# Patient Record
Sex: MALE | Race: BLACK OR AFRICAN AMERICAN | Employment: UNEMPLOYED | URBAN - METROPOLITAN AREA
[De-identification: names, ages, dates, MRNs, and addresses within clinical notes are randomized per-mention and may not be internally consistent; named-entity substitution may affect disease eponyms.]

---

## 2017-02-04 RX ORDER — LEVOTHYROXINE SODIUM 50 UG/1
50 TABLET ORAL
Qty: 90 TAB | Refills: 2 | Status: SHIPPED | OUTPATIENT
Start: 2017-02-04 | End: 2017-05-11 | Stop reason: SDUPTHER

## 2017-03-08 DIAGNOSIS — D50.9 IRON DEFICIENCY ANEMIA, UNSPECIFIED IRON DEFICIENCY ANEMIA TYPE: ICD-10-CM

## 2017-03-10 RX ORDER — MELATONIN
1000 DAILY
Qty: 90 TAB | Refills: 4 | Status: SHIPPED | OUTPATIENT
Start: 2017-03-10 | End: 2019-04-19 | Stop reason: SDUPTHER

## 2017-03-10 RX ORDER — FERROUS SULFATE 220 (44)/5
6.8 SOLUTION, ORAL ORAL DAILY
Qty: 473 ML | Refills: 3 | Status: SHIPPED | OUTPATIENT
Start: 2017-03-10 | End: 2017-05-01 | Stop reason: SDUPTHER

## 2017-03-29 DIAGNOSIS — G40.209 PARTIAL EPILEPSY WITH IMPAIRMENT OF CONSCIOUSNESS (HCC): ICD-10-CM

## 2017-03-29 RX ORDER — VALPROIC ACID 250 MG/5ML
SOLUTION ORAL
Qty: 500 ML | Refills: 1 | Status: SHIPPED | OUTPATIENT
Start: 2017-03-29 | End: 2017-08-18 | Stop reason: SDUPTHER

## 2017-03-29 NOTE — TELEPHONE ENCOUNTER
Requested Prescriptions     Pending Prescriptions Disp Refills    valproate (DEPAKENE) 250 mg/5 mL syrup 500 mL 1     Sig: Take 10 ml (500 mg) in morning and 10 ml(500mg) in the evening.

## 2017-05-01 DIAGNOSIS — D50.9 IRON DEFICIENCY ANEMIA, UNSPECIFIED IRON DEFICIENCY ANEMIA TYPE: ICD-10-CM

## 2017-05-01 RX ORDER — FERROUS SULFATE 220 (44)/5
6.8 SOLUTION, ORAL ORAL DAILY
Qty: 473 ML | Refills: 3 | Status: SHIPPED | OUTPATIENT
Start: 2017-05-01 | End: 2017-05-11 | Stop reason: SDUPTHER

## 2017-05-09 ENCOUNTER — OFFICE VISIT (OUTPATIENT)
Dept: INTERNAL MEDICINE CLINIC | Age: 52
End: 2017-05-09

## 2017-05-09 VITALS
HEART RATE: 69 BPM | DIASTOLIC BLOOD PRESSURE: 68 MMHG | SYSTOLIC BLOOD PRESSURE: 109 MMHG | WEIGHT: 141 LBS | HEIGHT: 63 IN | BODY MASS INDEX: 24.98 KG/M2 | OXYGEN SATURATION: 97 % | RESPIRATION RATE: 12 BRPM | TEMPERATURE: 97.9 F

## 2017-05-09 DIAGNOSIS — Z12.11 SCREEN FOR COLON CANCER: ICD-10-CM

## 2017-05-09 DIAGNOSIS — Z13.39 SCREENING FOR ALCOHOLISM: ICD-10-CM

## 2017-05-09 DIAGNOSIS — Z00.00 ROUTINE GENERAL MEDICAL EXAMINATION AT A HEALTH CARE FACILITY: Primary | ICD-10-CM

## 2017-05-09 NOTE — PROGRESS NOTES
This is an Initial Medicare Annual Wellness Exam (AWV) (Performed 12 months after IPPE or effective date of Medicare Part B enrollment, Once in a lifetime)    I have reviewed the patient's medical history in detail and updated the computerized patient record. History     Past Medical History:   Diagnosis Date    Anemia     Anxiety     Blindness of left eye     Cerebral palsy (HCC)     DEMENTIA     Down syndrome     Endocrine disease     thyroid disorder    GERD (gastroesophageal reflux disease)     Ill-defined condition     blind in left eye    Ill-defined condition     imparied gait - uses walker    Ill-defined condition     dermatitis    Ill-defined condition     cerebral palsy    Ill-defined condition     prone to decubitus ulcers    Ill-defined condition     anemia    Neurological disorder     Other ill-defined conditions(799.89)     Down syndrome    Psychiatric disorder     mental retardation    Psychiatric disorder     anxiety disorder    Se[facility]ures [facility] [address]     Sleep apnea     Thyroid disease     Unspecified epilepsy without mention of intractable epilepsy ([facility] [address]       Past Surgical History:   Procedure Laterality Date    HX OTHER SURGICAL  03/13/2015     [facility]  Dr. Richie Driscoll. David  @ [address]     Current Outpatient Prescriptions   Medication Sig Dispense Refill    multivit w-min-ferrous gluconate (CEROVITE) 9 mg iron/15 mL oral liquid Take  by mouth daily.  ferrous sulfate 220 mg (44 mg iron)/5 mL solution Take 6.8 mL by mouth daily. 473 mL 3    valproate (DEPAKENE) 250 mg/5 mL syrup Take 10 ml (500 mg) in morning and 10 ml(500mg) in the evening. 500 mL 1    cholecalciferol (VITAMIN D3) 1,000 unit tablet Take 1 Tab by mouth daily. 90 Tab 4    levothyroxine (SYNTHROID) 50 mcg tablet Take 1 Tab by mouth Daily (before breakfast). 90 Tab 2    HYDROcodone-acetaminophen (XODOL) 7.5-300 mg tablet Take  by mouth.  phenytoin (DILANTIN-125) suspension Take 4 mL by mouth two (2) times a day.  2 Bottle 5    levETIRAcetam (KEPPRA) 100 mg/mL solution 15ml by mouth twice a day. 600 mL 5    OTHER Benefiber Clear Powder. Mix two teaspoonfuls in 4-8 oz of suitable liquid and give by mouth twice daily as needed.  chlorhexidine (PERIDEX) 0.12 % solution 15 mL by Swish and Spit route two (2) times a day. Allergies   Allergen Reactions    Morphine Not Reported This Time    Tegretol [Carbamazepine] Other (comments)     \"bad reaction\" \"changes attitude\"     Family History   Problem Relation Age of Onset    Hypertension Mother     Cancer Mother     Heart Disease Father     Diabetes Father     Hypertension Father     Diabetes Brother     Cancer Maternal Grandmother      Social History   Substance Use Topics    Smoking status: Never Smoker    Smokeless tobacco: Never Used    Alcohol use No     Patient Active Problem List   Diagnosis Code    Recurrent seizures ([facility] [address] G40.909    Partial epilepsy with impairment of consciousness ([facility] [address] G40.209    Ataxia R27.0    Memory loss R41.3    Seizure ([address] R56.9    S/P placement of VNS (vagus nerve stimulation) device Z96.89    ACP (advance care planning) Z71.89         Depression Risk Factor Screening:     PHQ over the last two weeks 12/10/2015   Little interest or pleasure in doing things Not at all   Feeling down, depressed or hopeless Not at all   Total Score PHQ 2 0     Alcohol Risk Factor Screening: On any occasion during the past 3 months, have you had more than 4 drinks containing alcohol? No    Do you average more than 14 drinks per week? No    Functional Ability and Level of Safety:     Hearing Loss   none    Activities of Daily Living   Total assistance. Requires assistance with: ambulation, bathing and hygiene, feeding, continence, grooming, toileting and dressing    Fall Risk   No flowsheet data found.   Abuse Screen   Patient is not abused    Review of Systems   A comprehensive review of systems was negative except for that written in the HPI. Non responsive  inwheel chair, walks with assistance. Says one word phrases. Physical Examination     No exam data present    Evaluation of Cognitive Function:  Mood/affect:  neutral  Appearance: older than stated age  Family member/caregiver input: none- [address] [place], [place] [address]Truly worker    GEN: No apparent distress. Alert ,non verbal    EYES:  Conjunctiva clear; pupils round and reactive to light; extraocular movements are intact. EAR: External ears are normal.  Tympanic membranes are clear and without effusion. NOSE: Turbinates are within normal limits. No drainage  OROPHYARYNX: No oral lesions or exudates. NECK:  Supple; no masses; thyroid normal           LUNGS: Respirations unlabored; clear to auscultation bilaterally  CARDIOVASCULAR: Regular, rate, and rhythm without murmurs, gallops or rubs   ABDOMEN: Soft; nontender; nondistended; normoactive bowel sounds; no masses or organomegaly  NEUROLOGIC:  No focal neurologic deficits. Strength and sensation grossly intact. Coordination and gait grossly intact. EXT: Well perfused. No edema. SKIN: No obvious rashes. In wheelchair. Patient Care Team:  David Chavira MD as PCP - General (Family Practice)  Hubert Kiran NP as Nurse Practitioner (Neurology)  Jayy Powell MD as Surgeon (General Surgery)  Ben Johnston MD (Neurology)  Sharon Givens MD (Neurology)    Advice/Referrals/Counseling   Education and counseling provided:  Are appropriate based on today's review and evaluation  End-of-Life planning (with patient's consent)  Colorectal cancer screening tests      Assessment/Plan       ICD-10-CM ICD-9-CM    1. Routine general medical examination at a health care facility Z00.00 V70.0 OCCULT BLOOD, IMMUNOASSAY (FIT)   2. Screening for alcoholism Z13.89 V79.1    3. Screen for colon cancer Z12.11 V76.51 OCCULT BLOOD, IMMUNOASSAY (FIT)     Review to see of colonoscopy was done on 2015 referral- GI.    Annual FIT test. Follow-up Disposition:  Return for [facility].   Deloise Gowers, MD

## 2017-05-09 NOTE — MR AVS SNAPSHOT
Visit Information Date & Time Provider Department Dept. Phone Encounter #  
 5/9/2017  2:15 PM Todd Martin MD [facility] and [place] [id number] Follow-up Instructions Return for [facility]. Upcoming Health Maintenance Date Due DTaP/Tdap/Td series (1 - Tdap) 2/16/1986 FOBT Q 1 YEAR AGE 50-75 12/10/2016 INFLUENZA AGE 9 TO ADULT 8/1/2017 Allergies as of 5/9/2017  Review Complete On: 11/9/2016 By: Todd Martin MD  
  
 Severity Noted Reaction Type Reactions Morphine  01/22/2016    Not Reported This Time Tegretol [Carbamazepine]  01/21/2013    Other (comments) \"bad reaction\" \"changes attitude\" Current Immunizations  Reviewed on 12/10/2015 Name Date Influenza Vaccine Intradermal PF 11/13/2014 TB Skin Test (PPD) Intradermal 4/5/2016, 1/29/2013 Not reviewed this visit You Were Diagnosed With   
  
 Codes Comments Routine general medical examination at a health care facility    -  Primary ICD-10-CM: Z00.00 ICD-9-CM: V70.0 Screening for alcoholism     ICD-10-CM: Z13.89 ICD-9-CM: V79.1 Screen for colon cancer     ICD-10-CM: Z12.11 ICD-9-CM: V76.51 Vitals BP Pulse Temp Resp Height(growth percentile) Weight(growth percentile) 109/68 69 97.9 °F (36.6 °C) 12 5' 3\" (1.6 m) 141 lb (64 kg) SpO2 BMI Smoking Status 97% 24.98 kg/m2 Never Smoker BMI and BSA Data Body Mass Index Body Surface Area 24.98 kg/m 2 1.69 m 2 Preferred Pharmacy Pharmacy Name Phone [facility] [phone] Your Updated Medication List  
  
   
This list is accurate as of: 5/9/17  3:03 PM.  Always use your most recent med list.  
  
  
  
  
 CEROVITE 9 mg iron/15 mL oral liquid Generic drug:  multivit w-min-ferrous gluconate Take  by mouth daily. chlorhexidine 0.12 % solution Commonly known as:  PERIDEX 15 mL by Swish and Spit route two (2) times a day. cholecalciferol 1,000 unit tablet Commonly known as:  VITAMIN D3 Take 1 Tab by mouth daily. ferrous sulfate 220 mg (44 mg iron)/5 mL solution Take 6.8 mL by mouth daily. HYDROcodone-acetaminophen 7.5-300 mg tablet Commonly known as:  Bernette Just Take  by mouth.  
  
 levETIRAcetam 100 mg/mL solution Commonly known as:  KEPPRA 15ml by mouth twice a day. levothyroxine 50 mcg tablet Commonly known as:  SYNTHROID Take 1 Tab by mouth Daily (before breakfast). OTHER Benefiber Clear Powder. Mix two teaspoonfuls in 4-8 oz of suitable liquid and give by mouth twice daily as needed. phenytoin suspension Commonly known as:  GTINETPO-257 Take 4 mL by mouth two (2) times a day. valproate 250 mg/5 mL syrup Commonly known as:  Ulysess Cinnamon Take 10 ml (500 mg) in morning and 10 ml(500mg) in the evening. We Performed the Following OCCULT BLOOD, IMMUNOASSAY (FIT) [id number] CPT(R)] Follow-up Instructions Return for [facility]. Introducing [facility]! Larry Pearson introduces [employer] patient portal. Now you can access parts of your medical record, email your doctor's office, and request medication refills online. 1. In your internet browser, go to [email or web address] 2. Click on the First Time User? Click Here link in the Sign In box. You will see the New Member Sign Up page. 3. Enter your [employer] Access Code exactly as it appears below. You will not need to use this code after youve completed the sign-up process. If you do not sign up before the expiration date, you must request a new code. · [employer] Access Code: [email or web address] Expires: 8/7/2017  3:03 PM 
 
4. Enter the last four digits of your Social Security Number (xxxx) and Date of Birth (mm/dd/yyyy) as indicated and click Submit.  You will be taken to the next sign-up page. 5. Create a [employer] ID. This will be your [employer] login ID and cannot be changed, so think of one that is secure and easy to remember. 6. Create a [employer] password. You can change your password at any time. 7. Enter your Password Reset Question and Answer. This can be used at a later time if you forget your password. 8. Enter your e-mail address. You will receive e-mail notification when new information is available in [address] 9. Click Sign Up. You can now view and download portions of your medical record. 10. Click the Download Summary menu link to download a portable copy of your medical information. If you have questions, please visit the Frequently Asked Questions section of the [employer] website. Remember, [employer] is NOT to be used for urgent needs. For medical emergencies, dial 911. Now available from your iPhone and Android! Please provide this summary of care documentation to your next provider. Your primary care clinician is listed as [employer] If you have any questions after today's visit, please call [phone].

## 2017-05-11 DIAGNOSIS — D50.9 IRON DEFICIENCY ANEMIA, UNSPECIFIED IRON DEFICIENCY ANEMIA TYPE: ICD-10-CM

## 2017-05-11 RX ORDER — FERROUS SULFATE 220 (44)/5
6.8 SOLUTION, ORAL ORAL DAILY
Qty: 473 ML | Refills: 3 | Status: SHIPPED | OUTPATIENT
Start: 2017-05-11 | End: 2018-02-03

## 2017-05-11 RX ORDER — LEVOTHYROXINE SODIUM 50 UG/1
50 TABLET ORAL
Qty: 90 TAB | Refills: 2 | Status: SHIPPED | OUTPATIENT
Start: 2017-05-11 | End: 2018-05-23 | Stop reason: SDUPTHER

## 2017-08-18 DIAGNOSIS — G40.209 PARTIAL EPILEPSY WITH IMPAIRMENT OF CONSCIOUSNESS (HCC): ICD-10-CM

## 2017-08-18 NOTE — TELEPHONE ENCOUNTER
Requested Prescriptions     Pending Prescriptions Disp Refills    valproate (DEPAKENE) 250 mg/5 mL syrup 500 mL 0     Sig: Take 10 ml (500 mg) in morning and 10 ml(500mg) in the evening. Patient needs to schedule an appointment to continue refills.

## 2017-08-22 RX ORDER — VALPROIC ACID 250 MG/5ML
SOLUTION ORAL
Qty: 500 ML | Refills: 0 | Status: SHIPPED | OUTPATIENT
Start: 2017-08-22 | End: 2017-10-17 | Stop reason: SDUPTHER

## 2017-10-17 ENCOUNTER — OFFICE VISIT (OUTPATIENT)
Dept: NEUROLOGY | Age: 52
End: 2017-10-17

## 2017-10-17 VITALS
OXYGEN SATURATION: 98 % | SYSTOLIC BLOOD PRESSURE: 100 MMHG | HEART RATE: 82 BPM | DIASTOLIC BLOOD PRESSURE: 64 MMHG | HEIGHT: 63 IN

## 2017-10-17 DIAGNOSIS — G40.209 PARTIAL EPILEPSY WITH IMPAIRMENT OF CONSCIOUSNESS (HCC): ICD-10-CM

## 2017-10-17 DIAGNOSIS — Z51.81 THERAPEUTIC DRUG MONITORING: Primary | ICD-10-CM

## 2017-10-17 DIAGNOSIS — Z96.89 S/P PLACEMENT OF VNS (VAGUS NERVE STIMULATION) DEVICE: ICD-10-CM

## 2017-10-17 RX ORDER — PHENYTOIN 125 MG/5ML
100 SUSPENSION ORAL 2 TIMES DAILY
Qty: 2 BOTTLE | Refills: 5 | Status: ON HOLD | OUTPATIENT
Start: 2017-10-17 | End: 2018-02-07

## 2017-10-17 RX ORDER — LEVETIRACETAM 100 MG/ML
SOLUTION ORAL
Qty: 600 ML | Refills: 5 | Status: SHIPPED | OUTPATIENT
Start: 2017-10-17 | End: 2018-10-19 | Stop reason: SDUPTHER

## 2017-10-17 RX ORDER — VALPROIC ACID 250 MG/5ML
SOLUTION ORAL
Qty: 500 ML | Refills: 5 | Status: ON HOLD | OUTPATIENT
Start: 2017-10-17 | End: 2018-02-07

## 2017-10-17 NOTE — MR AVS SNAPSHOT
Visit Information Date & Time Provider Department Dept. Phone Encounter #  
 10/17/2017  2:30 PM Luciano Husbands, NP Neurology [facility] at [facility] [phone] [id number] Your Appointments 4/20/2018  2:20 PM  
Follow Up with Winter Torrez MD  
Neurology Clinic at [facility]) Appt Note: Follow up $0CP tdb 10/17/17  
 [address], 
[address] [place] [address], [address] [phone]  
[address] [facility], [address] [place] [address], [address] [phone] Upcoming Health Maintenance Date Due DTaP/Tdap/Td series (1 - Tdap) 2/16/1986 FOBT Q 1 YEAR AGE 50-75 12/10/2016 INFLUENZA AGE 9 TO ADULT 8/1/2017 Allergies as of 10/17/2017  Review Complete On: 10/17/2017 By: Ankita Husbands, NP Severity Noted Reaction Type Reactions Morphine  01/22/2016    Not Reported This Time Tegretol [Carbamazepine]  01/21/2013    Other (comments) \"bad reaction\" \"changes attitude\" Current Immunizations  Reviewed on 12/10/2015 Name Date Influenza Vaccine Intradermal PF 11/13/2014 TB Skin Test (PPD) Intradermal 4/5/2016, 1/29/2013 Not reviewed this visit You Were Diagnosed With   
  
 Codes Comments Therapeutic drug monitoring    -  Primary ICD-10-CM: Z51.81 
ICD-9-CM: V58.83 Partial epilepsy with impairment of consciousness ([facility] [address]     ICD-10-CM: C63.806 ICD-9-CM: 345.40 S/P placement of VNS (vagus nerve stimulation) device     ICD-10-CM: Z96.89 
ICD-9-CM: V45.89 Vitals BP Pulse Height(growth percentile) SpO2 Smoking Status 100/64 82 5' 3\" (1.6 m) 98% Never Smoker Preferred Pharmacy Pharmacy Name Phone [facility] [phone] Your Updated Medication List  
  
   
This list is accurate as of: 10/17/17  3:09 PM.  Always use your most recent med list.  
  
  
  
  
 CEROVITE 9 mg iron/15 mL oral liquid Generic drug:  multivit w-min-ferrous gluconate Take  by mouth daily. chlorhexidine 0.12 % solution Commonly known as:  PERIDEX 15 mL by Swish and Spit route two (2) times a day. cholecalciferol 1,000 unit tablet Commonly known as:  VITAMIN D3 Take 1 Tab by mouth daily. ferrous sulfate 220 mg (44 mg iron)/5 mL solution Take 6.8 mL by mouth daily. HYDROcodone-acetaminophen 7.5-300 mg tablet Commonly known as:  Ashley Faith Take  by mouth.  
  
 levETIRAcetam 100 mg/mL solution Commonly known as:  KEPPRA 15ml by mouth twice a day. levothyroxine 50 mcg tablet Commonly known as:  SYNTHROID Take 1 Tab by mouth Daily (before breakfast). OTHER Benefiber Clear Powder. Mix two teaspoonfuls in 4-8 oz of suitable liquid and give by mouth twice daily as needed. phenytoin suspension Commonly known as:  [id number] Take 4 mL by mouth two (2) times a day. valproate 250 mg/5 mL syrup Commonly known as:  Gaby Rivera Take 10 ml (500 mg) in morning and 10 ml(500mg) in the evening. Prescriptions Sent to Pharmacy Refills  
 valproate (DEPAKENE) 250 mg/5 mL syrup 5 Sig: Take 10 ml (500 mg) in morning and 10 ml(500mg) in the evening. Class: Normal  
 Pharmacy: [address] [place] [address] Ph #: [phone]  
 phenytoin ([id number]) suspension 5 Sig: Take 4 mL by mouth two (2) times a day. Class: Normal  
 Pharmacy: [address] [place] [address] Ph #: [phone] Route: Oral  
 levETIRAcetam (KEPPRA) 100 mg/mL solution 5 Sig: 15ml by mouth twice a day. Class: Normal  
 Pharmacy: [address] [place] [address] Ph #: [phone] We Performed the Following LEVETIRACETAM (KEPPRA) [id number] CPT(R)] PHENYTOIN, TOTAL & FREE [id number] CPT(R)] WA ELEC ROCAEL NSTIM PLS GEN SMPL SC/PERPH W/PRGRMG [57021 CPT(R)] VALPROIC ACID [74309 CPT(R)] Patient Instructions PRESCRIPTION REFILL POLICY [facility] Statement to Patients April 1, 2014 In an effort to ensure the large volume of patient prescription refills is processed in the most efficient and expeditious manner, we are asking our patients to assist us by calling your Pharmacy for all prescription refills, this will include also your  Mail Order Pharmacy. The pharmacy will contact our office electronically to continue the refill process. Please do not wait until the last minute to call your pharmacy. We need at least 48 hours (2days) to fill prescriptions. We also encourage you to call your pharmacy before going to  your prescription to make sure it is ready. With regard to controlled substance prescription refill requests (narcotic refills) that need to be picked up at our office, we ask your cooperation by providing us with at least 72 hours (3days) notice that you will need a refill. We will not refill narcotic prescription refill requests after 4:00pm on any weekday, Monday through Thursday, or after 2:00pm on Fridays, or on the weekends. We encourage everyone to explore another way of getting your prescription refill request processed using [employer] ([employer]), our patient web portal through our electronic medical record system. [employer] ([employer]) is an efficient and effective way to communicate your medication request directly to the office and  downloadable as an abi on your smart phone . [employer] ([employer]) also features a review functionality that allows you to view your medication list as well as leave messages for your physician. Are you ready to get connected? If so please review the attatched instructions or speak to any of our staff to get you set up right away! Thank you so much for your cooperation.  Should you have any questions please contact our Practice Administrator. The Physicians and Staff,  [facility] A Healthy Lifestyle: Care Instructions Your Care Instructions A healthy lifestyle can help you feel good, stay at a healthy weight, and have plenty of energy for both work and play. A healthy lifestyle is something you can share with your whole family. A healthy lifestyle also can lower your risk for serious health problems, such as high blood pressure, heart disease, and diabetes. You can follow a few steps listed below to improve your health and the health of your family. Follow-up care is a key part of your treatment and safety. Be sure to make and go to all appointments, and call your doctor if you are having problems. Its also a good idea to know your test results and keep a list of the medicines you take. How can you care for yourself at home? · Do not eat too much sugar, fat, or fast foods. You can still have dessert and treats now and then. The goal is moderation. · Start small to improve your eating habits. Pay attention to portion sizes, drink less juice and soda pop, and eat more fruits and vegetables. ¨ Eat a healthy amount of food. A 3-ounce serving of meat, for example, is about the size of a deck of cards. Fill the rest of your plate with vegetables and whole grains. ¨ Limit the amount of soda and sports drinks you have every day. Drink more water when you are thirsty. ¨ Eat at least 5 servings of fruits and vegetables every day. It may seem like a lot, but it is not hard to reach this goal. A serving or helping is 1 piece of fruit, 1 cup of vegetables, or 2 cups of leafy, raw vegetables. Have an apple or some carrot sticks as an afternoon snack instead of a candy bar. Try to have fruits and/or vegetables at every meal. 
· Make exercise part of your daily routine. You may want to start with simple activities, such as walking, bicycling, or slow swimming.  Try to be active 30 to 60 minutes every day. You do not need to do all 30 to 60 minutes all at once. For example, you can exercise 3 times a day for 10 or 20 minutes. Moderate exercise is safe for most people, but it is always a good idea to talk to your doctor before starting an exercise program. 
· Keep moving. Rosita Meza the lawn, work in the garden, or [employer]. Take the stairs instead of the elevator at work. · If you smoke, quit. People who smoke have an increased risk for heart attack, stroke, cancer, and other lung illnesses. Quitting is hard, but there are ways to boost your chance of quitting tobacco for good. ¨ Use nicotine gum, patches, or lozenges. ¨ Ask your doctor about stop-smoking programs and medicines. ¨ Keep trying. In addition to reducing your risk of diseases in the future, you will notice some benefits soon after you stop using tobacco. If you have shortness of breath or asthma symptoms, they will likely get better within a few weeks after you quit. · Limit how much alcohol you drink. Moderate amounts of alcohol (up to 2 drinks a day for men, 1 drink a day for women) are okay. But drinking too much can lead to liver problems, high blood pressure, and other health problems. Family health If you have a family, there are many things you can do together to improve your health. · Eat meals together as a family as often as possible. · Eat healthy foods. This includes fruits, vegetables, lean meats and dairy, and whole grains. · Include your family in your fitness plan. Most people think of activities such as jogging or tennis as the way to fitness, but there are many ways you and your family can be more active. Anything that makes you breathe hard and gets your heart pumping is exercise. Here are some tips: 
¨ Walk to do errands or to take your child to school or the bus. ¨ Go for a family bike ride after dinner instead of watching TV. Where can you learn more? Go to http://marcello-cynthia.info/. Enter L838 in the search box to learn more about \"A Healthy Lifestyle: Care Instructions. \" Current as of: July 26, 2016 Content Version: 11.3 © 6822-7354 [employer]. Care instructions adapted under license by [employer] (which disclaims liability or warranty for this information). If you have questions about a medical condition or this instruction, always ask your healthcare professional. [address] any warranty or liability for your use of this information. Introducing [facility]! [employer] introduces [employer] patient portal. Now you can access parts of your medical record, email your doctor's office, and request medication refills online. 1. In your internet browser, go to [email or web address] 2. Click on the First Time User? Click Here link in the Sign In box. You will see the New Member Sign Up page. 3. Enter your [employer] Access Code exactly as it appears below. You will not need to use this code after youve completed the sign-up process. If you do not sign up before the expiration date, you must request a new code. · [employer] Access Code: [email or web address] Expires: 1/15/2018  3:09 PM 
 
4. Enter the last four digits of your Social Security Number (xxxx) and Date of Birth (mm/dd/yyyy) as indicated and click Submit. You will be taken to the next sign-up page. 5. Create a [employer] ID. This will be your [employer] login ID and cannot be changed, so think of one that is secure and easy to remember. 6. Create a [employer] password. You can change your password at any time. 7. Enter your Password Reset Question and Answer. This can be used at a later time if you forget your password. 8. Enter your e-mail address. You will receive e-mail notification when new information is available in [address] 9. Click Sign Up.  You can now view and download portions of your medical record. 10. Click the Download Summary menu link to download a portable copy of your medical information. If you have questions, please visit the Frequently Asked Questions section of the [facility] website. Remember, [facility] is NOT to be used for urgent needs. For medical emergencies, dial 911. Now available from your iPhone and Android! Please provide this summary of care documentation to your next provider. Your primary care clinician is listed as Luis Parker. If you have any questions after today's visit, please call [phone].

## 2017-10-17 NOTE — PROGRESS NOTES
Date:            2017    Name:  Shannon Crisostomo  :  1965  MRN:  [id number]     PCP:  Juli Lala MD    Chief Complaint   Patient presents with    Follow-up    Seizure         HISTORY OF PRESENT ILLNESS:  Bruce Zhang is a 46 y.o., male who presents today for follow up for seizures. He was having a lot of breakthrough seizures when he is seen last October, so Dilantin was added back onto his regimen. This has dramatically decreased his seizure frequency. He also has a VNS, and is on Depakote and Keppra. He has not had any big seizures in a while per his caregiver, nothing like his old seizures. He will jerk briefly and get stiff, with his magnet and with caregivers talking to him he will come out of it. This might happen once every few months. He is able to walk with walker at home, uses a wheelchair for long distances. No changes in his health. He is not excessively tired, he might be a little more drowsy some days than others but is able to remain engaged at his day center. No new problems with his health.    10.21.2016 recap  Bruce Zhang is a 46 y.o., male who presents today for follow up for seizures. He has been having seizures more frequently, he had 3 yesterday. The night before he had 5. These usually last about 1 minute. On 10.14.16 and 10.15.16 he had bad seizures. First lasted about 4 minutes, magnet didn't work. Second lasted about 3 minutes. If family is able to see it start and use the magnet right away, seizures will be shorter. If they miss the start, they may last a few minutes. When he has a seizure, he will stiffen and jerk, sometimes scream. Makes a gurgling sound in his throat, and will drool or food will come out of his mouth. VNS magnet does reduce duration and severity of seizures. Frequency has increased since stopping dilantin. This was stopped because of balance concerns. Family member thinks that his balance gets worse when he comes off of dilantin. Usually he can walk, although he might use furniture to steady himself. Since stopping, he is unable to do so. His caregiver has to carry a fair amount of his weight, it's as if he's forgetting how to walk. Current Outpatient Prescriptions   Medication Sig    valproate (DEPAKENE) 250 mg/5 mL syrup Take 10 ml (500 mg) in morning and 10 ml(500mg) in the evening. Patient needs to schedule an appointment to continue refills.  levothyroxine (SYNTHROID) 50 mcg tablet Take 1 Tab by mouth Daily (before breakfast).  ferrous sulfate 220 mg (44 mg iron)/5 mL solution Take 6.8 mL by mouth daily.  multivit w-min-ferrous gluconate (CEROVITE) 9 mg iron/15 mL oral liquid Take  by mouth daily.  cholecalciferol (VITAMIN D3) 1,000 unit tablet Take 1 Tab by mouth daily.  HYDROcodone-acetaminophen (XODOL) 7.5-300 mg tablet Take  by mouth.  phenytoin (DILANTIN-125) suspension Take 4 mL by mouth two (2) times a day.  levETIRAcetam (KEPPRA) 100 mg/mL solution 15ml by mouth twice a day.  OTHER Benefiber Clear Powder. Mix two teaspoonfuls in 4-8 oz of suitable liquid and give by mouth twice daily as needed.  chlorhexidine (PERIDEX) 0.12 % solution 15 mL by Swish and Spit route two (2) times a day. No current facility-administered medications for this visit.       Allergies   Allergen Reactions    Morphine Not Reported This Time    Tegretol [Carbamazepine] Other (comments)     \"bad reaction\" \"changes attitude\"     Past Medical History:   Diagnosis Date    Anemia     Anxiety     Blindness of left eye     [facility] [address] [address]     DEMENTIA     Down syndrome     Endocrine disease     thyroid disorder    GERD (gastroesophageal reflux disease)     Ill-defined condition     blind in left eye    Ill-defined condition     imparied gait - uses walker    Ill-defined condition     dermatitis    Ill-defined condition     cerebral palsy    Ill-defined condition     prone to decubitus ulcers    Ill-defined condition     anemia    Neurological disorder     Other ill-defined conditions(799.89)     Down syndrome    Psychiatric disorder     mental retardation    Psychiatric disorder     anxiety disorder    Seizures ([facility] [address]     Sleep apnea     Thyroid disease     Unspecified epilepsy without mention of intractable epilepsy [facility])      Past Surgical History:   Procedure Laterality Date    HX OTHER SURGICAL  03/13/2015     VNS  Dr. Elizabeth Castaneda. Izabela Downing  @ [facility]     Social History     Social History    Marital status: SINGLE     Spouse name: N/A    Number of children: N/A    Years of education: N/A     Occupational History    Not on file. Social History Main Topics    Smoking status: Never Smoker    Smokeless tobacco: Never Used    Alcohol use No    Drug use: No    Sexual activity: Not on file     Other Topics Concern    Not on file     Social History Narrative     Family History   Problem Relation Age of Onset    Hypertension Mother     Cancer Mother     Heart Disease Father     Diabetes Father     Hypertension Father     Diabetes Brother     Cancer Maternal Grandmother          PHYSICAL EXAMINATION:    Visit Vitals    /64    Pulse 82    Ht 5' 3\" (1.6 m)    SpO2 98%     General:  Well defined, nourished, and groomed individual in no acute distress. Neck: Supple, nontender, no bruits, no pain with resistance to active range of motion. Heart: Regular rate and rhythm, no murmurs, rub, or gallop. Normal S1S2. Lungs:  Clear to auscultation bilaterally with equal chest expansion, no cough, no wheeze  Musculoskeletal:  Extremities revealed no edema and had full range of motion of joints. Psych:  Good mood and bright affect    NEUROLOGICAL EXAMINATION:     Mental Status:   Alert, unable to follow simple commands, nonverbal.      Cranial Nerves:    II, III, IV, VI:  Visual acuity grossly intact. Right pupil round and reactive light, left eye clouded.   Extra-ocular movements are full and fluid. No ptosis or nystagmus. V-XII: Hearing is grossly intact. Facial features are symmetric, with normal sensation and strength. The palate rises symmetrically and the tongue protrudes midline. Sternocleidomastoids 5/5. Motor Examination: NT, does not follow commands. Coordination:  No resting or intention tremor  Gait and Station: [employer]. No muscle wasting or fasciculations noted. VNS interrogated, sufficient battery life, no autostim  Output Current 1.5  Signal Frequency 20  Pulse Width 250  Signal On Time 30  Signal Off Time 5.00    Magnet   Output Current 1.75  Pulse Width 250  Signal On Time 60      ASSESSMENT AND PLAN    ICD-10-CM ICD-9-CM    1. Therapeutic drug monitoring Z51.81 V58.83 LEVETIRACETAM (KEPPRA)      VALPROIC ACID      PHENYTOIN, TOTAL & FREE   2. Partial epilepsy with impairment of consciousness (HCC) G40.209 345.40 valproate (DEPAKENE) 250 mg/5 mL syrup      phenytoin (DILANTIN-125) suspension      levETIRAcetam (KEPPRA) 100 mg/mL solution      LEVETIRACETAM (KEPPRA)      VALPROIC ACID      PHENYTOIN, TOTAL & FREE      MT ELEC ROCAEL NSTIM PLS GEN SMPL SC/PERPH W/PRGRMG   3. S/P placement of VNS (vagus nerve stimulation) device Z96.89 V45.89 MT ELEC ROCAEL NSTIM PLS GEN SMPL SC/PERPH W/PRGRMG     22-year-old male seen in follow-up for seizures. He continues to have occasional breakthrough seizures despite being on Dilantin, Depakote, Keppra and having a VNS. Dilantin was added back last year due to higher frequency of breakthrough seizures, and they have decreased in frequency. He is a little bit drowsy at times, but able to engage in his day program.     1.  Continue Dilantin 100 mg (4 ml) bid  2. Continue Keppra 1500 (15 ml) mg bid  3. Continue depakote 500 mg (10 ml) bid  4. VNS current and magnet current increased    Follow-up in 6 months, call sooner with concerns      Yared Lopez NP    This note was created using voice recognition software.  Despite editing, there may be syntax errors.

## 2017-10-17 NOTE — PATIENT INSTRUCTIONS
[facility]   Statement to Patients  April 1, 2014      In an effort to ensure the large volume of patient prescription refills is processed in the most efficient and expeditious manner, we are asking our patients to assist us by calling your Pharmacy for all prescription refills, this will include also your  Mail Order Pharmacy. The pharmacy will contact our office electronically to continue the refill process. Please do not wait until the last minute to call your pharmacy. We need at least 48 hours (2days) to fill prescriptions. We also encourage you to call your pharmacy before going to  your prescription to make sure it is ready. With regard to controlled substance prescription refill requests (narcotic refills) that need to be picked up at our office, we ask your cooperation by providing us with at least 72 hours (3days) notice that you will need a refill. We will not refill narcotic prescription refill requests after 4:00pm on any weekday, Monday through Thursday, or after 2:00pm on Fridays, or on the weekends. We encourage everyone to explore another way of getting your prescription refill request processed using [employer], our patient web portal through our electronic medical record system. [employer] is an efficient and effective way to communicate your medication request directly to the office and  downloadable as an abi on your smart phone . [employer] also features a review functionality that allows you to view your medication list as well as leave messages for your physician. Are you ready to get connected? If so please review the attatched instructions or speak to any of our staff to get you set up right away! Thank you so much for your cooperation. Should you have any questions please contact our Practice Administrator.     The Physicians and Staff,  [facility]          A Healthy Lifestyle: Care Instructions  Your Care Instructions  A healthy lifestyle can help you feel good, stay at a healthy weight, and have plenty of energy for both work and play. A healthy lifestyle is something you can share with your whole family. A healthy lifestyle also can lower your risk for serious health problems, such as high blood pressure, heart disease, and diabetes. You can follow a few steps listed below to improve your health and the health of your family. Follow-up care is a key part of your treatment and safety. Be sure to make and go to all appointments, and call your doctor if you are having problems. Its also a good idea to know your test results and keep a list of the medicines you take. How can you care for yourself at home? · Do not eat too much sugar, fat, or fast foods. You can still have dessert and treats now and then. The goal is moderation. · Start small to improve your eating habits. Pay attention to portion sizes, drink less juice and soda pop, and eat more fruits and vegetables. ¨ Eat a healthy amount of food. A 3-ounce serving of meat, for example, is about the size of a deck of cards. Fill the rest of your plate with vegetables and whole grains. ¨ Limit the amount of soda and sports drinks you have every day. Drink more water when you are thirsty. ¨ Eat at least 5 servings of fruits and vegetables every day. It may seem like a lot, but it is not hard to reach this goal. A serving or helping is 1 piece of fruit, 1 cup of vegetables, or 2 cups of leafy, raw vegetables. Have an apple or some carrot sticks as an afternoon snack instead of a candy bar. Try to have fruits and/or vegetables at every meal.  · Make exercise part of your daily routine. You may want to start with simple activities, such as walking, bicycling, or slow swimming. Try to be active 30 to 60 minutes every day. You do not need to do all 30 to 60 minutes all at once. For example, you can exercise 3 times a day for 10 or 20 minutes.  Moderate exercise is safe for most people, but it is always a good idea to talk to your doctor before starting an exercise program.  · Keep moving. Ralph Brown the lawn, work in the garden, or [employer]. Take the stairs instead of the elevator at work. · If you smoke, quit. People who smoke have an increased risk for heart attack, stroke, cancer, and other lung illnesses. Quitting is hard, but there are ways to boost your chance of quitting tobacco for good. ¨ Use nicotine gum, patches, or lozenges. ¨ Ask your doctor about stop-smoking programs and medicines. ¨ Keep trying. In addition to reducing your risk of diseases in the future, you will notice some benefits soon after you stop using tobacco. If you have shortness of breath or asthma symptoms, they will likely get better within a few weeks after you quit. · Limit how much alcohol you drink. Moderate amounts of alcohol (up to 2 drinks a day for men, 1 drink a day for women) are okay. But drinking too much can lead to liver problems, high blood pressure, and other health problems. Family health  If you have a family, there are many things you can do together to improve your health. · Eat meals together as a family as often as possible. · Eat healthy foods. This includes fruits, vegetables, lean meats and dairy, and whole grains. · Include your family in your fitness plan. Most people think of activities such as jogging or tennis as the way to fitness, but there are many ways you and your family can be more active. Anything that makes you breathe hard and gets your heart pumping is exercise. Here are some tips:  ¨ Walk to do errands or to take your child to school or the bus. ¨ Go for a family bike ride after dinner instead of watching TV. Where can you learn more? Go to http://marcello-cynthia.info/. Enter C476 in the search box to learn more about \"A Healthy Lifestyle: Care Instructions. \"  Current as of: July 26, 2016  Content Version: 11.3  © 5654-8266 [employer]. Care instructions adapted under license by [employer] (which disclaims liability or warranty for this information). If you have questions about a medical condition or this instruction, always ask your healthcare professional. [address] any warranty or liability for your use of this information.

## 2017-10-31 LAB
LEVETIRACETAM SERPL-MCNC: 11.7 UG/ML (ref 10–40)
PHENYTOIN FREE SERPL-MCNC: 0.9 UG/ML (ref 1–2)
PHENYTOIN SERPL-MCNC: 9.5 UG/ML (ref 10–20)
VALPROATE SERPL-MCNC: 30 UG/ML (ref 50–100)

## 2017-12-27 ENCOUNTER — HOSPITAL ENCOUNTER (EMERGENCY)
Age: 52
Discharge: HOME | End: 2017-12-27
Attending: FAMILY MEDICINE

## 2017-12-27 VITALS
HEIGHT: 63 IN | BODY MASS INDEX: 26.05 KG/M2 | OXYGEN SATURATION: 100 % | DIASTOLIC BLOOD PRESSURE: 87 MMHG | HEART RATE: 92 BPM | RESPIRATION RATE: 22 BRPM | TEMPERATURE: 98.8 F | SYSTOLIC BLOOD PRESSURE: 140 MMHG | WEIGHT: 147 LBS

## 2017-12-27 DIAGNOSIS — J06.9 ACUTE UPPER RESPIRATORY INFECTION: Primary | ICD-10-CM

## 2017-12-27 RX ORDER — LORATADINE 10 MG/1
10 TABLET ORAL DAILY
Qty: 10 TAB | Refills: 0 | Status: SHIPPED | OUTPATIENT
Start: 2017-12-27 | End: 2018-01-06

## 2017-12-27 NOTE — UC PROVIDER NOTE
Patient is a 46 y.o. male presenting with cold symptoms. The history is provided by a caregiver. Cold Symptoms    This is a new problem. The current episode started more than 1 week ago. The problem has not changed since onset. There has been no fever. Associated symptoms include congestion. Pertinent negatives include no chest pain and no cough. He has tried nothing for the symptoms. Past Medical History:   Diagnosis Date    Anemia     Anxiety     Blindness of left eye     Cerebral palsy (HCC)     DEMENTIA     Down syndrome     Endocrine disease     thyroid disorder    GERD (gastroesophageal reflux disease)     Ill-defined condition     blind in left eye    Ill-defined condition     imparied gait - uses walker    Ill-defined condition     dermatitis    Ill-defined condition     cerebral palsy    Ill-defined condition     prone to decubitus ulcers    Ill-defined condition     anemia    Neurological disorder     Other ill-defined conditions(799.89)     Down syndrome    Psychiatric disorder     mental retardation    Psychiatric disorder     anxiety disorder    Seizures ([address]     Sleep apnea     Thyroid disease     Unspecified epilepsy without mention of intractable epilepsy         Past Surgical History:   Procedure Laterality Date    HX OTHER SURGICAL  03/13/2015     VNS  Dr. Alirio Carrera. Roberto Downey  @ [id number] [facility]         Family History   Problem Relation Age of Onset    Hypertension Mother     Cancer Mother     Heart Disease Father     Diabetes Father     Hypertension Father     Diabetes Brother     Cancer Maternal Grandmother         Social History     Social History    Marital status: SINGLE     Spouse name: N/A    Number of children: N/A    Years of education: N/A     Occupational History    Not on file.      Social History Main Topics    Smoking status: Never Smoker    Smokeless tobacco: Never Used    Alcohol use No    Drug use: No    Sexual activity: Not on file     Other Topics Concern  Not on file     Social History Narrative                ALLERGIES: Morphine and Tegretol [carbamazepine]    Review of Systems   Constitutional: Negative for chills. HENT: Positive for congestion. Respiratory: Negative for cough. Cardiovascular: Negative for chest pain. Vitals:    12/27/17 1646   BP: 140/87   Pulse: 92   Resp: 22   Temp: 98.8 °F (37.1 °C)   SpO2: 100%   Weight: 66.7 kg (147 lb)   Height: 5' 3\" (1.6 m)       Physical Exam   Constitutional: He is oriented to person, place, and time. He appears well-developed and well-nourished. HENT:   Right Ear: External ear normal.   Left Ear: External ear normal.   Cardiovascular: Normal rate, regular rhythm and normal heart sounds. Pulmonary/Chest: Effort normal and breath sounds normal. No respiratory distress. He has no wheezes. He has no rales. Neurological: He is alert and oriented to person, place, and time. Skin: Skin is warm and dry. Psychiatric: He has a normal mood and affect. His behavior is normal. Judgment and thought content normal.   Nursing note and vitals reviewed. MDM     Differential Diagnosis; Clinical Impression; Plan:     CLINICAL IMPRESSION:  Acute upper respiratory infection  (primary encounter diagnosis)    Plan:  1. claritin   2.   3.   Risk of Significant Complications, Morbidity, and/or Mortality:   Presenting problems: Moderate  Diagnostic procedures: Moderate  Management options:   Moderate  Progress:   Patient progress:  Stable      Procedures

## 2017-12-27 NOTE — DISCHARGE INSTRUCTIONS
Upper Respiratory Infection (Cold): Care Instructions  Your Care Instructions    An upper respiratory infection, or URI, is an infection of the nose, sinuses, or throat. URIs are spread by coughs, sneezes, and direct contact. The common cold is the most frequent kind of URI. The flu and sinus infections are other kinds of URIs. Almost all URIs are caused by viruses. Antibiotics won't cure them. But you can treat most infections with home care. This may include drinking lots of fluids and taking over-the-counter pain medicine. You will probably feel better in 4 to 10 days. The doctor has checked you carefully, but problems can develop later. If you notice any problems or new symptoms, get medical treatment right away. Follow-up care is a key part of your treatment and safety. Be sure to make and go to all appointments, and call your doctor if you are having problems. It's also a good idea to know your test results and keep a list of the medicines you take. How can you care for yourself at home? · To prevent dehydration, drink plenty of fluids, enough so that your urine is light yellow or clear like water. Choose water and other caffeine-free clear liquids until you feel better. If you have kidney, heart, or liver disease and have to limit fluids, talk with your doctor before you increase the amount of fluids you drink. · Take an over-the-counter pain medicine, such as acetaminophen (Tylenol), ibuprofen (Advil, Motrin), or naproxen (Aleve). Read and follow all instructions on the label. · Before you use cough and cold medicines, check the label. These medicines may not be safe for young children or for people with certain health problems. · Be careful when taking over-the-counter cold or flu medicines and Tylenol at the same time. Many of these medicines have acetaminophen, which is Tylenol. Read the labels to make sure that you are not taking more than the recommended dose.  Too much acetaminophen (Tylenol) can be harmful. · Get plenty of rest.  · Do not smoke or allow others to smoke around you. If you need help quitting, talk to your doctor about stop-smoking programs and medicines. These can increase your chances of quitting for good. When should you call for help? Call 911 anytime you think you may need emergency care. For example, call if:  ? · You have severe trouble breathing. ?Call your doctor now or seek immediate medical care if:  ? · You seem to be getting much sicker. ? · You have new or worse trouble breathing. ? · You have a new or higher fever. ? · You have a new rash. ? Watch closely for changes in your health, and be sure to contact your doctor if:  ? · You have a new symptom, such as a sore throat, an earache, or sinus pain. ? · You cough more deeply or more often, especially if you notice more mucus or a change in the color of your mucus. ? · You do not get better as expected. Where can you learn more? Go to http://marcello-cynthia.info/. Enter C450 in the search box to learn more about \"Upper Respiratory Infection (Cold): Care Instructions. \"  Current as of: May 12, 2017  Content Version: 11.4  © 0793-9435 [employer]. Care instructions adapted under license by [employer] (which disclaims liability or warranty for this information). If you have questions about a medical condition or this instruction, always ask your healthcare professional. [place] [address] any warranty or liability for your use of this information.

## 2018-01-11 ENCOUNTER — OFFICE VISIT (OUTPATIENT)
Dept: INTERNAL MEDICINE CLINIC | Age: 53
End: 2018-01-11

## 2018-01-11 ENCOUNTER — HOSPITAL ENCOUNTER (OUTPATIENT)
Dept: LAB | Age: 53
Discharge: HOME | End: 2018-01-11
Payer: MEDICARE

## 2018-01-11 VITALS
SYSTOLIC BLOOD PRESSURE: 109 MMHG | HEIGHT: 63 IN | RESPIRATION RATE: 18 BRPM | TEMPERATURE: 98.4 F | HEART RATE: 107 BPM | BODY MASS INDEX: 26.97 KG/M2 | DIASTOLIC BLOOD PRESSURE: 75 MMHG | WEIGHT: 152.2 LBS | OXYGEN SATURATION: 99 %

## 2018-01-11 DIAGNOSIS — R09.81 NASAL CONGESTION: ICD-10-CM

## 2018-01-11 DIAGNOSIS — R56.9 SEIZURE (HCC): ICD-10-CM

## 2018-01-11 DIAGNOSIS — E03.9 ACQUIRED HYPOTHYROIDISM: Primary | ICD-10-CM

## 2018-01-11 DIAGNOSIS — Z76.89 ENCOUNTER TO ESTABLISH CARE WITH NEW DOCTOR: ICD-10-CM

## 2018-01-11 DIAGNOSIS — R79.89 LOW VITAMIN D LEVEL: ICD-10-CM

## 2018-01-11 PROBLEM — H54.40 BLIND LEFT EYE: Status: ACTIVE | Noted: 2018-01-11

## 2018-01-11 PROCEDURE — 84443 ASSAY THYROID STIM HORMONE: CPT

## 2018-01-11 PROCEDURE — 85025 COMPLETE CBC W/AUTO DIFF WBC: CPT

## 2018-01-11 PROCEDURE — 80053 COMPREHEN METABOLIC PANEL: CPT

## 2018-01-11 PROCEDURE — 82306 VITAMIN D 25 HYDROXY: CPT

## 2018-01-11 RX ORDER — LANOLIN ALCOHOL/MO/W.PET/CERES
CREAM (GRAM) TOPICAL
COMMUNITY
End: 2018-05-23 | Stop reason: SDUPTHER

## 2018-01-11 RX ORDER — LORATADINE 10 MG/1
10 TABLET ORAL DAILY
Qty: 30 TAB | Refills: 1 | Status: SHIPPED | OUTPATIENT
Start: 2018-01-11 | End: 2018-03-22 | Stop reason: SDUPTHER

## 2018-01-11 NOTE — LETTER
1/12/2018 12:59 PM 
 
Mr. So Sherwood [address] [place] [id number] Dear So Sherwood: 
 
Please find your most recent results below. Resulted Orders TSH AND FREE T4 Result Value Ref Range TSH 1.670 0.450 - 4.500 uIU/mL T4, Free 1.11 0.82 - 1.77 ng/dL Narrative Performed at:  [address]  [id number] : Kyler Byrne MD, Phone:  [phone] VITAMIN D, 25 HYDROXY Result Value Ref Range VITAMIN D, 25-HYDROXY 36.0 30.0 - 100.0 ng/mL Comment:  
   Vitamin D deficiency has been defined by the [address] [place] [address] practice guideline as a 
level of serum 25-OH vitamin D less than 20 ng/mL (1,2). The Endocrine Society went on to further define vitamin D 
insufficiency as a level between 21 and 29 ng/mL (2). 1. IOM ([place] of Medicine). 2010. Dietary reference 
   intakes for calcium and D. [address] [place] [address]: The 
   [employer]. 2. Ralf MF, José NC, Mercedes SANTOS, et al. 
   Evaluation, treatment, and prevention of vitamin D 
   deficiency: an Endocrine Society clinical practice 
   guideline. JCEM. 2011 Jul; 96(7):1911-30. Narrative Performed at:  [address]  [id number] : Kyler Byrne MD, Phone:  [phone] METABOLIC PANEL, COMPREHENSIVE Result Value Ref Range Glucose 97 65 - 99 mg/dL BUN 11 6 - 24 mg/dL Creatinine 0.69 (L) 0.76 - 1.27 mg/dL GFR est non- >59 mL/min/1.73 GFR est  >59 mL/min/1.73  
 BUN/Creatinine ratio 16 9 - 20 Sodium 142 134 - 144 mmol/L Potassium 4.7 3.5 - 5.2 mmol/L Chloride 101 96 - 106 mmol/L  
 CO2 28 18 - 29 mmol/L Calcium 9.1 8.7 - 10.2 mg/dL Protein, total 8.5 6.0 - 8.5 g/dL Albumin 3.8 3.5 - 5.5 g/dL GLOBULIN, TOTAL 4.7 (H) 1.5 - 4.5 g/dL A-G Ratio 0.8 (L) 1.2 - 2.2 Bilirubin, total <0.2 0.0 - 1.2 mg/dL Alk. phosphatase 97 39 - 117 IU/L  
 AST (SGOT) 26 0 - 40 IU/L  
 ALT (SGPT) 18 0 - 44 IU/L Narrative Performed at:  [address]  [id number] : Paty Solorio MD, Phone:  [phone] CBC WITH AUTOMATED DIFF Result Value Ref Range WBC 3.6 3.4 - 10.8 x10E3/uL  
 RBC 3.51 (L) 4.14 - 5.80 x10E6/uL HGB 12.4 (L) 13.0 - 17.7 g/dL HCT 36.1 (L) 37.5 - 51.0 %  (H) 79 - 97 fL  
 MCH 35.3 (H) 26.6 - 33.0 pg  
 MCHC 34.3 31.5 - 35.7 g/dL  
 RDW 14.1 12.3 - 15.4 % PLATELET 789 772 - 340 x10E3/uL NEUTROPHILS 58 Not Estab. % Lymphocytes 24 Not Estab. % MONOCYTES 14 Not Estab. % EOSINOPHILS 2 Not Estab. % BASOPHILS 1 Not Estab. %  
 ABS. NEUTROPHILS 2.2 1.4 - 7.0 x10E3/uL Abs Lymphocytes 0.9 0.7 - 3.1 x10E3/uL  
 ABS. MONOCYTES 0.5 0.1 - 0.9 x10E3/uL  
 ABS. EOSINOPHILS 0.1 0.0 - 0.4 x10E3/uL  
 ABS. BASOPHILS 0.0 0.0 - 0.2 x10E3/uL IMMATURE GRANULOCYTES 1 Not Estab. %  
 ABS. IMM. GRANS. 0.0 0.0 - 0.1 x10E3/uL Narrative Performed at:  [address]  [id number] : Paty Solorio MD, Phone:  [phone] RECOMMENDATIONS: 
 
1. Anemic. Hemoglobin level is 12.4. Take iron supplement twice a day. 2. Vit D, kidney, liver function is normal.  
 
3. Thyroid level is normal. Continue current regiment Please call me if you have any questions: [phone] Sincerely, 
 
 
Kathe Gale MD

## 2018-01-11 NOTE — PROGRESS NOTES
Chief Complaint   Patient presents with    New Patient     establishment    Nasal Congestion     Visit Vitals    /75 (BP 1 Location: Left arm, BP Patient Position: Sitting)    Pulse (!) 107    Temp 98.4 °F (36.9 °C) (Oral)    Resp 18    Ht 5' 3\" (1.6 m)    Wt 152 lb 3.2 oz (69 kg)    SpO2 99%    BMI 26.96 kg/m2     1. Have you been to the ER, urgent care clinic since your last visit? Hospitalized since your last visit? Yes Where: [facility]    2. Have you seen or consulted any other health care providers outside of the [address] [place] [address] since your last visit? Include any pap smears or colon screening.  Yes Where: Dr. Flavio Keller, neurology

## 2018-01-11 NOTE — MR AVS SNAPSHOT
Visit Information Date & Time Provider Department Dept. Phone Encounter #  
 1/11/2018 10:30 AM Mirza Moore MD [facility] Internal Medicine [phone] [id number] Follow-up Instructions Return in about 3 months (around 4/11/2018). Your Appointments 4/20/2018  2:20 PM  
Follow Up with Kwaku Bass MD  
[facility]) Appt Note: Follow up $0CP tdb 10/17/17  
 [address] [place] [address], 
[address] [facility], [address] [address]  
[address] [facility], [address] [facility], [address] [address] Upcoming Health Maintenance Date Due DTaP/Tdap/Td series (1 - Tdap) 2/16/1986 FOBT Q 1 YEAR AGE 50-75 12/10/2016 Influenza Age 5 to Adult 8/1/2017 Allergies as of 1/11/2018  Review Complete On: 1/11/2018 By: Peggy Hassan LPN Severity Noted Reaction Type Reactions Morphine  01/22/2016    Not Reported This Time Tegretol [Carbamazepine]  01/21/2013    Other (comments) \"bad reaction\" \"changes attitude\" Current Immunizations  Reviewed on 12/10/2015 Name Date Influenza Vaccine Intradermal PF 11/13/2014 TB Skin Test (PPD) Intradermal 4/5/2016, 1/29/2013 Not reviewed this visit You Were Diagnosed With   
  
 Codes Comments Acquired hypothyroidism    -  Primary ICD-10-CM: E03.9 ICD-9-CM: 244.9 Low vitamin D level     ICD-10-CM: E55.9 ICD-9-CM: 268.9 Seizure ([facility] [address]     ICD-10-CM: R56.9 ICD-9-CM: 780.39 Nasal congestion     ICD-10-CM: R09.81 ICD-9-CM: 478.19 Vitals BP Pulse Temp Resp Height(growth percentile) Weight(growth percentile) 109/75 (BP 1 Location: Left arm, BP Patient Position: Sitting) (!) 107 98.4 °F (36.9 °C) (Oral) 18 5' 3\" (1.6 m) 152 lb 3.2 oz (69 kg) SpO2 BMI Smoking Status 99% 26.96 kg/m2 Never Smoker Vitals History BMI and BSA Data Body Mass Index Body Surface Area 26.96 kg/m 2 1.75 m 2 Preferred Pharmacy Pharmacy Name Phone [facility] [phone] Your Updated Medication List  
  
   
This list is accurate as of: 1/11/18 12:02 PM.  Always use your most recent med list.  
  
  
  
  
 CEROVITE 9 mg iron/15 mL oral liquid Generic drug:  multivitamin-minerals-ferrous gluconate Take  by mouth daily. chlorhexidine 0.12 % solution Commonly known as:  PERIDEX 15 mL by Swish and Spit route two (2) times a day. cholecalciferol 1,000 unit tablet Commonly known as:  VITAMIN D3 Take 1 Tab by mouth daily. * ferrous sulfate 325 mg (65 mg iron) tablet Take  by mouth Daily (before breakfast). * ferrous sulfate 220 mg (44 mg iron)/5 mL solution Take 6.8 mL by mouth daily. levETIRAcetam 100 mg/mL solution Commonly known as:  KEPPRA 15ml by mouth twice a day. levothyroxine 50 mcg tablet Commonly known as:  SYNTHROID Take 1 Tab by mouth Daily (before breakfast). loratadine 10 mg tablet Commonly known as:  Levonia Beams Take 1 Tab by mouth daily. OTHER Benefiber Clear Powder. Mix two teaspoonfuls in 4-8 oz of suitable liquid and give by mouth twice daily as needed. phenytoin suspension Commonly known as:  PUGIZTAM-917 Take 4 mL by mouth two (2) times a day. valproate 250 mg/5 mL syrup Commonly known as:  Berrios Pages Take 10 ml (500 mg) in morning and 10 ml(500mg) in the evening. * Notice: This list has 2 medication(s) that are the same as other medications prescribed for you. Read the directions carefully, and ask your doctor or other care provider to review them with you. Prescriptions Sent to Pharmacy Refills  
 loratadine (CLARITIN) 10 mg tablet 1 Sig: Take 1 Tab by mouth daily. Class: Normal  
 Pharmacy: [address] [place] [address] Ph #: [phone] Route: Oral  
  
We Performed the Following CBC WITH AUTOMATED DIFF [63158 CPT(R)] METABOLIC PANEL, COMPREHENSIVE [29860 CPT(R)] TSH AND FREE T4 [57659 CPT(R)] VITAMIN D, 25 HYDROXY [id number] CPT(R)] Follow-up Instructions Return in about 3 months (around 4/11/2018). Introducing [facility]! [facility] introduces Mirror42 patient portal. Now you can access parts of your medical record, email your doctor's office, and request medication refills online. 1. In your internet browser, go to [email or web address] 2. Click on the First Time User? Click Here link in the Sign In box. You will see the New Member Sign Up page. 3. Enter your Mirror42 Access Code exactly as it appears below. You will not need to use this code after youve completed the sign-up process. If you do not sign up before the expiration date, you must request a new code. · Mirror42 Access Code: [email or web address] Expires: 1/15/2018  2:09 PM 
 
4. Enter the last four digits of your Social Security Number (xxxx) and Date of Birth (mm/dd/yyyy) as indicated and click Submit. You will be taken to the next sign-up page. 5. Create a Mirror42 ID. This will be your Mirror42 login ID and cannot be changed, so think of one that is secure and easy to remember. 6. Create a Mirror42 password. You can change your password at any time. 7. Enter your Password Reset Question and Answer. This can be used at a later time if you forget your password. 8. Enter your e-mail address. You will receive e-mail notification when new information is available in [address] 9. Click Sign Up. You can now view and download portions of your medical record. 10. Click the Download Summary menu link to download a portable copy of your medical information. If you have questions, please visit the Frequently Asked Questions section of the Mirror42 website.  Remember, Mirror42 is NOT to be used for urgent needs. For medical emergencies, dial 911. Now available from your iPhone and Android! Please provide this summary of care documentation to your next provider. Your primary care clinician is listed as Mirza Sharma. If you have any questions after today's visit, please call [phone].

## 2018-01-12 ENCOUNTER — TELEPHONE (OUTPATIENT)
Dept: INTERNAL MEDICINE CLINIC | Age: 53
End: 2018-01-12

## 2018-01-12 LAB
25(OH)D3+25(OH)D2 SERPL-MCNC: 36 NG/ML (ref 30–100)
ALBUMIN SERPL-MCNC: 3.8 G/DL (ref 3.5–5.5)
ALBUMIN/GLOB SERPL: 0.8 {RATIO} (ref 1.2–2.2)
ALP SERPL-CCNC: 97 IU/L (ref 39–117)
ALT SERPL-CCNC: 18 IU/L (ref 0–44)
AST SERPL-CCNC: 26 IU/L (ref 0–40)
BASOPHILS # BLD AUTO: 0 X10E3/UL (ref 0–0.2)
BASOPHILS NFR BLD AUTO: 1 %
BILIRUB SERPL-MCNC: <0.2 MG/DL (ref 0–1.2)
BUN SERPL-MCNC: 11 MG/DL (ref 6–24)
BUN/CREAT SERPL: 16 (ref 9–20)
CALCIUM SERPL-MCNC: 9.1 MG/DL (ref 8.7–10.2)
CHLORIDE SERPL-SCNC: 101 MMOL/L (ref 96–106)
CO2 SERPL-SCNC: 28 MMOL/L (ref 18–29)
CREAT SERPL-MCNC: 0.69 MG/DL (ref 0.76–1.27)
EOSINOPHIL # BLD AUTO: 0.1 X10E3/UL (ref 0–0.4)
EOSINOPHIL NFR BLD AUTO: 2 %
ERYTHROCYTE [DISTWIDTH] IN BLOOD BY AUTOMATED COUNT: 14.1 % (ref 12.3–15.4)
GLOBULIN SER CALC-MCNC: 4.7 G/DL (ref 1.5–4.5)
GLUCOSE SERPL-MCNC: 97 MG/DL (ref 65–99)
HCT VFR BLD AUTO: 36.1 % (ref 37.5–51)
HGB BLD-MCNC: 12.4 G/DL (ref 13–17.7)
IMM GRANULOCYTES # BLD: 0 X10E3/UL (ref 0–0.1)
IMM GRANULOCYTES NFR BLD: 1 %
LYMPHOCYTES # BLD AUTO: 0.9 X10E3/UL (ref 0.7–3.1)
LYMPHOCYTES NFR BLD AUTO: 24 %
MCH RBC QN AUTO: 35.3 PG (ref 26.6–33)
MCHC RBC AUTO-ENTMCNC: 34.3 G/DL (ref 31.5–35.7)
MCV RBC AUTO: 103 FL (ref 79–97)
MONOCYTES # BLD AUTO: 0.5 X10E3/UL (ref 0.1–0.9)
MONOCYTES NFR BLD AUTO: 14 %
NEUTROPHILS # BLD AUTO: 2.2 X10E3/UL (ref 1.4–7)
NEUTROPHILS NFR BLD AUTO: 58 %
PLATELET # BLD AUTO: 264 X10E3/UL (ref 150–379)
POTASSIUM SERPL-SCNC: 4.7 MMOL/L (ref 3.5–5.2)
PROT SERPL-MCNC: 8.5 G/DL (ref 6–8.5)
RBC # BLD AUTO: 3.51 X10E6/UL (ref 4.14–5.8)
SODIUM SERPL-SCNC: 142 MMOL/L (ref 134–144)
T4 FREE SERPL-MCNC: 1.11 NG/DL (ref 0.82–1.77)
TSH SERPL DL<=0.005 MIU/L-ACNC: 1.67 UIU/ML (ref 0.45–4.5)
WBC # BLD AUTO: 3.6 X10E3/UL (ref 3.4–10.8)

## 2018-01-12 NOTE — PROGRESS NOTES
Please call patient:    1. Anemic. Hemoglobin level is 12.4. Take iron supplement twice a day. 2. Vit D, kidney, liver function is normal.     3. Thyroid level is normal. Continue current regiment.

## 2018-01-12 NOTE — PROGRESS NOTES
Subjective:     Chief Complaint   Patient presents with    New Patient     establishment    Nasal Congestion        He  is a 46y.o. year old male who presents with his brother and caregiver from group home as a new patient to establish. patient is non verbal.  His medical history is significant for Down's syndrome, CP, seizure, hypothyroidism, low vit d. Has been following up with neurologist regularly for seizure. Reports that they have been noticing nasal congestion as well as runny nose in dinner time. Wondering what they should do. Reports that he had taken Claritin D in the past which helped him. He is currently on synthroid 50 mcg daily. Has not had blood work for more than year. He is currently on 1,00 iu vit D. Lab Results   Component Value Date/Time    VITAMIN D, 25-HYDROXY 18.2 11/09/2016 03:33 PM       He is supposed to use CPAP but he does not like to use that. He snores a lot. All info was obtained form info in the chart as well as from family member. Review of systems not obtained due to patient factors. Objective:     Vitals:    01/11/18 1103   BP: 109/75   Pulse: (!) 107   Resp: 18   Temp: 98.4 °F (36.9 °C)   TempSrc: Oral   SpO2: 99%   Weight: 152 lb 3.2 oz (69 kg)   Height: 5' 3\" (1.6 m)       Physical Examination: General appearance - alert, well appearing, and in no distress, oriented to person and normal appearing weight. Non verbal.   Left eye blind. Mental status - alert, oriented to person. Ears - bilateral TM's and external ear canals normal  Nose - nose is very crusted , right nostril is blocked due to dry crust  Mouth - mucous membranes moist, pharynx normal without lesions  Neck - supple, no significant adenopathy  Chest - clear to auscultation, no wheezes, rales or rhonchi, symmetric air entry  Heart - normal rate, regular rhythm, normal S1, S2, no murmurs, rubs, clicks or gallops  Neuro: Unable to walk without support. Abnormal gait.      Allergies   Allergen Reactions    Morphine Not Reported This Time    Tegretol [Carbamazepine] Other (comments)     \"bad reaction\" \"changes attitude\"      Social History     Social History    Marital status: SINGLE     Spouse name: N/A    Number of children: N/A    Years of education: N/A     Social History Main Topics    Smoking status: Never Smoker    Smokeless tobacco: Never Used    Alcohol use No    Drug use: No    Sexual activity: Not Currently     Other Topics Concern    None     Social History Narrative      Family History   Problem Relation Age of Onset    Hypertension Mother     Cancer Mother     Lupus Mother     Arthritis-osteo Mother     Heart Disease Mother     Heart Disease Father     Diabetes Father     Hypertension Father     Elevated Lipids Father     Diabetes Brother     Elevated Lipids Brother     Hypertension Brother     Mental Retardation Brother     Cancer Maternal Grandmother     Arthritis-osteo Maternal Grandmother     Alcohol abuse Maternal Grandfather     Cancer Maternal Grandfather     Arthritis-osteo Paternal Grandmother     Cancer Paternal Grandfather       Past Surgical History:   Procedure Laterality Date    HX OTHER SURGICAL  03/13/2015     STEVIE Alejandro Sender.  Aj Theresa  @ [address] [address] [facility]      Past Medical History:   Diagnosis Date    Anemia     Anxiety     Blindness of left eye     C[facility]bral palsy ([facility] [address]     DEMENTIA     Down syndrome     Endocrine disease     thyroid disorder    GERD (gastroesophageal reflux disease)     Ill-defined condition     blind in left eye    Ill-defined condition     imparied gait - uses walker    Ill-defined condition     dermatitis    Ill-defined condition     cerebral palsy    Ill-defined condition     prone to decubitus ulcers    Ill-defined condition     anemia    Neurological disorder     Other ill-defined conditions(799.89)     Down syndrome    Psychiatric disorder     mental retardation    Psychiatric disorder     anxiety disorder    [facility]     Sleep apnea     Thyroid disease     Unspecified epilepsy without mention of intractable epilepsy       Current Outpatient Prescriptions   Medication Sig Dispense Refill    ferrous sulfate 325 mg (65 mg iron) tablet Take  by mouth Daily (before breakfast).  loratadine (CLARITIN) 10 mg tablet Take 1 Tab by mouth daily. 30 Tab 1    valproate (DEPAKENE) 250 mg/5 mL syrup Take 10 ml (500 mg) in morning and 10 ml(500mg) in the evening. 500 mL 5    phenytoin (DILANTIN-125) suspension Take 4 mL by mouth two (2) times a day. 2 Bottle 5    levETIRAcetam (KEPPRA) 100 mg/mL solution 15ml by mouth twice a day. 600 mL 5    levothyroxine (SYNTHROID) 50 mcg tablet Take 1 Tab by mouth Daily (before breakfast). 90 Tab 2    ferrous sulfate 220 mg (44 mg iron)/5 mL solution Take 6.8 mL by mouth daily. 473 mL 3    multivit w-min-ferrous gluconate (CEROVITE) 9 mg iron/15 mL oral liquid Take  by mouth daily.  cholecalciferol (VITAMIN D3) 1,000 unit tablet Take 1 Tab by mouth daily. 90 Tab 4    OTHER Benefiber Clear Powder. Mix two teaspoonfuls in 4-8 oz of suitable liquid and give by mouth twice daily as needed.  chlorhexidine (PERIDEX) 0.12 % solution 15 mL by Swish and Spit route two (2) times a day. Assessment/ Plan:   Diagnoses and all orders for this visit:    1. Acquired hypothyroidism  -     TSH AND FREE T4        - continue current med . Will call with result and further recommendation. 2. Nasal congestion  -   Advised to use normal saline nasal spary to clean the nostril can use a bulb suction. -     loratadine (CLARITIN) 10 mg tablet; Take 1 Tab by mouth daily. 3. Low vitamin D level  -     VITAMIN D, 25 HYDROXY    - will call with result and further recommendation. 4. Seizure ([facility]  -    Stable. Continue follow up with neurologist.  -     METABOLIC PANEL, COMPREHENSIVE  -     CBC WITH AUTOMATED DIFF    5.  Encounter to establish care with new doctor       Spent > 40 minutes with patient. Medication risks/benefits/costs/interactions/alternatives discussed with patient. Advised patient to call back or return to office if symptoms worsen/change/persist. If patient cannot reach us or should anything more severe/urgent arise he/she should proceed directly to the nearest emergency department. Discussed expected course/resolution/complications of diagnosis in detail with patient. Patient given a written after visit summary which includes her diagnoses, current medications and vitals. Patient expressed understanding with the diagnosis and plan. Follow-up Disposition:  Return in about 3 months (around 4/11/2018).

## 2018-01-12 NOTE — TELEPHONE ENCOUNTER
----- Message from Jose Benz MD sent at 1/12/2018 12:53 PM EST -----  Please call patient:    1. Anemic. Hemoglobin level is 12.4. Take iron supplement twice a day. 2. Vit D, kidney, liver function is normal.     3. Thyroid level is normal. Continue current regiment.

## 2018-01-16 ENCOUNTER — TELEPHONE (OUTPATIENT)
Dept: INTERNAL MEDICINE CLINIC | Age: 53
End: 2018-01-16

## 2018-01-16 NOTE — TELEPHONE ENCOUNTER
Pt needs a prescription for group home for nasal spray, will not let him have it if there is not a prescription.

## 2018-02-03 ENCOUNTER — HOSPITAL ENCOUNTER (INPATIENT)
Age: 53
LOS: 4 days | Discharge: NURSING, REHAB OR LONG-TERM CARE | DRG: 871 | End: 2018-02-07
Attending: EMERGENCY MEDICINE | Admitting: HOSPITALIST
Payer: MEDICARE

## 2018-02-03 ENCOUNTER — APPOINTMENT (OUTPATIENT)
Dept: GENERAL RADIOLOGY | Age: 53
DRG: 871 | End: 2018-02-03
Attending: EMERGENCY MEDICINE
Payer: MEDICARE

## 2018-02-03 ENCOUNTER — APPOINTMENT (OUTPATIENT)
Dept: CT IMAGING | Age: 53
DRG: 871 | End: 2018-02-03
Attending: EMERGENCY MEDICINE
Payer: MEDICARE

## 2018-02-03 DIAGNOSIS — J18.9 PNEUMONIA OF LEFT LOWER LOBE DUE TO INFECTIOUS ORGANISM: ICD-10-CM

## 2018-02-03 DIAGNOSIS — R50.9 FEVER, UNSPECIFIED FEVER CAUSE: ICD-10-CM

## 2018-02-03 DIAGNOSIS — G40.901 STATUS EPILEPTICUS (HCC): Primary | ICD-10-CM

## 2018-02-03 DIAGNOSIS — N39.0 URINARY TRACT INFECTION WITHOUT HEMATURIA, SITE UNSPECIFIED: ICD-10-CM

## 2018-02-03 DIAGNOSIS — G40.209 PARTIAL EPILEPSY WITH IMPAIRMENT OF CONSCIOUSNESS (HCC): ICD-10-CM

## 2018-02-03 LAB
ALBUMIN SERPL-MCNC: 3.2 G/DL (ref 3.5–5)
ALBUMIN/GLOB SERPL: 0.5 {RATIO} (ref 1.1–2.2)
ALP SERPL-CCNC: 98 U/L (ref 45–117)
ALT SERPL-CCNC: 27 U/L (ref 12–78)
ANION GAP SERPL CALC-SCNC: 9 MMOL/L (ref 5–15)
APPEARANCE UR: ABNORMAL
AST SERPL-CCNC: ABNORMAL U/L (ref 15–37)
ATRIAL RATE: 119 BPM
BACTERIA URNS QL MICRO: NEGATIVE /HPF
BASOPHILS # BLD: 0 K/UL (ref 0–0.1)
BASOPHILS NFR BLD: 0 % (ref 0–1)
BILIRUB SERPL-MCNC: 0.2 MG/DL (ref 0.2–1)
BILIRUB UR QL: NEGATIVE
BUN SERPL-MCNC: 8 MG/DL (ref 6–20)
BUN/CREAT SERPL: 9 (ref 12–20)
CALCIUM SERPL-MCNC: 8.3 MG/DL (ref 8.5–10.1)
CALCULATED P AXIS, ECG09: 58 DEGREES
CALCULATED R AXIS, ECG10: 54 DEGREES
CALCULATED T AXIS, ECG11: 41 DEGREES
CHLORIDE SERPL-SCNC: 100 MMOL/L (ref 97–108)
CO2 SERPL-SCNC: 28 MMOL/L (ref 21–32)
COLOR UR: ABNORMAL
CREAT SERPL-MCNC: 0.91 MG/DL (ref 0.7–1.3)
DIAGNOSIS, 93000: NORMAL
DIFFERENTIAL METHOD BLD: ABNORMAL
EOSINOPHIL # BLD: 0 K/UL (ref 0–0.4)
EOSINOPHIL NFR BLD: 0 % (ref 0–7)
EPITH CASTS URNS QL MICRO: ABNORMAL /LPF
ERYTHROCYTE [DISTWIDTH] IN BLOOD BY AUTOMATED COUNT: 13.2 % (ref 11.5–14.5)
FLUAV AG NPH QL IA: NEGATIVE
FLUBV AG NOSE QL IA: NEGATIVE
GLOBULIN SER CALC-MCNC: 6.1 G/DL (ref 2–4)
GLUCOSE SERPL-MCNC: 84 MG/DL (ref 65–100)
GLUCOSE UR STRIP.AUTO-MCNC: NEGATIVE MG/DL
HCT VFR BLD AUTO: 35.1 % (ref 36.6–50.3)
HGB BLD-MCNC: 11.9 G/DL (ref 12.1–17)
HGB UR QL STRIP: NEGATIVE
HYALINE CASTS URNS QL MICRO: ABNORMAL /LPF (ref 0–5)
IMM GRANULOCYTES # BLD: 0.1 K/UL (ref 0–0.04)
IMM GRANULOCYTES NFR BLD AUTO: 1 % (ref 0–0.5)
KETONES UR QL STRIP.AUTO: NEGATIVE MG/DL
LACTATE SERPL-SCNC: 1.4 MMOL/L (ref 0.4–2)
LEUKOCYTE ESTERASE UR QL STRIP.AUTO: ABNORMAL
LYMPHOCYTES # BLD: 0.6 K/UL (ref 0.8–3.5)
LYMPHOCYTES NFR BLD: 9 % (ref 12–49)
MCH RBC QN AUTO: 35.4 PG (ref 26–34)
MCHC RBC AUTO-ENTMCNC: 33.9 G/DL (ref 30–36.5)
MCV RBC AUTO: 104.5 FL (ref 80–99)
MONOCYTES # BLD: 0.9 K/UL (ref 0–1)
MONOCYTES NFR BLD: 15 % (ref 5–13)
NEUTS SEG # BLD: 4.7 K/UL (ref 1.8–8)
NEUTS SEG NFR BLD: 75 % (ref 32–75)
NITRITE UR QL STRIP.AUTO: POSITIVE
NRBC # BLD: 0 K/UL (ref 0–0.01)
NRBC BLD-RTO: 0 PER 100 WBC
P-R INTERVAL, ECG05: 164 MS
PH UR STRIP: 8 [PH] (ref 5–8)
PHENYTOIN SERPL-MCNC: 4.4 UG/ML (ref 10–20)
PLATELET # BLD AUTO: 261 K/UL (ref 150–400)
PMV BLD AUTO: 9.4 FL (ref 8.9–12.9)
POTASSIUM SERPL-SCNC: ABNORMAL MMOL/L (ref 3.5–5.1)
PROT SERPL-MCNC: 9.3 G/DL (ref 6.4–8.2)
PROT UR STRIP-MCNC: NEGATIVE MG/DL
Q-T INTERVAL, ECG07: 326 MS
QRS DURATION, ECG06: 118 MS
QTC CALCULATION (BEZET), ECG08: 458 MS
RBC # BLD AUTO: 3.36 M/UL (ref 4.1–5.7)
RBC #/AREA URNS HPF: ABNORMAL /HPF (ref 0–5)
RBC MORPH BLD: ABNORMAL
RBC MORPH BLD: ABNORMAL
SODIUM SERPL-SCNC: 137 MMOL/L (ref 136–145)
SP GR UR REFRACTOMETRY: 1.02 (ref 1–1.03)
UR CULT HOLD, URHOLD: NORMAL
UROBILINOGEN UR QL STRIP.AUTO: 0.2 EU/DL (ref 0.2–1)
VALPROATE SERPL-MCNC: 42 UG/ML (ref 50–100)
VENTRICULAR RATE, ECG03: 119 BPM
WBC # BLD AUTO: 6.3 K/UL (ref 4.1–11.1)
WBC URNS QL MICRO: ABNORMAL /HPF (ref 0–4)

## 2018-02-03 PROCEDURE — 95816 EEG AWAKE AND DROWSY: CPT | Performed by: PSYCHIATRY & NEUROLOGY

## 2018-02-03 PROCEDURE — 93005 ELECTROCARDIOGRAM TRACING: CPT

## 2018-02-03 PROCEDURE — 65660000000 HC RM CCU STEPDOWN

## 2018-02-03 PROCEDURE — 77030034696 HC CATH URETH FOL 2W BARD -A

## 2018-02-03 PROCEDURE — 99285 EMERGENCY DEPT VISIT HI MDM: CPT

## 2018-02-03 PROCEDURE — 87086 URINE CULTURE/COLONY COUNT: CPT | Performed by: EMERGENCY MEDICINE

## 2018-02-03 PROCEDURE — 81001 URINALYSIS AUTO W/SCOPE: CPT | Performed by: EMERGENCY MEDICINE

## 2018-02-03 PROCEDURE — 36415 COLL VENOUS BLD VENIPUNCTURE: CPT | Performed by: EMERGENCY MEDICINE

## 2018-02-03 PROCEDURE — 74011250636 HC RX REV CODE- 250/636: Performed by: HOSPITALIST

## 2018-02-03 PROCEDURE — 83605 ASSAY OF LACTIC ACID: CPT | Performed by: EMERGENCY MEDICINE

## 2018-02-03 PROCEDURE — 96375 TX/PRO/DX INJ NEW DRUG ADDON: CPT

## 2018-02-03 PROCEDURE — 74011250637 HC RX REV CODE- 250/637: Performed by: HOSPITALIST

## 2018-02-03 PROCEDURE — 85025 COMPLETE CBC W/AUTO DIFF WBC: CPT | Performed by: EMERGENCY MEDICINE

## 2018-02-03 PROCEDURE — 80053 COMPREHEN METABOLIC PANEL: CPT | Performed by: EMERGENCY MEDICINE

## 2018-02-03 PROCEDURE — 71045 X-RAY EXAM CHEST 1 VIEW: CPT

## 2018-02-03 PROCEDURE — 80164 ASSAY DIPROPYLACETIC ACD TOT: CPT | Performed by: EMERGENCY MEDICINE

## 2018-02-03 PROCEDURE — 74011250637 HC RX REV CODE- 250/637: Performed by: EMERGENCY MEDICINE

## 2018-02-03 PROCEDURE — 87040 BLOOD CULTURE FOR BACTERIA: CPT | Performed by: EMERGENCY MEDICINE

## 2018-02-03 PROCEDURE — 74011250637 HC RX REV CODE- 250/637: Performed by: PSYCHIATRY & NEUROLOGY

## 2018-02-03 PROCEDURE — 80185 ASSAY OF PHENYTOIN TOTAL: CPT | Performed by: EMERGENCY MEDICINE

## 2018-02-03 PROCEDURE — 87804 INFLUENZA ASSAY W/OPTIC: CPT | Performed by: EMERGENCY MEDICINE

## 2018-02-03 PROCEDURE — 96365 THER/PROPH/DIAG IV INF INIT: CPT

## 2018-02-03 PROCEDURE — 96361 HYDRATE IV INFUSION ADD-ON: CPT

## 2018-02-03 PROCEDURE — 74011250636 HC RX REV CODE- 250/636: Performed by: EMERGENCY MEDICINE

## 2018-02-03 PROCEDURE — 51701 INSERT BLADDER CATHETER: CPT

## 2018-02-03 PROCEDURE — 70450 CT HEAD/BRAIN W/O DYE: CPT

## 2018-02-03 PROCEDURE — 96374 THER/PROPH/DIAG INJ IV PUSH: CPT

## 2018-02-03 PROCEDURE — 74011000250 HC RX REV CODE- 250: Performed by: EMERGENCY MEDICINE

## 2018-02-03 PROCEDURE — 74011000258 HC RX REV CODE- 258: Performed by: HOSPITALIST

## 2018-02-03 PROCEDURE — 74011000258 HC RX REV CODE- 258: Performed by: EMERGENCY MEDICINE

## 2018-02-03 RX ORDER — LEVETIRACETAM 100 MG/ML
1500 SOLUTION ORAL 2 TIMES DAILY
Status: DISCONTINUED | OUTPATIENT
Start: 2018-02-03 | End: 2018-02-03

## 2018-02-03 RX ORDER — SODIUM CHLORIDE 0.9 % (FLUSH) 0.9 %
5-10 SYRINGE (ML) INJECTION AS NEEDED
Status: DISCONTINUED | OUTPATIENT
Start: 2018-02-03 | End: 2018-02-07 | Stop reason: HOSPADM

## 2018-02-03 RX ORDER — SODIUM CHLORIDE 9 MG/ML
100 INJECTION, SOLUTION INTRAVENOUS CONTINUOUS
Status: DISCONTINUED | OUTPATIENT
Start: 2018-02-03 | End: 2018-02-05

## 2018-02-03 RX ORDER — VALPROIC ACID 250 MG/5ML
750 SOLUTION ORAL EVERY 12 HOURS
Status: DISCONTINUED | OUTPATIENT
Start: 2018-02-03 | End: 2018-02-07 | Stop reason: HOSPADM

## 2018-02-03 RX ORDER — SODIUM CHLORIDE 0.9 % (FLUSH) 0.9 %
5-10 SYRINGE (ML) INJECTION EVERY 8 HOURS
Status: DISCONTINUED | OUTPATIENT
Start: 2018-02-03 | End: 2018-02-07 | Stop reason: HOSPADM

## 2018-02-03 RX ORDER — MELATONIN
1000 DAILY
Status: DISCONTINUED | OUTPATIENT
Start: 2018-02-04 | End: 2018-02-07 | Stop reason: HOSPADM

## 2018-02-03 RX ORDER — ACETAMINOPHEN 500 MG
1000 TABLET ORAL
Status: COMPLETED | OUTPATIENT
Start: 2018-02-03 | End: 2018-02-03

## 2018-02-03 RX ORDER — PHENYTOIN 125 MG/5ML
100 SUSPENSION ORAL 3 TIMES DAILY
Status: DISCONTINUED | OUTPATIENT
Start: 2018-02-03 | End: 2018-02-07 | Stop reason: HOSPADM

## 2018-02-03 RX ORDER — LEVETIRACETAM 100 MG/ML
1500 SOLUTION ORAL EVERY 12 HOURS
Status: DISCONTINUED | OUTPATIENT
Start: 2018-02-03 | End: 2018-02-07 | Stop reason: HOSPADM

## 2018-02-03 RX ORDER — LORATADINE 10 MG/1
10 TABLET ORAL DAILY
Status: DISCONTINUED | OUTPATIENT
Start: 2018-02-04 | End: 2018-02-07 | Stop reason: HOSPADM

## 2018-02-03 RX ORDER — LEVOFLOXACIN 5 MG/ML
750 INJECTION, SOLUTION INTRAVENOUS ONCE
Status: COMPLETED | OUTPATIENT
Start: 2018-02-03 | End: 2018-02-03

## 2018-02-03 RX ORDER — LANOLIN ALCOHOL/MO/W.PET/CERES
1 CREAM (GRAM) TOPICAL
Status: DISCONTINUED | OUTPATIENT
Start: 2018-02-04 | End: 2018-02-07 | Stop reason: HOSPADM

## 2018-02-03 RX ORDER — LEVOTHYROXINE SODIUM 50 UG/1
50 TABLET ORAL
Status: DISCONTINUED | OUTPATIENT
Start: 2018-02-04 | End: 2018-02-07 | Stop reason: HOSPADM

## 2018-02-03 RX ORDER — VALPROIC ACID 250 MG/5ML
750 SOLUTION ORAL 2 TIMES DAILY
Status: DISCONTINUED | OUTPATIENT
Start: 2018-02-03 | End: 2018-02-03

## 2018-02-03 RX ORDER — ACETAMINOPHEN 325 MG/1
650 TABLET ORAL EVERY 6 HOURS
Status: DISCONTINUED | OUTPATIENT
Start: 2018-02-03 | End: 2018-02-03

## 2018-02-03 RX ORDER — ENOXAPARIN SODIUM 100 MG/ML
40 INJECTION SUBCUTANEOUS EVERY 24 HOURS
Status: DISCONTINUED | OUTPATIENT
Start: 2018-02-03 | End: 2018-02-07 | Stop reason: HOSPADM

## 2018-02-03 RX ADMIN — VALPROIC ACID 750 MG: 250 SOLUTION ORAL at 21:14

## 2018-02-03 RX ADMIN — LEVETIRACETAM 1500 MG: 100 SOLUTION ORAL at 23:39

## 2018-02-03 RX ADMIN — SODIUM CHLORIDE 100 ML/HR: 900 INJECTION, SOLUTION INTRAVENOUS at 15:48

## 2018-02-03 RX ADMIN — PHENYTOIN SODIUM 1000 MG: 50 INJECTION INTRAMUSCULAR; INTRAVENOUS at 12:16

## 2018-02-03 RX ADMIN — AZITHROMYCIN MONOHYDRATE 500 MG: 500 INJECTION, POWDER, LYOPHILIZED, FOR SOLUTION INTRAVENOUS at 15:47

## 2018-02-03 RX ADMIN — PIPERACILLIN SODIUM,TAZOBACTAM SODIUM 3.38 G: 3; .375 INJECTION, POWDER, FOR SOLUTION INTRAVENOUS at 13:37

## 2018-02-03 RX ADMIN — SODIUM CHLORIDE 1000 MG: 900 INJECTION, SOLUTION INTRAVENOUS at 12:13

## 2018-02-03 RX ADMIN — LEVOFLOXACIN 750 MG: 5 INJECTION, SOLUTION INTRAVENOUS at 14:14

## 2018-02-03 RX ADMIN — PIPERACILLIN AND TAZOBACTAM 10.12 G: 3; .375 INJECTION, POWDER, FOR SOLUTION INTRAVENOUS at 14:18

## 2018-02-03 RX ADMIN — SODIUM CHLORIDE 2067 ML: 900 INJECTION, SOLUTION INTRAVENOUS at 11:30

## 2018-02-03 RX ADMIN — Medication 10 ML: at 13:42

## 2018-02-03 RX ADMIN — ACETAMINOPHEN 1000 MG: 500 TABLET ORAL at 11:01

## 2018-02-03 RX ADMIN — SODIUM CHLORIDE 1000 MG: 900 INJECTION, SOLUTION INTRAVENOUS at 11:31

## 2018-02-03 RX ADMIN — CEFTRIAXONE 1 G: 1 INJECTION, POWDER, FOR SOLUTION INTRAMUSCULAR; INTRAVENOUS at 15:25

## 2018-02-03 RX ADMIN — Medication 10 ML: at 21:15

## 2018-02-03 RX ADMIN — PHENYTOIN 100 MG: 125 SUSPENSION ORAL at 21:14

## 2018-02-03 RX ADMIN — ACETAMINOPHEN 650 MG: 650 SOLUTION ORAL at 18:42

## 2018-02-03 RX ADMIN — ENOXAPARIN SODIUM 40 MG: 40 INJECTION SUBCUTANEOUS at 13:45

## 2018-02-03 NOTE — ED TRIAGE NOTES
TRIAGE NOTE: Patient arrives for nonproductive cough and low grade fever that started this morning. Patient with multiple seizures yesterday and last night, and one this morning. Patient with seizure hx, VNS device in place, but per care giver \"his seizures aren't back to back like this unless something else is going on\".

## 2018-02-03 NOTE — ED NOTES
Verbal shift change report given to Arturo Samaniego RN (oncoming nurse) by Autumn Mccracken RN (offgoing nurse). Report included the following information SBAR, ED Summary, Intake/Output, MAR, Recent Results and Cardiac Rhythm NSR     Patient in CT.

## 2018-02-03 NOTE — H&P
[facility]  ACUTE CARE HISTORY AND PHYSICAL    Farrukh DANIELSON  MR#: [id number]  : 1965  ACCOUNT #: [de-identified]   DATE OF SERVICE: 2018    PRIMARY CARE PHYSICIAN:  Dr. Jennifer Gillette. CHIEF COMPLAINT:  Fever, cough and seizures. HISTORY OF PRESENT ILLNESS:  The patient is a 72-year-old gentleman who has previous history significant for intractable seizures, Down's syndrome. He had vagal nerve stimulator. The patient currently resides at a group home, was brought in as he had a cough which started this am and was associated with fever. On further questioning, the group home coordinator tells me that he had 2-3 seizures last night and couple of them this morning, which is more than his baseline. In the emergency room, the patient was given 1 gram of Keppra and 1 gram of Dilantin as his Dilantin levels was 4.4 and Depakote level was 42. The patient's group home administrator says that there are some other visitors who are sick. In the emergency room further workup included a chest x-ray, which showed he has left lower lobe infiltrate. He was given IV fluids, Zosyn and Levaquin. The patient at this time is sleepy and not able to give much history. PAST MEDICAL HISTORY:    1. Down's syndrome. 2.  History of recurrent seizures. 3.  History of hypothyroidism. 4.  History of vagal nerve stimulator. 5.  History of GERD. 6.  Mental retardation. SOCIAL HISTORY:  The patient does not smoke or drink. He walks with a walker. PAST SURGICAL HISTORY:  Significant for vagal nerve stimulator insertion in . CODE STATUS:  FULL CODE. ALLERGIES:  INCLUDE MORPHINE AND TEGRETOL.     MEDICATIONS:  Prior to admission are not clear; however, it seems that patient is on vitamin D3 1000 units daily, ferrous sulfate 325 mg daily, Keppra 15 mL twice daily, this is 100 mL,  so he is on 1500 b.i.d., levothyroxine/Synthroid 50 mcg daily, Claritin 10 mg daily, Dilantin 125 mg 4 mL twice daily, Depakote 10 mL in the morning and 10 in the evening. REVIEW OF SYSTEMS:  Cannot be done because of patient's mental retardation. PHYSICAL EXAMINATION:  The patient is a 70-year-old gentleman, not in any acute distress. VITAL SIGNS:  In the ER, his maximum temperature was 102.7, blood pressure was 118/97, pulse is 106, respiratory rate is 20, saturation is 100%. HEENT:  Reveals pupils equally reacting to light and accommodation. NECK:  Supple. There is no adenopathy or JVD. LUNGS:  Clear. No wheezing or crackles. HEART:  S1 and S2 regular, no murmur, no S3.  ABDOMEN:  Reveals no tenderness, no guarding, no rigidity. Bowel sounds are active. EXTREMITIES:  No pedal edema. CENTRAL NERVOUS SYSTEM:  Patient is lethargic, but is moving all his extremities, follows commands. SKIN:  Unremarkable. LABORATORY DATA:  White count of 6.3, hemoglobin of 11.9, hematocrit 35.1, .5, platelet count is 991,477. His chemistries reveal sodium of 137, potassium is pending, chloride is 100, bicarbonate is 28, anion gap of 9, glucose 84, BUN is 8, creatinine 0.91, bilirubin 0.2, protein is 9.3, albumin 3.2, globulin 6.1, ALT is 27. Lactic acid is 1.4. Urinalysis revealed nitrite and small amount of leukocyte esterase. His influenza A and B titers are negative. Dilantin level is 4.4. Depakote level is 42. EKG shows sinus tachycardia with right bundle branch block. His chest x-ray shows left lower lobe infiltrate suggestive of pneumonia. ASSESSMENT AND PLAN:  The patient is a 70-year-old gentleman who has previous history significant for cerebral palsy, Down's syndrome, mental retardation, recurrent seizure, has vagal nerve stimulator placed in 2 years ago, currently resides in a nursing home, is admitted due to:     1. Cough and fever due to pneumonia. Patient will be started on Rocephin and Zithromax for community-acquired pneumonia. 2.  Recurrent seizure.   The patient's Dilantin and Depakote levels are low. He already has received Keppra and Dilantin 1 gram each. We will consult neurology and put him on seizure precaution. He has VGNS. 3.  History of hypothyroidism. Continue thyroid replacement. 4.  The patient has a slightly low hemoglobin. We will monitor. 5.  DVT prophylaxis. 6.  Obtain appropriate cultures. 7. Increase Depakote to 750 mg twice daily due to low levels.         Itzel Gant MD       [facility] /   D: 02/03/2018 12:25     T: 02/03/2018 13:19  JOB #: [id number]

## 2018-02-03 NOTE — PROGRESS NOTES
Admission Medication Reconciliation:    Information obtained from: Group home owner Leodan Greenfield (also NP)    Significant PMH/Disease States:   Past Medical History:   Diagnosis Date    Anemia     Anxiety     Blindness of left eye     Cerebral palsy ([address]     DEMENTIA     Down syndrome     Endocrine disease     thyroid disorder    GERD (gastroesophageal reflux disease)     Ill-defined condition     blind in left eye    Ill-defined condition     imparied gait - uses walker    Ill-defined condition     dermatitis    Ill-defined condition     cerebral palsy    Ill-defined condition     prone to decubitus ulcers    Ill-defined condition     anemia    Neurological disorder     Other ill-defined conditions(799.89)     Down syndrome    Psychiatric disorder     mental retardation    Psychiatric disorder     anxiety disorder    Seizures ([facility]     Sleep apnea     Thyroid disease     Unspecified epilepsy without mention of intractable epilepsy        Chief Complaint for this Admission:  Seizures, fever, cough    Allergies:  Morphine and Tegretol [carbamazepine]    Prior to Admission Medications:   Prior to Admission Medications   Prescriptions Last Dose Informant Patient Reported? Taking? OTHER 2/2/2018 at Unknown time  Yes Yes   Sig: Benefiber Clear Powder. Mix two teaspoonfuls in 4-8 oz of suitable liquid and give by mouth twice daily as needed. chlorhexidine (PERIDEX) 0.12 % solution 2/2/2018 at Unknown time  Yes Yes   Sig: 15 mL by Swish and Spit route two (2) times a day. Dental caries   cholecalciferol (VITAMIN D3) 1,000 unit tablet 2/2/2018 at Unknown time  No Yes   Sig: Take 1 Tab by mouth daily. ferrous sulfate 325 mg (65 mg iron) tablet 2/2/2018 at Unknown time  Yes Yes   Sig: Take  by mouth Daily (before breakfast). levETIRAcetam (KEPPRA) 100 mg/mL solution 2/2/2018 at Unknown time  No Yes   Sig: 15ml by mouth twice a day.    levothyroxine (SYNTHROID) 50 mcg tablet 2/2/2018 at Unknown time  No Yes   Sig: Take 1 Tab by mouth Daily (before breakfast). loratadine (CLARITIN) 10 mg tablet 2018 at Unknown time  No Yes   Sig: Take 1 Tab by mouth daily. multivit w-min-ferrous gluconate (CEROVITE) 9 mg iron/15 mL oral liquid 2018 at Unknown time  Yes Yes   Sig: Take  by mouth daily. phenytoin (DILANTIN-125) suspension 2018 at Unknown time  No Yes   Sig: Take 4 mL by mouth two (2) times a day. sodium chloride (SALINE NASAL) 0.65 % nasal spray 2018 at Unknown time  No Yes   [place] by Both Nostrils route as needed for Congestion. valproate (DEPAKENE) 250 mg/5 mL syrup 2018 at Unknown time  No Yes   Sig: Take 10 ml (500 mg) in morning and 10 ml(500mg) in the evening. Facility-Administered Medications: None         Comments/Recommendations: Patient did not participate in interview, was sleeping soundly (post-ictal?--apparently had four seizures today). Frida Estevez is group home owner and his NP, provided medication list and confirmed his allergies. Note that she is unclear why Morphine is listed as allergy, although I am hesitant to remove as allergy until patient can report for himself the nature of his reaction (if any). NP stated patient has taken LORTAB in the past without difficulty. Please note:  1. DYSPHAGIA per NP: Most meds should be crushed and administered in applesauce or provided in liquid formulation if possible. No changes to med list except to update administration times. Thank you for allowing me to participate in the care of your patient.     Jim PonceD, RN #[id number]

## 2018-02-03 NOTE — ROUTINE PROCESS
Bedside and Verbal shift change report given to Noa Borrego RN (oncoming nurse) by Susan Roldan RN (offgoing nurse). Report included the following information SBAR, Kardex, Intake/Output, MAR and Recent Results.

## 2018-02-03 NOTE — ED NOTES
TRANSFER - OUT REPORT:    Verbal report given to Catina(name) on [address]  being transferred to [facility](unit) for routine progression of care       Report consisted of patients Situation, Background, Assessment and   Recommendations(SBAR). Information from the following report(s) SBAR, Kardex, ED Summary, MAR, Med Rec Status and Cardiac Rhythm nsr/ sinus tach was reviewed with the receiving nurse. Lines:   Peripheral IV 02/03/18 Right Arm (Active)   Site Assessment Clean, dry, & intact 2/3/2018  2:55 PM   Phlebitis Assessment 0 2/3/2018  2:55 PM   Infiltration Assessment 0 2/3/2018  2:55 PM   Dressing Status Clean, dry, & intact 2/3/2018  2:55 PM   Dressing Type Transparent;Tape 2/3/2018  2:55 PM   Hub Color/Line Status Pink; Infusing 2/3/2018  2:55 PM   Action Taken Blood drawn 2/3/2018 10:13 AM   Alcohol Cap Used No 2/3/2018 10:13 AM       Peripheral IV 02/03/18 Left Antecubital (Active)   Site Assessment Clean, dry, & intact 2/3/2018  2:55 PM   Phlebitis Assessment 0 2/3/2018  2:55 PM   Infiltration Assessment 0 2/3/2018  2:55 PM   Dressing Status Clean, dry, & intact 2/3/2018  2:55 PM   Dressing Type Transparent;Tape 2/3/2018  2:55 PM   Hub Color/Line Status Pink; Infusing 2/3/2018  2:55 PM   Action Taken Blood drawn 2/3/2018 10:25 AM        Opportunity for questions and clarification was provided.       Patient transported with:   Monitor

## 2018-02-03 NOTE — CONSULTS
NEUROLOGY CONSULT NOTE    Name Zulma Nj Age 46 y.o. MRN [id number]  1965     Consulting Physician: Hugo Orellana. Milton Pruett MD      Chief Complaint:  Seizures     Assessment:     Principal Problem:    Pneumonia (2/3/2018)    Active Problems:    Recurrent seizures ([address] (2012)      46year old AAM h/o CP, non-verbal at baseline but follows commands, uses walker for ambulation, OS blindness/cataract, refractory seizure d/o s/p VNS presenting with increasing seizure activity provoked by comorbid infectious processes/PNA. AED levels are also subtherapeutic which is also likely contributing. He has been loaded with additional VPA, PHT and LEV in the ED. Head CT completed and without acute process. He is not presently back to his baseline due to post-ictal state. Recommendations:   Stat EEG  If continued somnolence/no improvement in mental status by 1700, please obtain MRI Brain WO to exclude acute intracranial process, although no focal deficits on examination presently. VPA increased to 750mg BID which is reasonable given subtherapeutic levels on admission  Would also increase PHT to 100mg TID  Cont. LEV 1500mg BID  Obtain levels of all AEDs in the AM  Seizure precautions  Management of PNA per primary team- please avoid Levofloxacin due to potential for lowered seizure threshold    Thank you very much for this referral. I appreciate the opportunity to participate in this patient's care. History of Present Illness: This is a 46 y.o.  left handed  male, we were asked to see for seizures. PMH notable for CP, non-verbal at baseline but follows commands, uses walker for ambulation, OS blindness 2/2 cataracts, seizure d/o s/p VNS. He presents from his group home due to increased seizure activity in the setting of new onset cough and fevers (Tm 102.7). At baseline per his brother at bedside, he has breakthrough seizure activity 3x weekly.   His family has noted at least 5 brief episodes of clonic UE activity lasting approximately 15 s today. Patient is presently non-responsive and unable to provide additional details. AED levels are subtherapeutic on admission, PHT 4.7 and VPA 42. He is maintained on VPA 500mg BID, LEV 1500 BID, PHT 100mg BID in addition to his VNS per outside records. CXR c/w LLL infiltrate/PNA now on antibiotics. Allergies   Allergen Reactions    Morphine Not Reported This Time    Tegretol [Carbamazepine] Other (comments)     \"bad reaction\" \"changes attitude\"        Prior to Admission medications    Medication Sig Start Date End Date Taking? Authorizing Provider   sodium chloride (SALINE NASAL) 0.65 % nasal spray 1 [place] by Both Nostrils route as needed for Congestion. 1/16/18  Yes Mirza Sharma MD   ferrous sulfate 325 mg (65 mg iron) tablet Take  by mouth Daily (before breakfast). Yes Historical Provider   loratadine (CLARITIN) 10 mg tablet Take 1 Tab by mouth daily. 1/11/18  Yes Mirza Sharma MD   valproate (DEPAKENE) 250 mg/5 mL syrup Take 10 ml (500 mg) in morning and 10 ml(500mg) in the evening. 10/17/17  Yes Uday Arana NP   phenytoin (DAOJZZJN-280) suspension Take 4 mL by mouth two (2) times a day. 10/17/17  Yes Uday Arana NP   levETIRAcetam (KEPPRA) 100 mg/mL solution 15ml by mouth twice a day. 10/17/17  Yes Uday Arana NP   levothyroxine (SYNTHROID) 50 mcg tablet Take 1 Tab by mouth Daily (before breakfast). 5/11/17  Yes Debra Waddell MD   multivit w-min-ferrous gluconate (CEROVITE) 9 mg iron/15 mL oral liquid Take  by mouth daily. Yes Historical Provider   cholecalciferol (VITAMIN D3) 1,000 unit tablet Take 1 Tab by mouth daily. 3/10/17  Yes Debra Waddell MD   OTHER Benefiber Clear Powder. Mix two teaspoonfuls in 4-8 oz of suitable liquid and give by mouth twice daily as needed. Yes Historical Provider   chlorhexidine (PERIDEX) 0.12 % solution 15 mL by Swish and Spit route two (2) times a day.  Dental caries   Yes Historical Provider       Past Medical History:   Diagnosis Date    Anemia     Anxiety     Blindness of left eye     Cerebral palsy ([address]     DEMENTIA     Down syndrome     Endocrine disease     thyroid disorder    GERD (gastroesophageal reflux disease)     Ill-defined condition     blind in left eye    Ill-defined condition     imparied gait - uses walker    Ill-defined condition     dermatitis    Ill-defined condition     cerebral palsy    Ill-defined condition     prone to decubitus ulcers    Ill-defined condition     anemia    Neurological disorder     Other ill-defined conditions(799.89)     Down syndrome    Psychiatric disorder     mental retardation    Psychiatric disorder     anxiety disorder    Seizures ([address]     Sleep apnea     Thyroid disease     Unspecified epilepsy without mention of intractable epilepsy         Past Surgical History:   Procedure Laterality Date    HX OTHER SURGICAL  03/13/2015     VNS  Dr. Jessica Montoya.  Xochilt Terry  @ [facility]        Social History   Substance Use Topics    Smoking status: Never Smoker    Smokeless tobacco: Never Used    Alcohol use No        Family History   Problem Relation Age of Onset    Hypertension Mother     Cancer Mother     Lupus Mother     Arthritis-osteo Mother     Heart Disease Mother     Heart Disease Father     Diabetes Father     Hypertension Father     Elevated Lipids Father     Diabetes Brother     Elevated Lipids Brother     Hypertension Brother     Mental Retardation Brother     Cancer Maternal Grandmother     Arthritis-osteo Maternal Grandmother     Alcohol abuse Maternal Grandfather     Cancer Maternal Grandfather     Arthritis-osteo Paternal Grandmother     Cancer Paternal Grandfather         Review of Systems:   Comprehensive review of systems: unable to obtain 2/2 post ictal state     Exam:     Visit Vitals    BP 90/46    Pulse 88    Temp 98.8 °F (37.1 °C)    Resp 18    Ht 5' 3\" (1.6 m)    Wt 68.6 kg (151 lb 3.2 oz)    SpO2 99%    BMI 26.78 kg/m2        General: Post-ictal, no commands   Head: Normocephalic, atraumatic, anicteric sclera   Lungs:  Diminished b/l   Cardiac: Regular rate and rhythm with no murmurs. Abd: Bowel sounds were audible. No tenderness on palpation   Ext: No pedal edema   Skin: No overt signs of rash     Neurological Exam:  Mental Status: Post-ictal, somnolent, no commands, arouses minimally to noxious stimulation   Speech: Non-verbal at baseline   Cranial Nerves:   Symmetric facial grimace, eyes ML, no blink threat b/l (OS blindness). Eyes: Pupils OD pinpoint reactive OS opacified, EOM-ML. Motor:  W/D to noxious stim throughout   Reflexes:   Deep tendon reflexes 1+/4 and symmetrical.  Plantar response is extensor b/l   Sensory:   W/D noxious throughout   Gait:  Unable to assess    Tremor:   No tremor noted. Cerebellar:  Unable to assess   Neurovascular: No carotid bruits. Imaging  CT Results (maximum last 3): Results from [place] encounter on 02/03/18   CT HEAD WO CONT   Narrative INDICATION: seizure    EXAM: CT HEAD without contrast.   CT dose reduction was achieved through use of a standardized protocol tailored  for this examination and automatic exposure control for dose modulation. COMPARISON: 2/19/2013. FINDINGS: Unenhanced CT Head is performed. There is no acute finding or  significant change. There is no acute infarct. Unchanged is mild global  cerebellar loss and a punctate hyperdensity in the region of the anterior third  ventricle/foramina of [place]. There is no bleed, shift, hydrocephalus or  extra-axial fluid collection. Bone windows are unremarkable. Impression IMPRESSION: No Acute Intracranial Disease Evident on Head CT. MRI Results (maximum last 3): No results found for this or any previous visit.     Lab Review  Lab Results   Component Value Date/Time    WBC 6.3 02/03/2018 10:15 AM    HCT 35.1 02/03/2018 10:15 AM    HGB 11.9 02/03/2018 10:15 AM    PLATELET 260 59/33/3977 10:15 AM     Lab Results   Component Value Date/Time    Sodium 137 02/03/2018 10:15 AM    Potassium HEMOLYZED,RECOLLECT REQUESTED 02/03/2018 10:15 AM    Chloride 100 02/03/2018 10:15 AM    CO2 28 02/03/2018 10:15 AM    Glucose 84 02/03/2018 10:15 AM    BUN 8 02/03/2018 10:15 AM    Creatinine 0.91 02/03/2018 10:15 AM    Calcium 8.3 02/03/2018 10:15 AM     No components found for: TROPQUANT  No results found for: LEAH    Signed:  Araceli New.  Alyssa Thomas DO  2/3/2018  2:23 PM

## 2018-02-03 NOTE — ED NOTES
Caregiver states patient had seizure #3. Notified MD.    While in room, patient had approx 10 second seizure.  Notified MD.

## 2018-02-03 NOTE — ED PROVIDER NOTES
HPI Comments: 46 y.o. male with past medical history significant for seizures (VNS device in place), down syndrome, cerebral palsy, sleep apnea, mental retardation, left eye blindness, and anxiety disorder who presents from group home for fever of 100.5F accompanied by cough since this morning. Per group home provider, pt had \"a few more seizures than normal\" with associated vomiting last night. Pt takes depakote, dilantin, and keppra. Group home provider denies any missed doses. Group home provider reports pt Hx of pneumonia but denies pt Hx of frequent UTI's. Per group home provider, possible ill contacts have been present at pt's day program. Pt is baseline nonverbal and follows some commands. Per group home provider, pt is \"not too far off\" from baseline; he is currently quieter than usual. Pt does not take prednisone or any immunosuppressants. No rash. There are no other acute medical concerns at this time. Social hx: never smoker, no alcohol or drug use  PCP: Toño Gonzalez MD    Full history, physical exam, and ROS unable to be obtained due to:  Mental status. Note written by Jennifer St, as dictated by [facility]. Guy Munroe MD 10:14 AM      The history is provided by the patient. No  was used.         Past Medical History:   Diagnosis Date    Anemia     Anxiety     Blindness of left eye     Cerebral palsy (HCC)     DEMENTIA     Down syndrome     Endocrine disease     thyroid disorder    GERD (gastroesophageal reflux disease)     Ill-defined condition     blind in left eye    Ill-defined condition     imparied gait - uses walker    Ill-defined condition     dermatitis    Ill-defined condition     cerebral palsy    Ill-defined condition     prone to decubitus ulcers    Ill-defined condition     anemia    Neurological disorder     Other ill-defined conditions(799.89)     Down syndrome    Psychiatric disorder     mental retardation    Psychiatric disorder anxiety disorder    Se[facility] [address]     Sleep apnea     Thyroid disease     Unspecified epilepsy without mention of intractable epilepsy        Past Surgical History:   Procedure Laterality Date    HX OTHER SURGICAL  03/13/2015     VNS  Dr. Hector Keller. Gwen Huizar  @ [address]         Family History:   Problem Relation Age of Onset    Hypertension Mother     Cancer Mother     Lupus Mother     Arthritis-osteo Mother     Heart Disease Mother     Heart Disease Father     Diabetes Father     Hypertension Father     Elevated Lipids Father     Diabetes Brother     Elevated Lipids Brother     Hypertension Brother     Mental Retardation Brother     Cancer Maternal Grandmother     Arthritis-osteo Maternal Grandmother     Alcohol abuse Maternal Grandfather     Cancer Maternal Grandfather     Arthritis-osteo Paternal Grandmother     Cancer Paternal Grandfather        Social History     Social History    Marital status: SINGLE     Spouse name: N/A    Number of children: N/A    Years of education: N/A     Occupational History    Not on file. Social History Main Topics    Smoking status: Never Smoker    Smokeless tobacco: Never Used    Alcohol use No    Drug use: No    Sexual activity: Not Currently     Other Topics Concern    Not on file     Social History Narrative         ALLERGIES: Morphine and Tegretol [carbamazepine]    Review of Systems   Unable to perform ROS: Patient nonverbal       Vitals:    02/03/18 0949 02/03/18 1003   BP: (!) 118/97    Pulse: (!) 113    Resp: 24    Temp: (!) 102.7 °F (39.3 °C) (!) 101.8 °F (38.8 °C)   SpO2: 100%    Height: 5' 3\" (1.6 m)             Physical Exam   Constitutional: He appears well-developed and well-nourished. No distress. HENT:   Head: Normocephalic and atraumatic. Bilateral TM's occluded by cerumen. Rhinorrhea noted   Eyes: No scleral icterus. Neck: Normal range of motion. Neck supple. No tracheal deviation present.    Cardiovascular: Regular rhythm, normal heart sounds and intact distal pulses. Exam reveals no gallop and no friction rub. No murmur heard. Tachycardia   Pulmonary/Chest: Effort normal. No respiratory distress. He has no wheezes. Rales at left base   Abdominal: Soft. He exhibits no distension. There is no tenderness. There is no rebound and no guarding. Musculoskeletal: He exhibits no edema. Neurological: He is alert. Skin: Skin is warm and dry. Skin warm to the touch. No skin rash noted. Psychiatric: He has a normal mood and affect. Nursing note and vitals reviewed. Note written by Manohar Lacey, as dictated by Jillian Griffin. Kamari Flores MD 10:15 AM      MDM  Number of Diagnoses or Management Options  Fever, unspecified fever cause:   Pneumonia of left lower lobe due to infectious organism [facility]):   Status epilepticus [facility]):   Urinary tract infection without hematuria, site unspecified:   Diagnosis management comments: 77-year-old male with history of seizures, nonverbal who presents with seizures and fever. Differential diagnosis includes pneumonia, sepsis, UTI, status. Chest x-ray shows left lower lobe infiltrate consistent with lung exam. Urinalysis shows positive nitrates. Treated with broad-spectrum antibiotics. Noted with Keppra empirically. Dilantin and valproic acid levels returned low. Given 1 g of each. 2 more episodes of seizure noted in ED. Admitted to hospitalist in serious condition. Critical Care  Total time providing critical care: (Total critical care time spent exclusive of procedures: 36 minutes  )    Patient Progress  Patient progress: improved        ED Course       Procedures    CONSULT NOTE:  12:16 PM Reymundo Flores MD spoke with Dr. Chasity Shabazz, Consult for Hospitalist.  Discussed available diagnostic tests and clinical findings. He is in agreement with care plans as outlined. Dr. Chasity Shabazz will evaluate and admit patient. ED EKG interpretation:  Rhythm: sinus tachycardia;   Rate (approx.): 119; Axis: normal; QRS interval: normal ; ST/T wave: non-specific changes;  Other findings:RBBB

## 2018-02-03 NOTE — PROGRESS NOTES
Day #1 of Zosyn  Indication:  CAP  Current regimen:  Zosyn 3.375 g q 8 h  Abx regimen: Zosyn + Levaquin  Recent Labs      18   1015   WBC  6.3     Est CrCl: >100 ml/min; UO: - ml/kg/hr  Temp (24hrs), Av.3 °F (39.1 °C), Min:101.8 °F (38.8 °C), Max:102.7 °F (39.3 °C)    Cultures:   2/3 urine-  2/3 blood-    Plan: Change to 3.375 g IV followed by 10.125 g over 24 hours

## 2018-02-03 NOTE — PROCEDURES
ELECTROENCEPHALOGRAM REPORT    HISTORY: Patient is a 41-year-old male who is being evaluated for altered  mental status and seizure activity. DESCRIPTION: This is an 18-channel EEG performed on a post-ictal  patient. The dominant posterior background rhythm consists of  medium-voltage rhythms in the 7 Hz frequency range out of the posterior  head region. Photic stimulation does not elicit a significant   driving response. Hyperventilation was not performed. ELECTROENCEPHALOGRAM SUMMARY: Mildly abnormal EEG due to mild slowing of  the background rhythms. CLINICAL INTERPRETATION: This EEG is suggestive of some mild generalized  encephalopathic process, nonspecific in type. This may be related to  underlying structural brain injury and/or toxic/metabolic abnormality. No  clearly lateralizing or epileptiform features were seen. Please correlate  clinically.     Karyn Penn DO  02/03/18

## 2018-02-03 NOTE — IP AVS SNAPSHOT
[address] 
[phone] Patient: Maxwell Sandifer MRN: [id number] :1965 About your hospitalization You were admitted on:  February 3, 2018 You last received care in the:  [facility] 6S NEURO-SCI TELE You were discharged on:  2018 Why you were hospitalized Your primary diagnosis was:  Pneumonia Your diagnoses also included:  Recurrent Seizures (Hcc) Follow-up Information Follow up With Details Comments Contact Info Clarisse Ceron MD In 1 week post hospitalization follow up recommended within one week of discharge [address] 
[phone] Discharge Orders None A check juan ramon indicates which time of day the medication should be taken. My Medications START taking these medications Instructions Each Dose to Equal  
 Morning Noon Evening Bedtime  
 amoxicillin-clavulanate 875-125 mg per tablet Commonly known as:  AUGMENTIN Your last dose was: Your next dose is: Take 1 Tab by mouth two (2) times a day for 7 days. 1 Tab Bacillus coagulans 10 billion cell Cpdr  
Commonly known as:  PROBIOTIC (B. COAGULANS) Your last dose was: Your next dose is: Take 1 Cap by mouth daily for 30 days. 1 Cap  
    
   
   
   
  
 oseltamivir 75 mg capsule Commonly known as:  TAMIFLU Your last dose was: Your next dose is: Take 1 Cap by mouth two (2) times a day for 4 doses. 75 mg CHANGE how you take these medications Instructions Each Dose to Equal  
 Morning Noon Evening Bedtime  
 phenytoin suspension Commonly known as:  [id number] What changed:  when to take this Your last dose was: Your next dose is: Take 4 mL by mouth three (3) times daily.   
 100 mg  
    
   
   
   
  
 valproate 250 mg/5 mL syrup Commonly known as:  Stephan Vasques What changed:   
- how much to take 
- how to take this - when to take this Your last dose was: Your next dose is: Take 15 mL by mouth two (2) times a day. Take 10 ml (500 mg) in morning and 10 ml(500mg) in the evening. 750 mg CONTINUE taking these medications Instructions Each Dose to Equal  
 Morning Noon Evening Bedtime CEROVITE 9 mg iron/15 mL oral liquid Generic drug:  multivitamin-minerals-ferrous gluconate Your last dose was: Your next dose is: Take  by mouth daily. chlorhexidine 0.12 % solution Commonly known as:  PERIDEX Your last dose was: Your next dose is:    
   
   
 15 mL by Swish and Spit route two (2) times a day. Dental caries 15 mL  
    
   
   
   
  
 cholecalciferol 1,000 unit tablet Commonly known as:  VITAMIN D3 Your last dose was: Your next dose is: Take 1 Tab by mouth daily. 1000 Units  
    
   
   
   
  
 ferrous sulfate 325 mg (65 mg iron) tablet Your last dose was: Your next dose is: Take  by mouth Daily (before breakfast). levETIRAcetam 100 mg/mL solution Commonly known as:  KEPPRA Your last dose was: Your next dose is:    
   
   
 15ml by mouth twice a day. levothyroxine 50 mcg tablet Commonly known as:  SYNTHROID Your last dose was: Your next dose is: Take 1 Tab by mouth Daily (before breakfast). 50 mcg  
    
   
   
   
  
 loratadine 10 mg tablet Commonly known as:  Martínez Or Your last dose was: Your next dose is: Take 1 Tab by mouth daily. 10 mg  
    
   
   
   
  
 OTHER Your last dose was: Your next dose is: Benefiber Clear Powder. Mix two teaspoonfuls in 4-8 oz of suitable liquid and give by mouth twice daily as needed. sodium chloride 0.65 % nasal squeeze bottle Commonly known as:  SALINE NASAL Your last dose was: Your next dose is:    
   
   
 1 Spray by Both Nostrils route as needed for Congestion. 1 Spray Where to Get Your Medications Information on where to get these meds will be given to you by the nurse or doctor. ! Ask your nurse or doctor about these medications  
  amoxicillin-clavulanate 875-125 mg per tablet Bacillus coagulans 10 billion cell Cpdr  
 oseltamivir 75 mg capsule  
 phenytoin suspension  
 valproate 250 mg/5 mL syrup Discharge Instructions Discharge Instructions PATIENT ID: Charlie Willis MRN: [id number] YOB: 1965 DATE OF ADMISSION: 2/3/2018  9:38 AM   
DATE OF DISCHARGE: 2/7/2018 PRIMARY CARE PROVIDER: Fatimah Garcia MD  
 
ATTENDING PHYSICIAN: Yung Manzano MD 
DISCHARGING PROVIDER: Yung Manzano MD   
To contact this individual call [phone] and ask the  to page. If unavailable ask to be transferred the Adult Hospitalist Department. DISCHARGE DIAGNOSES and ADDITIONAL CARE RECOMMENDATIONS: 
Sepsis Pneumonia: complete your antibiotics as prescribed. You need follow up chest xray in 6 weeks to ensure resolution of the pneumonia. Influenza A infection: Complete the Tamiflu as prescribed. -Contact and droplet isolation at the facility CONSULTATIONS: IP CONSULT TO HOSPITALIST 
IP CONSULT TO NEUROLOGY PROCEDURES/SURGERIES: * No surgery found * PENDING TEST RESULTS:  
At the time of discharge the following test results are still pending: none FOLLOW UP APPOINTMENTS:  
Follow-up Information Follow up With Details Comments Contact Info  Fatimah Garcia MD In 1 week post hospitalization follow up recommended within one week of discharge [address] [place] [address] 
[phone] DIET: Regular Diet ACTIVITY: Activity as tolerated and PT/OT per Home Health 
 
WOUND CARE: NA 
 
EQUIPMENT needed: own DISCHARGE MEDICATIONS: 
 See Medication Reconciliation Form · It is important that you take the medication exactly as they are prescribed. · Keep your medication in the bottles provided by the pharmacist and keep a list of the medication names, dosages, and times to be taken in your wallet. · Do not take other medications without consulting your doctor. NOTIFY YOUR PHYSICIAN FOR ANY OF THE FOLLOWING:  
Fever over 101 degrees for 24 hours. Chest pain, shortness of breath, fever, chills, nausea, vomiting, diarrhea, change in mentation, falling, weakness, bleeding. Severe pain or pain not relieved by medications. Or, any other signs or symptoms that you may have questions about. DISPOSITION:BAck to Long term facility  With HHPT Signed: Leon Sahu MD 
2/7/2018 11:14 AM 
 
ACO Transitions of Care Introducing [facility] [address] Arnav offers a voluntary care coordination program to provide high quality service and care to [facility] fee-for-service beneficiaries. Kelli Stallings was designed to help you enhance your health and well-being through the following services: ? Transitions of Care  support for individuals who are transitioning from one care setting to another (example: Hospital to home). ? Chronic and Complex Care Coordination  support for individuals and caregivers of those with serious or chronic illnesses or with more than one chronic (ongoing) condition and those who take a number of different medications.   
 
 
If you meet specific medical criteria, a [address] [place] [address] Rd may call you directly to coordinate your care with your primary care physician and your other care providers. For questions about the [facility] programs, please, contact your physicians office. For general questions or additional information about Accountable Care Organizations: 
Please visit www.medicare.gov/acos. html or call 1-800-MEDICARE (1-657.348.2692) TTY users should call 6-592.692.5392. eMindful Announcement We are excited to announce that we are making your provider's discharge notes available to you in eMindful. You will see these notes when they are completed and signed by the physician that discharged you from your recent hospital stay. If you have any questions or concerns about any information you see in eMindful, please call the Health Information Department where you were seen or reach out to your Primary Care Provider for more information about your plan of care. Introducing [facility]! Isaiah Reyna introduces eMindful patient portal. Now you can access parts of your medical record, email your doctor's office, and request medication refills online. 1. In your internet browser, go to [email or web address] 2. Click on the First Time User? Click Here link in the Sign In box. You will see the New Member Sign Up page. 3. Enter your eMindful Access Code exactly as it appears below. You will not need to use this code after youve completed the sign-up process. If you do not sign up before the expiration date, you must request a new code. · eMindful Access Code: [email or web address] Expires: 5/8/2018 11:25 AM 
 
4. Enter the last four digits of your Social Security Number (xxxx) and Date of Birth (mm/dd/yyyy) as indicated and click Submit. You will be taken to the next sign-up page. 5. Create a eMindful ID. This will be your eMindful login ID and cannot be changed, so think of one that is secure and easy to remember. 6. Create a English TVt password. You can change your password at any time. 7. Enter your Password Reset Question and Answer. This can be used at a later time if you forget your password. 8. Enter your e-mail address. You will receive e-mail notification when new information is available in [address] 9. Click Sign Up. You can now view and download portions of your medical record. 10. Click the Download Summary menu link to download a portable copy of your medical information. If you have questions, please visit the Frequently Asked Questions section of the Dataiumt website. Remember, Flowline is NOT to be used for urgent needs. For medical emergencies, dial 911. Now available from your iPhone and Android! Unresulted Labs-Please follow up with your PCP about these lab tests Order Current Status VALPROIC ACID, FREE In process CULTURE, BLOOD, PAIRED Preliminary result Providers Seen During Your Hospitalization Provider Specialty Primary office phone Juanita Renee. Gal Ramirez, [address] [place] [address] Emergency Medicine [phone] Vee Gomez MD Internal Medicine [phone] Tracey Taylor MD Internal Medicine [phone] Your Primary Care Physician (PCP) Primary Care Physician Office Phone Office Fax [address] [phone] You are allergic to the following Allergen Reactions Morphine Not Reported This Time Tegretol (Carbamazepine) Other (comments) \"bad reaction\" \"changes attitude\" Recent Documentation Height Weight BMI Smoking Status 1.6 m 65.6 kg 25.63 kg/m2 Never Smoker Emergency Contacts Name Discharge Info Relation Home Work Mobile Duy Dill DISCHARGE CAREGIVER [3] Other Relative [6] [phone] Yaron Sanchez  Brother [24]   [phone] Patient Belongings  The following personal items are in your possession at time of discharge: 
  Dental Appliances: None  Visual Aid: None      Home Medications: None Evelyneelry: None  Clothing: At bedside    Other Valuables: Leticia Diaz Please provide this summary of care documentation to your next provider. Signatures-by signing, you are acknowledging that this After Visit Summary has been reviewed with you and you have received a copy. Patient Signature:  ____________________________________________________________ Date:  ____________________________________________________________  
  
Bhupinder Payor Provider Signature:  ____________________________________________________________ Date:  ____________________________________________________________

## 2018-02-04 LAB
ANION GAP SERPL CALC-SCNC: 7 MMOL/L (ref 5–15)
B PERT DNA SPEC QL NAA+PROBE: NOT DETECTED
BACTERIA SPEC CULT: ABNORMAL
BUN SERPL-MCNC: 8 MG/DL (ref 6–20)
BUN/CREAT SERPL: 9 (ref 12–20)
C PNEUM DNA SPEC QL NAA+PROBE: NOT DETECTED
CALCIUM SERPL-MCNC: 7.2 MG/DL (ref 8.5–10.1)
CC UR VC: ABNORMAL
CHLORIDE SERPL-SCNC: 107 MMOL/L (ref 97–108)
CO2 SERPL-SCNC: 25 MMOL/L (ref 21–32)
CREAT SERPL-MCNC: 0.94 MG/DL (ref 0.7–1.3)
ERYTHROCYTE [DISTWIDTH] IN BLOOD BY AUTOMATED COUNT: 13.2 % (ref 11.5–14.5)
FLUAV H1 2009 PAND RNA SPEC QL NAA+PROBE: NOT DETECTED
FLUAV H1 RNA SPEC QL NAA+PROBE: NOT DETECTED
FLUAV H3 RNA SPEC QL NAA+PROBE: DETECTED
FLUAV SUBTYP SPEC NAA+PROBE: NOT DETECTED
FLUBV RNA SPEC QL NAA+PROBE: NOT DETECTED
GLUCOSE SERPL-MCNC: 112 MG/DL (ref 65–100)
HADV DNA SPEC QL NAA+PROBE: NOT DETECTED
HCOV 229E RNA SPEC QL NAA+PROBE: NOT DETECTED
HCOV HKU1 RNA SPEC QL NAA+PROBE: NOT DETECTED
HCOV NL63 RNA SPEC QL NAA+PROBE: NOT DETECTED
HCOV OC43 RNA SPEC QL NAA+PROBE: NOT DETECTED
HCT VFR BLD AUTO: 24.5 % (ref 36.6–50.3)
HGB BLD-MCNC: 8.4 G/DL (ref 12.1–17)
HMPV RNA SPEC QL NAA+PROBE: NOT DETECTED
HPIV1 RNA SPEC QL NAA+PROBE: NOT DETECTED
HPIV2 RNA SPEC QL NAA+PROBE: NOT DETECTED
HPIV3 RNA SPEC QL NAA+PROBE: NOT DETECTED
HPIV4 RNA SPEC QL NAA+PROBE: NOT DETECTED
M PNEUMO DNA SPEC QL NAA+PROBE: NOT DETECTED
MCH RBC QN AUTO: 35.6 PG (ref 26–34)
MCHC RBC AUTO-ENTMCNC: 34.3 G/DL (ref 30–36.5)
MCV RBC AUTO: 103.8 FL (ref 80–99)
NRBC # BLD: 0 K/UL (ref 0–0.01)
NRBC BLD-RTO: 0 PER 100 WBC
PHENYTOIN FREE SERPL-MCNC: 1.9 UG/ML (ref 1–2)
PHENYTOIN SERPL-MCNC: 9.1 UG/ML (ref 10–20)
PLATELET # BLD AUTO: 214 K/UL (ref 150–400)
PMV BLD AUTO: 9.5 FL (ref 8.9–12.9)
POTASSIUM SERPL-SCNC: 3.5 MMOL/L (ref 3.5–5.1)
RBC # BLD AUTO: 2.36 M/UL (ref 4.1–5.7)
RSV RNA SPEC QL NAA+PROBE: NOT DETECTED
RV+EV RNA SPEC QL NAA+PROBE: NOT DETECTED
SERVICE CMNT-IMP: ABNORMAL
SODIUM SERPL-SCNC: 139 MMOL/L (ref 136–145)
VALPROATE SERPL-MCNC: 80 UG/ML (ref 50–100)
WBC # BLD AUTO: 6.7 K/UL (ref 4.1–11.1)

## 2018-02-04 PROCEDURE — 74011250637 HC RX REV CODE- 250/637: Performed by: HOSPITALIST

## 2018-02-04 PROCEDURE — 80164 ASSAY DIPROPYLACETIC ACD TOT: CPT | Performed by: PSYCHIATRY & NEUROLOGY

## 2018-02-04 PROCEDURE — 85027 COMPLETE CBC AUTOMATED: CPT | Performed by: HOSPITALIST

## 2018-02-04 PROCEDURE — 80186 ASSAY OF PHENYTOIN FREE: CPT | Performed by: PSYCHIATRY & NEUROLOGY

## 2018-02-04 PROCEDURE — 80185 ASSAY OF PHENYTOIN TOTAL: CPT | Performed by: PSYCHIATRY & NEUROLOGY

## 2018-02-04 PROCEDURE — 87205 SMEAR GRAM STAIN: CPT | Performed by: HOSPITALIST

## 2018-02-04 PROCEDURE — 36415 COLL VENOUS BLD VENIPUNCTURE: CPT | Performed by: HOSPITALIST

## 2018-02-04 PROCEDURE — 74011250636 HC RX REV CODE- 250/636: Performed by: HOSPITALIST

## 2018-02-04 PROCEDURE — 87186 SC STD MICRODIL/AGAR DIL: CPT | Performed by: HOSPITALIST

## 2018-02-04 PROCEDURE — 74011000258 HC RX REV CODE- 258: Performed by: HOSPITALIST

## 2018-02-04 PROCEDURE — 80165 DIPROPYLACETIC ACID FREE: CPT | Performed by: PSYCHIATRY & NEUROLOGY

## 2018-02-04 PROCEDURE — 74011250637 HC RX REV CODE- 250/637: Performed by: PSYCHIATRY & NEUROLOGY

## 2018-02-04 PROCEDURE — 87581 M.PNEUMON DNA AMP PROBE: CPT | Performed by: HOSPITALIST

## 2018-02-04 PROCEDURE — 80048 BASIC METABOLIC PNL TOTAL CA: CPT | Performed by: HOSPITALIST

## 2018-02-04 PROCEDURE — 87077 CULTURE AEROBIC IDENTIFY: CPT | Performed by: HOSPITALIST

## 2018-02-04 PROCEDURE — 65660000000 HC RM CCU STEPDOWN

## 2018-02-04 RX ORDER — SODIUM CHLORIDE 9 MG/ML
250 INJECTION, SOLUTION INTRAVENOUS
Status: COMPLETED | OUTPATIENT
Start: 2018-02-04 | End: 2018-02-04

## 2018-02-04 RX ORDER — OSELTAMIVIR PHOSPHATE 6 MG/ML
75 FOR SUSPENSION ORAL EVERY 12 HOURS
Status: DISCONTINUED | OUTPATIENT
Start: 2018-02-04 | End: 2018-02-07 | Stop reason: HOSPADM

## 2018-02-04 RX ORDER — OSELTAMIVIR PHOSPHATE 75 MG/1
75 CAPSULE ORAL 2 TIMES DAILY
Status: DISCONTINUED | OUTPATIENT
Start: 2018-02-04 | End: 2018-02-04

## 2018-02-04 RX ADMIN — ACETAMINOPHEN 650 MG: 650 SOLUTION ORAL at 03:11

## 2018-02-04 RX ADMIN — OSELTAMIVIR PHOSPHATE 75 MG: 6 POWDER, FOR SUSPENSION ORAL at 22:15

## 2018-02-04 RX ADMIN — CEFTRIAXONE 1 G: 1 INJECTION, POWDER, FOR SOLUTION INTRAMUSCULAR; INTRAVENOUS at 12:54

## 2018-02-04 RX ADMIN — FERROUS SULFATE TAB 325 MG (65 MG ELEMENTAL FE) 325 MG: 325 (65 FE) TAB at 09:27

## 2018-02-04 RX ADMIN — SODIUM CHLORIDE 100 ML/HR: 900 INJECTION, SOLUTION INTRAVENOUS at 16:00

## 2018-02-04 RX ADMIN — LEVETIRACETAM 1500 MG: 100 SOLUTION ORAL at 10:03

## 2018-02-04 RX ADMIN — VALPROIC ACID 750 MG: 250 SOLUTION ORAL at 09:27

## 2018-02-04 RX ADMIN — ACETAMINOPHEN 650 MG: 650 SOLUTION ORAL at 17:28

## 2018-02-04 RX ADMIN — Medication 10 ML: at 07:00

## 2018-02-04 RX ADMIN — SODIUM CHLORIDE 100 ML/HR: 900 INJECTION, SOLUTION INTRAVENOUS at 01:00

## 2018-02-04 RX ADMIN — VALPROIC ACID 750 MG: 250 SOLUTION ORAL at 22:15

## 2018-02-04 RX ADMIN — LORATADINE 10 MG: 10 TABLET ORAL at 09:28

## 2018-02-04 RX ADMIN — SODIUM CHLORIDE 250 ML/HR: 900 INJECTION, SOLUTION INTRAVENOUS at 07:30

## 2018-02-04 RX ADMIN — OSELTAMIVIR PHOSPHATE 75 MG: 6 POWDER, FOR SUSPENSION ORAL at 09:26

## 2018-02-04 RX ADMIN — PHENYTOIN 100 MG: 125 SUSPENSION ORAL at 22:15

## 2018-02-04 RX ADMIN — ENOXAPARIN SODIUM 40 MG: 40 INJECTION SUBCUTANEOUS at 12:54

## 2018-02-04 RX ADMIN — LEVOTHYROXINE SODIUM 50 MCG: 50 TABLET ORAL at 09:26

## 2018-02-04 RX ADMIN — LEVETIRACETAM 1500 MG: 100 SOLUTION ORAL at 22:15

## 2018-02-04 RX ADMIN — Medication 10 ML: at 22:28

## 2018-02-04 RX ADMIN — ACETAMINOPHEN 650 MG: 650 SOLUTION ORAL at 09:56

## 2018-02-04 RX ADMIN — VITAMIN D, TAB 1000IU (100/BT) 1000 UNITS: 25 TAB at 09:28

## 2018-02-04 RX ADMIN — PHENYTOIN 100 MG: 125 SUSPENSION ORAL at 09:27

## 2018-02-04 RX ADMIN — Medication 10 ML: at 14:00

## 2018-02-04 RX ADMIN — PHENYTOIN 100 MG: 125 SUSPENSION ORAL at 16:00

## 2018-02-04 RX ADMIN — AZITHROMYCIN MONOHYDRATE 500 MG: 500 INJECTION, POWDER, LYOPHILIZED, FOR SOLUTION INTRAVENOUS at 12:54

## 2018-02-04 NOTE — PROGRESS NOTES
6603 Received call from [facility] in CCU regarding pt's VS. RN awaiting report and to pt's bedside to monitor pt.    0800 Informed charge nurses Kristy Rivera, and Larry Powell of VS and pt condition. Charge nurses, Rocco RN, noc shift RN Arturo Crespo, and this RN at bedside, so no need to call Rapid Response at this time. This RN received bedside report from [facility] from noc shift, who increased NS bolus rate at this time. Paged Dr. Candido Kim and will await return call. 0820 Contact and droplet isolation precautions initiated per dr hyatt. Brother Ganesh Lerma and caregiver Shannan Garcia instructed of same and complied. Marianela Zepeda RN repaged Dr. Candido Kim. 46 RN has remained at bedside. Update provided to charge nurses. Have not received return call from hospitalist. VS as charted.

## 2018-02-04 NOTE — PROGRESS NOTES
Antonina RN and Cici Lezama RN performed a dual skin assessment on this patient. No impairment noted. Scars noted.   Salinas score is 20

## 2018-02-04 NOTE — PROGRESS NOTES
Neurology Progress Note    Patient ID:  Yobani Blackburn  [id number]  89 y.o.  1965    Subjective:      Patient without further seizure activity. EEG completed with diffuse slowing, no subclinical seizure activity.     PHT/VPA levels therapeutic  He is sleeping this AM but arouses to voice    Current Facility-Administered Medications   Medication Dose Route Frequency    0.9% sodium chloride infusion  250 mL/hr IntraVENous ONCE PRN    oseltamivir (TAMIFLU) 6 mg/mL oral suspension 75 mg  75 mg Oral Q12H    sodium chloride (NS) flush 5-10 mL  5-10 mL IntraVENous PRN    sodium chloride (NS) flush 5-10 mL  5-10 mL IntraVENous Q8H    sodium chloride (NS) flush 5-10 mL  5-10 mL IntraVENous PRN    enoxaparin (LOVENOX) injection 40 mg  40 mg SubCUTAneous Q24H    cefTRIAXone (ROCEPHIN) 1 g in 0.9% sodium chloride (MBP/ADV) 50 mL  1 g IntraVENous Q24H    azithromycin (ZITHROMAX) 500 mg in 0.9% sodium chloride (MBP/ADV) 250 mL  500 mg IntraVENous Q24H    ferrous sulfate tablet 325 mg  1 Tab Oral DAILY WITH BREAKFAST    levothyroxine (SYNTHROID) tablet 50 mcg  50 mcg Oral ACB    cholecalciferol (VITAMIN D3) tablet 1,000 Units  1,000 Units Oral DAILY    loratadine (CLARITIN) tablet 10 mg  10 mg Oral DAILY    sodium chloride (OCEAN) 0.65 % nasal spray 1 Spray  1 Spray Both Nostrils PRN    guar gum (BENEFIBER) packet 1 Packet  1 Packet Oral BID PRN    phenytoin (DILANTIN) 100 mg/4 mL oral suspension 100 mg  100 mg Oral TID    0.9% sodium chloride infusion  100 mL/hr IntraVENous CONTINUOUS    levETIRAcetam (KEPPRA) 100 mg/mL solution 1,500 mg  1,500 mg Oral Q12H    acetaminophen (TYLENOL) solution 650 mg  650 mg Oral Q6H    valproic acid (as sodium salt) (DEPAKENE) 250 mg/5 mL (5 mL) oral solution 750 mg  750 mg Oral Q12H          Objective:     Patient Vitals for the past 8 hrs:   BP Temp Pulse Resp SpO2 Weight   02/04/18 1530 102/58 - 96 18 93 % -   02/04/18 1500 (!) 103/32 99.2 °F (37.3 °C) 90 19 94 % - 02/04/18 1353 - - - - - 68.9 kg (152 lb)   02/04/18 1300 110/58 - 94 20 95 % -   02/04/18 1230 107/53 - 99 18 94 % -   02/04/18 1200 106/51 - 90 20 93 % -   02/04/18 1130 106/55 - (!) 101 22 93 % -   02/04/18 1100 113/60 99.7 °F (37.6 °C) 97 19 92 % -   02/04/18 1000 110/64 99.9 °F (37.7 °C) 99 20 94 % -   02/04/18 0930 116/63 - 95 21 97 % -   02/04/18 0915 106/58 - 99 19 95 % -   02/04/18 0900 107/56 - 96 20 93 % -   02/04/18 0846 111/56 - 96 17 93 % -   02/04/18 0830 104/49 - 98 14 95 % -   02/04/18 0823 100/52 - 99 23 93 % -   02/04/18 0817 110/58 - 95 21 93 % -   02/04/18 0810 102/49 - 99 22 92 % -   02/04/18 0804 (!) 77/40 - 96 23 92 % -   02/04/18 0801 (!) 78/46 99.7 °F (37.6 °C) 98 23 91 % -          02/02 1901 - 02/04 0700  In: 2020 [I.V.:2020]  Out: -     Lab Review   Recent Results (from the past 24 hour(s))   METABOLIC PANEL, BASIC    Collection Time: 02/04/18  4:19 AM   Result Value Ref Range    Sodium 139 136 - 145 mmol/L    Potassium 3.5 3.5 - 5.1 mmol/L    Chloride 107 97 - 108 mmol/L    CO2 25 21 - 32 mmol/L    Anion gap 7 5 - 15 mmol/L    Glucose 112 (H) 65 - 100 mg/dL    BUN 8 6 - 20 MG/DL    Creatinine 0.94 0.70 - 1.30 MG/DL    BUN/Creatinine ratio 9 (L) 12 - 20      GFR est AA >60 >60 ml/min/1.73m2    GFR est non-AA >60 >60 ml/min/1.73m2    Calcium 7.2 (L) 8.5 - 10.1 MG/DL   CBC W/O DIFF    Collection Time: 02/04/18  4:19 AM   Result Value Ref Range    WBC 6.7 4.1 - 11.1 K/uL    RBC 2.36 (L) 4.10 - 5.70 M/uL    HGB 8.4 (L) 12.1 - 17.0 g/dL    HCT 24.5 (L) 36.6 - 50.3 %    .8 (H) 80.0 - 99.0 FL    MCH 35.6 (H) 26.0 - 34.0 PG    MCHC 34.3 30.0 - 36.5 g/dL    RDW 13.2 11.5 - 14.5 %    PLATELET 535 635 - 830 K/uL    MPV 9.5 8.9 - 12.9 FL    NRBC 0.0 0  WBC    ABSOLUTE NRBC 0.00 0.00 - 0.01 K/uL   VALPROIC ACID    Collection Time: 02/04/18  4:19 AM   Result Value Ref Range    Valproic acid 80 50 - 100 ug/ml   PHENYTOIN, FREE    Collection Time: 02/04/18  4:19 AM   Result Value Ref Range    Phenytoin, free 1.9 1.0 - 2.0 ug/mL   PHENYTOIN    Collection Time: 02/04/18  4:19 AM   Result Value Ref Range    Phenytoin 9.1 (L) 10.0 - 20.0 ug/mL   CULTURE, RESPIRATORY/SPUTUM/BRONCH W GRAM STAIN    Collection Time: 02/04/18  4:20 AM   Result Value Ref Range    Special Requests: NO SPECIAL REQUESTS      GRAM STAIN 1+ WBCS SEEN      GRAM STAIN FEW EPITHELIAL CELLS SEEN      GRAM STAIN FEW GRAM NEGATIVE RODS      Culture result: PENDING    RESPIRATORY PANEL,PCR,NASOPHARYNGEAL    Collection Time: 02/04/18  9:40 AM   Result Value Ref Range    Adenovirus NOT DETECTED NOTD      Coronavirus 229E NOT DETECTED NOTD      Coronavirus HKU1 NOT DETECTED NOTD      Coronavirus CVNL63 NOT DETECTED NOTD      Coronavirus OC43 NOT DETECTED NOTD      Metapneumovirus NOT DETECTED NOTD      Rhinovirus and Enterovirus NOT DETECTED NOTD      Influenza A NOT DETECTED NOTD      Influenza A, subtype H1 NOT DETECTED NOTD      Influenza A, subtype H3 DETECTED (A) NOTD      INFLUENZA A H1N1 PCR NOT DETECTED NOTD      Influenza B NOT DETECTED NOTD      Parainfluenza 1 NOT DETECTED NOTD      Parainfluenza 2 NOT DETECTED NOTD      Parainfluenza 3 NOT DETECTED NOTD      Parainfluenza virus 4 NOT DETECTED NOTD      RSV by PCR NOT DETECTED NOTD      Bordetella pertussis - PCR NOT DETECTED NOTD      Chlamydophila pneumoniae DNA, QL, PCR NOT DETECTED NOTD      Mycoplasma pneumoniae DNA, QL, PCR NOT DETECTED NOTD       Neurological Exam:  Mental Status: Arouses to voice from sleep, follows commands intermittently   Speech: Non-verbal at baseline   Cranial Nerves:   Symmetric facial grimace, eyes ML     Eyes: Pupils OD reactive OS opacified, EOM-ML. Motor:  UE AG, LE w/d b/l   Reflexes:   Deep tendon reflexes 1+/4 and symmetrical.  Plantar response is extensor b/l   Sensory:   W/D noxious throughout   Gait:  Unable to assess    Tremor:   No tremor noted.    Cerebellar:  Unable to assess         Assessment:     Principal Problem:    Pneumonia (2/3/2018)    Active Problems:    Recurrent seizures ([facility] [address] (8/27/2012)    46year old AAM h/o CP, non-verbal at baseline but follows commands, uses walker for ambulation, OS blindness/cataract, refractory seizure d/o s/p VNS presenting with increasing seizure activity provoked by comorbid infectious processes/PNA/FLU+. AED levels were subtherapeutic on admission which also likely contributed. Head CT completed and without acute process. He appears close to baseline today according to family at bedside. No further seizure activity. AED levels now therapeutic. Plan:   Cont.  Current AEDs  Seizure precautions  FLU tx per primary team  Will sign off, please call if further questions/concerns      Signed:  Yenny Hinojosa DO  2/4/2018  3:53 PM

## 2018-02-04 NOTE — PROGRESS NOTES
2882: NT suction 1x for sputum culture. Large amount tan thick secretions obtained. Pt continues to have coarse lung sounds. 0147-9073: Sustained decreasing BPs 60-80s/30-40s, Dr. Landis Angelucci notified. Orders to give 500cc NS bolus if repeat SBP < 90.  0720: 500 cc NS bolus started  0800: Bedside shift change report given to Kaia Hassan RN (oncoming nurse) by Marvin La RN (offgoing nurse). Report included the following information SBAR, ED Summary, OR Summary, Procedure Summary, Intake/Output, MAR and Recent Results.

## 2018-02-04 NOTE — PROGRESS NOTES
Notified per microbiology lab of positive influenza A dx. Dr. Christiano Alan informed of same. No new orders, as pt already received first dose of Tamiflu and is on isolation precautions. Pt's brother/guardian at bedside and informed of same. Encouraged to contact his PCP today re: exposure as Lidia Valdovinos has been with pt for 24hrs, providing direct pt care. Lidia Valdovinos verbalized understanding of same and made phone call.

## 2018-02-04 NOTE — PROGRESS NOTES
Bedside and Verbal shift change report given to [address] (oncoming nurse) by Praneeth Covarrubias (offgoing nurse). Report included the following information SBAR, Kardex, ED Summary, Intake/Output, MAR, Recent Results and Cardiac Rhythm ST. Informed that Dr Jacob Harris aware of VS and high MEWS; no new orders. Group home APRN, caregiver Parish Penn and brother Jose Cruz Rick at bedside. RN informed of visitation policy of two healthy adults, and all verbalized understanding. Brother is leaving for a short while.

## 2018-02-04 NOTE — PROGRESS NOTES
Dr. Jen Welsh notified of MEWS 5, VS upon arrival to unit from ER. Clarified APAP order (scheduled, not PRN) and per  no SVN indicated.  No new orders

## 2018-02-04 NOTE — PROGRESS NOTES
Hospitalist Progress Note                               Leon Sahu MD                                     Answering service: [phone]                               OR [phone] from in house phone                               Cell: [phone]              Date of Service:  2018  NAME:  Zulma Nj  :  1965  MRN:  [id number]      Admission Summary:   Reviewed ED and admission records     Mr. Zulma Nj is 46 y.o. male with past medical history significant for seizures (VNS device in place), down syndrome, cerebral palsy, sleep apnea, mental retardation, left eye blindness, and anxiety disorder who presents from group home for fever of 100.5F accompanied by cough since this morning. Per group home provider, pt had \"a few more seizures than normal\" with associated vomiting last night. Pt takes depakote, dilantin, and keppra. Group home provider denies any missed doses. Group home provider reports pt Hx of pneumonia but denies pt Hx of frequent UTI's. Per group home provider, possible ill contacts have been present at pt's day program. Pt is baseline nonverbal and follows some commands. Per group home provider, pt is \"not too far off\" from baseline; he is currently quieter than usual.      Interval history / Subjective:   Patient is non verbal at base line uses RW for ambulation. He was able to follow commands during exam,resting quietly. His family in the room. Assessment & Plan:   Sepsis due to pneumonia  -Fluid resuscitated. BP was low this Am,received 500 cc NS bolus and bp improved. Her brother stated he has low BP at baseline. Lactate was normal.  -Levaquin x 1 and Zosyn  x1 on 2/3 in the ED,continue with Ceftriaxone and Zithromax. -Rapid flu negative,this is acute febrile illness in his day care,started Tamiflu. Resp PCR ordered. Droplet isolation.     Breakthrough seizure precipitated by acute infection +/-subtherapeutic AEDs.group home reported patient did not miss doses. -AED levels were low. He received loading doses of depakon,keppra and dilantin at the ED on 2/3. EEG without  clearly lateralizing or epileptiform features were seen.  -No more seizure activity,Contineu AEDs. .    Abnormal UA: abx as above pending culture    Hypothyroidism: on synthroid. TSh a month ago was normal    Down's syndrome,mental retardation,non verbal,ambulates with RW at base line. Group home resident since his parents  in . Drop in HB:no obvious blood loss. This is more than likely due to hemodilution from aggressive fluid resuscitation. Monitor. Diet:regular  Code status: full  DVT prophylaxis: scd  Care Plan discussed with: patient's brother. Discharge planning/disposition:he is septic and needs on going inpatient treatment. Hospital Problems  Date Reviewed: 2/3/2018          Codes Class Noted POA    * (Principal)Pneumonia ICD-10-CM: J18.9  ICD-9-CM: 271  2/3/2018 Unknown        Recurrent seizures ([facility] ICD-10-CM: E64.328  ICD-9-CM: 345.80  2012 Yes                Review of Systems:   Review of systems not obtained due to patient factors. Physical Examination:      Last 24hrs VS reviewed since prior progress note. Most recent are:  Visit Vitals    /64 (BP 1 Location: Right arm, BP Patient Position: At rest;Head of bed elevated (Comment degrees))    Pulse 99    Temp 99.9 °F (37.7 °C)    Resp 20    Ht 5' 3\" (1.6 m)    Wt 68.6 kg (151 lb 3.2 oz)    SpO2 94%    BMI 26.78 kg/m2           Constitutional: Lying quietly, follows commands,not in distress. Eyes: Bruising right periorbital area. ENT:  Oral mucous moist, oropharynx benign. Neck supple,    Resp:  CTA bilaterally. No wheezing/rhonchi/rales. No accessory muscle use   CV:  Regular rhythm, normal rate, no murmurs, gallops, rubs    GI:  Soft, non distended, non tender.  normoactive bowel sounds, no hepatosplenomegaly    :  No CVA or suprapubic tenderness   Skin  : No erythema,rash,bullae,dipigmentation     Musculoskeletal:  No edema, warm, 2+ pulses throughout    Neurologic:  Follows commands,mentally challenged. Intake/Output Summary (Last 24 hours) at 02/04/18 1020  Last data filed at 02/04/18 0400   Gross per 24 hour   Intake             2020 ml   Output                0 ml   Net             2020 ml          Data Review:    Review and/or order of clinical lab test  Review and/or order of tests in the radiology section of CPT  Review and/or order of tests in the medicine section of CPT      Labs:     Recent Labs      02/04/18   0419  02/03/18   1015   WBC  6.7  6.3   HGB  8.4*  11.9*   HCT  24.5*  35.1*   PLT  214  261     Recent Labs      02/04/18   0419  02/03/18   1015   NA  139  137   K  3.5  HEMOLYZED,RECOLLECT REQUESTED   CL  107  100   CO2  25  28   BUN  8  8   CREA  0.94  0.91   GLU  112*  84   CA  7.2*  8.3*     Recent Labs      02/03/18   1015   SGOT  HEMOLYZED,RECOLLECT REQUESTED   ALT  27   AP  98   TBILI  0.2   TP  9.3*   ALB  3.2*   GLOB  6.1*     No results for input(s): INR, PTP, APTT in the last 72 hours. No lab exists for component: INREXT   No results for input(s): FE, TIBC, PSAT, FERR in the last 72 hours. Lab Results   Component Value Date/Time    Folate 8.5 01/19/2015 01:06 PM      No results for input(s): PH, PCO2, PO2 in the last 72 hours. No results for input(s): CPK, CKNDX, TROIQ in the last 72 hours.     No lab exists for component: CPKMB  Lab Results   Component Value Date/Time    Cholesterol, total 271 12/10/2015 12:30 PM    HDL Cholesterol 64 12/10/2015 12:30 PM    LDL, calculated 183 12/10/2015 12:30 PM    Triglyceride 120 12/10/2015 12:30 PM     Lab Results   Component Value Date/Time    Glucose (POC) 98 02/11/2009 12:15 PM     Lab Results   Component Value Date/Time    Color YELLOW/STRAW 02/03/2018 10:16 AM    Appearance CLOUDY 02/03/2018 10:16 AM    Specific gravity 1.018 02/03/2018 10:16 AM    pH (UA) 8.0 02/03/2018 10:16 AM    Protein NEGATIVE  02/03/2018 10:16 AM    Glucose NEGATIVE  02/03/2018 10:16 AM    Ketone NEGATIVE  02/03/2018 10:16 AM    Bilirubin NEGATIVE  02/03/2018 10:16 AM    Urobilinogen 0.2 02/03/2018 10:16 AM    Nitrites POSITIVE 02/03/2018 10:16 AM    Leukocyte Esterase SMALL 02/03/2018 10:16 AM    Epithelial cells FEW 02/03/2018 10:16 AM    Bacteria NEGATIVE  02/03/2018 10:16 AM    WBC 10-20 02/03/2018 10:16 AM    RBC 0-5 02/03/2018 10:16 AM         Medications Reviewed:     Current Facility-Administered Medications   Medication Dose Route Frequency    0.9% sodium chloride infusion  250 mL/hr IntraVENous ONCE PRN    oseltamivir (TAMIFLU) 6 mg/mL oral suspension 75 mg  75 mg Oral Q12H    sodium chloride (NS) flush 5-10 mL  5-10 mL IntraVENous PRN    piperacillin-tazobactam (ZOSYN) 10.125 g in  mL continuous 24 hr infusion  10.125 g IntraVENous Q24H    sodium chloride (NS) flush 5-10 mL  5-10 mL IntraVENous Q8H    sodium chloride (NS) flush 5-10 mL  5-10 mL IntraVENous PRN    enoxaparin (LOVENOX) injection 40 mg  40 mg SubCUTAneous Q24H    cefTRIAXone (ROCEPHIN) 1 g in 0.9% sodium chloride (MBP/ADV) 50 mL  1 g IntraVENous Q24H    azithromycin (ZITHROMAX) 500 mg in 0.9% sodium chloride (MBP/ADV) 250 mL  500 mg IntraVENous Q24H    ferrous sulfate tablet 325 mg  1 Tab Oral DAILY WITH BREAKFAST    levothyroxine (SYNTHROID) tablet 50 mcg  50 mcg Oral ACB    cholecalciferol (VITAMIN D3) tablet 1,000 Units  1,000 Units Oral DAILY    loratadine (CLARITIN) tablet 10 mg  10 mg Oral DAILY    sodium chloride (OCEAN) 0.65 % nasal spray 1 Spray  1 Spray Both Nostrils PRN    guar gum (BENEFIBER) packet 1 Packet  1 Packet Oral BID PRN    phenytoin (DILANTIN) 100 mg/4 mL oral suspension 100 mg  100 mg Oral TID    0.9% sodium chloride infusion  100 mL/hr IntraVENous CONTINUOUS    levETIRAcetam (KEPPRA) 100 mg/mL solution 1,500 mg  1,500 mg Oral Q12H    acetaminophen (TYLENOL) solution 650 mg  650 mg Oral Q6H  valproic acid (as sodium salt) (DEPAKENE) 250 mg/5 mL (5 mL) oral solution 750 mg  750 mg Oral Q12H     ______________________________________________________________________  EXPECTED LENGTH OF STAY: - - -  ACTUAL LENGTH OF STAY:          1                 [address], [place]

## 2018-02-05 LAB
BASOPHILS # BLD: 0 K/UL (ref 0–0.1)
BASOPHILS NFR BLD: 0 % (ref 0–1)
DIFFERENTIAL METHOD BLD: ABNORMAL
EOSINOPHIL # BLD: 0 K/UL (ref 0–0.4)
EOSINOPHIL NFR BLD: 0 % (ref 0–7)
ERYTHROCYTE [DISTWIDTH] IN BLOOD BY AUTOMATED COUNT: 13.4 % (ref 11.5–14.5)
HCT VFR BLD AUTO: 28.6 % (ref 36.6–50.3)
HGB BLD-MCNC: 9.6 G/DL (ref 12.1–17)
IMM GRANULOCYTES # BLD: 0.1 K/UL (ref 0–0.04)
IMM GRANULOCYTES NFR BLD AUTO: 2 % (ref 0–0.5)
LYMPHOCYTES # BLD: 0.6 K/UL (ref 0.8–3.5)
LYMPHOCYTES NFR BLD: 18 % (ref 12–49)
MCH RBC QN AUTO: 35.2 PG (ref 26–34)
MCHC RBC AUTO-ENTMCNC: 33.6 G/DL (ref 30–36.5)
MCV RBC AUTO: 104.8 FL (ref 80–99)
MONOCYTES # BLD: 0.4 K/UL (ref 0–1)
MONOCYTES NFR BLD: 13 % (ref 5–13)
NEUTS SEG # BLD: 2.1 K/UL (ref 1.8–8)
NEUTS SEG NFR BLD: 67 % (ref 32–75)
NRBC # BLD: 0 K/UL (ref 0–0.01)
NRBC BLD-RTO: 0 PER 100 WBC
PLATELET # BLD AUTO: 155 K/UL (ref 150–400)
PMV BLD AUTO: 9.4 FL (ref 8.9–12.9)
RBC # BLD AUTO: 2.73 M/UL (ref 4.1–5.7)
WBC # BLD AUTO: 3.1 K/UL (ref 4.1–11.1)

## 2018-02-05 PROCEDURE — 74011250637 HC RX REV CODE- 250/637: Performed by: HOSPITALIST

## 2018-02-05 PROCEDURE — 36415 COLL VENOUS BLD VENIPUNCTURE: CPT | Performed by: HOSPITALIST

## 2018-02-05 PROCEDURE — 74011000258 HC RX REV CODE- 258: Performed by: HOSPITALIST

## 2018-02-05 PROCEDURE — 65660000000 HC RM CCU STEPDOWN

## 2018-02-05 PROCEDURE — 74011250636 HC RX REV CODE- 250/636: Performed by: HOSPITALIST

## 2018-02-05 PROCEDURE — 85025 COMPLETE CBC W/AUTO DIFF WBC: CPT | Performed by: HOSPITALIST

## 2018-02-05 PROCEDURE — 74011250637 HC RX REV CODE- 250/637: Performed by: PSYCHIATRY & NEUROLOGY

## 2018-02-05 RX ADMIN — VITAMIN D, TAB 1000IU (100/BT) 1000 UNITS: 25 TAB at 10:51

## 2018-02-05 RX ADMIN — VALPROIC ACID 750 MG: 250 SOLUTION ORAL at 22:08

## 2018-02-05 RX ADMIN — LORATADINE 10 MG: 10 TABLET ORAL at 10:51

## 2018-02-05 RX ADMIN — LEVOTHYROXINE SODIUM 50 MCG: 50 TABLET ORAL at 07:06

## 2018-02-05 RX ADMIN — ACETAMINOPHEN 650 MG: 650 SOLUTION ORAL at 07:06

## 2018-02-05 RX ADMIN — AZITHROMYCIN MONOHYDRATE 500 MG: 500 INJECTION, POWDER, LYOPHILIZED, FOR SOLUTION INTRAVENOUS at 12:39

## 2018-02-05 RX ADMIN — OSELTAMIVIR PHOSPHATE 75 MG: 6 POWDER, FOR SUSPENSION ORAL at 10:48

## 2018-02-05 RX ADMIN — LEVETIRACETAM 1500 MG: 100 SOLUTION ORAL at 10:56

## 2018-02-05 RX ADMIN — OSELTAMIVIR PHOSPHATE 75 MG: 6 POWDER, FOR SUSPENSION ORAL at 21:30

## 2018-02-05 RX ADMIN — Medication 10 ML: at 07:07

## 2018-02-05 RX ADMIN — Medication 10 ML: at 22:08

## 2018-02-05 RX ADMIN — ACETAMINOPHEN 650 MG: 650 SOLUTION ORAL at 17:57

## 2018-02-05 RX ADMIN — ACETAMINOPHEN 650 MG: 650 SOLUTION ORAL at 12:39

## 2018-02-05 RX ADMIN — PHENYTOIN 100 MG: 125 SUSPENSION ORAL at 22:00

## 2018-02-05 RX ADMIN — SODIUM CHLORIDE 100 ML/HR: 900 INJECTION, SOLUTION INTRAVENOUS at 01:56

## 2018-02-05 RX ADMIN — ACETAMINOPHEN 650 MG: 650 SOLUTION ORAL at 01:42

## 2018-02-05 RX ADMIN — VALPROIC ACID 750 MG: 250 SOLUTION ORAL at 10:56

## 2018-02-05 RX ADMIN — CEFTRIAXONE 1 G: 1 INJECTION, POWDER, FOR SOLUTION INTRAMUSCULAR; INTRAVENOUS at 12:39

## 2018-02-05 RX ADMIN — ENOXAPARIN SODIUM 40 MG: 40 INJECTION SUBCUTANEOUS at 12:39

## 2018-02-05 RX ADMIN — PHENYTOIN 100 MG: 125 SUSPENSION ORAL at 16:30

## 2018-02-05 RX ADMIN — PHENYTOIN 100 MG: 125 SUSPENSION ORAL at 10:56

## 2018-02-05 RX ADMIN — FERROUS SULFATE TAB 325 MG (65 MG ELEMENTAL FE) 325 MG: 325 (65 FE) TAB at 10:51

## 2018-02-05 RX ADMIN — LEVETIRACETAM 1500 MG: 100 SOLUTION ORAL at 22:06

## 2018-02-05 NOTE — CDMP QUERY
#2    Hi Dr. Tonia Lord,     Please clarify if this patient is (was) being treated/managed for:     => Mental retardation; please specify: mild, moderate, profound or severe in the setting of group home residency, nonverbal and follows some commands requiring monitoring  => Other explanation of clinical findings  => Unable to determine (no explanation for clinical findings)    The medical record reflects the following clinical findings, treatment, and risk factors. Risk Factors:  47 y/o pt resides at group home  Clinical Indicators:  Hx of mental retardation, Down's syndrome. Noted to be nonverbal and follow some commands at baseline  Treatment: monitoring    Please clarify and document your clinical opinion in the progress notes and discharge summary including the definitive and/or presumptive diagnosis, (suspected or probable), related to the above clinical findings. Please include clinical findings supporting your diagnosis.     Thank you,   Donnie Loyola, [address]

## 2018-02-05 NOTE — PROGRESS NOTES
Hospitalist Progress Note                               Ilana Sanchez MD                                     Answering service: [phone]                               OR [phone] from in house phone                               Cell: [phone]              Date of Service:  2018  NAME:  Luis Miguel Workman  :  1965  MRN:  [id number]      Admission Summary:   Reviewed ED and admission records     Mr. Luis Miguel Workman is 46 y.o. male with past medical history significant for seizures (VNS device in place), down syndrome, cerebral palsy, sleep apnea, mental retardation, left eye blindness, and anxiety disorder who presents from group home for fever of 100.5F accompanied by cough since this morning. Per group home provider, pt had \"a few more seizures than normal\" with associated vomiting last night. Pt takes depakote, dilantin, and keppra. Group home provider denies any missed doses. Group home provider reports pt Hx of pneumonia but denies pt Hx of frequent UTI's. Per group home provider, possible ill contacts have been present at pt's day program. Pt is baseline nonverbal and follows some commands. Per group home provider, pt is \"not too far off\" from baseline; he is currently quieter than usual.      Interval history / Subjective:   Patient is non verbal at base line     Assessment & Plan:   Sepsis due to pneumonia  -Fluid resuscitated. BP was low this Am,received 500 cc NS bolus and bp improved. Her brother stated he has low BP at baseline. Lactate was normal.  -Levaquin x 1 and Zosyn  x1 on 23 in the ED,continue with Ceftriaxone and Zithromax.  -Blood culture negative;urine cx grew diphtheroids. Influenza A infection:on Tamiflu. Contact and droplet isolation. Breakthrough seizure precipitated by acute infection +/-subtherapeutic AEDs.group home reported patient did not miss doses. -AED levels were low. He received loading doses of depakon,keppra and dilantin at the ED on 2/3. EEG without  clearly lateralizing or epileptiform features were seen.  -No more seizure activity,Contineu AEDs. .    Abnormal UA:cx grew diphtheroids    Hypothyroidism: on synthroid. TSH a month ago was normal    Down's syndrome,mental retardation,non verbal,ambulates with RW at base line. Group home resident since his parents  in . Drop in HB:no obvious blood loss. This is more than likely due to hemodilution from aggressive fluid resuscitation. Monitor. Diet:regular  Code status: full  DVT prophylaxis: scd  Care Plan discussed with:RN,no family at bedside. Discharge planning/disposition:possible discharge tomorrow. Hospital Problems  Date Reviewed: 2/3/2018          Codes Class Noted POA    * (Principal)Pneumonia ICD-10-CM: J18.9  ICD-9-CM: 728  2/3/2018 Unknown        Recurrent seizures ([facility] [address] ICD-10-CM: N86.020  ICD-9-CM: 345.80  2012 Yes                Review of Systems:   Review of systems not obtained due to patient factors. Physical Examination:      Last 24hrs VS reviewed since prior progress note. Most recent are:  Visit Vitals    /72 (BP 1 Location: Right arm, BP Patient Position: At rest)    Pulse 89    Temp 98.4 °F (36.9 °C)    Resp 15    Ht 5' 3\" (1.6 m)    Wt 68.9 kg (152 lb)    SpO2 94%    BMI 26.93 kg/m2           Constitutional: Follows commands,not in distress. Eyes: Bruising right periorbital area. ENT:  Oral mucous moist, oropharynx benign. Neck supple,    Resp:  CTA bilaterally. No wheezing/rhonchi/rales. No accessory muscle use   CV:  Regular rhythm, normal rate, no murmurs, gallops, rubs    GI:  Soft, non distended, non tender. normoactive bowel sounds, no hepatosplenomegaly    :  No CVA or suprapubic tenderness   Skin  :  No erythema,rash,bullae,dipigmentation     Musculoskeletal:  No edema, warm, 2+ pulses throughout    Neurologic:  Follows commands,mentally challenged.               Intake/Output Summary (Last 24 hours) at 02/05/18 1449  Last data filed at 02/05/18 0735   Gross per 24 hour   Intake              450 ml   Output              800 ml   Net             -350 ml          Data Review:    Review and/or order of clinical lab test  Review and/or order of tests in the radiology section of CPT  Review and/or order of tests in the medicine section of CPT      Labs:     Recent Labs      02/05/18   0200  02/04/18   0419   WBC  3.1*  6.7   HGB  9.6*  8.4*   HCT  28.6*  24.5*   PLT  155  214     Recent Labs      02/04/18   0419  02/03/18   1015   NA  139  137   K  3.5  HEMOLYZED,RECOLLECT REQUESTED   CL  107  100   CO2  25  28   BUN  8  8   CREA  0.94  0.91   GLU  112*  84   CA  7.2*  8.3*     Recent Labs      02/03/18   1015   SGOT  HEMOLYZED,RECOLLECT REQUESTED   ALT  27   AP  98   TBILI  0.2   TP  9.3*   ALB  3.2*   GLOB  6.1*     No results for input(s): INR, PTP, APTT in the last 72 hours. No lab exists for component: INREXT, INREXT   No results for input(s): FE, TIBC, PSAT, FERR in the last 72 hours. Lab Results   Component Value Date/Time    Folate 8.5 01/19/2015 01:06 PM      No results for input(s): PH, PCO2, PO2 in the last 72 hours. No results for input(s): CPK, CKNDX, TROIQ in the last 72 hours.     No lab exists for component: CPKMB  Lab Results   Component Value Date/Time    Cholesterol, total 271 12/10/2015 12:30 PM    HDL Cholesterol 64 12/10/2015 12:30 PM    LDL, calculated 183 12/10/2015 12:30 PM    Triglyceride 120 12/10/2015 12:30 PM     Lab Results   Component Value Date/Time    Glucose (POC) 98 02/11/2009 12:15 PM     Lab Results   Component Value Date/Time    Color YELLOW/STRAW 02/03/2018 10:16 AM    Appearance CLOUDY 02/03/2018 10:16 AM    Specific gravity 1.018 02/03/2018 10:16 AM    pH (UA) 8.0 02/03/2018 10:16 AM    Protein NEGATIVE  02/03/2018 10:16 AM    Glucose NEGATIVE  02/03/2018 10:16 AM    Ketone NEGATIVE  02/03/2018 10:16 AM    Bilirubin NEGATIVE  02/03/2018 10:16 AM Urobilinogen 0.2 02/03/2018 10:16 AM    Nitrites POSITIVE 02/03/2018 10:16 AM    Leukocyte Esterase SMALL 02/03/2018 10:16 AM    Epithelial cells FEW 02/03/2018 10:16 AM    Bacteria NEGATIVE  02/03/2018 10:16 AM    WBC 10-20 02/03/2018 10:16 AM    RBC 0-5 02/03/2018 10:16 AM         Medications Reviewed:     Current Facility-Administered Medications   Medication Dose Route Frequency    oseltamivir (TAMIFLU) 6 mg/mL oral suspension 75 mg  75 mg Oral Q12H    sodium chloride (NS) flush 5-10 mL  5-10 mL IntraVENous PRN    sodium chloride (NS) flush 5-10 mL  5-10 mL IntraVENous Q8H    sodium chloride (NS) flush 5-10 mL  5-10 mL IntraVENous PRN    enoxaparin (LOVENOX) injection 40 mg  40 mg SubCUTAneous Q24H    cefTRIAXone (ROCEPHIN) 1 g in 0.9% sodium chloride (MBP/ADV) 50 mL  1 g IntraVENous Q24H    azithromycin (ZITHROMAX) 500 mg in 0.9% sodium chloride (MBP/ADV) 250 mL  500 mg IntraVENous Q24H    ferrous sulfate tablet 325 mg  1 Tab Oral DAILY WITH BREAKFAST    levothyroxine (SYNTHROID) tablet 50 mcg  50 mcg Oral ACB    cholecalciferol (VITAMIN D3) tablet 1,000 Units  1,000 Units Oral DAILY    loratadine (CLARITIN) tablet 10 mg  10 mg Oral DAILY    sodium chloride (OCEAN) 0.65 % nasal spray 1 Spray  1 Spray Both Nostrils PRN    guar gum (BENEFIBER) packet 1 Packet  1 Packet Oral BID PRN    phenytoin (DILANTIN) 100 mg/4 mL oral suspension 100 mg  100 mg Oral TID    0.9% sodium chloride infusion  100 mL/hr IntraVENous CONTINUOUS    levETIRAcetam (KEPPRA) 100 mg/mL solution 1,500 mg  1,500 mg Oral Q12H    acetaminophen (TYLENOL) solution 650 mg  650 mg Oral Q6H    valproic acid (as sodium salt) (DEPAKENE) 250 mg/5 mL (5 mL) oral solution 750 mg  750 mg Oral Q12H     ______________________________________________________________________  EXPECTED LENGTH OF STAY: 4d 21h  ACTUAL LENGTH OF STAY:          2                 Yeny Wilkinson MD

## 2018-02-05 NOTE — PROGRESS NOTES
Bedside shift change report given to James Pond Arnav (oncoming nurse) by Billy Figueroa (offgoing nurse). Report included the following information SBAR, Kardex, ED Summary, Intake/Output, MAR, Recent Results and Cardiac Rhythm SR/ST. Note pt's brother/Guardian Shilo Carlson and caregiver from group home remain at bedside and had previously complied with isolation precautions to include wearing masks, gloves, and handwashing/using alcohol gel. Both were instructed to not eat or drink in the pt's hospital room also. As previously documented, RN had also encouraged both and those previously exposed to pt to contact their PCPs today to advise them of same for further orders. Upon arrival to pt's room, brother and caregiver from group home were eating at pt's bedside without wearing masks or gloves. RN reminded them of precautions, and both donned masks and gloves. During report, brother removed mask and resumed eating and was reeducated by noc shift RN of concerns to not just visitors but to pts and others he is exposing to. This is the first noted noncompliance this shift.

## 2018-02-05 NOTE — CDMP QUERY
#1    Hi Dr. Landis Angelucci,     Please clarify if this patient is (was) being treated/managed for:     => Metabolic encephalopathy in the setting AMS, somnolence requiring IV abxs and monitoring  => Other explanation of clinical findings  => Unable to determine (no explanation for clinical findings)    The medical record reflects the following clinical findings, treatment, and risk factors. Risk Factors:    Clinical Indicators:  AMS, somnolence, EEG clinical interpretation: This EEG is suggestive of some mild generalized  encephalopathic process, nonspecific in type. This may be related to  underlying structural brain injury and/or toxic/metabolic abnormality. Treatment: monitoring, IV abxs, EEG, neurology consult    Please clarify and document your clinical opinion in the progress notes and discharge summary including the definitive and/or presumptive diagnosis, (suspected or probable), related to the above clinical findings. Please include clinical findings supporting your diagnosis.     Thank you,  Arnold Leonardo, [address]

## 2018-02-05 NOTE — PROGRESS NOTES
Bedside shift change report given to James José (oncoming nurse) by Livier Rodriguez (offgoing nurse). Report included the following information SBAR, Kardex, ED Summary, Intake/Output, MAR, Recent Results and Cardiac Rhythm SR/ST. No significant events overnight, tolerated the condom catheter well. One episode of hypoxemia while sleeping - woke patient up and returned to baseline and remained in the mid 90's spo2      Bedside and Verbal shift change report given to Army Blackburn (oncoming nurse) by Best Vidales (offgoing nurse). Report included the following information SBAR, Kardex, ED Summary, OR Summary, Procedure Summary, Intake/Output, MAR, Accordion, Recent Results, Med Rec Status, Cardiac Rhythm NSr, ST and Alarm Parameters .

## 2018-02-06 PROCEDURE — 74011250637 HC RX REV CODE- 250/637: Performed by: HOSPITALIST

## 2018-02-06 PROCEDURE — 65660000000 HC RM CCU STEPDOWN

## 2018-02-06 PROCEDURE — 74011000258 HC RX REV CODE- 258: Performed by: HOSPITALIST

## 2018-02-06 PROCEDURE — 74011250637 HC RX REV CODE- 250/637: Performed by: PSYCHIATRY & NEUROLOGY

## 2018-02-06 PROCEDURE — 74011250636 HC RX REV CODE- 250/636: Performed by: HOSPITALIST

## 2018-02-06 RX ORDER — AZITHROMYCIN 250 MG/1
500 TABLET, FILM COATED ORAL DAILY
Status: DISCONTINUED | OUTPATIENT
Start: 2018-02-07 | End: 2018-02-07 | Stop reason: HOSPADM

## 2018-02-06 RX ADMIN — ACETAMINOPHEN 650 MG: 650 SOLUTION ORAL at 06:42

## 2018-02-06 RX ADMIN — LEVOTHYROXINE SODIUM 50 MCG: 50 TABLET ORAL at 06:42

## 2018-02-06 RX ADMIN — ENOXAPARIN SODIUM 40 MG: 40 INJECTION SUBCUTANEOUS at 13:06

## 2018-02-06 RX ADMIN — LEVETIRACETAM 1500 MG: 100 SOLUTION ORAL at 22:19

## 2018-02-06 RX ADMIN — LORATADINE 10 MG: 10 TABLET ORAL at 08:44

## 2018-02-06 RX ADMIN — Medication 10 ML: at 06:43

## 2018-02-06 RX ADMIN — VITAMIN D, TAB 1000IU (100/BT) 1000 UNITS: 25 TAB at 08:44

## 2018-02-06 RX ADMIN — Medication 10 ML: at 13:07

## 2018-02-06 RX ADMIN — PHENYTOIN 100 MG: 125 SUSPENSION ORAL at 16:00

## 2018-02-06 RX ADMIN — ACETAMINOPHEN 650 MG: 650 SOLUTION ORAL at 01:30

## 2018-02-06 RX ADMIN — ACETAMINOPHEN 650 MG: 650 SOLUTION ORAL at 18:09

## 2018-02-06 RX ADMIN — PHENYTOIN 100 MG: 125 SUSPENSION ORAL at 21:22

## 2018-02-06 RX ADMIN — PHENYTOIN 100 MG: 125 SUSPENSION ORAL at 08:44

## 2018-02-06 RX ADMIN — VALPROIC ACID 750 MG: 250 SOLUTION ORAL at 08:44

## 2018-02-06 RX ADMIN — ACETAMINOPHEN 650 MG: 650 SOLUTION ORAL at 13:06

## 2018-02-06 RX ADMIN — OSELTAMIVIR PHOSPHATE 75 MG: 6 POWDER, FOR SUSPENSION ORAL at 08:53

## 2018-02-06 RX ADMIN — FERROUS SULFATE TAB 325 MG (65 MG ELEMENTAL FE) 325 MG: 325 (65 FE) TAB at 08:44

## 2018-02-06 RX ADMIN — AZITHROMYCIN MONOHYDRATE 500 MG: 500 INJECTION, POWDER, LYOPHILIZED, FOR SOLUTION INTRAVENOUS at 13:06

## 2018-02-06 RX ADMIN — OSELTAMIVIR PHOSPHATE 75 MG: 6 POWDER, FOR SUSPENSION ORAL at 21:22

## 2018-02-06 RX ADMIN — CEFTRIAXONE 1 G: 1 INJECTION, POWDER, FOR SOLUTION INTRAMUSCULAR; INTRAVENOUS at 13:06

## 2018-02-06 RX ADMIN — Medication 10 ML: at 22:20

## 2018-02-06 RX ADMIN — VALPROIC ACID 750 MG: 250 SOLUTION ORAL at 21:22

## 2018-02-06 RX ADMIN — LEVETIRACETAM 1500 MG: 100 SOLUTION ORAL at 13:07

## 2018-02-06 NOTE — PROGRESS NOTES
Hospitalist Progress Note                               [address] MD [place]                                     Answering service: [phone]                               OR [phone] from in house phone                               Cell: [phone]              Date of Service:  2018  NAME:  Miguel Ángel Leija  :  1965  MRN:  [id number]      Admission Summary:   Reviewed ED and admission records     Mr. Miguel Ángel Leija is 46 y.o. male with past medical history significant for seizures (VNS device in place), down syndrome, cerebral palsy, sleep apnea, mental retardation, left eye blindness, and anxiety disorder who presents from group home for fever of 100.5F accompanied by cough since this morning. Per group home provider, pt had \"a few more seizures than normal\" with associated vomiting last night. Pt takes depakote, dilantin, and keppra. Group home provider denies any missed doses. Group home provider reports pt Hx of pneumonia but denies pt Hx of frequent UTI's. Per group home provider, possible ill contacts have been present at pt's day program. Pt is baseline nonverbal and follows some commands. Per group home provider, pt is \"not too far off\" from baseline; he is currently quieter than usual.      Interval history / Subjective:   Mr Jose J Uribe is non verbal,sleeping soundly. Blood was oozing from the right arm, apparently the iv line haa fallen off. Daisy Nevarez stopped with some pressure and placed tape. Informed JACOB Sanchez. Assessment & Plan:   Sepsis due to pneumonia  -Fluid resuscitated. BP was low this Am,received 500 cc NS bolus and bp improved. Her brother stated he has low BP at baseline. Lactate was normal.  -Levaquin x 1 and Zosyn  x1 on 2/3 in the ED,continue with Ceftriaxone and Zithromax.  -Blood culture negative;urine cx grew diphtheroids. Influenza A infection:on Tamiflu. Contact and droplet isolation.     Breakthrough seizure precipitated by acute infection +/-subtherapeutic AEDs.group home reported patient did not miss doses. -AED levels were low. He received loading doses of depakon,keppra and dilantin at the ED on 2/3. EEG without  clearly lateralizing or epileptiform features were seen.  -No more seizure activity,Contineu AEDs. .    Abnormal UA:cx grew diphtheroids    Hypothyroidism: on synthroid. TSH a month ago was normal    Down's syndrome,mental retardation,non verbal,ambulates with RW at base line. Group home resident since his parents  in . Drop in HB:no obvious blood loss. This is more than likely due to hemodilution from aggressive fluid resuscitation. Monitor. Diet:regular  Code status: full  DVT prophylaxis: scd  Care Plan discussed with:RN,no family at bedside. Discharge planning/disposition:PT eval and possible d/c     Hospital Problems  Date Reviewed: 2/3/2018          Codes Class Noted POA    * (Principal)Pneumonia ICD-10-CM: J18.9  ICD-9-CM: 135  2/3/2018 Unknown        Recurrent seizures ([facility] [address] ICD-10-CM: G40.909  ICD-9-CM: 345.80  2012 Yes                Review of Systems:   Review of systems not obtained due to patient factors. Physical Examination:      Last 24hrs VS reviewed since prior progress note. Most recent are:  Visit Vitals    /66 (BP 1 Location: Right arm, BP Patient Position: At rest)    Pulse 74    Temp 98.4 °F (36.9 °C)    Resp 19    Ht 5' 3\" (1.6 m)    Wt 70.5 kg (155 lb 6.4 oz)    SpO2 96%    BMI 27.53 kg/m2           Constitutional: Sleeping soundly   Eyes: Bruising right periorbital area. ENT:  Oral mucous moist, oropharynx benign. Neck supple,    Resp:  CTA bilaterally. No wheezing/rhonchi/rales. No accessory muscle use   CV:  Regular rhythm, normal rate, no murmurs, gallops, rubs    GI:  Soft, non distended, non tender.  normoactive bowel sounds, no hepatosplenomegaly    :  No CVA or suprapubic tenderness   Skin  :  No erythema,rash,bullae,dipigmentation Musculoskeletal:  oozing from right arm iv line that has fallen off,stopped with pressure and tape applied. Neurologic:  Follows commands,mentally challenged. Intake/Output Summary (Last 24 hours) at 02/06/18 0940  Last data filed at 02/06/18 0702   Gross per 24 hour   Intake                0 ml   Output             1150 ml   Net            -1150 ml          Data Review:    Review and/or order of clinical lab test  Review and/or order of tests in the radiology section of CPT  Review and/or order of tests in the medicine section of CPT      Labs:     Recent Labs      02/05/18   0200  02/04/18   0419   WBC  3.1*  6.7   HGB  9.6*  8.4*   HCT  28.6*  24.5*   PLT  155  214     Recent Labs      02/04/18   0419  02/03/18   1015   NA  139  137   K  3.5  HEMOLYZED,RECOLLECT REQUESTED   CL  107  100   CO2  25  28   BUN  8  8   CREA  0.94  0.91   GLU  112*  84   CA  7.2*  8.3*     Recent Labs      02/03/18   1015   SGOT  HEMOLYZED,RECOLLECT REQUESTED   ALT  27   AP  98   TBILI  0.2   TP  9.3*   ALB  3.2*   GLOB  6.1*     No results for input(s): INR, PTP, APTT in the last 72 hours. No lab exists for component: INREXT, INREXT   No results for input(s): FE, TIBC, PSAT, FERR in the last 72 hours. Lab Results   Component Value Date/Time    Folate 8.5 01/19/2015 01:06 PM      No results for input(s): PH, PCO2, PO2 in the last 72 hours. No results for input(s): CPK, CKNDX, TROIQ in the last 72 hours.     No lab exists for component: CPKMB  Lab Results   Component Value Date/Time    Cholesterol, total 271 12/10/2015 12:30 PM    HDL Cholesterol 64 12/10/2015 12:30 PM    LDL, calculated 183 12/10/2015 12:30 PM    Triglyceride 120 12/10/2015 12:30 PM     Lab Results   Component Value Date/Time    Glucose (POC) 98 02/11/2009 12:15 PM     Lab Results   Component Value Date/Time    Color YELLOW/STRAW 02/03/2018 10:16 AM    Appearance CLOUDY 02/03/2018 10:16 AM    Specific gravity 1.018 02/03/2018 10:16 AM    pH (UA) 8.0 02/03/2018 10:16 AM    Protein NEGATIVE  02/03/2018 10:16 AM    Glucose NEGATIVE  02/03/2018 10:16 AM    Ketone NEGATIVE  02/03/2018 10:16 AM    Bilirubin NEGATIVE  02/03/2018 10:16 AM    Urobilinogen 0.2 02/03/2018 10:16 AM    Nitrites POSITIVE 02/03/2018 10:16 AM    Leukocyte Esterase SMALL 02/03/2018 10:16 AM    Epithelial cells FEW 02/03/2018 10:16 AM    Bacteria NEGATIVE  02/03/2018 10:16 AM    WBC 10-20 02/03/2018 10:16 AM    RBC 0-5 02/03/2018 10:16 AM         Medications Reviewed:     Current Facility-Administered Medications   Medication Dose Route Frequency    oseltamivir (TAMIFLU) 6 mg/mL oral suspension 75 mg  75 mg Oral Q12H    sodium chloride (NS) flush 5-10 mL  5-10 mL IntraVENous PRN    sodium chloride (NS) flush 5-10 mL  5-10 mL IntraVENous Q8H    sodium chloride (NS) flush 5-10 mL  5-10 mL IntraVENous PRN    enoxaparin (LOVENOX) injection 40 mg  40 mg SubCUTAneous Q24H    cefTRIAXone (ROCEPHIN) 1 g in 0.9% sodium chloride (MBP/ADV) 50 mL  1 g IntraVENous Q24H    azithromycin (ZITHROMAX) 500 mg in 0.9% sodium chloride (MBP/ADV) 250 mL  500 mg IntraVENous Q24H    ferrous sulfate tablet 325 mg  1 Tab Oral DAILY WITH BREAKFAST    levothyroxine (SYNTHROID) tablet 50 mcg  50 mcg Oral ACB    cholecalciferol (VITAMIN D3) tablet 1,000 Units  1,000 Units Oral DAILY    loratadine (CLARITIN) tablet 10 mg  10 mg Oral DAILY    sodium chloride (OCEAN) 0.65 % nasal spray 1 Spray  1 Spray Both Nostrils PRN    guar gum (BENEFIBER) packet 1 Packet  1 Packet Oral BID PRN    phenytoin (DILANTIN) 100 mg/4 mL oral suspension 100 mg  100 mg Oral TID    levETIRAcetam (KEPPRA) 100 mg/mL solution 1,500 mg  1,500 mg Oral Q12H    acetaminophen (TYLENOL) solution 650 mg  650 mg Oral Q6H    valproic acid (as sodium salt) (DEPAKENE) 250 mg/5 mL (5 mL) oral solution 750 mg  750 mg Oral Q12H     ______________________________________________________________________  EXPECTED LENGTH OF STAY: 4d 21h  ACTUAL LENGTH OF STAY:          3                 Ilana Sanchez MD

## 2018-02-06 NOTE — PROGRESS NOTES
Bedside and Verbal shift change report given to [address] [facility] [address] (oncoming nurse) by Rosario Cabrera (offgoing nurse). Report included the following information SBAR, Kardex and Recent Results.

## 2018-02-06 NOTE — PROGRESS NOTES
Clinical Pharmacy Note: Re: IV to PO Automatic Conversion - Antibiotic    Please note: Danelle Jackson medication Azithromycin has been changed from IV to PO based on the following criteria:    The patient:  1. Has received IV therapy for at least 48 hours   2. Has a functioning GI tract  - Taking scheduled oral medications  - Tolerating tube feeds at goal rate or a full liquid, soft, or regular diet         3. Is clinically stable        - Temperature < 100.4F for at least 24 hours        - WBC is trending down    This IV to PO conversion is based on the [employer] approved automatic conversion policy for eligible patients. Please call with questions.

## 2018-02-06 NOTE — PROGRESS NOTES
Bedside shift change report given to Grace Sunshine (oncoming nurse) by Deshaun Noguera (offgoing nurse).  Report included the following information SBAR, Kardex, ED Summary, Intake/Output, MAR, Recent Results and Cardiac Rhythm SR.

## 2018-02-06 NOTE — PROGRESS NOTES
Problem: Falls - Risk of  Goal: *Absence of Falls  Document Mae Fall Risk and appropriate interventions in the flowsheet.    Outcome: Progressing Towards Goal  Fall Risk Interventions:  Mobility Interventions: Assess mobility with egress test, Bed/chair exit alarm, Communicate number of staff needed for ambulation/transfer, Patient to call before getting OOB, PT Consult for mobility concerns, Utilize walker, cane, or other assitive device    Mentation Interventions: Adequate sleep, hydration, pain control, Bed/chair exit alarm, Door open when patient unattended, Increase mobility, More frequent rounding, Reorient patient, Toileting rounds    Medication Interventions: Patient to call before getting OOB, Teach patient to arise slowly    Elimination Interventions: Call light in reach, Toileting schedule/hourly rounds, Patient to call for help with toileting needs

## 2018-02-06 NOTE — PROGRESS NOTES
CM met with pt's caregiver, Guy Joseph ([phone]) and spoke with his brother, Bertha Pickett, to introduce them to the role of Cm and transition of care. Both verbalized understanding. This pt resides at the [facility] with plans to return there at d/c. Per Emmie Nieves would need to contact [employer], mgr of the facility ([phone]) at d/c. She will arrange transportation for this pt and ensure that a caregiver will be waiting for him at his home at the time of d/c. CM informed Guy Joseph that per the attending's note, this pt will likely d/c tomorrow. Cm was also told that report should be called to Toño Gloria, pt's NP at [phone]. Per Shadia's request, CM faxed pt's clinicals to [phone]. Pt has PT ordered for today. If our therapist recommends home health PT, they would like to use [facility]. CM will follow. [address] Management Interventions  PCP Verified by CM:  Yes  Palliative Care Criteria Met (RRAT>21 & CHF Dx)?: No  Transition of Care Consult (CM Consult): [address]: Yes  MyChart Signup: No  Discharge Durable Medical Equipment: No  Physical Therapy Consult: Yes  Occupational Therapy Consult: No  Speech Therapy Consult: No  Current Support Network: Assisted Living  Confirm Follow Up Transport: Family  Plan discussed with Pt/Family/Caregiver: Yes  Freedom of Choice Offered: Yes  Drew Resource Information Provided?: No

## 2018-02-06 NOTE — INTERDISCIPLINARY ROUNDS
IDR/SLIDR Summary          Patient: Susie Heaton MRN: [id number]    Age: 46 y.o. YOB: 1965 Room/Bed: [facility]   Admit Diagnosis: Pneumonia , Influenza A Principal Diagnosis: Pneumonia   Goals: Nutrition, Tamiflu, mobility, PT consult, discharge planning  Readmission: NO  Quality Measure: PNA  VTE Prophylaxis: Chemical  Influenza Vaccine screening completed? YES  Pneumococcal Vaccine screening completed? NO  Mobility needs: Yes   Nutrition plan:Yes  Consults: P. T and Case Management    Financial concerns:No  Escalated to CM? YES  RRAT Score:    Interventions:Home Health  Testing due for pt today?  NO  LOS: 3 days Expected length of stay 5 days  Discharge plan: Return to [address] [place] [address]   PCP: Leo Birmingham MD  Transportation needs: Yes    Days before discharge:one day until discharge   Discharge disposition: [facility])    Signed:     Danielle Molina RN  2/6/2018  5:59 AM

## 2018-02-07 ENCOUNTER — HOME HEALTH ADMISSION (OUTPATIENT)
Dept: HOME HEALTH SERVICES | Facility: HOME HEALTH | Age: 53
End: 2018-02-07
Payer: MEDICARE

## 2018-02-07 VITALS
SYSTOLIC BLOOD PRESSURE: 107 MMHG | BODY MASS INDEX: 25.64 KG/M2 | TEMPERATURE: 97.5 F | HEART RATE: 89 BPM | WEIGHT: 144.7 LBS | OXYGEN SATURATION: 96 % | HEIGHT: 63 IN | DIASTOLIC BLOOD PRESSURE: 69 MMHG | RESPIRATION RATE: 12 BRPM

## 2018-02-07 LAB
BACTERIA SPEC CULT: ABNORMAL
GRAM STN SPEC: ABNORMAL
SERVICE CMNT-IMP: ABNORMAL
VALPROATE FREE SERPL-MCNC: 27.8 UG/ML (ref 6–22)

## 2018-02-07 PROCEDURE — 74011250636 HC RX REV CODE- 250/636: Performed by: HOSPITALIST

## 2018-02-07 PROCEDURE — 97116 GAIT TRAINING THERAPY: CPT

## 2018-02-07 PROCEDURE — 74011000258 HC RX REV CODE- 258: Performed by: HOSPITALIST

## 2018-02-07 PROCEDURE — G8979 MOBILITY GOAL STATUS: HCPCS

## 2018-02-07 PROCEDURE — 97161 PT EVAL LOW COMPLEX 20 MIN: CPT

## 2018-02-07 PROCEDURE — 74011250637 HC RX REV CODE- 250/637: Performed by: HOSPITALIST

## 2018-02-07 PROCEDURE — 74011250637 HC RX REV CODE- 250/637: Performed by: PSYCHIATRY & NEUROLOGY

## 2018-02-07 PROCEDURE — G8978 MOBILITY CURRENT STATUS: HCPCS

## 2018-02-07 RX ORDER — AMOXICILLIN AND CLAVULANATE POTASSIUM 875; 125 MG/1; MG/1
1 TABLET, FILM COATED ORAL 2 TIMES DAILY
Qty: 14 TAB | Refills: 0 | Status: SHIPPED | OUTPATIENT
Start: 2018-02-07 | End: 2018-02-14

## 2018-02-07 RX ORDER — PHENYTOIN 125 MG/5ML
100 SUSPENSION ORAL 3 TIMES DAILY
Qty: 2 BOTTLE | Refills: 5 | Status: SHIPPED
Start: 2018-02-07 | End: 2018-04-24 | Stop reason: SDUPTHER

## 2018-02-07 RX ORDER — OSELTAMIVIR PHOSPHATE 75 MG/1
75 CAPSULE ORAL 2 TIMES DAILY
Qty: 4 CAP | Refills: 0 | Status: SHIPPED | OUTPATIENT
Start: 2018-02-07 | End: 2018-02-09

## 2018-02-07 RX ORDER — VALPROIC ACID 250 MG/5ML
750 SOLUTION ORAL 2 TIMES DAILY
Qty: 500 ML | Refills: 5 | Status: SHIPPED
Start: 2018-02-07 | End: 2018-03-28 | Stop reason: ALTCHOICE

## 2018-02-07 RX ADMIN — LORATADINE 10 MG: 10 TABLET ORAL at 09:38

## 2018-02-07 RX ADMIN — Medication 10 ML: at 06:30

## 2018-02-07 RX ADMIN — VITAMIN D, TAB 1000IU (100/BT) 1000 UNITS: 25 TAB at 09:38

## 2018-02-07 RX ADMIN — VALPROIC ACID 750 MG: 250 SOLUTION ORAL at 09:46

## 2018-02-07 RX ADMIN — CEFTRIAXONE 1 G: 1 INJECTION, POWDER, FOR SOLUTION INTRAMUSCULAR; INTRAVENOUS at 12:18

## 2018-02-07 RX ADMIN — PHENYTOIN 100 MG: 125 SUSPENSION ORAL at 09:44

## 2018-02-07 RX ADMIN — ACETAMINOPHEN 650 MG: 650 SOLUTION ORAL at 06:29

## 2018-02-07 RX ADMIN — LEVOTHYROXINE SODIUM 50 MCG: 50 TABLET ORAL at 06:30

## 2018-02-07 RX ADMIN — OSELTAMIVIR PHOSPHATE 75 MG: 6 POWDER, FOR SUSPENSION ORAL at 09:49

## 2018-02-07 RX ADMIN — ACETAMINOPHEN 650 MG: 650 SOLUTION ORAL at 00:42

## 2018-02-07 RX ADMIN — ENOXAPARIN SODIUM 40 MG: 40 INJECTION SUBCUTANEOUS at 12:18

## 2018-02-07 RX ADMIN — LEVETIRACETAM 1500 MG: 100 SOLUTION ORAL at 12:10

## 2018-02-07 RX ADMIN — ACETAMINOPHEN 650 MG: 650 SOLUTION ORAL at 12:18

## 2018-02-07 RX ADMIN — AZITHROMYCIN 500 MG: 250 TABLET, FILM COATED ORAL at 09:37

## 2018-02-07 RX ADMIN — FERROUS SULFATE TAB 325 MG (65 MG ELEMENTAL FE) 325 MG: 325 (65 FE) TAB at 09:38

## 2018-02-07 NOTE — INTERDISCIPLINARY ROUNDS
IDR/SLIDR Summary          Patient: Cesar Gould MRN: [id number]    Age: 46 y.o. YOB: 1965 Room/Bed: [facility]   Admit Diagnosis: Pneumonia , Influenza A Principal Diagnosis: Pneumonia   Goals: Nutrition, Tamiflu, mobility, PT consult, discharge planning  Readmission: NO  Quality Measure: PNA  VTE Prophylaxis: Chemical  Influenza Vaccine screening completed? YES  Pneumococcal Vaccine screening completed? NO  Mobility needs: Yes   Nutrition plan:Yes  Consults: P. T and Case Management    Financial concerns:No  Escalated to CM? YES  RRAT Score:    Interventions:Home Health  Testing due for pt today?  NO  LOS: 4 days Expected length of stay 5 days  [facility]   PCP: Rosalinda Borges MD  Transportation needs: Yes    Days before discharge: ready for discharge  Discharge disposition: [facility])    Signed:     Ye Walker  2/7/2018  12:26 AM

## 2018-02-07 NOTE — PROGRESS NOTES
Problem: Falls - Risk of  Goal: *Absence of Falls  Document Mae Fall Risk and appropriate interventions in the flowsheet.    Outcome: Progressing Towards Goal  Fall Risk Interventions:  Mobility Interventions: Bed/chair exit alarm, Communicate number of staff needed for ambulation/transfer, OT consult for ADLs, PT Consult for mobility concerns, PT Consult for assist device competence, Utilize walker, cane, or other assitive device    Mentation Interventions: Adequate sleep, hydration, pain control, Bed/chair exit alarm, Door open when patient unattended, Increase mobility, More frequent rounding, Reorient patient, Toileting rounds    Medication Interventions: Assess postural VS orthostatic hypotension, Bed/chair exit alarm, Patient to call before getting OOB, Teach patient to arise slowly, Utilize gait belt for transfers/ambulation    Elimination Interventions: Bed/chair exit alarm, Call light in reach, Toileting schedule/hourly rounds

## 2018-02-07 NOTE — PROGRESS NOTES
Patient left the unit with [facility] personnel and personal belongings, discharge instructions, prescriptions, and a copy of the MAR. Nicki Chiu, the patient's brother, is aware the patient was discharged today and transportation arrangements made by [facility].

## 2018-02-07 NOTE — ROUTINE PROCESS
Bedside and Verbal shift change report given to Marcelo Whitney (oncoming nurse) by Sherman Winslow (offgoing nurse). Report included the following information SBAR, Kardex, ED Summary, Procedure Summary, Intake/Output, MAR, Accordion, Recent Results, Med Rec Status and Cardiac Rhythm NSR.

## 2018-02-07 NOTE — DISCHARGE INSTRUCTIONS
Discharge Instructions       PATIENT ID: Anahy Baker  MRN: [id number]   YOB: 1965    DATE OF ADMISSION: 2/3/2018  9:38 AM    DATE OF DISCHARGE: 2/7/2018    PRIMARY CARE PROVIDER: Toño Gonzalez MD     ATTENDING PHYSICIAN: Mynor Melton MD  DISCHARGING PROVIDER: Mynor Melton MD    To contact this individual call [phone] and ask the  to page. If unavailable ask to be transferred the Adult Hospitalist Department. DISCHARGE DIAGNOSES and ADDITIONAL CARE RECOMMENDATIONS:  Sepsis   Pneumonia: complete your antibiotics as prescribed. You need follow up chest xray in 6 weeks to ensure resolution of the pneumonia. Influenza A infection: Complete the Tamiflu as prescribed. -Contact and droplet isolation at the facility         CONSULTATIONS: IP CONSULT TO HOSPITALIST  IP CONSULT TO NEUROLOGY    PROCEDURES/SURGERIES: * No surgery found *    PENDING TEST RESULTS:   At the time of discharge the following test results are still pending: none    FOLLOW UP APPOINTMENTS:   Follow-up Information     Follow up With Details Comments Contact Info    Toño Gonzalez MD In 1 week post hospitalization follow up recommended within one week of discharge [place] [address]  [phone]                 DIET: Regular Diet    ACTIVITY: Activity as tolerated and PT/OT per [address]: NA    EQUIPMENT needed: own      DISCHARGE MEDICATIONS:   See Medication Reconciliation Form    · It is important that you take the medication exactly as they are prescribed. · Keep your medication in the bottles provided by the pharmacist and keep a list of the medication names, dosages, and times to be taken in your wallet. · Do not take other medications without consulting your doctor. NOTIFY YOUR PHYSICIAN FOR ANY OF THE FOLLOWING:   Fever over 101 degrees for 24 hours.    Chest pain, shortness of breath, fever, chills, nausea, vomiting, diarrhea, change in mentation, falling, weakness, bleeding. Severe pain or pain not relieved by medications. Or, any other signs or symptoms that you may have questions about. DISPOSITION:BAck to Long term facility  With HHPT      Signed:    Yeny Wilkinson MD  2/7/2018  11:14 AM

## 2018-02-07 NOTE — PROGRESS NOTES
Pt for discharge today. CM met with pt to inform of his returning to the group home and that transportation will be provided by the home. CM discussed home health for PT at home. He acknowledged information. JULIO talked to [employer]. Manager at [employer] [place], [phone]. She will arrange her transportation to  pt at 2:00 pm. JULIO talked to ISABELLA Topete as well. Discharge summary and instructions were faxed to [phone]. Referral sent to [facility] via [address] [place] [address] Avenue. Pt's nurse to call report to Yoselyn , [phone].     Marcell Mohan, RN ACM

## 2018-02-07 NOTE — PROGRESS NOTES
TRANSFER - OUT REPORT:    Verbal report given to Aniyah at [facility] ([phone]) (name) on Susanne Perez  being discharged to [facility] (unit) for routine progression of care       Report consisted of patients Situation, Background, Assessment and   Recommendations(SBAR). Information from the following report(s) SBAR, Kardex, [facility] and Cardiac Rhythm nsr was reviewed with the receiving nurse. Opportunity for questions and clarification was provided.       Patient transported with:   patient belongings  [facility] staff

## 2018-02-07 NOTE — PROGRESS NOTES
Problem: Mobility Impaired (Adult and Pediatric)  Goal: *Acute Goals and Plan of Care (Insert Text)  Physical Therapy Goals  Initiated 2/7/2018  1. Patient will move from supine to sit and sit to supine  and scoot up and down in bed with supervision/set-up within 7 day(s). 2.  Patient will transfer from bed to chair and chair to bed with supervision/set-up using the least restrictive device within 7 day(s). 3.  Patient will perform sit to stand with supervision/set-up within 7 day(s). 4.  Patient will ambulate with minimal assistance/contact guard assist for 100 feet with the least restrictive device within 7 day(s). physical Therapy EVALUATION  Patient: Christoph Lee (66 y.o. male)  Date: 2/7/2018  Primary Diagnosis: Pneumonia        Precautions:   Contact (and droplet)    ASSESSMENT :  Based on the objective data described below, the patient presents with impaired ?functional mobility as compared to baseline level 2* decreased alertness, decreased command following, impaired cognition and safety awareness, poor standing balance, and impaired gait following admission for PNA and seizure. Pt is non verbal at baseline and unable to provide any PLOF information, however per chart review, he resides at a group home and has (?) 24/7 care and was ambulatory using a rolling walker. Unsure of level of assist needed. Pt was cleared for mobility by RN and appears agreeable to participation in session. He required up to min A for supine>sit (with HOB elevated) with verbal and tactile cues for initiation. Demos good unsupported sitting balance for 3+min, one mild posterior LOB with fatigue. He was able to complete sit<>stands from EOB and chair with CGA and cues for hand placement; gait training progressed in room using personal RW and up to min A - assist for pace setting, path navigation, and AD management - note ?mild ataxia.  He remained up in chair at end of session with chair alarm on, PCT present for bathing. Anticipate that pt is near but possibly slightly below his functional baseline - recommend discharge back to group home with 24/7 SUP/assist and HHPT. Will follow. Patient will benefit from skilled intervention to address the above impairments. Patients rehabilitation potential is considered to be Good  Factors which may influence rehabilitation potential include:   []         None noted  []         Mental ability/status  []         Medical condition  []         Home/family situation and support systems  []         Safety awareness  []         Pain tolerance/management  []         Other:      PLAN :  Recommendations and Planned Interventions:  []           Bed Mobility Training             []    Neuromuscular Re-Education  []           Transfer Training                   []    Orthotic/Prosthetic Training  []           Gait Training                         []    Modalities  []           Therapeutic Exercises           []    Edema Management/Control  []           Therapeutic Activities            []    Patient and Family Training/Education  []           Other (comment):    Frequency/Duration: Patient will be followed by physical therapy  3 times a week to address goals. Discharge Recommendations: Group home with 24/7 care and HHPT  Further Equipment Recommendations for Discharge: none     SUBJECTIVE:   Patient nodded yes/no. No verbalizations.     OBJECTIVE DATA SUMMARY:   HISTORY:    Past Medical History:   Diagnosis Date    Anemia     Anxiety     Blindness of left eye     [facility]bral palsy ([facility] [address]     DEMENTIA     Down syndrome     Endocrine disease     thyroid disorder    GERD (gastroesophageal reflux disease)     Ill-defined condition     blind in left eye    Ill-defined condition     imparied gait - uses walker    Ill-defined condition     dermatitis    Ill-defined condition     cerebral palsy    Ill-defined condition     prone to decubitus ulcers    Ill-defined condition     anemia    Neurological disorder     Other ill-defined conditions(799.89)     Down syndrome    Psychiatric disorder     mental retardation    Psychiatric disorder     anxiety disorder    Seizures ([facility] [address]     Sleep apnea     Thyroid disease     Unspecified epilepsy without mention of intractable epilepsy      Past Surgical History:   Procedure Laterality Date    HX OTHER SURGICAL  03/13/2015     VNS  Dr. Ezra Boyer. Richar Lundbergir  @ [id number]     Prior Level of Function/Home Situation: Unsure of level of assist needed at group home but was ambulatory using a RW  Personal factors and/or comorbidities impacting plan of care:     Home Situation  [facility] [address] [place] [address]  # Steps to Enter: 0  One/Two Story Residence: Other (Comment)  Living Alone: No  Support Systems:  (group home staff)  Patient Expects to be Discharged to[de-identified] Group home  Current DME Used/Available at Home: Walker, rolling    EXAMINATION/PRESENTATION/DECISION MAKING:   Critical Behavior:  Neurologic State: Alert, Eyes open to stimulus  Orientation Level: Unable to verbalize  Cognition: Decreased attention/concentration, Decreased command following  Safety/Judgement: Decreased awareness of environment, Decreased awareness of need for assistance, Decreased awareness of need for safety, Decreased insight into deficits  Hearing:   Auditory  Auditory Impairment: None  Skin:  Intact where expsoed  Edema:   Range Of Motion:  AROM: Generally decreased, functional        Strength:    Strength: Generally decreased, functional        Tone & Sensation:   Tone: Normal  Sensation: Intact      Coordination:  Coordination: Generally decreased, functional  Vision:      Functional Mobility:  Bed Mobility:     Supine to Sit: Minimum assistance     Transfers:  Sit to Stand: Contact guard assistance  Stand to Sit: Contact guard assistance  Balance:   Sitting: Impaired  Sitting - Static: Good (unsupported)  Sitting - Dynamic: Fair (occasional)  Standing: Impaired  Standing - Static: Fair;Constant support  Standing - Dynamic : Poor  Ambulation/Gait Training:  Distance (ft): 30 Feet (ft)  Assistive Device: Gait belt;Walker, rolling  Ambulation - Level of Assistance: Minimal assistance  Gait Description (WDL): Exceptions to WDL  Gait Abnormalities: Ataxic; Altered arm swing;Decreased step clearance;Shuffling gait  Base of Support: Widened  Speed/Elizabet: Shuffled;Pace decreased (<100 feet/min)  Step Length: Left shortened;Right shortened  Functional Measure:  Tinetti test:    Sitting Balance: 1  Arises: 1  Attempts to Rise: 2  Immediate Standing Balance: 1  Standing Balance: 1  Nudged: 0  Eyes Closed: 0  Turn 360 Degrees - Continuous/Discontinuous: 0  Turn 360 Degrees - Steady/Unsteady: 0  Sitting Down: 1  Balance Score: 7  Indication of Gait: 0  R Step Length/Height: 1  L Step Length/Height: 1  R Foot Clearance: 1  L Foot Clearance: 1  Step Symmetry: 1  Step Continuity: 0  Path: 1  Trunk: 0  Walking Time: 0  Gait Score: 6  Total Score: 13       Tinetti Test and G-code impairment scale:  Percentage of Impairment CH    0%   CI    1-19% CJ    20-39% CK    40-59% CL    60-79% CM    80-99% CN     100%   Tinetti  Score 0-28 28 23-27 17-22 12-16 6-11 1-5 0       Tinetti Tool Score Risk of Falls  <19 = High Fall Risk  19-24 = Moderate Fall Risk  25-28 = Low Fall Risk  Tinetti ME. Performance-Oriented Assessment of Mobility Problems in Elderly Patients. [facility]; [id number]. (Scoring Description: PT Bulletin Feb. 10, 1993)    Older adults: Tommy Caldera et al, 2009; n = 1000 [facility] elderly evaluated with ABC, FERMIN, ADL, and IADL)  · Mean FERMIN score for males aged 69-68 years = 26.21(3.40)  · Mean FERMIN score for females age 69-68 years = 25.16(4.30)  · Mean FERMIN score for males over 80 years = 23.29(6.02)  · Mean FERMIN score for females over 80 years = 17.20(8.32)         G codes:   In compliance with CMSs Claims Based Outcome Reporting, the following G-code set was chosen for this patient based on their primary functional limitation being treated: The outcome measure chosen to determine the severity of the functional limitation was the Tinetti with a score of 13/28 which was correlated with the impairment scale. ? Mobility - Walking and Moving Around:     - CURRENT STATUS: CK - 40%-59% impaired, limited or restricted    - GOAL STATUS: CJ - 20%-39% impaired, limited or restricted    - D/C STATUS:  ---------------To be determined---------------      Physical Therapy Evaluation Charge Determination   History Examination Presentation Decision-Making   HIGH Complexity :3+ comorbidities / personal factors will impact the outcome/ POC  MEDIUM Complexity : 3 Standardized tests and measures addressing body structure, function, activity limitation and / or participation in recreation  LOW Complexity : Stable, uncomplicated  MEDIUM Complexity : FOTO score of 26-74      Based on the above components, the patient evaluation is determined to be of the following complexity level: LOW     Pain:  Pain Scale 1: Visual  Pain Intensity 1: 0              Activity Tolerance:   NAD    Please refer to the flowsheet for vital signs taken during this treatment. After treatment:   [x]         Patient left in no apparent distress sitting up in chair  []         Patient left in no apparent distress in bed  [x]         Call bell left within reach  [x]         Nursing notified  [x]         Caregiver present  [x]         Chair alarm activated    COMMUNICATION/EDUCATION:   The patients plan of care was discussed with: Registered Nurse, Physician and . [x]         Fall prevention education was provided and the patient/caregiver indicated understanding. [x]         Patient/family have participated as able in goal setting and plan of care. [x]         Patient/family agree to work toward stated goals and plan of care.   []         Patient understands intent and goals of therapy, but is neutral about his/her participation. []         Patient is unable to participate in goal setting and plan of care.     Thank you for this referral.  Junior Ferrer, PT , DPT   Time Calculation: 28 mins

## 2018-02-07 NOTE — DISCHARGE SUMMARY
Discharge Summary       PATIENT ID: Jensen Sidhu  MRN: [id number]   YOB: 1965    DATE OF ADMISSION: 2/3/2018  9:38 AM    DATE OF DISCHARGE: 2/7/2018  PRIMARY CARE PROVIDER: Toño Gonzalez MD     ATTENDING PHYSICIAN: Sherryle Negri, MD  DISCHARGING PROVIDER: Sherryle Negri, MD    To contact this individual call [phone] and ask the  to page. If unavailable ask to be transferred the Adult Hospitalist Department. CONSULTATIONS: IP CONSULT TO HOSPITALIST  IP CONSULT TO NEUROLOGY    PROCEDURES/SURGERIES: * No surgery found *    ADMITTING DIAGNOSES & HOSPITAL COURSE:        Admission Summary:   Reviewed ED and admission records      Mr. Jensen Sidhu is 46 y.o. male with past medical history significant for seizures (VNS device in place), down syndrome, cerebral palsy, sleep apnea, mental retardation, left eye blindness, and anxiety disorder who presents from group home for fever of 100.5F accompanied by cough since this morning. Per group home provider, pt had \"a few more seizures than normal\" with associated vomiting last night. Pt takes depakote, dilantin, and keppra. Group home provider denies any missed doses. Group home provider reports pt Hx of pneumonia but denies pt Hx of frequent UTI's. Per group home provider, possible ill contacts have been present at pt's day program. Pt is baseline nonverbal and follows some commands. Per group home provider, pt is \"not too far off\" from baseline; he is currently quieter than usual.              Assessment & Plan:   Sepsis due to pneumonia  -Fluid resuscitated.  -Levaquin x 1 and Zosyn  x1 on 2/3 in the ED,continued with Ceftriaxone and Zithromax. Sputum cx grew rina staph sensitive to augmentin and pt is discharged on augmentin for 7 ore days.  -Blood culture negative;urine cx grew diphtheroids. -CXR in 6 weeks. Influenza A infection:on Tamiflu. Contact and droplet isolation.      Breakthrough seizure precipitated by acute infection +/-subtherapeutic AEDs.group home reported patient did not miss doses. -AED levels were low. He received loading doses of depakon,keppra and dilantin at the ED on 2/3. EEG without  clearly lateralizing or epileptiform features were seen. -AEDs adjusted per neurology      Abnormal UA:cx grew diphtheroids     Hypothyroidism: on synthroid. TSH a month ago was normal     Likely moderate MR:Down's syndrome,mental retardation,non verbal,ambulates with RW at base line. Group home resident since his parents  in . Acute metabolic encephalopathy due to acute infection  in the setting of MR: back to baseline. Non verbal.Follows commands. Drop in HB:no obvious blood loss. This is more than likely due to hemodilution from aggressive fluid resuscitation. Monitor. PENDING TEST RESULTS:   At the time of discharge the following test results are still pending: none    FOLLOW UP APPOINTMENTS:    Follow-up Information     Follow up With Details Comments Contact Info    Jose Elias Morin MD In 1 week post hospitalization follow up recommended within one week of discharge [id number] and ADDITIONAL CARE RECOMMENDATIONS:  Sepsis   Pneumonia: complete your antibiotics as prescribed. You need follow up chest xray in 6 weeks to ensure resolution of the pneumonia. Influenza A infection: Complete the Tamiflu as prescribed. -Contact and droplet isolation at the facility     DIET: Regular Diet    ACTIVITY: Activity as tolerated and PT/OT per Home Health    WOUND CARE: NA    EQUIPMENT needed: own        DISCHARGE MEDICATIONS:  Current Discharge Medication List      START taking these medications    Details   oseltamivir (TAMIFLU) 75 mg capsule Take 1 Cap by mouth two (2) times a day for 4 doses. Qty: 4 Cap, Refills: 0      amoxicillin-clavulanate (AUGMENTIN) 875-125 mg per tablet Take 1 Tab by mouth two (2) times a day for 7 days.   Qty: 14 Tab, Refills: 0      Bacillus coagulans (PROBIOTIC, B. COAGULANS,) 10 billion cell cpDR Take 1 Cap by mouth daily for 30 days. Qty: 30 Cap, Refills: 0.         CONTINUE these medications which have CHANGED    Details   valproate (DEPAKENE) 250 mg/5 mL syrup Take 15 mL by mouth two (2) times a day. Take 10 ml (500 mg) in morning and 10 ml(500mg) in the evening. Qty: 500 mL, Refills: 5    Associated Diagnoses: Partial epilepsy with impairment of consciousness (HCC)      phenytoin (DILANTIN-125) suspension Take 4 mL by mouth three (3) times daily. Qty: 2 Bottle, Refills: 5    Associated Diagnoses: Partial epilepsy with impairment of consciousness ([address]         CONTINUE these medications which have NOT CHANGED    Details   sodium chloride (SALINE NASAL) 0.65 % nasal spray 1 [place] by Both Nostrils route as needed for Congestion. Qty: 15 mL, Refills: 6      ferrous sulfate 325 mg (65 mg iron) tablet Take  by mouth Daily (before breakfast). loratadine (CLARITIN) 10 mg tablet Take 1 Tab by mouth daily. Qty: 30 Tab, Refills: 1    Associated Diagnoses: Nasal congestion      levETIRAcetam (KEPPRA) 100 mg/mL solution 15ml by mouth twice a day. 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 Qty: 90 Tab, Refills: 2      multivit w-min-ferrous gluconate (CEROVITE) 9 mg iron/15 mL oral liquid Take  by mouth daily. cholecalciferol (VITAMIN D3) 1,000 unit tablet Take 1 Tab by mouth daily. Qty: 90 Tab, Refills: 4      OTHER Benefiber Clear Powder. Mix two teaspoonfuls in 4-8 oz of suitable liquid and give by mouth twice daily as needed. chlorhexidine (PERIDEX) 0.12 % solution 15 mL by Swish and Spit route two (2) times a day. Dental caries               NOTIFY YOUR PHYSICIAN FOR ANY OF THE FOLLOWING:   Fever over 101 degrees for 24 hours.    Chest pain, shortness of breath, fever, chills, nausea, vomiting, diarrhea, change in mentation, falling, weakness, bleeding. Severe pain or pain not relieved by medications. Or, any other signs or symptoms that you may have questions about. DISPOSITION:GP home with [facility] [address] With:   OT  PT  HH  RN       Long term SNF/Inpatient Rehab    Independent/assisted living    Hospice    Other:       PATIENT CONDITION AT DISCHARGE:     Functional status   x Poor     Deconditioned     Independent      Cognition mental retardation. Lucid     Forgetful     Dementia      Catheters/lines (plus indication)    [place]     PICC     PEG    x None      Code status   x  Full code     DNR      PHYSICAL EXAMINATION AT DISCHARGE:     Visit Vitals    /68 (BP 1 Location: Right arm, BP Patient Position: At rest)    Pulse 78    Temp 98.6 °F (37 °C)    Resp 18    Ht 5' 3\" (1.6 m)    Wt 65.6 kg (144 lb 11.2 oz)    SpO2 96%    BMI 25.63 kg/m2    O2 Flow Rate (L/min): 1.5 l/min O2 Device: Room air    Temp (24hrs), Av.5 °F (36.9 °C), Min:98.1 °F (36.7 °C), Max:98.6 °F (37 °C)         1901 -  0700  In: 890 [P.O.:840; I.V.:50]  Out: 1150 [Urine:1150]  Constitutional: Awake,PCT feeding him breakfast.   Eyes: Bruising right periorbital area. ENT:  Oral mucous moist, oropharynx benign. Neck supple,    Resp:  CTA bilaterally. No wheezing/rhonchi/rales. No accessory muscle use   CV:  Regular rhythm, normal rate, no murmurs, gallops, rubs    GI:  Soft, non distended, non tender. normoactive bowel sounds, no hepatosplenomegaly    :  No CVA or suprapubic tenderness   Skin  :  No erythema,rash,bullae,dipigmentation     Musculoskeletal:  oozing from right arm iv line that has fallen off,stopped with pressure and tape applied. Neurologic:  Follows commands,mentally challenged.            CHRONIC MEDICAL DIAGNOSES:  Problem List as of 2018  Date Reviewed: 2/3/2018          Codes Class Noted - Resolved    * (Principal)Pneumonia ICD-10-CM: J18.9  ICD-9-CM: 503  2/3/2018 - Present        Low vitamin D level ICD-10-CM: E55.9  ICD-9-CM: 268.9  1/11/2018 - Present        Blind left eye ICD-10-CM: H54.40  ICD-9-CM: 369.60  1/11/2018 - Present        ACP (advance care planning) ICD-10-CM: Z71.89  ICD-9-CM: V65.49  11/9/2016 - Present        S/P placement of VNS (vagus nerve stimulation) device ICD-10-CM: Z96.89  ICD-9-CM: V45.89  4/10/2015 - Present        Seizure ([facility] [address] ICD-10-CM: R56.9  ICD-9-CM: 780.39  11/22/2013 - Present        Partial epilepsy with impairment of consciousness ([facility] [address] ICD-10-CM: G40.209  ICD-9-CM: 345.40  1/21/2013 - Present        Ataxia ICD-10-CM: R27.0  ICD-9-CM: 781.3  1/21/2013 - Present        Memory loss ICD-10-CM: R41.3  ICD-9-CM: 780.93  1/21/2013 - Present        Recurrent seizures ([facility] [address] ICD-10-CM: G40.909  ICD-9-CM: 345.80  8/27/2012 - Present              Greater than 40 minutes were spent with the patient on counseling and coordination of care    Signed:    Irvine Hodgkins, MD  2/7/2018  11:17 AM

## 2018-02-07 NOTE — PROGRESS NOTES
Pharmacist Discharge Medication Reconciliation    Discharging Provider: Dr. Deshaun Sanderson [facility]:   Past Medical History:   Diagnosis Date    Anemia     Anxiety     Blindness of left eye     Cerebral palsy ([facility] [address]     DEMENTIA     Down syndrome     Endocrine disease     thyroid disorder    GERD (gastroesophageal reflux disease)     Ill-defined condition     blind in left eye    Ill-defined condition     imparied gait - uses walker    Ill-defined condition     dermatitis    Ill-defined condition     cerebral palsy    Ill-defined condition     prone to decubitus ulcers    Ill-defined condition     anemia    Neurological disorder     Other ill-defined conditions(799.89)     Down syndrome    Psychiatric disorder     mental retardation    Psychiatric disorder     anxiety disorder    Seizures ([facility] [address]     Sleep apnea     Thyroid disease     Unspecified epilepsy without mention of intractable epilepsy      Chief Complaint for this Admission:   Chief Complaint   Patient presents with    Seizure    Fever    Cough     Allergies: Morphine and Tegretol [carbamazepine]    Discharge Medications:   Current Discharge Medication List        START taking these medications    Details   oseltamivir (TAMIFLU) 75 mg capsule Take 1 Cap by mouth two (2) times a day for 4 doses. Qty: 4 Cap, Refills: 0      amoxicillin-clavulanate (AUGMENTIN) 875-125 mg per tablet Take 1 Tab by mouth two (2) times a day for 7 days. Qty: 14 Tab, Refills: 0      Bacillus coagulans (PROBIOTIC, B. COAGULANS,) 10 billion cell cpDR Take 1 Cap by mouth daily for 30 days. Qty: 30 Cap, Refills: 0.           CONTINUE these medications which have CHANGED    Details   valproate (DEPAKENE) 250 mg/5 mL syrup Take 15 mL by mouth two (2) times a day. Take 10 ml (500 mg) in morning and 10 ml(500mg) in the evening.   Qty: 500 mL, Refills: 5    Associated Diagnoses: Partial epilepsy with impairment of consciousness ([facility] [address]      phenytoin (FOYHKXEY-848) suspension Take 4 mL by mouth three (3) times daily. Qty: 2 Bottle, Refills: 5    Associated Diagnoses: Partial epilepsy with impairment of consciousness ([address]           CONTINUE these medications which have NOT CHANGED    Details   sodium chloride (SALINE NASAL) 0.65 % nasal spray 1 [place] by Both Nostrils route as needed for Congestion. Qty: 15 mL, Refills: 6      ferrous sulfate 325 mg (65 mg iron) tablet Take  by mouth Daily (before breakfast). loratadine (CLARITIN) 10 mg tablet Take 1 Tab by mouth daily. Qty: 30 Tab, Refills: 1    Associated Diagnoses: Nasal congestion      levETIRAcetam (KEPPRA) 100 mg/mL solution 15ml by mouth twice a day. 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 Qty: 90 Tab, Refills: 2      multivit w-min-ferrous gluconate (CEROVITE) 9 mg iron/15 mL oral liquid Take  by mouth daily. cholecalciferol (VITAMIN D3) 1,000 unit tablet Take 1 Tab by mouth daily. Qty: 90 Tab, Refills: 4      OTHER Benefiber Clear Powder. Mix two teaspoonfuls in 4-8 oz of suitable liquid and give by mouth twice daily as needed. chlorhexidine (PERIDEX) 0.12 % solution 15 mL by Swish and Spit route two (2) times a day.  Dental caries             The patient's chart, MAR and AVS were reviewed by Laurie Sun PHARMD.

## 2018-02-07 NOTE — PROGRESS NOTES
Bedside shift change report given to [employer] (oncoming nurse) by Edel Damon (offgoing nurse).  Report included the following information SBAR, Kardex, Procedure Summary, Intake/Output, MAR, Recent Results and Cardiac Rhythm SR.

## 2018-02-08 ENCOUNTER — HOME CARE VISIT (OUTPATIENT)
Dept: SCHEDULING | Facility: HOME HEALTH | Age: 53
End: 2018-02-08
Payer: MEDICARE

## 2018-02-08 VITALS
DIASTOLIC BLOOD PRESSURE: 64 MMHG | OXYGEN SATURATION: 96 % | TEMPERATURE: 98 F | RESPIRATION RATE: 17 BRPM | SYSTOLIC BLOOD PRESSURE: 122 MMHG | HEART RATE: 68 BPM

## 2018-02-08 LAB
BACTERIA SPEC CULT: NORMAL
SERVICE CMNT-IMP: NORMAL

## 2018-02-08 PROCEDURE — 3331090002 HH PPS REVENUE DEBIT

## 2018-02-08 PROCEDURE — 400013 HH SOC

## 2018-02-08 PROCEDURE — G0151 HHCP-SERV OF PT,EA 15 MIN: HCPCS

## 2018-02-08 PROCEDURE — 3331090001 HH PPS REVENUE CREDIT

## 2018-02-08 PROCEDURE — 3331090003 HH PPS REVENUE ADJ

## 2018-02-13 ENCOUNTER — OFFICE VISIT (OUTPATIENT)
Dept: INTERNAL MEDICINE CLINIC | Age: 53
End: 2018-02-13

## 2018-02-13 VITALS
BODY MASS INDEX: 27.64 KG/M2 | OXYGEN SATURATION: 95 % | SYSTOLIC BLOOD PRESSURE: 124 MMHG | TEMPERATURE: 97.6 F | DIASTOLIC BLOOD PRESSURE: 62 MMHG | HEART RATE: 104 BPM | WEIGHT: 156 LBS | RESPIRATION RATE: 18 BRPM | HEIGHT: 63 IN

## 2018-02-13 DIAGNOSIS — J11.1 INFLUENZA: ICD-10-CM

## 2018-02-13 DIAGNOSIS — J11.00 PNEUMONIA OF LEFT LOWER LOBE DUE TO INFLUENZA A VIRUS: ICD-10-CM

## 2018-02-13 DIAGNOSIS — R09.81 NASAL CONGESTION: ICD-10-CM

## 2018-02-13 DIAGNOSIS — Z09 HOSPITAL DISCHARGE FOLLOW-UP: Primary | ICD-10-CM

## 2018-02-13 RX ORDER — ASCORBIC ACID 500 MG
500 TABLET ORAL DAILY
Qty: 30 TAB | Refills: 6 | Status: SHIPPED | OUTPATIENT
Start: 2018-02-13 | End: 2018-08-06 | Stop reason: SDUPTHER

## 2018-02-13 RX ORDER — FLUTICASONE PROPIONATE 50 MCG
2 SPRAY, SUSPENSION (ML) NASAL DAILY
Qty: 1 BOTTLE | Refills: 2 | Status: SHIPPED | OUTPATIENT
Start: 2018-02-13 | End: 2021-03-31 | Stop reason: SDUPTHER

## 2018-02-13 NOTE — MR AVS SNAPSHOT
Will Barber 
 
 
 Sherice Samiavonnie Jacobs 26 [address] [place] [address] 
[phone] Patient: Almaz Sierra MRN: [id number] :1965 Visit Information Date & Time Provider Department Dept. Phone Encounter #  
 2018  3:30 PM Marinell Ahumada, MD [facility] Internal Medicine [phone] [id number] Your Appointments 2018  3:00 PM  
ROUTINE CARE with Marinell Ahumada, MD  
[facility] Internal Medicine [address] [place] [address]) Appt Note: follow up  
 Sherice Jacobs [address] [address]  
[phone]  
  
   
 [place] [address]  
  
    
 2018  2:20 PM  
Follow Up with Aramis Che MD  
Neurology Clinic at [address]) Appt Note: Follow up $0CP tdb 10/17/17  
 [address], [address] [address]  
[address] [facility], [address] [place] [address], [address] [place] [address] [address] [address] Upcoming Health Maintenance Date Due DTaP/Tdap/Td series (1 - Tdap) 1986 FOBT Q 1 YEAR AGE 50-75 12/10/2016 Influenza Age 5 to Adult 2017 Allergies as of 2018  Review Complete On: 2018 By: Marinell Ahumada, MD  
  
 Severity Noted Reaction Type Reactions Morphine  2016    Not Reported This Time Tegretol [Carbamazepine]  2013    Other (comments) \"bad reaction\" \"changes attitude\" Current Immunizations  Reviewed on 12/10/2015 Name Date Influenza Vaccine Intradermal PF 2014 TB Skin Test (PPD) Intradermal 2016, 2013 Not reviewed this visit You Were Diagnosed With   
  
 Codes Comments Pneumonia of left lower lobe due to influenza A virus    -  Primary ICD-10-CM: J11.00 ICD-9-CM: 487.0 Nasal congestion     ICD-10-CM: R09.81 ICD-9-CM: 478.19 Vitals BP Pulse Temp Resp Height(growth percentile)  124/62 (BP 1 Location: Left arm, BP Patient Position: Sitting) (!) 104 97.6 °F (36.4 °C) (Temporal) 18 5' 3\" (1.6 m) Weight(growth percentile) SpO2 BMI Smoking Status 156 lb (70.8 kg) 95% 27.63 kg/m2 Never Smoker Vitals History BMI and BSA Data Body Mass Index Body Surface Area  
 27.63 kg/m 2 1.77 m 2 Preferred Pharmacy Pharmacy Name Phone [facility] [phone] Your Updated Medication List  
  
   
This list is accurate as of: 2/13/18  3:57 PM.  Always use your most recent med list.  
  
  
  
  
 amoxicillin-clavulanate 875-125 mg per tablet Commonly known as:  AUGMENTIN Take 1 Tab by mouth two (2) times a day for 7 days. ascorbic acid (vitamin C) 500 mg tablet Commonly known as:  VITAMIN C Take 1 Tab by mouth daily. Bacillus coagulans 10 billion cell Cpdr  
Commonly known as:  PROBIOTIC (B. COAGULANS) Take 1 Cap by mouth daily for 30 days. CEROVITE 9 mg iron/15 mL oral liquid Generic drug:  multivitamin-minerals-ferrous gluconate Take  by mouth daily. chlorhexidine 0.12 % solution Commonly known as:  PERIDEX 15 mL by Swish and Spit route two (2) times a day. Dental caries  
  
 cholecalciferol 1,000 unit tablet Commonly known as:  VITAMIN D3 Take 1 Tab by mouth daily. ferrous sulfate 325 mg (65 mg iron) tablet Take  by mouth Daily (before breakfast). fluticasone 50 mcg/actuation nasal spray Commonly known as:  Joi Joaquin 2 Sprays by Both Nostrils route daily. levETIRAcetam 100 mg/mL solution Commonly known as:  KEPPRA 15ml by mouth twice a day. levothyroxine 50 mcg tablet Commonly known as:  SYNTHROID Take 1 Tab by mouth Daily (before breakfast). loratadine 10 mg tablet Commonly known as:  Marline Cheryl Take 1 Tab by mouth daily. OTHER Benefiber Clear Powder. Mix two teaspoonfuls in 4-8 oz of suitable liquid and give by mouth twice daily as needed. phenytoin suspension Commonly known as:  [id number] Take 4 mL by mouth three (3) times daily. sodium chloride 0.65 % nasal squeeze bottle Commonly known as:  SALINE NASAL  
1 [place] by Both Nostrils route as needed for Congestion. valproate 250 mg/5 mL syrup Commonly known as:  [place] Madelaine Take 15 mL by mouth two (2) times a day. Take 10 ml (500 mg) in morning and 10 ml(500mg) in the evening. Prescriptions Sent to Pharmacy Refills  
 fluticasone (FLONASE) 50 mcg/actuation nasal spray 2 Si Sprays by Both Nostrils route daily. Class: Normal  
 Pharmacy: [address] [place] [address] Ph #: [phone] Route: Both Nostrils  
 ascorbic acid, vitamin C, (VITAMIN C) 500 mg tablet 6 Sig: Take 1 Tab by mouth daily. Class: Normal  
 Pharmacy: [address] [place] [address] Ph #: [phone] Route: Oral  
  
We Performed the Following SLEEP MEDICINE REFERRAL [HPM366 Custom] Comments:  
 Orders: 
Sleep Medicine Consult - Schedule patient for a sleep specialist consult. If appropriate, schedule patient for sleep study(s). Initiate treatment if needed. Forward correspondance to my office. To-Do List   
 2018 Imaging:  XR CHEST PA LAT Referral Information Referral ID Referred By Referred To  
  
 [id number] ASAD, ISMAT A Davie Dakins, MD   
   [address] [place] [address] Phone: [phone] Fax: [phone] Visits Status Start Date End Date 1 New Request 18 If your referral has a status of pending review or denied, additional information will be sent to support the outcome of this decision. Introducing [facility] & HEALTH SERVICES! [facility] introduces [employer] patient portal. Now you can access parts of your medical record, email your doctor's office, and request medication refills online.    
 
1. In your internet browser, go to [email or web address] 2. Click on the First Time User? Click Here link in the Sign In box. You will see the New Member Sign Up page. 3. Enter your [employer] Access Code exactly as it appears below. You will not need to use this code after youve completed the sign-up process. If you do not sign up before the expiration date, you must request a new code. · [employer] Access Code: [email or web address] Expires: 5/8/2018 11:25 AM 
 
4. Enter the last four digits of your Social Security Number (xxxx) and Date of Birth (mm/dd/yyyy) as indicated and click Submit. You will be taken to the next sign-up page. 5. Create a [employer] ID. This will be your [employer] login ID and cannot be changed, so think of one that is secure and easy to remember. 6. Create a [employer] password. You can change your password at any time. 7. Enter your Password Reset Question and Answer. This can be used at a later time if you forget your password. 8. Enter your e-mail address. You will receive e-mail notification when new information is available in [address] 9. Click Sign Up. You can now view and download portions of your medical record. 10. Click the Download Summary menu link to download a portable copy of your medical information. If you have questions, please visit the Frequently Asked Questions section of the [employer] website. Remember, [employer] is NOT to be used for urgent needs. For medical emergencies, dial 911. Now available from your iPhone and Android! Please provide this summary of care documentation to your next provider. Your primary care clinician is listed as Mirza Sharma. If you have any questions after today's visit, please call [phone].

## 2018-02-14 NOTE — PROGRESS NOTES
Subjective:     Chief Complaint   Patient presents with   [address] [facility] Follow Up    Pneumonia        He  is a 46y.o. year old male with h/o Down's syndrome, CP, seizure, hypothyroidism, low vit d.  who presents with his caregiver for follow up from his recent hospital admission. He was admitted in [facility] from 2/3/2018 to 2/7/2018 with   1. Sepsis due to pneumonia  -Levaquin x 1 and Zosyn  x1 on 2/3 in the ED,continued with Ceftriaxone and Zithromax. Sputum cx grew rina staph sensitive to augmentin and pt is discharged on augmentin for 7 ore days.  -Blood culture negative;urine cx grew diphtheroids. 2. Influenza: he was diagnosed with Flu. Been treated with tamiflu. According to the staff member he is back to his normal state. Doing well. He is not coughing. Patient has sleep apnea but he does not want to wear the mask. Group home staff member wants to tevin a referral for a sleep specialist to see if there are any different option. In last visit I did ask guardian to use NS and clean his nose but seems like it was not done. I reviewed the hospital records. Review of systems not obtained due to patient factors. Objective:     Vitals:    02/13/18 1530   BP: 124/62   Pulse: (!) 104   Resp: 18   Temp: 97.6 °F (36.4 °C)   TempSrc: Temporal   SpO2: 95%   Weight: 156 lb (70.8 kg)   Height: 5' 3\" (1.6 m)       Physical Examination: General appearance - alert, well appearing, and in no distress and non verbal, cooperative. Mental status - baseline sate. Ears - bilateral TM's and external ear canals normal  Nose: very crusty, nostrils are almost block.    Neck - supple, no significant adenopathy  Chest - clear to auscultation, no wheezes, rales or rhonchi, symmetric air entry  Heart - normal rate, regular rhythm, normal S1, S2, no murmurs, rubs, clicks or gallops  Neurological - alert, oriented, normal speech, no focal findings or movement disorder noted    Allergies   Allergen Reactions    Morphine Not Reported This Time    Tegretol [Carbamazepine] Other (comments)     \"bad reaction\" \"changes attitude\"      Social History     Social History    Marital status: SINGLE     Spouse name: N/A    Number of children: N/A    Years of education: N/A     Social History Main Topics    Smoking status: Never Smoker    Smokeless tobacco: Never Used    Alcohol use No    Drug use: No    Sexual activity: Not Currently     Other Topics Concern    None     Social History Narrative      Family History   Problem Relation Age of Onset    Hypertension Mother     Cancer Mother     Lupus Mother     Arthritis-osteo Mother     Heart Disease Mother     Heart Disease Father     Diabetes Father     Hypertension Father     Elevated Lipids Father     Diabetes Brother     Elevated Lipids Brother     Hypertension Brother     Mental Retardation Brother     Cancer Maternal Grandmother     Arthritis-osteo Maternal Grandmother     Alcohol abuse Maternal Grandfather     Cancer Maternal Grandfather     Arthritis-osteo Paternal Grandmother     Cancer Paternal Grandfather       Past Surgical History:   Procedure Laterality Date    HX OTHER SURGICAL  03/13/2015     VNS  Dr. Hooper Me.  Katerin Houser  @ [address] [facility]      Past Medical History:   Diagnosis Date    Anemia     Anxiety     Blindness of left eye     Cerebral palsy ([address]     DEMENTIA     Down syndrome     Endocrine disease     thyroid disorder    GERD (gastroesophageal reflux disease)     Ill-defined condition     blind in left eye    Ill-defined condition     imparied gait - uses walker    Ill-defined condition     dermatitis    Ill-defined condition     cerebral palsy    Ill-defined condition     prone to decubitus ulcers    Ill-defined condition     anemia    Neurological disorder     Other ill-defined conditions(799.89)     Down syndrome    Psychiatric disorder     mental retardation    Psychiatric disorder     anxiety disorder    Seizures ([address]     Sleep apnea     Thyroid disease     Unspecified epilepsy without mention of intractable epilepsy       Current Outpatient Prescriptions   Medication Sig Dispense Refill    fluticasone (FLONASE) 50 mcg/actuation nasal spray 2 Sprays by Both Nostrils route daily. 1 Bottle 2    ascorbic acid, vitamin C, (VITAMIN C) 500 mg tablet Take 1 Tab by mouth daily. 30 Tab 6    valproate (DEPAKENE) 250 mg/5 mL syrup Take 15 mL by mouth two (2) times a day. Take 10 ml (500 mg) in morning and 10 ml(500mg) in the evening. 500 mL 5    phenytoin (DILANTIN-125) suspension Take 4 mL by mouth three (3) times daily. 2 Bottle 5    amoxicillin-clavulanate (AUGMENTIN) 875-125 mg per tablet Take 1 Tab by mouth two (2) times a day for 7 days. 14 Tab 0    Bacillus coagulans (PROBIOTIC, B. COAGULANS,) 10 billion cell cpDR Take 1 Cap by mouth daily for 30 days. 30 Cap 0.    sodium chloride (SALINE NASAL) 0.65 % nasal spray 1 [place] by Both Nostrils route as needed for Congestion. 15 mL 6    ferrous sulfate 325 mg (65 mg iron) tablet Take  by mouth Daily (before breakfast).  loratadine (CLARITIN) 10 mg tablet Take 1 Tab by mouth daily. 30 Tab 1    levETIRAcetam (KEPPRA) 100 mg/mL solution 15ml by mouth twice a day. 600 mL 5    levothyroxine (SYNTHROID) 50 mcg tablet Take 1 Tab by mouth Daily (before breakfast). 90 Tab 2    multivit w-min-ferrous gluconate (CEROVITE) 9 mg iron/15 mL oral liquid Take  by mouth daily.  cholecalciferol (VITAMIN D3) 1,000 unit tablet Take 1 Tab by mouth daily. 90 Tab 4    chlorhexidine (PERIDEX) 0.12 % solution 15 mL by Swish and Spit route two (2) times a day. Dental caries      OTHER Benefiber Clear Powder. Mix two teaspoonfuls in 4-8 oz of suitable liquid and give by mouth twice daily as needed. Assessment/ Plan:   Diagnoses and all orders for this visit:    1. Hospital discharge follow-up         - doing well. Treatment completed.    2. Pneumonia of left lower lobe due to influenza A virus  - Patient finished the Augmentin. Doing well. -CXR in 6 weeks. -    XR CHEST PA LAT; Future    3. Influenza       - stable. Finished tamiflu. 4. Nasal congestion  -     fluticasone (FLONASE) 50 mcg/actuation nasal spray; 2 Sprays by Both Nostrils route daily. Advised to use nasal spray, clean with bulb suction.   -     SLEEP MEDICINE REFERRAL    Other orders  -     ascorbic acid, vitamin C, (VITAMIN C) 500 mg tablet; Take 1 Tab by mouth daily. Medication risks/benefits/costs/interactions/alternatives discussed with patient. Advised patient to call back or return to office if symptoms worsen/change/persist. If patient cannot reach us or should anything more severe/urgent arise he/she should proceed directly to the nearest emergency department. Discussed expected course/resolution/complications of diagnosis in detail with patient. Patient given a written after visit summary which includes her diagnoses, current medications and vitals. Patient expressed understanding with the diagnosis and plan.        Follow-up Disposition: Not on File

## 2018-02-21 ENCOUNTER — TELEPHONE (OUTPATIENT)
Dept: INTERNAL MEDICINE CLINIC | Age: 53
End: 2018-02-21

## 2018-02-21 NOTE — TELEPHONE ENCOUNTER
Pharmacist states that pt's liquid centrum multivitamin is unavailable and they would like the ok to switch to a different brand. Advised that it is ok to switch. She verbalized her understanding.

## 2018-03-13 ENCOUNTER — TELEPHONE (OUTPATIENT)
Dept: NEUROLOGY | Age: 53
End: 2018-03-13

## 2018-03-13 NOTE — TELEPHONE ENCOUNTER
----- Message from [facility] sent at 3/13/2018  3:16 PM EDT -----  Regarding: Dr. Abernathy Shown telephone  Leydi Rancho, caregiver, is requesting a callback from the nurse. She needs the nurse to pull the pt's file and provide some information. Best contact number is [phone].

## 2018-03-16 ENCOUNTER — TELEPHONE (OUTPATIENT)
Dept: NEUROLOGY | Age: 53
End: 2018-03-16

## 2018-03-16 NOTE — TELEPHONE ENCOUNTER
Patient caregiver stated patient is not acting right since he had the flu and would like labs R/T seizure and faxed to [phone].

## 2018-03-16 NOTE — TELEPHONE ENCOUNTER
----- Message from [facility] sent at 3/16/2018  2:18 PM EDT -----  Regarding: ISABELLA Schultz/Telephone  Pt caregiver Raffi Acosta [phone] says that the pt is not acting like himself. He was d/c from [facility] with the flu and he has not been acting right since. She's not sure what the issue is.

## 2018-03-18 NOTE — TELEPHONE ENCOUNTER
Rodrigue Pitts, we probably should see the patient in the office, can you work in on Thursday or some other time this week for me to take a look at

## 2018-03-19 NOTE — TELEPHONE ENCOUNTER
I spoke with the patient's caregiver. I put him on the schedule.   Symptoms include: not eating well, tremors have increased, not wanting to get up to do anything

## 2018-03-20 ENCOUNTER — OFFICE VISIT (OUTPATIENT)
Dept: INTERNAL MEDICINE CLINIC | Age: 53
End: 2018-03-20

## 2018-03-20 ENCOUNTER — HOSPITAL ENCOUNTER (OUTPATIENT)
Dept: LAB | Age: 53
Discharge: HOME | End: 2018-03-20
Payer: MEDICARE

## 2018-03-20 VITALS
HEIGHT: 63 IN | SYSTOLIC BLOOD PRESSURE: 130 MMHG | DIASTOLIC BLOOD PRESSURE: 66 MMHG | BODY MASS INDEX: 26.58 KG/M2 | OXYGEN SATURATION: 98 % | WEIGHT: 150 LBS | TEMPERATURE: 96.4 F | HEART RATE: 106 BPM | RESPIRATION RATE: 18 BRPM

## 2018-03-20 DIAGNOSIS — R63.0 LACK OF APPETITE: ICD-10-CM

## 2018-03-20 DIAGNOSIS — R26.9 GAIT ABNORMALITY: ICD-10-CM

## 2018-03-20 DIAGNOSIS — Q90.9 DOWN SYNDROME: ICD-10-CM

## 2018-03-20 DIAGNOSIS — D64.9 LOW HEMOGLOBIN: ICD-10-CM

## 2018-03-20 DIAGNOSIS — R27.0 ATAXIA: ICD-10-CM

## 2018-03-20 DIAGNOSIS — G40.909 RECURRENT SEIZURES (HCC): ICD-10-CM

## 2018-03-20 DIAGNOSIS — R53.83 FATIGUE, UNSPECIFIED TYPE: Primary | ICD-10-CM

## 2018-03-20 PROCEDURE — 80053 COMPREHEN METABOLIC PANEL: CPT

## 2018-03-20 PROCEDURE — 85027 COMPLETE CBC AUTOMATED: CPT

## 2018-03-20 NOTE — MR AVS SNAPSHOT
Alfred Jacobson 
 
 
 Sherice Marianarudi Jacobs [address] [place] [address] 
[phone] Patient: Angelique Vargas MRN: [id number] :1965 Visit Information Date & Time Provider Department Dept. Phone Encounter #  
 3/20/2018 12:00 PM Toño Gonzalez MD [facility] Internal Medicine [phone] [id number] Follow-up Instructions Return if symptoms worsen or fail to improve. Your Appointments 3/22/2018  1:20 PM  
Follow Up with Víctor Jenkins MD  
Neurology Clinic at [facility]) Appt Note: f/u seizure problems, jrb 3/19/18  
 [address] [facility], 
[address] [place] [address], [address] [address]  
[address], [address] [place] [address], [address] [facility] [facility] [address] [address]  
  
    
 2018  3:00 PM  
ROUTINE CARE with Toño Gonzalez MD  
[facility] Internal Medicine [facility]) Appt Note: follow up  
 Sherice Marianarudi Jacobs 26 [facility] [place] [facility] [address]  
[phone]  
  
   
 [facility] [address]  
  
    
 2018  2:20 PM  
Follow Up with Víctor Jenkins MD  
Neurology Clinic at [facility] Appt Note: Follow up $0CP tdb 10/17/17  
 [address] [place] [address], 
[address] [place] [address], [address] [address]  
[phone]  
  
    
 2018  2:00 PM  
New Patient with Fabi Fair MD  
[address] [facility] [place] ([facility]) Appt Note: NP_ ref by Dr Celena Aranda Asad_ h/o seizures_ Medicare+ [employer]BS [place]  
 [address] [facility] [address] [place] [address] [address]  
  
   
 [address] [facility] [address] [facility] [address] Upcoming Health Maintenance Date Due DTaP/Tdap/Td series (1 - Tdap) 1986 FOBT Q 1 YEAR AGE 50-75 12/10/2016 Influenza Age 5 to Adult 2017 MEDICARE YEARLY EXAM 5/10/2018 Allergies as of 3/20/2018  Review Complete On: 3/20/2018 By: Toño Gonzalez MD  
  
 Severity Noted Reaction Type Reactions Morphine  01/22/2016    Not Reported This Time Tegretol [Carbamazepine]  01/21/2013    Other (comments) \"bad reaction\" \"changes attitude\" Current Immunizations  Reviewed on 12/10/2015 Name Date Influenza Vaccine Intradermal PF 11/13/2014 TB Skin Test (PPD) Intradermal 4/5/2016, 1/29/2013 Not reviewed this visit You Were Diagnosed With   
  
 Codes Comments Altered mental status, unspecified altered mental status type    -  Primary ICD-10-CM: R41.82 
ICD-9-CM: 780.97 Fatigue, unspecified type     ICD-10-CM: R53.83 ICD-9-CM: 780.79 Vitals BP Pulse Temp Resp Height(growth percentile) 130/66 (BP 1 Location: Left arm, BP Patient Position: Sitting) (!) 106 96.4 °F (35.8 °C) (Temporal) 18 5' 3\" (1.6 m) Weight(growth percentile) SpO2 BMI Smoking Status 150 lb (68 kg) 98% 26.57 kg/m2 Never Smoker Vitals History BMI and BSA Data Body Mass Index Body Surface Area  
 26.57 kg/m 2 1.74 m 2 Preferred Pharmacy Pharmacy Name Phone [facility] [phone] Your Updated Medication List  
  
   
This list is accurate as of 3/20/18  1:04 PM.  Always use your most recent med list.  
  
  
  
  
 ascorbic acid (vitamin C) 500 mg tablet Commonly known as:  VITAMIN C Take 1 Tab by mouth daily. CEROVITE 9 mg iron/15 mL oral liquid Generic drug:  multivitamin-minerals-ferrous gluconate Take  by mouth daily. chlorhexidine 0.12 % solution Commonly known as:  PERIDEX 15 mL by Swish and Spit route two (2) times a day. Dental caries  
  
 cholecalciferol 1,000 unit tablet Commonly known as:  VITAMIN D3 Take 1 Tab by mouth daily. ferrous sulfate 325 mg (65 mg iron) tablet Take  by mouth Daily (before breakfast). fluticasone 50 mcg/actuation nasal spray Commonly known as:  Jennifer More 2 Sprays by Both Nostrils route daily. levETIRAcetam 100 mg/mL solution Commonly known as:  KEPPRA 15ml by mouth twice a day. levothyroxine 50 mcg tablet Commonly known as:  SYNTHROID Take 1 Tab by mouth Daily (before breakfast). loratadine 10 mg tablet Commonly known as:  Thermon Marker Take 1 Tab by mouth daily. OTHER Benefiber Clear Powder. Mix two teaspoonfuls in 4-8 oz of suitable liquid and give by mouth twice daily as needed. phenytoin suspension Commonly known as:  [id number] Take 4 mL by mouth three (3) times daily. sodium chloride 0.65 % nasal squeeze bottle Commonly known as:  SALINE NASAL  
1 [place] by Both Nostrils route as needed for Congestion. valproate 250 mg/5 mL syrup Commonly known as:  [place] Take 15 mL by mouth two (2) times a day. Take 10 ml (500 mg) in morning and 10 ml(500mg) in the evening. We Performed the Following AMB POC URINALYSIS DIP STICK MANUAL W/O MICRO [86877 CPT(R)] CBC W/O DIFF [45776 CPT(R)] METABOLIC PANEL, COMPREHENSIVE [84409 CPT(R)] Follow-up Instructions Return if symptoms worsen or fail to improve. Introducing \[facility]\"" & HEALTH SERVICES! Citlalli Alexandre introduces [employer] patient portal. Now you can access parts of your medical record, email your doctor's office, and request medication refills online. 1. In your internet browser, go to [email or web address] 2. Click on the First Time User? Click Here link in the Sign In box. You will see the New Member Sign Up page. 3. Enter your [employer] Access Code exactly as it appears below. You will not need to use this code after youve completed the sign-up process. If you do not sign up before the expiration date, you must request a new code. · [employer] Access Code: [email or web address] Expires: 5/8/2018 12:25 PM 
 
4. Enter the last four digits of your Social Security Number (xxxx) and Date of Birth (mm/dd/yyyy) as indicated and click Submit.  You will be taken to the next sign-up page. 5. Create a [employer] ID. This will be your [employer] login ID and cannot be changed, so think of one that is secure and easy to remember. 6. Create a [employer] password. You can change your password at any time. 7. Enter your Password Reset Question and Answer. This can be used at a later time if you forget your password. 8. Enter your e-mail address. You will receive e-mail notification when new information is available in [address] 9. Click Sign Up. You can now view and download portions of your medical record. 10. Click the Download Summary menu link to download a portable copy of your medical information. If you have questions, please visit the Frequently Asked Questions section of the [employer] website. Remember, [employer] is NOT to be used for urgent needs. For medical emergencies, dial 911. Now available from your iPhone and Android! Please provide this summary of care documentation to your next provider. Your primary care clinician is listed as Mirza Sharma. If you have any questions after today's visit, please call [phone].

## 2018-03-20 NOTE — LETTER
3/21/2018 3:08 PM 
 
Mr. Shy Blanchard [address] [place] [id number] Dear Shy Blanchard: 
 
Please find your most recent results below. Resulted Orders CBC W/O DIFF Result Value Ref Range WBC 6.1 3.4 - 10.8 x10E3/uL  
 RBC 3.31 (L) 4.14 - 5.80 x10E6/uL HGB 11.4 (L) 13.0 - 17.7 g/dL HCT 33.8 (L) 37.5 - 51.0 %  (H) 79 - 97 fL  
 MCH 34.4 (H) 26.6 - 33.0 pg  
 MCHC 33.7 31.5 - 35.7 g/dL  
 RDW 13.7 12.3 - 15.4 % PLATELET 305 (H) 123 - 379 x10E3/uL Narrative Performed at:  [address]  [id number] : Sivan Grey MD, Phone:  [phone] METABOLIC PANEL, COMPREHENSIVE Result Value Ref Range Glucose 104 (H) 65 - 99 mg/dL BUN 5 (L) 6 - 24 mg/dL Creatinine 0.64 (L) 0.76 - 1.27 mg/dL GFR est non- >59 mL/min/1.73 GFR est  >59 mL/min/1.73  
 BUN/Creatinine ratio 8 (L) 9 - 20 Sodium 143 134 - 144 mmol/L Potassium 4.2 3.5 - 5.2 mmol/L Chloride 101 96 - 106 mmol/L  
 CO2 27 18 - 29 mmol/L Calcium 8.8 8.7 - 10.2 mg/dL Protein, total 8.7 (H) 6.0 - 8.5 g/dL Albumin 3.6 3.5 - 5.5 g/dL GLOBULIN, TOTAL 5.1 (H) 1.5 - 4.5 g/dL A-G Ratio 0.7 (L) 1.2 - 2.2 Bilirubin, total <0.2 0.0 - 1.2 mg/dL Alk. phosphatase 86 39 - 117 IU/L  
 AST (SGOT) 29 0 - 40 IU/L  
 ALT (SGPT) 20 0 - 44 IU/L Narrative Performed at:  [address]  [id number] : Sivan Grey MD, Phone:  [phone] RECOMMENDATIONS: 
 
1. Hemoglobin level is stable and better than last time but still in low range. Continue iron supplement BID. 2. Kidney and liver function is fairly normal 
 
Please call me if you have any questions: [phone] Sincerely, 
 
 
Sav Estrada MD

## 2018-03-20 NOTE — LETTER
3/27/2018 3:17 PM 
 
Mr. Austin Hyman [address] [place] [address] [phone] Dear Austin Hyman: 
 
Please find your most recent results below. Resulted Orders CBC W/O DIFF Result Value Ref Range WBC 6.1 3.4 - 10.8 x10E3/uL  
 RBC 3.31 (L) 4.14 - 5.80 x10E6/uL HGB 11.4 (L) 13.0 - 17.7 g/dL HCT 33.8 (L) 37.5 - 51.0 %  (H) 79 - 97 fL  
 MCH 34.4 (H) 26.6 - 33.0 pg  
 MCHC 33.7 31.5 - 35.7 g/dL  
 RDW 13.7 12.3 - 15.4 % PLATELET 467 (H) 503 - 379 x10E3/uL Narrative Performed at:  [address]  [id number] : Stu Diallo MD, Phone:  [phone] METABOLIC PANEL, COMPREHENSIVE Result Value Ref Range Glucose 104 (H) 65 - 99 mg/dL BUN 5 (L) 6 - 24 mg/dL Creatinine 0.64 (L) 0.76 - 1.27 mg/dL GFR est non- >59 mL/min/1.73 GFR est  >59 mL/min/1.73  
 BUN/Creatinine ratio 8 (L) 9 - 20 Sodium 143 134 - 144 mmol/L Potassium 4.2 3.5 - 5.2 mmol/L Chloride 101 96 - 106 mmol/L  
 CO2 27 18 - 29 mmol/L Calcium 8.8 8.7 - 10.2 mg/dL Protein, total 8.7 (H) 6.0 - 8.5 g/dL Albumin 3.6 3.5 - 5.5 g/dL GLOBULIN, TOTAL 5.1 (H) 1.5 - 4.5 g/dL A-G Ratio 0.7 (L) 1.2 - 2.2 Bilirubin, total <0.2 0.0 - 1.2 mg/dL Alk. phosphatase 86 39 - 117 IU/L  
 AST (SGOT) 29 0 - 40 IU/L  
 ALT (SGPT) 20 0 - 44 IU/L Narrative Performed at:  [address]  [id number] : Stu Diallo MD, Phone:  [phone] AMB POC URINALYSIS DIP STICK MANUAL W/O MICRO Result Value Ref Range Color (UA POC) Yellow Clarity (UA POC) Clear Glucose (UA POC) Negative Negative Bilirubin (UA POC) Negative Negative Ketones (UA POC) Negative Negative Specific gravity (UA POC) 1.010 1.001 - 1.035 Blood (UA POC) Negative Negative pH (UA POC) 6.5 4.6 - 8.0 Protein (UA POC) Negative Negative Urobilinogen (UA POC) 1 mg/dL 0.2 - 1 Nitrites (UA POC) Negative Negative Leukocyte esterase (UA POC) Negative Negative RECOMMENDATIONS: 
 
None. Urine is normal 
 
Please call me if you have any questions: [phone] Sincerely, 
 
 
Marine Asif MD

## 2018-03-20 NOTE — PROGRESS NOTES
Subjective:     Chief Complaint   Patient presents with    Lethargy    Decreased Appetite    Difficulty Walking        He  is a 48y.o. year old male with h/o Down's syndrome, non verbal. CP, seizure, ataxia, hypothyroidism, low vit d who presents with his caregiver as well as his brother with a complain of Lethargy, lack of appetite, difficulty walking for the past 2-3 days. According to the brother when he went to visit him in the group home he noticed him being lethargic as well as grimace in his face. Noticed lack of appetite as well. Reports that had an episode of small seizure on Saturday and non since then. Denies any cough. Caregiver noted that he is not using the bathroom ( peeing) as much as he suppose to. Reports that he has been having BM 4-6 times/week. Has been taking iron supplement regularly. Lab Results   Component Value Date/Time    HGB 9.6 (L) 02/05/2018 02:00 AM     When I asked his brother about his current status, he mentioned that he looks like he is back to his normal state now. All history obtained from caregivers. H/O     Review of systems not obtained due to patient factors. Objective:     Vitals:    03/20/18 1209   BP: 130/66   Pulse: (!) 106   Resp: 18   Temp: 96.4 °F (35.8 °C)   TempSrc: Temporal   SpO2: 98%   Weight: 150 lb (68 kg)   Height: 5' 3\" (1.6 m)       Physical Examination: General appearance - alert, well appearing, and in no distress and oriented to person  Mental status - alert, oriented to person, normal mood, behavior, dress, motor activity.  Non verbal.   Ears - bilateral TM's and external ear canals normal  Nose - normal and patent, no erythema, discharge or polyps  Mouth - mucous membranes moist, pharynx normal without lesions  Neck - supple, no significant adenopathy  Chest - clear to auscultation, no wheezes, rales or rhonchi, symmetric air entry  Heart - normal rate, regular rhythm, normal S1, S2, no murmurs, rubs, clicks or gallops  Neurological - ataxia. Uses a walker to walk around. Extremities - peripheral pulses normal, no pedal edema, no clubbing or cyanosis, no pedal edema noted    Allergies   Allergen Reactions    Morphine Not Reported This Time    Tegretol [Carbamazepine] Other (comments)     \"bad reaction\" \"changes attitude\"      Social History     Social History    Marital status: SINGLE     Spouse name: N/A    Number of children: N/A    Years of education: N/A     Social History Main Topics    Smoking status: Never Smoker    Smokeless tobacco: Never Used    Alcohol use No    Drug use: No    Sexual activity: Not Currently     Other Topics Concern    None     Social History Narrative      Family History   Problem Relation Age of Onset    Hypertension Mother     Cancer Mother     Lupus Mother     Arthritis-osteo Mother     Heart Disease Mother     Heart Disease Father     Diabetes Father     Hypertension Father     Elevated Lipids Father     Diabetes Brother     Elevated Lipids Brother     Hypertension Brother     Mental Retardation Brother     Cancer Maternal Grandmother     Arthritis-osteo Maternal Grandmother     Alcohol abuse Maternal Grandfather     Cancer Maternal Grandfather     Arthritis-osteo Paternal Grandmother     Cancer Paternal Grandfather       Past Surgical History:   Procedure Laterality Date    HX OTHER SURGICAL  03/13/2015     VNS  Dr. Shannon Mabry.  Jose Ramirez  @ [facility]      Past Medical History:   Diagnosis Date    Anemia     Anxiety     Blindness of left eye     Cerebral palsy ([address]     DEMENTIA     Down syndrome     Endocrine disease     thyroid disorder    GERD (gastroesophageal reflux disease)     Ill-defined condition     blind in left eye    Ill-defined condition     imparied gait - uses walker    Ill-defined condition     dermatitis    Ill-defined condition     cerebral palsy    Ill-defined condition     prone to decubitus ulcers    Ill-defined condition     anemia    Neurological disorder     Other ill-defined conditions(799.89)     Down syndrome    Psychiatric disorder     mental retardation    Psychiatric disorder     anxiety disorder    Seizures (HCC)     Sleep apnea     Thyroid disease     Unspecified epilepsy without mention of intractable epilepsy       Current Outpatient Prescriptions   Medication Sig Dispense Refill    fluticasone (FLONASE) 50 mcg/actuation nasal spray 2 Sprays by Both Nostrils route daily. 1 Bottle 2    ascorbic acid, vitamin C, (VITAMIN C) 500 mg tablet Take 1 Tab by mouth daily. 30 Tab 6    valproate (DEPAKENE) 250 mg/5 mL syrup Take 15 mL by mouth two (2) times a day. Take 10 ml (500 mg) in morning and 10 ml(500mg) in the evening. 500 mL 5    phenytoin (DILANTIN-125) suspension Take 4 mL by mouth three (3) times daily. 2 Bottle 5    sodium chloride (SALINE NASAL) 0.65 % nasal spray 1 [place] by Both Nostrils route as needed for Congestion. 15 mL 6    ferrous sulfate 325 mg (65 mg iron) tablet Take  by mouth Daily (before breakfast).  loratadine (CLARITIN) 10 mg tablet Take 1 Tab by mouth daily. 30 Tab 1    levETIRAcetam (KEPPRA) 100 mg/mL solution 15ml by mouth twice a day. 600 mL 5    levothyroxine (SYNTHROID) 50 mcg tablet Take 1 Tab by mouth Daily (before breakfast). 90 Tab 2    multivit w-min-ferrous gluconate (CEROVITE) 9 mg iron/15 mL oral liquid Take  by mouth daily.  cholecalciferol (VITAMIN D3) 1,000 unit tablet Take 1 Tab by mouth daily. 90 Tab 4    OTHER Benefiber Clear Powder. Mix two teaspoonfuls in 4-8 oz of suitable liquid and give by mouth twice daily as needed.  chlorhexidine (PERIDEX) 0.12 % solution 15 mL by Swish and Spit route two (2) times a day. Dental caries          Assessment/ Plan:   Diagnoses and all orders for this visit:    1. Fatigue, unspecified type  -     CBC W/O DIFF  -     METABOLIC PANEL, COMPREHENSIVE  -     AMB POC URINALYSIS DIP STICK MANUAL W/O MICRO    2.  Lack of appetite  - CBC W/O DIFF  -     METABOLIC PANEL, COMPREHENSIVE  -     AMB POC URINALYSIS DIP STICK MANUAL W/O MICRO    3. Gait abnormality  -     according to the the brother he mentioned that he is back to his normal state now. CBC W/O DIFF  -     METABOLIC PANEL, COMPREHENSIVE  -     AMB POC URINALYSIS DIP STICK MANUAL W/O MICRO-   Patient unable to leave any urine sample. Will bring sample tomorrow. 4. Ataxia        - according to the the brother he mentioned that he is back to his normal state now. CT head on 2/2018 was non significant. 5. Low hemoglobin  -     CBC W/O DIFF    6. Recurrent seizures ([facility]       - continue current med. Follow up with neurologist as scheduled. 7. Down syndrome       I spent over 40 minutes with patient today for coordination care. Patient is non verbal which makes it difficult and takes longer time. Medication risks/benefits/costs/interactions/alternatives discussed with patient. Advised patient to call back or return to office if symptoms worsen/change/persist. If patient cannot reach us or should anything more severe/urgent arise he/she should proceed directly to the nearest emergency department. Discussed expected course/resolution/complications of diagnosis in detail with patient. Patient given a written after visit summary which includes her diagnoses, current medications and vitals. Patient expressed understanding with the diagnosis and plan. Follow-up Disposition:  Return if symptoms worsen or fail to improve.

## 2018-03-21 LAB
ALBUMIN SERPL-MCNC: 3.6 G/DL (ref 3.5–5.5)
ALBUMIN/GLOB SERPL: 0.7 {RATIO} (ref 1.2–2.2)
ALP SERPL-CCNC: 86 IU/L (ref 39–117)
ALT SERPL-CCNC: 20 IU/L (ref 0–44)
AST SERPL-CCNC: 29 IU/L (ref 0–40)
BILIRUB SERPL-MCNC: <0.2 MG/DL (ref 0–1.2)
BUN SERPL-MCNC: 5 MG/DL (ref 6–24)
BUN/CREAT SERPL: 8 (ref 9–20)
CALCIUM SERPL-MCNC: 8.8 MG/DL (ref 8.7–10.2)
CHLORIDE SERPL-SCNC: 101 MMOL/L (ref 96–106)
CO2 SERPL-SCNC: 27 MMOL/L (ref 18–29)
CREAT SERPL-MCNC: 0.64 MG/DL (ref 0.76–1.27)
ERYTHROCYTE [DISTWIDTH] IN BLOOD BY AUTOMATED COUNT: 13.7 % (ref 12.3–15.4)
GFR SERPLBLD CREATININE-BSD FMLA CKD-EPI: 112 ML/MIN/1.73
GFR SERPLBLD CREATININE-BSD FMLA CKD-EPI: 129 ML/MIN/1.73
GLOBULIN SER CALC-MCNC: 5.1 G/DL (ref 1.5–4.5)
GLUCOSE SERPL-MCNC: 104 MG/DL (ref 65–99)
HCT VFR BLD AUTO: 33.8 % (ref 37.5–51)
HGB BLD-MCNC: 11.4 G/DL (ref 13–17.7)
MCH RBC QN AUTO: 34.4 PG (ref 26.6–33)
MCHC RBC AUTO-ENTMCNC: 33.7 G/DL (ref 31.5–35.7)
MCV RBC AUTO: 102 FL (ref 79–97)
PLATELET # BLD AUTO: 391 X10E3/UL (ref 150–379)
POTASSIUM SERPL-SCNC: 4.2 MMOL/L (ref 3.5–5.2)
PROT SERPL-MCNC: 8.7 G/DL (ref 6–8.5)
RBC # BLD AUTO: 3.31 X10E6/UL (ref 4.14–5.8)
SODIUM SERPL-SCNC: 143 MMOL/L (ref 134–144)
WBC # BLD AUTO: 6.1 X10E3/UL (ref 3.4–10.8)

## 2018-03-21 NOTE — PROGRESS NOTES
Please inform patient that:    1. Hemoglobin level is stable and better than last time but still in low range. Continue iron supplement BID.     2. Kidney and liver function is fairly normal.

## 2018-03-22 DIAGNOSIS — R09.81 NASAL CONGESTION: ICD-10-CM

## 2018-03-22 RX ORDER — LORATADINE 10 MG/1
10 TABLET ORAL DAILY
Qty: 30 TAB | Refills: 1 | Status: SHIPPED | OUTPATIENT
Start: 2018-03-22 | End: 2018-05-23 | Stop reason: SDUPTHER

## 2018-03-23 ENCOUNTER — OFFICE VISIT (OUTPATIENT)
Dept: NEUROLOGY | Age: 53
End: 2018-03-23

## 2018-03-23 VITALS
RESPIRATION RATE: 16 BRPM | DIASTOLIC BLOOD PRESSURE: 82 MMHG | HEIGHT: 63 IN | OXYGEN SATURATION: 98 % | WEIGHT: 150 LBS | HEART RATE: 99 BPM | TEMPERATURE: 98 F | BODY MASS INDEX: 26.58 KG/M2 | SYSTOLIC BLOOD PRESSURE: 140 MMHG

## 2018-03-23 DIAGNOSIS — G40.909 RECURRENT SEIZURES (HCC): ICD-10-CM

## 2018-03-23 DIAGNOSIS — G40.209 PARTIAL EPILEPSY WITH IMPAIRMENT OF CONSCIOUSNESS (HCC): Primary | ICD-10-CM

## 2018-03-23 DIAGNOSIS — R27.0 ATAXIA: ICD-10-CM

## 2018-03-23 DIAGNOSIS — Z96.89 S/P PLACEMENT OF VNS (VAGUS NERVE STIMULATION) DEVICE: ICD-10-CM

## 2018-03-23 DIAGNOSIS — G40.209 COMPLEX PARTIAL SEIZURE EVOLVING TO GENERALIZED SEIZURE (HCC): ICD-10-CM

## 2018-03-23 DIAGNOSIS — R41.3 MEMORY LOSS: ICD-10-CM

## 2018-03-23 DIAGNOSIS — G93.40 ACUTE ENCEPHALOPATHY: ICD-10-CM

## 2018-03-23 DIAGNOSIS — H54.40 BLIND LEFT EYE: ICD-10-CM

## 2018-03-23 NOTE — LETTER
3/23/2018 9:12 PM 
 
Patient:  Ronna Ordaz YOB: 1965 Date of Visit: 3/23/2018 Dear No Recipients: Thank you for referring Mr. Frank Corrales to me for evaluation/treatment. Below are the relevant portions of my assessment and plan of care. Consult REFERRED BY: 
Marvin Fletcher MD 
 
CHIEF COMPLAINT: Increasing lethargy and decreasing ability to walk Subjective:  
 
Ronna Ordaz is a 48 y.o. right-handed -American male seen for evaluation urgent work in basis at the request of Dr. Melissa Huffman after I have not seen the patient now for 5 years, for increasing lethargy, drowsiness, and possibly being overmedicated. Patient has known history of Down syndrome and seizures and mental impairment, and basically is nonconversant, quadriplegic, and is able to walk some with a walker at times. Patient was recently hospitalized at [facility] with infection and the flu, and had several seizures there, and had his Depakote increased from 500 mg twice a day to 750 mg twice a day and Dilantin increased from 100 mg twice a day to 300 mg a day in divided doses. His Keppra was continued at 750 mg twice a day. He is becoming increasing lethargic ever since then, but it he for the last 2 weeks, and is working to see us today on urgent work in basis. He still has about 1 seizure a month according to the staff, where he will have a staring off spell and then some tonic-clonic activity for about 30 seconds followed by mild postictal confusion for a minute or 2 and he quickly comes around. He has intractable seizures and has a vagus nerve stimulator and use the [place] swipe stimulator with the magnet and he will stop the seizures. He also seem to be having more tremors, so I suspect is either Dilantin or Depakote as high on the patient.   We told the staff to continue his medication, we check levels today, decide what medication would be the best to decrease to help his symptoms. He is been toxic in the past on medications several times. He had an EEG at [facility] that just showed mild generalized slowing in February and imaging of the brain showed no acute changes. He has had no other focal weakness, sensory loss, or other focal signs. He does not seem to have any major new medical illnesses now. We discussed his condition with the staff in detail, we will check metabolic parameters to rule out any other treatable cause of his deterioration. Past Medical History:  
Diagnosis Date  Anemia  Anxiety  Blindness of left eye  Cerebral palsy ([address]  DEMENTIA  Down syndrome  Endocrine disease   
 thyroid disorder  GERD (gastroesophageal reflux disease)  Ill-defined condition   
 blind in left eye  Ill-defined condition   
 imparied gait - uses walker  Ill-defined condition   
 dermatitis  Ill-defined condition   
 cerebral palsy  Ill-defined condition   
 prone to decubitus ulcers  Ill-defined condition   
 anemia  Neurological disorder  Other ill-defined conditions(799.89) Down syndrome  Psychiatric disorder   
 mental retardation  Psychiatric disorder   
 anxiety disorder  Seizures ([address]  Sleep apnea  Thyroid disease  Unspecified epilepsy without mention of intractable epilepsy Past Surgical History:  
Procedure Laterality Date  HX OTHER SURGICAL  03/13/2015 VNS  Dr. Zohaib Pennington. Candace Early  @ [facility] Family History Problem Relation Age of Onset  Hypertension Mother  Cancer Mother  Lupus Mother [facility] Arthritis-osteo Mother  Heart Disease Mother  Heart Disease Father  Diabetes Father  Hypertension Father  Elevated Lipids Father  Diabetes Brother  Elevated Lipids Brother  Hypertension Brother  Mental Retardation Brother  Cancer Maternal Grandmother  Arthritis-osteo Maternal Grandmother  Alcohol abuse Maternal Grandfather  Cancer Maternal Grandfather  Arthritis-osteo Paternal Grandmother  Cancer Paternal Grandfather Social History Substance Use Topics  Smoking status: Never Smoker  Smokeless tobacco: Never Used  Alcohol use No  
   
 
Current Outpatient Prescriptions:  
  loratadine (CLARITIN) 10 mg tablet, Take 1 Tab by mouth daily. , Disp: 30 Tab, Rfl: 1 
  fluticasone (FLONASE) 50 mcg/actuation nasal spray, 2 Sprays by Both Nostrils route daily. , Disp: 1 Bottle, Rfl: 2 
  ascorbic acid, vitamin C, (VITAMIN C) 500 mg tablet, Take 1 Tab by mouth daily. , Disp: 30 Tab, Rfl: 6 
  valproate (DEPAKENE) 250 mg/5 mL syrup, Take 15 mL by mouth two (2) times a day. Take 10 ml (500 mg) in morning and 10 ml(500mg) in the evening. (Patient taking differently: Take 250 mg by mouth. Take 10 ml (500 mg) in morning and 10 ml(500mg) in the evening.), Disp: 500 mL, Rfl: 5 
  phenytoin (DILANTIN-125) suspension, Take 4 mL by mouth three (3) times daily. (Patient taking differently: Take 100 mg by mouth two (2) times a day.), Disp: 2 Bottle, Rfl: 5 
  sodium chloride (SALINE NASAL) 0.65 % nasal spray, 1 [place] by Both Nostrils route as needed for Congestion. , Disp: 15 mL, Rfl: 6 
  ferrous sulfate 325 mg (65 mg iron) tablet, Take  by mouth Daily (before breakfast). , Disp: , Rfl:  
  levETIRAcetam (KEPPRA) 100 mg/mL solution, 15ml by mouth twice a day., Disp: 600 mL, Rfl: 5 
  levothyroxine (SYNTHROID) 50 mcg tablet, Take 1 Tab by mouth Daily (before breakfast). , Disp: 90 Tab, Rfl: 2 
  multivit w-min-ferrous gluconate (CEROVITE) 9 mg iron/15 mL oral liquid, Take  by mouth daily. , Disp: , Rfl:  
  cholecalciferol (VITAMIN D3) 1,000 unit tablet, Take 1 Tab by mouth daily. , Disp: 90 Tab, Rfl: 4 
  OTHER, Benefiber Clear Powder. Mix two teaspoonfuls in 4-8 oz of suitable liquid and give by mouth twice daily as needed. , Disp: , Rfl:  
   chlorhexidine (PERIDEX) 0.12 % solution, 15 mL by Swish and Spit route two (2) times a day. Dental caries, Disp: , Rfl:  
 
 
 
Allergies Allergen Reactions  Morphine Not Reported This Time  Tegretol [Carbamazepine] Other (comments) \"bad reaction\" \"changes attitude\" Review of Systems: A comprehensive review of systems was negative except for: Constitutional: positive for fatigue and malaise Musculoskeletal: positive for myalgias, arthralgias and stiff joints Neurological: positive for dizziness, seizures, memory problems, speech problems, coordination problems, gait problems, tremor and weakness Vitals:  
 03/23/18 1148 BP: 140/82 Pulse: 99 Resp: 16 Temp: 98 °F (36.7 °C) SpO2: 98% Weight: 150 lb (68 kg) Height: 5' 3\" (1.6 m) Objective: I 
 
NEUROLOGICAL EXAM: 
  
Appearance: The patient is well developed, well nourished, provides no history and is nonverbal and in no acute distress with severe mental impairment. Mental Status: Nonverbal but does follow simple commands. Mood and affect lethargic. Cranial Nerves:   Intact visual fields. Fundi are benign but poorly seen on the right. Right pupil reacts, left pupil is scarred over, EOM's full, no nystagmus, no ptosis. Facial sensation is normal. Corneal reflexes are intact. Facial movement is symmetric. Hearing is normal bilaterally. Palate is midline with normal sternocleidomastoid and trapezius muscles are normal. Tongue is midline Neck without meningismus or bruits Temporal arteries not tender or enlarged. Motor:  3/5 strength in upper and lower proximal and distal muscles. Normal bulk and tone. No fasciculations. Reflexes:   Deep tendon reflexes 1+/4 and symmetrical. Patient has no clonus or Babinski signs present Sensory:   Normal to touch, pinprick and temperature and vibration unreliable as is DSS Gait:  Not testable gait. Tremor: Moderate bilateral intention tremor noted. Cerebellar: Moderate abnormal Romberg and tandem cerebellar signs present. Neurovascular:  Normal heart sounds and regular rhythm, peripheral pulses decreased, and no carotid bruits.  
  
 
 
 
Assessment: ICD-10-CM ICD-9-CM 1. Partial epilepsy with impairment of consciousness ([facility] G40.209 345.40 VALPROIC ACID PHENYTOIN, TOTAL & FREE  
   LEVETIRACETAM (KEPPRA) CBC WITH AUTOMATED DIFF  
   METABOLIC PANEL, COMPREHENSIVE  
   AMMONIA 2. Ataxia R27.0 781. 3 VALPROIC ACID PHENYTOIN, TOTAL & FREE  
   LEVETIRACETAM (KEPPRA) CBC WITH AUTOMATED DIFF  
   METABOLIC PANEL, COMPREHENSIVE  
   AMMONIA 3. Memory loss R41.3 780.93 VALPROIC ACID PHENYTOIN, TOTAL & FREE  
   LEVETIRACETAM (KEPPRA) CBC WITH AUTOMATED DIFF  
   METABOLIC PANEL, COMPREHENSIVE  
   AMMONIA 4. Acute encephalopathy G93.40 348.30 VALPROIC ACID PHENYTOIN, TOTAL & FREE  
   LEVETIRACETAM (KEPPRA) CBC WITH AUTOMATED DIFF  
   METABOLIC PANEL, COMPREHENSIVE  
   AMMONIA 5. Recurrent seizures ([facility] G40.909 345.80 VALPROIC ACID PHENYTOIN, TOTAL & FREE  
   LEVETIRACETAM (KEPPRA) CBC WITH AUTOMATED DIFF  
   METABOLIC PANEL, COMPREHENSIVE  
   AMMONIA 6. S/P placement of VNS (vagus nerve stimulation) device Z96.89 V45.89 VALPROIC ACID PHENYTOIN, TOTAL & FREE  
   LEVETIRACETAM (KEPPRA) CBC WITH AUTOMATED DIFF  
   METABOLIC PANEL, COMPREHENSIVE  
   AMMONIA 7. Blind left eye H54.40 369.60 VALPROIC ACID PHENYTOIN, TOTAL & FREE  
   LEVETIRACETAM (KEPPRA) CBC WITH AUTOMATED DIFF  
   METABOLIC PANEL, COMPREHENSIVE  
   AMMONIA 8. Complex partial seizure evolving to generalized seizure ([facility] G40.209 345.40 VALPROIC ACID PHENYTOIN, TOTAL & FREE  
   LEVETIRACETAM (KEPPRA) CBC WITH AUTOMATED DIFF  
   METABOLIC PANEL, COMPREHENSIVE  
   AMMONIA Active Problems: * No active hospital problems. * 
 
 
Plan: Patient would appear to have some toxicity from his medication most likely, and with her tremors Dilantin and Depakote but seem to be the likely culprits so levels were checked today in addition to his Keppra level. By the chart he has been on the same dose of Keppra for at least 6 months but the Depakote was increased to 750 twice daily from 500 twice daily and the Dilantin increased from 100 twice daily to 100 3 times daily 6 weeks ago. Recent imaging of the brain has shown no new structural lesion, and EEGs showed no seizure just mild generalized slowing. These were all done in February at [facility]. Patient will continue his current medications until we get his levels back to decide him with the treatment should be Discussed with the staff in detail, reviewed all [place]'s records, reviewed the CT scan at [facility] reviewed EEG, all on the PACS system, and I agree with reports and findings as described above. Follow-up in 1 month's time as a scheduled visit or earlier if needed. Signed By: Mariano Avila MD   
 March 23, 2018 CC: Stoney Rojas MD 
FAX: [phone] This note will not be viewable in [address] If you have questions, please do not hesitate to call me. I look forward to following Mr. Merlyn Lyn along with you. Sincerely, Mariano Avila MD

## 2018-03-23 NOTE — PATIENT INSTRUCTIONS

## 2018-03-23 NOTE — MR AVS SNAPSHOT
[address] [place] [address], 
[facility], [address] [facility]. 
[phone] Patient: Anahy Baker MRN: [id number] :1965 Visit Information Date & Time Provider Department Dept. Phone Encounter #  
 3/23/2018 11:40 AM Yves Guevara MD Neurology Clinic at [facility] [phone] [id number] Follow-up Instructions Return in about 6 months (around 2018). Your Appointments 2018  3:00 PM  
ROUTINE CARE with Julissa Martins MD  
[facility] Internal Medicine [facility]) Appt Note: follow up  
 Sherice Jacobs [address] [facility] [address]  
[phone]  
  
   
 [facility] [address]  
  
    
 2018  2:20 PM  
Follow Up with Yves Guevara MD  
Neurology Clinic at [facility]) Appt Note: Follow up $0CP tdb 10/17/17  
 [address] [place] [address], 
[address] [place] [address], [address] [phone]  
[address] [facility], [address] [place] [address], [address] [place] [address] [address] [phone]  
  
    
 2018  2:00 PM  
New Patient with Jesika Archer MD  
[address] [place] [address] ([facility]) Appt Note: NP_ ref by Dr Ada Canchola Asad_ h/o seizures_ Medicare+ BCBS [place]  
 [address] [facility] [address]  
  
   
 [address] [place] [id number] Upcoming Health Maintenance Date Due DTaP/Tdap/Td series (1 - Tdap) 1986 FOBT Q 1 YEAR AGE 50-75 12/10/2016 Influenza Age 5 to Adult 2017 MEDICARE YEARLY EXAM 5/10/2018 Allergies as of 3/23/2018  Review Complete On: 3/23/2018 By: Yves Guevara MD  
  
 Severity Noted Reaction Type Reactions Morphine  2016    Not Reported This Time Tegretol [Carbamazepine]  2013    Other (comments) \"bad reaction\" \"changes attitude\" Current Immunizations  Reviewed on 12/10/2015 Name Date Influenza Vaccine Intradermal PF 11/13/2014 TB Skin Test (PPD) Intradermal 4/5/2016, 1/29/2013 Not reviewed this visit You Were Diagnosed With   
  
 Codes Comments Partial epilepsy with impairment of consciousness ([facility]    -  Primary ICD-10-CM: H57.704 ICD-9-CM: 345.40 Ataxia     ICD-10-CM: R27.0 ICD-9-CM: 174. 3 Memory loss     ICD-10-CM: R41.3 ICD-9-CM: 780.93 Acute encephalopathy     ICD-10-CM: G93.40 ICD-9-CM: 348.30 Recurrent seizures ([facility]     ICD-10-CM: F49.012 ICD-9-CM: 345.80 S/P placement of VNS (vagus nerve stimulation) device     ICD-10-CM: Z96.89 
ICD-9-CM: V45.89 Blind left eye     ICD-10-CM: H54.40 ICD-9-CM: 369.60 Complex partial seizure evolving to generalized seizure ([facility].)     ICD-10-CM: E46.314 ICD-9-CM: 345.40 Vitals BP Pulse Temp Resp Height(growth percentile) Weight(growth percentile) 140/82 99 98 °F (36.7 °C) 16 5' 3\" (1.6 m) 150 lb (68 kg) SpO2 BMI Smoking Status 98% 26.57 kg/m2 Never Smoker Vitals History BMI and BSA Data Body Mass Index Body Surface Area  
 26.57 kg/m 2 1.74 m 2 Preferred Pharmacy Pharmacy Name Phone [address] [place] [address] [phone] Your Updated Medication List  
  
   
This list is accurate as of 3/23/18 12:08 PM.  Always use your most recent med list.  
  
  
  
  
 ascorbic acid (vitamin C) 500 mg tablet Commonly known as:  VITAMIN C Take 1 Tab by mouth daily. CEROVITE 9 mg iron/15 mL oral liquid Generic drug:  multivitamin-minerals-ferrous gluconate Take  by mouth daily. chlorhexidine 0.12 % solution Commonly known as:  PERIDEX 15 mL by Swish and Spit route two (2) times a day. Dental caries  
  
 cholecalciferol 1,000 unit tablet Commonly known as:  VITAMIN D3 Take 1 Tab by mouth daily. ferrous sulfate 325 mg (65 mg iron) tablet Take  by mouth Daily (before breakfast). fluticasone 50 mcg/actuation nasal spray Commonly known as:  Jenifer Medin 2 Sprays by Both Nostrils route daily. levETIRAcetam 100 mg/mL solution Commonly known as:  KEPPRA 15ml by mouth twice a day. levothyroxine 50 mcg tablet Commonly known as:  SYNTHROID Take 1 Tab by mouth Daily (before breakfast). loratadine 10 mg tablet Commonly known as:  Zachary Concepcion Take 1 Tab by mouth daily. OTHER Benefiber Clear Powder. Mix two teaspoonfuls in 4-8 oz of suitable liquid and give by mouth twice daily as needed. phenytoin suspension Commonly known as:  [id number] Take 4 mL by mouth three (3) times daily. sodium chloride 0.65 % nasal squeeze bottle Commonly known as:  SALINE NASAL  
1 [place] by Both Nostrils route as needed for Congestion. valproate 250 mg/5 mL syrup Commonly known as:  Chirag Palms Take 15 mL by mouth two (2) times a day. Take 10 ml (500 mg) in morning and 10 ml(500mg) in the evening. We Performed the Following AMMONIA [id number] CPT(R)] CBC WITH AUTOMATED DIFF [16342 CPT(R)] LEVETIRACETAM (KEPPRA) [id number] CPT(R)] METABOLIC PANEL, COMPREHENSIVE [35294 CPT(R)] PHENYTOIN, TOTAL & FREE [id number] CPT(R)] VALPROIC ACID [63654 CPT(R)] Follow-up Instructions Return in about 6 months (around 9/23/2018). Patient Instructions A Healthy Lifestyle: Care Instructions Your Care Instructions A healthy lifestyle can help you feel good, stay at a healthy weight, and have plenty of energy for both work and play. A healthy lifestyle is something you can share with your whole family. A healthy lifestyle also can lower your risk for serious health problems, such as high blood pressure, heart disease, and diabetes. You can follow a few steps listed below to improve your health and the health of your family. Follow-up care is a key part of your treatment and safety.  Be sure to make and go to all appointments, and call your doctor if you are having problems. It's also a good idea to know your test results and keep a list of the medicines you take. How can you care for yourself at home? · Do not eat too much sugar, fat, or fast foods. You can still have dessert and treats now and then. The goal is moderation. · Start small to improve your eating habits. Pay attention to portion sizes, drink less juice and soda pop, and eat more fruits and vegetables. ¨ Eat a healthy amount of food. A 3-ounce serving of meat, for example, is about the size of a deck of cards. Fill the rest of your plate with vegetables and whole grains. ¨ Limit the amount of soda and sports drinks you have every day. Drink more water when you are thirsty. ¨ Eat at least 5 servings of fruits and vegetables every day. It may seem like a lot, but it is not hard to reach this goal. A serving or helping is 1 piece of fruit, 1 cup of vegetables, or 2 cups of leafy, raw vegetables. Have an apple or some carrot sticks as an afternoon snack instead of a candy bar. Try to have fruits and/or vegetables at every meal. 
· Make exercise part of your daily routine. You may want to start with simple activities, such as walking, bicycling, or slow swimming. Try to be active 30 to 60 minutes every day. You do not need to do all 30 to 60 minutes all at once. For example, you can exercise 3 times a day for 10 or 20 minutes. Moderate exercise is safe for most people, but it is always a good idea to talk to your doctor before starting an exercise program. 
· Keep moving. Solomon Lev the lawn, work in the garden, or [employer]. Take the stairs instead of the elevator at work. · If you smoke, quit. People who smoke have an increased risk for heart attack, stroke, cancer, and other lung illnesses. Quitting is hard, but there are ways to boost your chance of quitting tobacco for good. ¨ Use nicotine gum, patches, or lozenges. ¨ Ask your doctor about stop-smoking programs and medicines. ¨ Keep trying. In addition to reducing your risk of diseases in the future, you will notice some benefits soon after you stop using tobacco. If you have shortness of breath or asthma symptoms, they will likely get better within a few weeks after you quit. · Limit how much alcohol you drink. Moderate amounts of alcohol (up to 2 drinks a day for men, 1 drink a day for women) are okay. But drinking too much can lead to liver problems, high blood pressure, and other health problems. Family health If you have a family, there are many things you can do together to improve your health. · Eat meals together as a family as often as possible. · Eat healthy foods. This includes fruits, vegetables, lean meats and dairy, and whole grains. · Include your family in your fitness plan. Most people think of activities such as jogging or tennis as the way to fitness, but there are many ways you and your family can be more active. Anything that makes you breathe hard and gets your heart pumping is exercise. Here are some tips: 
¨ Walk to do errands or to take your child to school or the bus. ¨ Go for a family bike ride after dinner instead of watching TV. Where can you learn more? Go to http://marcello-cynthia.info/. Enter X838 in the search box to learn more about \"A Healthy Lifestyle: Care Instructions. \" Current as of: May 12, 2017 Content Version: 11.4 © 9605-8321 [employer]. Care instructions adapted under license by [employer] (which disclaims liability or warranty for this information). If you have questions about a medical condition or this instruction, always ask your healthcare professional. [address] any warranty or liability for your use of this information. Introducing [facility]!    
 [facility] introduces [employer] patient portal. Now you can access parts of your medical record, email your doctor's office, and request medication refills online. 1. In your internet browser, go to [email or web address] 2. Click on the First Time User? Click Here link in the Sign In box. You will see the New Member Sign Up page. 3. Enter your Handipoints Access Code exactly as it appears below. You will not need to use this code after youve completed the sign-up process. If you do not sign up before the expiration date, you must request a new code. · Handipoints Access Code: [email or web address] Expires: 5/8/2018 12:25 PM 
 
4. Enter the last four digits of your Social Security Number (xxxx) and Date of Birth (mm/dd/yyyy) as indicated and click Submit. You will be taken to the next sign-up page. 5. Create a Handipoints ID. This will be your Handipoints login ID and cannot be changed, so think of one that is secure and easy to remember. 6. Create a Handipoints password. You can change your password at any time. 7. Enter your Password Reset Question and Answer. This can be used at a later time if you forget your password. 8. Enter your e-mail address. You will receive e-mail notification when new information is available in [address] 9. Click Sign Up. You can now view and download portions of your medical record. 10. Click the Download Summary menu link to download a portable copy of your medical information. If you have questions, please visit the Frequently Asked Questions section of the Handipoints website. Remember, Handipoints is NOT to be used for urgent needs. For medical emergencies, dial 911. Now available from your iPhone and Android! Please provide this summary of care documentation to your next provider. Your primary care clinician is listed as Mirza Sharma. If you have any questions after today's visit, please call [phone].

## 2018-03-24 NOTE — PROGRESS NOTES
Consult  REFERRED BY:  Sav Estrada MD    CHIEF COMPLAINT: Increasing lethargy and decreasing ability to walk      Subjective:     Shy Blanchard is a 48 y.o. right-handed -American male seen for evaluation urgent work in basis at the request of Dr. Clarita Espino after I have not seen the patient now for 5 years, for increasing lethargy, drowsiness, and possibly being overmedicated. Patient has known history of Down syndrome and seizures and mental impairment, and basically is nonconversant, quadriplegic, and is able to walk some with a walker at times. Patient was recently hospitalized at [facility] with infection and the flu, and had several seizures there, and had his Depakote increased from 500 mg twice a day to 750 mg twice a day and Dilantin increased from 100 mg twice a day to 300 mg a day in divided doses. His Keppra was continued at 750 mg twice a day. He is becoming increasing lethargic ever since then, but it he for the last 2 weeks, and is working to see us today on urgent work in basis. He still has about 1 seizure a month according to the staff, where he will have a staring off spell and then some tonic-clonic activity for about 30 seconds followed by mild postictal confusion for a minute or 2 and he quickly comes around. He has intractable seizures and has a vagus nerve stimulator and use the Dakin swipe stimulator with the magnet and he will stop the seizures. He also seem to be having more tremors, so I suspect is either Dilantin or Depakote as high on the patient. We told the staff to continue his medication, we check levels today, decide what medication would be the best to decrease to help his symptoms. He is been toxic in the past on medications several times. He had an EEG at [facility] that just showed mild generalized slowing in February and imaging of the brain showed no acute changes. He has had no other focal weakness, sensory loss, or other focal signs.   He does not seem to have any major new medical illnesses now. We discussed his condition with the staff in detail, we will check metabolic parameters to rule out any other treatable cause of his deterioration. Past Medical History:   Diagnosis Date    Anemia     Anxiety     Blindness of left eye     Cerebral palsy (HCC)     DEMENTIA     Down syndrome     Endocrine disease     thyroid disorder    GERD (gastroesophageal reflux disease)     Ill-defined condition     blind in left eye    Ill-defined condition     imparied gait - uses walker    Ill-defined condition     dermatitis    Ill-defined condition     cerebral palsy    Ill-defined condition     prone to decubitus ulcers    Ill-defined condition     anemia    Neurological disorder     Other ill-defined conditions(799.89)     Down syndrome    Psychiatric disorder     mental retardation    Psychiatric disorder     anxiety disorder    Seizures ([address]     Sleep apnea     Thyroid disease     Unspecified epilepsy without mention of intractable epilepsy       Past Surgical History:   Procedure Laterality Date    HX OTHER SURGICAL  03/13/2015     VNS  Dr. Manuel Keller.  Rashard Villaseñor  @ [facility]     Family History   Problem Relation Age of Onset    Hypertension Mother     Cancer Mother     Lupus Mother    Bishop Stai Arthritis-osteo Mother     Heart Disease Mother     Heart Disease Father     Diabetes Father     Hypertension Father     Elevated Lipids Father     Diabetes Brother     Elevated Lipids Brother     Hypertension Brother     Mental Retardation Brother     Cancer Maternal Grandmother     Arthritis-osteo Maternal Grandmother     Alcohol abuse Maternal Grandfather     Cancer Maternal Grandfather     Arthritis-osteo Paternal Grandmother     Cancer Paternal Grandfather       Social History   Substance Use Topics    Smoking status: Never Smoker    Smokeless tobacco: Never Used    Alcohol use No         Current Outpatient Prescriptions:     loratadine (CLARITIN) 10 mg tablet, Take 1 Tab by mouth daily. , Disp: 30 Tab, Rfl: 1    fluticasone (FLONASE) 50 mcg/actuation nasal spray, 2 Sprays by Both Nostrils route daily. , Disp: 1 Bottle, Rfl: 2    ascorbic acid, vitamin C, (VITAMIN C) 500 mg tablet, Take 1 Tab by mouth daily. , Disp: 30 Tab, Rfl: 6    valproate (DEPAKENE) 250 mg/5 mL syrup, Take 15 mL by mouth two (2) times a day. Take 10 ml (500 mg) in morning and 10 ml(500mg) in the evening. (Patient taking differently: Take 250 mg by mouth. Take 10 ml (500 mg) in morning and 10 ml(500mg) in the evening.), Disp: 500 mL, Rfl: 5    phenytoin (DILANTIN-125) suspension, Take 4 mL by mouth three (3) times daily. (Patient taking differently: Take 100 mg by mouth two (2) times a day.), Disp: 2 Bottle, Rfl: 5    sodium chloride (SALINE NASAL) 0.65 % nasal spray, 1 [place] by Both Nostrils route as needed for Congestion. , Disp: 15 mL, Rfl: 6    ferrous sulfate 325 mg (65 mg iron) tablet, Take  by mouth Daily (before breakfast). , Disp: , Rfl:     levETIRAcetam (KEPPRA) 100 mg/mL solution, 15ml by mouth twice a day., Disp: 600 mL, Rfl: 5    levothyroxine (SYNTHROID) 50 mcg tablet, Take 1 Tab by mouth Daily (before breakfast). , Disp: 90 Tab, Rfl: 2    multivit w-min-ferrous gluconate (CEROVITE) 9 mg iron/15 mL oral liquid, Take  by mouth daily. , Disp: , Rfl:     cholecalciferol (VITAMIN D3) 1,000 unit tablet, Take 1 Tab by mouth daily. , Disp: 90 Tab, Rfl: 4    OTHER, Benefiber Clear Powder. Mix two teaspoonfuls in 4-8 oz of suitable liquid and give by mouth twice daily as needed. , Disp: , Rfl:     chlorhexidine (PERIDEX) 0.12 % solution, 15 mL by Swish and Spit route two (2) times a day.  Dental caries, Disp: , Rfl:         Allergies   Allergen Reactions    Morphine Not Reported This Time    Tegretol [Carbamazepine] Other (comments)     \"bad reaction\" \"changes attitude\"        Review of Systems:  A comprehensive review of systems was negative except for: Constitutional: positive for fatigue and malaise  Musculoskeletal: positive for myalgias, arthralgias and stiff joints  Neurological: positive for dizziness, seizures, memory problems, speech problems, coordination problems, gait problems, tremor and weakness   Vitals:    03/23/18 1148   BP: 140/82   Pulse: 99   Resp: 16   Temp: 98 °F (36.7 °C)   SpO2: 98%   Weight: 150 lb (68 kg)   Height: 5' 3\" (1.6 m)     Objective:     I    NEUROLOGICAL EXAM:     Appearance: The patient is well developed, well nourished, provides no history and is nonverbal and in no acute distress with severe mental impairment. Mental Status: Nonverbal but does follow simple commands. Mood and affect lethargic. Cranial Nerves:   Intact visual fields. Fundi are benign but poorly seen on the right. Right pupil reacts, left pupil is scarred over, EOM's full, no nystagmus, no ptosis. Facial sensation is normal. Corneal reflexes are intact. Facial movement is symmetric. Hearing is normal bilaterally. Palate is midline with normal sternocleidomastoid and trapezius muscles are normal. Tongue is midline  Neck without meningismus or bruits  Temporal arteries not tender or enlarged. Motor:  3/5 strength in upper and lower proximal and distal muscles. Normal bulk and tone. No fasciculations. Reflexes:   Deep tendon reflexes 1+/4 and symmetrical. Patient has no clonus or Babinski signs present   Sensory:   Normal to touch, pinprick and temperature and vibration unreliable as is DSS   Gait:  Not testable gait. Tremor: Moderate bilateral intention tremor noted. Cerebellar: Moderate abnormal Romberg and tandem cerebellar signs present. Neurovascular:  Normal heart sounds and regular rhythm, peripheral pulses decreased, and no carotid bruits.            Assessment:       ICD-10-CM ICD-9-CM    1.  Partial epilepsy with impairment of consciousness (HCC) G40.209 345.40 VALPROIC ACID      PHENYTOIN, TOTAL & FREE      LEVETIRACETAM (KEPPRA)      CBC WITH AUTOMATED DIFF METABOLIC PANEL, COMPREHENSIVE      AMMONIA   2. Ataxia R27.0 781. 3 VALPROIC ACID      PHENYTOIN, TOTAL & FREE      LEVETIRACETAM (KEPPRA)      CBC WITH AUTOMATED DIFF      METABOLIC PANEL, COMPREHENSIVE      AMMONIA   3. Memory loss R41.3 780.93 VALPROIC ACID      PHENYTOIN, TOTAL & FREE      LEVETIRACETAM (KEPPRA)      CBC WITH AUTOMATED DIFF      METABOLIC PANEL, COMPREHENSIVE      AMMONIA   4. Acute encephalopathy G93.40 348.30 VALPROIC ACID      PHENYTOIN, TOTAL & FREE      LEVETIRACETAM (KEPPRA)      CBC WITH AUTOMATED DIFF      METABOLIC PANEL, COMPREHENSIVE      AMMONIA   5. Recurrent seizures (HCC) G40.909 345.80 VALPROIC ACID      PHENYTOIN, TOTAL & FREE      LEVETIRACETAM (KEPPRA)      CBC WITH AUTOMATED DIFF      METABOLIC PANEL, COMPREHENSIVE      AMMONIA   6. S/P placement of VNS (vagus nerve stimulation) device Z96.89 V45.89 VALPROIC ACID      PHENYTOIN, TOTAL & FREE      LEVETIRACETAM (KEPPRA)      CBC WITH AUTOMATED DIFF      METABOLIC PANEL, COMPREHENSIVE      AMMONIA   7. Blind left eye H54.40 369.60 VALPROIC ACID      PHENYTOIN, TOTAL & FREE      LEVETIRACETAM (KEPPRA)      CBC WITH AUTOMATED DIFF      METABOLIC PANEL, COMPREHENSIVE      AMMONIA   8. Complex partial seizure evolving to generalized seizure ([facility] G40.209 345.40 VALPROIC ACID      PHENYTOIN, TOTAL & FREE      LEVETIRACETAM (KEPPRA)      CBC WITH AUTOMATED DIFF      METABOLIC PANEL, COMPREHENSIVE      AMMONIA     Active Problems:    * No active hospital problems. *      Plan:     Patient would appear to have some toxicity from his medication most likely, and with her tremors Dilantin and Depakote but seem to be the likely culprits so levels were checked today in addition to his Keppra level. By the chart he has been on the same dose of Keppra for at least 6 months but the Depakote was increased to 750 twice daily from 500 twice daily and the Dilantin increased from 100 twice daily to 100 3 times daily 6 weeks ago.   Recent imaging of the brain has shown no new structural lesion, and EEGs showed no seizure just mild generalized slowing. These were all done in February at [facility]. Patient will continue his current medications until we get his levels back to decide him with the treatment should be  Discussed with the staff in detail, reviewed all [facility] records, reviewed the CT scan at [facility] reviewed EEG, all on the PACS system, and I agree with reports and findings as described above. Follow-up in 1 month's time as a scheduled visit or earlier if needed. Signed By: Alexy Santillan MD     March 23, 2018       CC: Radha Bravo MD  FAX: [phone]    This note will not be viewable in [address]

## 2018-03-26 ENCOUNTER — HOSPITAL ENCOUNTER (OUTPATIENT)
Dept: GENERAL RADIOLOGY | Age: 53
Discharge: HOME | End: 2018-03-26
Payer: MEDICARE

## 2018-03-26 DIAGNOSIS — J11.00 PNEUMONIA OF LEFT LOWER LOBE DUE TO INFLUENZA A VIRUS: ICD-10-CM

## 2018-03-26 PROCEDURE — 71046 X-RAY EXAM CHEST 2 VIEWS: CPT

## 2018-03-26 NOTE — LETTER
3/27/2018 3:18 PM 
 
Mr. Ronna Ordaz [address] [facility] 7 [address] Dear Ronna Ordaz: 
 
Please find your most recent results below. Resulted Orders XR CHEST PA LAT Narrative Exam:  2 view chest 
 
Indication: Follow-up abnormal chest x-ray Comparison to 2/3/2018. PA and lateral views demonstrate normal heart size. Parenchymal abnormality in 
the left lower lobe is unchanged compared to the prior examination. Degenerative 
changes are noted in the thoracic spine. Impression Impression: Stable parenchymal abnormality left lower lobe. Lungs are otherwise 
clear. RECOMMENDATIONS: 
 
C-xray is stable. No acute changes noted Please call me if you have any questions: [phone] Sincerely, [employer] [facility]M

## 2018-03-27 LAB
BILIRUB UR QL STRIP: NEGATIVE
GLUCOSE UR-MCNC: NEGATIVE MG/DL
KETONES P FAST UR STRIP-MCNC: NEGATIVE MG/DL
PH UR STRIP: 6.5 [PH] (ref 4.6–8)
PROT UR QL STRIP: NEGATIVE
SP GR UR STRIP: 1.01 (ref 1–1.03)
UA UROBILINOGEN AMB POC: NORMAL (ref 0.2–1)
URINALYSIS CLARITY POC: CLEAR
URINALYSIS COLOR POC: YELLOW
URINE BLOOD POC: NEGATIVE
URINE LEUKOCYTES POC: NEGATIVE
URINE NITRITES POC: NEGATIVE

## 2018-03-28 DIAGNOSIS — G40.209 PARTIAL EPILEPSY WITH IMPAIRMENT OF CONSCIOUSNESS (HCC): Primary | ICD-10-CM

## 2018-03-28 DIAGNOSIS — G40.209 COMPLEX PARTIAL SEIZURE EVOLVING TO GENERALIZED SEIZURE (HCC): ICD-10-CM

## 2018-03-28 RX ORDER — LACOSAMIDE 100 MG/1
100 TABLET ORAL 2 TIMES DAILY
Qty: 60 TAB | Refills: 11 | Status: SHIPPED | OUTPATIENT
Start: 2018-03-28 | End: 2018-04-12 | Stop reason: ALTCHOICE

## 2018-03-28 NOTE — PROGRESS NOTES
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

## 2018-03-30 ENCOUNTER — TELEPHONE (OUTPATIENT)
Dept: NEUROLOGY | Age: 53
End: 2018-03-30

## 2018-03-30 LAB
ALBUMIN SERPL-MCNC: 3.4 G/DL (ref 3.5–5.5)
ALBUMIN/GLOB SERPL: 0.7 {RATIO} (ref 1.2–2.2)
ALP SERPL-CCNC: 93 IU/L (ref 39–117)
ALT SERPL-CCNC: 19 IU/L (ref 0–44)
AMMONIA PLAS-MCNC: 136 UG/DL (ref 27–102)
AST SERPL-CCNC: 31 IU/L (ref 0–40)
BASOPHILS # BLD AUTO: 0 X10E3/UL (ref 0–0.2)
BASOPHILS NFR BLD AUTO: 0 %
BILIRUB SERPL-MCNC: 0.2 MG/DL (ref 0–1.2)
BUN SERPL-MCNC: 5 MG/DL (ref 6–24)
BUN/CREAT SERPL: 7 (ref 9–20)
CALCIUM SERPL-MCNC: 8.9 MG/DL (ref 8.7–10.2)
CHLORIDE SERPL-SCNC: 99 MMOL/L (ref 96–106)
CO2 SERPL-SCNC: 29 MMOL/L (ref 18–29)
CREAT SERPL-MCNC: 0.7 MG/DL (ref 0.76–1.27)
EOSINOPHIL # BLD AUTO: 0.1 X10E3/UL (ref 0–0.4)
EOSINOPHIL NFR BLD AUTO: 1 %
ERYTHROCYTE [DISTWIDTH] IN BLOOD BY AUTOMATED COUNT: 14.2 % (ref 12.3–15.4)
GFR SERPLBLD CREATININE-BSD FMLA CKD-EPI: 108 ML/MIN/1.73
GFR SERPLBLD CREATININE-BSD FMLA CKD-EPI: 125 ML/MIN/1.73
GLOBULIN SER CALC-MCNC: 5.1 G/DL (ref 1.5–4.5)
GLUCOSE SERPL-MCNC: 78 MG/DL (ref 65–99)
HCT VFR BLD AUTO: 34.8 % (ref 37.5–51)
HGB BLD-MCNC: 11.6 G/DL (ref 13–17.7)
IMM GRANULOCYTES # BLD: 0 X10E3/UL (ref 0–0.1)
IMM GRANULOCYTES NFR BLD: 1 %
LEVETIRACETAM SERPL-MCNC: 51.7 UG/ML (ref 10–40)
LYMPHOCYTES # BLD AUTO: 1 X10E3/UL (ref 0.7–3.1)
LYMPHOCYTES NFR BLD AUTO: 23 %
MCH RBC QN AUTO: 34.2 PG (ref 26.6–33)
MCHC RBC AUTO-ENTMCNC: 33.3 G/DL (ref 31.5–35.7)
MCV RBC AUTO: 103 FL (ref 79–97)
MONOCYTES # BLD AUTO: 0.8 X10E3/UL (ref 0.1–0.9)
MONOCYTES NFR BLD AUTO: 18 %
NEUTROPHILS # BLD AUTO: 2.4 X10E3/UL (ref 1.4–7)
NEUTROPHILS NFR BLD AUTO: 57 %
PHENYTOIN FREE SERPL-MCNC: 0.8 UG/ML (ref 1–2)
PHENYTOIN SERPL-MCNC: 6.4 UG/ML (ref 10–20)
PLATELET # BLD AUTO: 419 X10E3/UL (ref 150–379)
POTASSIUM SERPL-SCNC: 3.7 MMOL/L (ref 3.5–5.2)
PROT SERPL-MCNC: 8.5 G/DL (ref 6–8.5)
RBC # BLD AUTO: 3.39 X10E6/UL (ref 4.14–5.8)
SODIUM SERPL-SCNC: 142 MMOL/L (ref 134–144)
VALPROATE SERPL-MCNC: 69 UG/ML (ref 50–100)
WBC # BLD AUTO: 4.2 X10E3/UL (ref 3.4–10.8)

## 2018-03-30 NOTE — TELEPHONE ENCOUNTER
----- Message from Wendy Taylor sent at 3/30/2018  1:14 PM EDT -----  Regarding: Dr. Alberta Marks, from [facility] wanted to know if the medication change had been approved. He stated that the [facility] pharmacy had not received anything. Medication change was discussed at the 03/23/18 appt. Best contact number is  [phone].

## 2018-03-30 NOTE — TELEPHONE ENCOUNTER
Notified I called the Vimpat in to the pharmacy and notified the caregiver. He did want to know if the medication could be crushed as the patient has to have crushed or liquid medications.   I called the pharmacy and found the medication can be crushed and notified the caregiver of this as well

## 2018-04-12 ENCOUNTER — OFFICE VISIT (OUTPATIENT)
Dept: INTERNAL MEDICINE CLINIC | Age: 53
End: 2018-04-12

## 2018-04-12 VITALS
BODY MASS INDEX: 25.34 KG/M2 | HEIGHT: 63 IN | HEART RATE: 106 BPM | WEIGHT: 143 LBS | RESPIRATION RATE: 18 BRPM | OXYGEN SATURATION: 100 % | TEMPERATURE: 97.9 F | SYSTOLIC BLOOD PRESSURE: 134 MMHG | DIASTOLIC BLOOD PRESSURE: 70 MMHG

## 2018-04-12 DIAGNOSIS — R56.9 SEIZURE (HCC): ICD-10-CM

## 2018-04-12 DIAGNOSIS — E03.9 ACQUIRED HYPOTHYROIDISM: ICD-10-CM

## 2018-04-12 DIAGNOSIS — D50.9 IRON DEFICIENCY ANEMIA, UNSPECIFIED IRON DEFICIENCY ANEMIA TYPE: Primary | ICD-10-CM

## 2018-04-12 RX ORDER — HYDROCODONE BITARTRATE AND ACETAMINOPHEN 7.5; 325 MG/15ML; MG/15ML
10 SOLUTION ORAL
COMMUNITY
End: 2019-01-07

## 2018-04-12 RX ORDER — LACOSAMIDE 100 MG/1
100 TABLET ORAL 2 TIMES DAILY
Status: ON HOLD | COMMUNITY
End: 2018-10-04

## 2018-04-12 NOTE — MR AVS SNAPSHOT
[facility]. Diana Jacobs [address] [address] 
[phone] Patient: Aneta Knox MRN: [id number] :1965 Visit Information Date & Time Provider Department Dept. Phone Encounter #  
 2018  3:30 PM Sofie Nageotte, MD [facility] Internal Medicine [phone] [id number] Follow-up Instructions Return in about 3 months (around 2018). Your Appointments 2018  2:20 PM  
Follow Up with Madhavi Bazan MD  
Neurology Clinic at [address]) Appt Note: Follow up $0CP tdb 10/17/17  
 [address] [place] [address], 
[address] [place] [address], [address] [phone]  
[address] [facility], [address] [place] [address], [address] [place] [address] [address] [address]  
  
    
 2018  2:00 PM  
New Patient with Nelson Foreman MD  
[address] [place] [address] ([address] [place] [address]) Appt Note: NP_ ref by Dr Olivia Wick Asad_ h/o seizures_ Medicare+ BCBS [address] [place] [id number]  
[address] [address]  
  
    
 2018  1:00 PM  
Follow Up with Madhavi Bazan MD  
Neurology Clinic at [address]) Appt Note: f/u seizure, jrb 3/23/18  
 [address] [place] [address], 
[address] [place] [address], [address] [place] [phone] Upcoming Health Maintenance Date Due DTaP/Tdap/Td series (1 - Tdap) 1986 FOBT Q 1 YEAR AGE 50-75 12/10/2016 Influenza Age 5 to Adult 2017 MEDICARE YEARLY EXAM 5/10/2018 Allergies as of 2018  Review Complete On: 3/23/2018 By: Madhavi Bazan MD  
  
 Severity Noted Reaction Type Reactions Morphine  2016    Not Reported This Time Tegretol [Carbamazepine]  2013    Other (comments) \"bad reaction\" \"changes attitude\" Current Immunizations  Reviewed on 12/10/2015 Name Date Influenza Vaccine Intradermal PF 2014 TB Skin Test (PPD) Intradermal 4/5/2016, 1/29/2013 Not reviewed this visit You Were Diagnosed With   
  
 Codes Comments Iron deficiency anemia, unspecified iron deficiency anemia type    -  Primary ICD-10-CM: D50.9 ICD-9-CM: 280.9 Seizure ([address]     ICD-10-CM: R56.9 ICD-9-CM: 780.39 Vitals BP Pulse Temp Resp Height(growth percentile) 134/70 (BP 1 Location: Left arm, BP Patient Position: Sitting) (!) 106 97.9 °F (36.6 °C) (Temporal) 18 5' 3\" (1.6 m) Weight(growth percentile) SpO2 BMI Smoking Status 143 lb (64.9 kg) 100% 25.33 kg/m2 Never Smoker Vitals History BMI and BSA Data Body Mass Index Body Surface Area  
 25.33 kg/m 2 1.7 m 2 Preferred Pharmacy Pharmacy Name Phone [address] [place] [address] [phone] Your Updated Medication List  
  
   
This list is accurate as of 4/12/18  3:57 PM.  Always use your most recent med list.  
  
  
  
  
 ascorbic acid (vitamin C) 500 mg tablet Commonly known as:  VITAMIN C Take 1 Tab by mouth daily. cholecalciferol 1,000 unit tablet Commonly known as:  VITAMIN D3 Take 1 Tab by mouth daily. DEEP SEA NASAL 0.65 % nasal squeeze bottle Generic drug:  sodium chloride 1 Spray as needed for Congestion. ferrous sulfate 325 mg (65 mg iron) tablet Take  by mouth Daily (before breakfast). fluticasone 50 mcg/actuation nasal spray Commonly known as:  Leafy Few 2 Sprays by Both Nostrils route daily. GERITOL TONIC WITH FERREX 18 2.5 mg-50 mg-18 iron/15 mL Liqd Generic drug:  O8-D2-X2-B5-B6-iron-met-choln Take 15 mL by mouth daily. HYDROcodone-acetaminophen 0.5-21.7 mg/mL oral solution Commonly known as:  HYCET Take 10 mL by mouth four (4) times daily as needed for Pain.  
  
 levETIRAcetam 100 mg/mL solution Commonly known as:  KEPPRA 15ml by mouth twice a day. levothyroxine 50 mcg tablet Commonly known as:  SYNTHROID Take 1 Tab by mouth Daily (before breakfast). loratadine 10 mg tablet Commonly known as:  Curlee Arms Take 1 Tab by mouth daily. OTHER Benefiber Clear Powder. Mix two teaspoonfuls in 4-8 oz of suitable liquid and give by mouth twice daily as needed. phenytoin suspension Commonly known as:  [id number] Take 4 mL by mouth three (3) times daily. Follow-up Instructions Return in about 3 months (around 7/12/2018). Introducing [address]! [facility] [place] [facility] introduces [employer] patient portal. Now you can access parts of your medical record, email your doctor's office, and request medication refills online. 1. In your internet browser, go to [email or web address] 2. Click on the First Time User? Click Here link in the Sign In box. You will see the New Member Sign Up page. 3. Enter your [employer] Access Code exactly as it appears below. You will not need to use this code after youve completed the sign-up process. If you do not sign up before the expiration date, you must request a new code. · [employer] Access Code: [email or web address] Expires: 5/8/2018 12:25 PM 
 
4. Enter the last four digits of your Social Security Number (xxxx) and Date of Birth (mm/dd/yyyy) as indicated and click Submit. You will be taken to the next sign-up page. 5. Create a [employer] ID. This will be your [employer] login ID and cannot be changed, so think of one that is secure and easy to remember. 6. Create a [employer] password. You can change your password at any time. 7. Enter your Password Reset Question and Answer. This can be used at a later time if you forget your password. 8. Enter your e-mail address. You will receive e-mail notification when new information is available in [address] 9. Click Sign Up. You can now view and download portions of your medical record.  
10. Click the Download Summary menu link to download a portable copy of your medical information. If you have questions, please visit the Frequently Asked Questions section of the [employer] website. Remember, [employer] is NOT to be used for urgent needs. For medical emergencies, dial 911. Now available from your iPhone and Android! Please provide this summary of care documentation to your next provider. Your primary care clinician is listed as Mirza GONZALES Zachary. If you have any questions after today's visit, please call [phone].

## 2018-04-13 NOTE — PROGRESS NOTES
Subjective:     Chief Complaint   Patient presents with    Other     3 mo f/u        He  is a 48y.o. year old male with h/o Down's syndrome, non verbal. CP, seizure, ataxia, hypothyroidism, low vit d who presents with his caregiver for three months follow up. According to the caregiver he has been doing very well. His balance has been more stable, he did not have any seizure since last visit. Deandre Sparrow has been following up with neurologist regularly. Recently Depakote was stopped and started Vimpat. Has been taking iron supplement regularly. Lab Results   Component Value Date/Time    HGB 11.6 (L) 03/26/2018 12:12 PM     Hypothyroid: has been taking synthroid 50 mcg daily. No concerns today.         Review of systems not obtained due to patient factors. Objective:     Vitals:    04/12/18 1538   BP: 134/70   Pulse: (!) 106   Resp: 18   Temp: 97.9 °F (36.6 °C)   TempSrc: Temporal   SpO2: 100%   Weight: 143 lb (64.9 kg)   Height: 5' 3\" (1.6 m)       Physical Examination: General appearance - alert, well appearing, and in no distress and sitting in chair. Mental status - normal mood, behavior.  Non verbal.  Chest - clear to auscultation, no wheezes, rales or rhonchi, symmetric air entry  Heart - normal rate, regular rhythm, normal S1, S2, no murmurs, rubs, clicks or gallops    Allergies   Allergen Reactions    Morphine Not Reported This Time    Tegretol [Carbamazepine] Other (comments)     \"bad reaction\" \"changes attitude\"      Social History     Social History    Marital status: SINGLE     Spouse name: N/A    Number of children: N/A    Years of education: N/A     Social History Main Topics    Smoking status: Never Smoker    Smokeless tobacco: Never Used    Alcohol use No    Drug use: No    Sexual activity: Not Currently     Other Topics Concern    None     Social History Narrative      Family History   Problem Relation Age of Onset    Hypertension Mother     Cancer Mother     Lupus Mother     Arthritis-osteo Mother     Heart Disease Mother     Heart Disease Father     Diabetes Father     Hypertension Father     Elevated Lipids Father     Diabetes Brother     Elevated Lipids Brother     Hypertension Brother     Mental Retardation Brother     Cancer Maternal Grandmother     Arthritis-osteo Maternal Grandmother     Alcohol abuse Maternal Grandfather     Cancer Maternal Grandfather     Arthritis-osteo Paternal Grandmother     Cancer Paternal Grandfather       Past Surgical History:   Procedure Laterality Date    HX OTHER SURGICAL  03/13/2015     VNS  Dr. Didi Lantigua. Nelson Gates  @ [facility]      Past Medical History:   Diagnosis Date    Anemia     Anxiety     Blindness of left eye     Cerebral palsy ([address]     DEMENTIA     Down syndrome     Endocrine disease     thyroid disorder    GERD (gastroesophageal reflux disease)     Ill-defined condition     blind in left eye    Ill-defined condition     imparied gait - uses walker    Ill-defined condition     dermatitis    Ill-defined condition     cerebral palsy    Ill-defined condition     prone to decubitus ulcers    Ill-defined condition     anemia    Neurological disorder     Other ill-defined conditions(799.89)     Down syndrome    Psychiatric disorder     mental retardation    Psychiatric disorder     anxiety disorder    Seizures ([facility]     Sleep apnea     Thyroid disease     Unspecified epilepsy without mention of intractable epilepsy       Current Outpatient Prescriptions   Medication Sig Dispense Refill    J7-N1-K0-B5-B6-iron-met-choln (GERITOL TONIC WITH FERREX 18) 2.5 mg-50 mg-18 iron/15 mL liqd Take 15 mL by mouth daily.  HYDROcodone-acetaminophen (HYCET) 0.5-21.7 mg/mL oral solution Take 10 mL by mouth four (4) times daily as needed for Pain.  loratadine (CLARITIN) 10 mg tablet Take 1 Tab by mouth daily. 30 Tab 1    fluticasone (FLONASE) 50 mcg/actuation nasal spray 2 Sprays by Both Nostrils route daily.  1 Bottle 2    ascorbic acid, vitamin C, (VITAMIN C) 500 mg tablet Take 1 Tab by mouth daily. 30 Tab 6    phenytoin (DILANTIN-125) suspension Take 4 mL by mouth three (3) times daily. (Patient taking differently: Take 125 mg by mouth two (2) times a day.) 2 Bottle 5    ferrous sulfate 325 mg (65 mg iron) tablet Take  by mouth Daily (before breakfast).  levETIRAcetam (KEPPRA) 100 mg/mL solution 15ml by mouth twice a day. 600 mL 5    levothyroxine (SYNTHROID) 50 mcg tablet Take 1 Tab by mouth Daily (before breakfast). 90 Tab 2    cholecalciferol (VITAMIN D3) 1,000 unit tablet Take 1 Tab by mouth daily. 90 Tab 4    OTHER Benefiber Clear Powder. Mix two teaspoonfuls in 4-8 oz of suitable liquid and give by mouth twice daily as needed.  sodium chloride (DEEP SEA NASAL) 0.65 % nasal squeeze bottle 1 Spray as needed for Congestion. Assessment/ Plan:   Diagnoses and all orders for this visit:    1. Iron deficiency anemia, unspecified iron deficiency anemia type      - continue iron supplement. 2. Seizure ([address]      - stable. Continue follow up with neurologist.  3. Acquired hypothyroidism        - stable. Continue synthroid 50 mcg. Follow up in 3 months. Lab Results   Component Value Date/Time    TSH 1.670 01/11/2018 12:07 PM    T4, Free 1.11 01/11/2018 12:07 PM              Medication risks/benefits/costs/interactions/alternatives discussed with patient. Advised patient to call back or return to office if symptoms worsen/change/persist. If patient cannot reach us or should anything more severe/urgent arise he/she should proceed directly to the nearest emergency department. Discussed expected course/resolution/complications of diagnosis in detail with patient. Patient given a written after visit summary which includes her diagnoses, current medications and vitals. Patient expressed understanding with the diagnosis and plan. Follow-up Disposition:  Return in about 3 months (around 7/12/2018).

## 2018-04-20 ENCOUNTER — OFFICE VISIT (OUTPATIENT)
Dept: NEUROLOGY | Age: 53
End: 2018-04-20

## 2018-04-20 VITALS
SYSTOLIC BLOOD PRESSURE: 126 MMHG | HEART RATE: 83 BPM | HEIGHT: 63 IN | WEIGHT: 145 LBS | BODY MASS INDEX: 25.69 KG/M2 | OXYGEN SATURATION: 97 % | DIASTOLIC BLOOD PRESSURE: 82 MMHG

## 2018-04-20 DIAGNOSIS — Z96.89 S/P PLACEMENT OF VNS (VAGUS NERVE STIMULATION) DEVICE: ICD-10-CM

## 2018-04-20 DIAGNOSIS — G40.209 PARTIAL EPILEPSY WITH IMPAIRMENT OF CONSCIOUSNESS (HCC): Primary | ICD-10-CM

## 2018-04-20 DIAGNOSIS — Q90.9 DOWN SYNDROME: ICD-10-CM

## 2018-04-20 DIAGNOSIS — G40.209 COMPLEX PARTIAL SEIZURE EVOLVING TO GENERALIZED SEIZURE (HCC): ICD-10-CM

## 2018-04-20 DIAGNOSIS — R41.3 MEMORY LOSS: ICD-10-CM

## 2018-04-20 DIAGNOSIS — G93.40 ACUTE ENCEPHALOPATHY: ICD-10-CM

## 2018-04-20 DIAGNOSIS — H54.40 BLIND LEFT EYE: ICD-10-CM

## 2018-04-20 NOTE — LETTER
4/20/2018 9:03 PM 
 
Patient:  Karyn Vo YOB: 1965 Date of Visit: 4/20/2018 Dear No Recipients: Thank you for referring Mr. Edilberto Graff to me for evaluation/treatment. Below are the relevant portions of my assessment and plan of care. Consult REFERRED BY: 
Eugene Roberts MD 
 
CHIEF COMPLAINT: Increasing lethargy and decreasing ability to walk Subjective:  
 
Karyn Vo is a 48 y.o. right-handed -American male seen for evaluation urgent on work in basis at the request of Dr. Jenifer Forrester for new problem of not eating, and progressive weight loss for the last several months, that did not get better even though he stopped the Depakote because of increased ammonia from that medication, for us to reevaluate. Patient is now on Vimpat 100 mg twice a day instead of the Depakote, and has not had any clear seizures since his last visit 3 weeks ago. He seem to be tolerating the medication otherwise fairly well. His last Keppra and Dilantin levels were actually therapeutic with a Dilantin level being a little bit on the low side. His tremors are gotten much better in the interim. His drowsiness, sedation, and leaning over in his chair have all gotten better. We will check his levels again of his medications, and include an ammonia level, and metabolic parameters and liver function tests also check in addition to make sure there is no other cause for his symptoms. He discussed his condition with the patient and the caregiver in detail and they agreed to therapy and plans as above. He has not had any recurrent seizures in the interim and seem to be doing well as far as the problem goes on the new medications. Patient has known history of Down syndrome and seizures and mental impairment, and basically is nonconversant, quadriplegic, and is able to walk some with a walker at times.   He still has about 1 seizure a month according to the staff, where he will have a staring off spell and then some tonic-clonic activity for about 30 seconds followed by mild postictal confusion for a minute or 2 and he quickly comes around. He has intractable seizures and has a vagus nerve stimulator and use the Dakin swipe stimulator with the magnet and he will stop the seizures. He is been toxic in the past on medications several times. He had an EEG at [facility] that just showed mild generalized slowing in February and imaging of the brain showed no acute changes. He has had no other focal weakness, sensory loss, or other focal signs. He does not seem to have any major new medical illnesses now. We discussed his condition with the staff in detail, we will check metabolic parameters to rule out any other treatable cause of his deterioration. Past Medical History:  
Diagnosis Date  Anemia  Anxiety  Blindness of left eye  Cerebral palsy ([address]  DEMENTIA  Down syndrome  Endocrine disease   
 thyroid disorder  GERD (gastroesophageal reflux disease)  Ill-defined condition   
 blind in left eye  Ill-defined condition   
 imparied gait - uses walker  Ill-defined condition   
 dermatitis  Ill-defined condition   
 cerebral palsy  Ill-defined condition   
 prone to decubitus ulcers  Ill-defined condition   
 anemia  Neurological disorder  Other ill-defined conditions(799.89) Down syndrome  Psychiatric disorder   
 mental retardation  Psychiatric disorder   
 anxiety disorder  Seizures ([address]  Sleep apnea  Thyroid disease  Unspecified epilepsy without mention of intractable epilepsy Past Surgical History:  
Procedure Laterality Date  HX OTHER SURGICAL  03/13/2015 VNS  Dr. Joana Godoy. Mee Marina  @ [address] Family History Problem Relation Age of Onset  Hypertension Mother  Cancer Mother  Lupus Mother Catha Sprout Arthritis-osteo Mother  Heart Disease Mother  Heart Disease Father  Diabetes Father  Hypertension Father  Elevated Lipids Father  Diabetes Brother  Elevated Lipids Brother  Hypertension Brother  Mental Retardation Brother  Cancer Maternal Grandmother  Arthritis-osteo Maternal Grandmother  Alcohol abuse Maternal Grandfather  Cancer Maternal Grandfather  Arthritis-osteo Paternal Grandmother  Cancer Paternal Grandfather Social History Substance Use Topics  Smoking status: Never Smoker  Smokeless tobacco: Never Used  Alcohol use No  
   
 
Current Outpatient Prescriptions:  
  [id number]-T9-R7-B5-B6-iron-met-choln (GERITOL TONIC WITH FERREX 18) 2.5 mg-50 mg-18 iron/15 mL liqd, Take 15 mL by mouth daily. , Disp: , Rfl:  
  sodium chloride (DEEP SEA NASAL) 0.65 % nasal squeeze bottle, 1 Spray as needed for Congestion. , Disp: , Rfl:  
  HYDROcodone-acetaminophen (HYCET) 0.5-21.7 mg/mL oral solution, Take 10 mL by mouth four (4) times daily as needed for Pain., Disp: , Rfl:  
  lacosamide (VIMPAT) 100 mg tab tablet, Take 100 mg by mouth two (2) times a day., Disp: , Rfl:  
  loratadine (CLARITIN) 10 mg tablet, Take 1 Tab by mouth daily. , Disp: 30 Tab, Rfl: 1 
  fluticasone (FLONASE) 50 mcg/actuation nasal spray, 2 Sprays by Both Nostrils route daily. , Disp: 1 Bottle, Rfl: 2 
  ascorbic acid, vitamin C, (VITAMIN C) 500 mg tablet, Take 1 Tab by mouth daily. , Disp: 30 Tab, Rfl: 6 
  phenytoin (DILANTIN-125) suspension, Take 4 mL by mouth three (3) times daily. (Patient taking differently: Take 125 mg by mouth two (2) times a day.), Disp: 2 Bottle, Rfl: 5 
  ferrous sulfate 325 mg (65 mg iron) tablet, Take  by mouth Daily (before breakfast). , Disp: , Rfl:  
  levETIRAcetam (KEPPRA) 100 mg/mL solution, 15ml by mouth twice a day., Disp: 600 mL, Rfl: 5 
  levothyroxine (SYNTHROID) 50 mcg tablet, Take 1 Tab by mouth Daily (before breakfast). , Disp: 90 Tab, Rfl: 2   cholecalciferol (VITAMIN D3) 1,000 unit tablet, Take 1 Tab by mouth daily. , Disp: 90 Tab, Rfl: 4 
  OTHER, Benefiber Clear Powder. Mix two teaspoonfuls in 4-8 oz of suitable liquid and give by mouth twice daily as needed. , Disp: , Rfl:  
 
 
 
Allergies Allergen Reactions  Morphine Not Reported This Time  Tegretol [Carbamazepine] Other (comments) \"bad reaction\" \"changes attitude\" Review of Systems: A comprehensive review of systems was negative except for: Constitutional: positive for fatigue and malaise Musculoskeletal: positive for myalgias, arthralgias and stiff joints Neurological: positive for dizziness, seizures, memory problems, speech problems, coordination problems, gait problems, tremor and weakness Vitals:  
 04/20/18 1438 BP: 126/82 Pulse: 83 SpO2: 97% Weight: 145 lb (65.8 kg) Height: 5' 3\" (1.6 m) Objective: I 
 
NEUROLOGICAL EXAM: 
  
Appearance: The patient is well developed, well nourished, provides no history and is nonverbal and in no acute distress with severe mental impairment. Mental Status: Nonverbal but does follow simple commands. Mood and affect lethargic. Cranial Nerves:   Intact visual fields. Fundi are benign but poorly seen on the right. Right pupil reacts, left pupil is scarred over, EOM's full, no nystagmus, no ptosis. Facial sensation is normal. Corneal reflexes are intact. Facial movement is symmetric. Hearing is normal bilaterally. Palate is midline with normal sternocleidomastoid and trapezius muscles are normal. Tongue is midline Neck without meningismus or bruits Temporal arteries not tender or enlarged. Motor:  3/5 strength in upper and lower proximal and distal muscles. Normal bulk and tone. No fasciculations. Reflexes:   Deep tendon reflexes 1+/4 and symmetrical. Patient has no clonus or Babinski signs present Sensory:   Normal to touch, pinprick and temperature and vibration unreliable as is DSS Gait:  Not testable gait. Tremor: Moderate bilateral intention tremor noted. Cerebellar: Moderate abnormal Romberg and tandem cerebellar signs present. Neurovascular:  Normal heart sounds and regular rhythm, peripheral pulses decreased, and no carotid bruits.  
  
 
 
 
Assessment: ICD-10-CM ICD-9-CM 1. Partial epilepsy with impairment of consciousness ([facility]) G40.209 345.40 LEVETIRACETAM (KEPPRA) LACOSAMIDE PHENYTOIN, TOTAL & FREE  
   CBC WITH AUTOMATED DIFF  
   METABOLIC PANEL, COMPREHENSIVE  
   AMMONIA 2. Memory loss R41.3 780.93 LEVETIRACETAM (KEPPRA) LACOSAMIDE PHENYTOIN, TOTAL & FREE  
   CBC WITH AUTOMATED DIFF  
   METABOLIC PANEL, COMPREHENSIVE  
   AMMONIA 3. Acute encephalopathy G93.40 348.30 LEVETIRACETAM (KEPPRA) LACOSAMIDE PHENYTOIN, TOTAL & FREE  
   CBC WITH AUTOMATED DIFF  
   METABOLIC PANEL, COMPREHENSIVE  
   AMMONIA 4. Complex partial seizure evolving to generalized seizure ([facility] G40.209 345.40 LEVETIRACETAM (KEPPRA) LACOSAMIDE PHENYTOIN, TOTAL & FREE  
   CBC WITH AUTOMATED DIFF  
   METABOLIC PANEL, COMPREHENSIVE  
   AMMONIA 5. S/P placement of VNS (vagus nerve stimulation) device Z96.89 V45.89 LEVETIRACETAM (KEPPRA) LACOSAMIDE PHENYTOIN, TOTAL & FREE  
   CBC WITH AUTOMATED DIFF  
   METABOLIC PANEL, COMPREHENSIVE  
   AMMONIA 6. Blind left eye H54.40 369.60 LEVETIRACETAM (KEPPRA) LACOSAMIDE PHENYTOIN, TOTAL & FREE  
   CBC WITH AUTOMATED DIFF  
   METABOLIC PANEL, COMPREHENSIVE  
   AMMONIA 7. Down syndrome Q90.9 758.0 LEVETIRACETAM (KEPPRA) LACOSAMIDE PHENYTOIN, TOTAL & FREE  
   CBC WITH AUTOMATED DIFF  
   METABOLIC PANEL, COMPREHENSIVE  
   AMMONIA Active Problems: * No active hospital problems.  * 
 
 
Plan:  
 
Patient better now that his Depakote has been discontinued, probably reflecting the fact that his ammonia levels probably down to normal.  We will recheck that his metabolic parameters to make sure there is no other cause for his not eating and weight loss. Patient doing well without seizures we will continue his current dose of medication. Overall he is better, more awake, but still not eating. Recent imaging of the brain has shown no new structural lesion, and EEGs showed no seizure just mild generalized slowing. These were all done in February at [facility]. Patient will continue his current medications until we get his levels back to decide him with the treatment should be Discussed with the staff in detail, reviewed all [facility] records, reviewed the CT scan at [facility] reviewed EEG, all on the PACS system, and I agree with reports and findings as described above. Follow-up in 6 month's time as a scheduled visit or earlier if needed. If we can find a cause of his anorexia he is to see his PCP for medical evaluation. We will check my chart for results of this test, or give us a call for further interpretation of his lab results. Signed By: Adam Bella MD   
 April 20, 2018 CC: Balbir Willis MD 
FAX: [phone] This note will not be viewable in [address] If you have questions, please do not hesitate to call me. I look forward to following Mr. Jax Arzate along with you. Sincerely, Adam Bella MD

## 2018-04-20 NOTE — MR AVS SNAPSHOT
[address], 
[address], [facility] [address] [facility] 
[phone] Patient: Teressa Gonzales MRN: [id number] :1965 Visit Information Date & Time Provider Department Dept. Phone Encounter #  
 2018  2:20 PM Enid Rawls MD Neurology [facility] at [facility] [phone] [id number] Follow-up Instructions Return in about 6 months (around 10/20/2018). Your Appointments 2018  2:00 PM  
New Patient with Rafia Vanegas MD  
[address] [facility] ([address] [place] [address]) Appt Note: NP_ ref by Dr Evan Buchanan Asad_ h/o seizures_ Medicare+ BCBS   
 [address] [facility] [address] [place] [id number]  
[address]  
  
    
 2018  1:00 PM  
Follow Up with Endi Rawls MD  
Neurology Clinic at [address]) Appt Note: f/u seizure, jrb 3/23/18  
 [facility], 
[address] [place] [address], [address] [phone]  
[address] [facility], [address] [place] [address], [address] [place] [address] [address] [phone] Upcoming Health Maintenance Date Due DTaP/Tdap/Td series (1 - Tdap) 1986 FOBT Q 1 YEAR AGE 50-75 12/10/2016 Influenza Age 5 to Adult 2017 MEDICARE YEARLY EXAM 5/10/2018 Allergies as of 2018  Review Complete On: 2018 By: Enid Rawls MD  
  
 Severity Noted Reaction Type Reactions Morphine  2016    Not Reported This Time Tegretol [Carbamazepine]  2013    Other (comments) \"bad reaction\" \"changes attitude\" Current Immunizations  Reviewed on 12/10/2015 Name Date Influenza Vaccine Intradermal PF 2014 TB Skin Test (PPD) Intradermal 2016, 2013 Not reviewed this visit You Were Diagnosed With   
  
 Codes Comments Partial epilepsy with impairment of consciousness ([facility]    -  Primary ICD-10-CM: M99.912 ICD-9-CM: 345.40 Memory loss     ICD-10-CM: R41.3 ICD-9-CM: 780.93 Acute encephalopathy     ICD-10-CM: G93.40 ICD-9-CM: 348.30 Complex partial seizure evolving to generalized seizure ([facility]     ICD-10-CM: X99.805 ICD-9-CM: 345.40 S/P placement of VNS (vagus nerve stimulation) device     ICD-10-CM: Z96.89 
ICD-9-CM: V45.89 Blind left eye     ICD-10-CM: H54.40 ICD-9-CM: 369.60 Down syndrome     ICD-10-CM: Q90.9 ICD-9-CM: 494. 0 Vitals BP Pulse Height(growth percentile) Weight(growth percentile) SpO2 BMI  
 126/82 83 5' 3\" (1.6 m) 145 lb (65.8 kg) 97% 25.69 kg/m2 Smoking Status Never Smoker BMI and BSA Data Body Mass Index Body Surface Area  
 25.69 kg/m 2 1.71 m 2 Preferred Pharmacy Pharmacy Name Phone [address] [place] [address] [phone] Your Updated Medication List  
  
   
This list is accurate as of 4/20/18  2:53 PM.  Always use your most recent med list.  
  
  
  
  
 ascorbic acid (vitamin C) 500 mg tablet Commonly known as:  VITAMIN C Take 1 Tab by mouth daily. cholecalciferol 1,000 unit tablet Commonly known as:  VITAMIN D3 Take 1 Tab by mouth daily. DEEP SEA NASAL 0.65 % nasal squeeze bottle Generic drug:  sodium chloride 1 Spray as needed for Congestion. ferrous sulfate 325 mg (65 mg iron) tablet Take  by mouth Daily (before breakfast). fluticasone 50 mcg/actuation nasal spray Commonly known as:  Turner Davina 2 Sprays by Both Nostrils route daily. GERITOL TONIC WITH FERREX 18 2.5 mg-50 mg-18 iron/15 mL Liqd Generic drug:  S6-M4-H1-B5-B6-iron-met-choln Take 15 mL by mouth daily. HYDROcodone-acetaminophen 0.5-21.7 mg/mL oral solution Commonly known as:  HYCET Take 10 mL by mouth four (4) times daily as needed for Pain.  
  
 levETIRAcetam 100 mg/mL solution Commonly known as:  KEPPRA 15ml by mouth twice a day. levothyroxine 50 mcg tablet Commonly known as:  SYNTHROID Take 1 Tab by mouth Daily (before breakfast). loratadine 10 mg tablet Commonly known as:  Claritza Case Take 1 Tab by mouth daily. OTHER Benefiber Clear Powder. Mix two teaspoonfuls in 4-8 oz of suitable liquid and give by mouth twice daily as needed. phenytoin suspension Commonly known as:  [id number] Take 4 mL by mouth three (3) times daily. VIMPAT 100 mg Tab tablet Generic drug:  lacosamide Take 100 mg by mouth two (2) times a day. We Performed the Following AMMONIA [id number] CPT(R)] CBC WITH AUTOMATED DIFF [38049 CPT(R)] LACOSAMIDE [79707 CPT(R)] LEVETIRACETAM (KEPPRA) [id number] CPT(R)] METABOLIC PANEL, COMPREHENSIVE [26588 CPT(R)] PHENYTOIN, TOTAL & FREE [id number] CPT(R)] Follow-up Instructions Return in about 6 months (around 10/20/2018). Introducing [facility]! [employer] introduces [employer] patient portal. Now you can access parts of your medical record, email your doctor's office, and request medication refills online. 1. In your internet browser, go to https://[employer]. [employer] 2. Click on the First Time User? Click Here link in the Sign In box. You will see the New Member Sign Up page. 3. Enter your [employer] Access Code exactly as it appears below. You will not need to use this code after youve completed the sign-up process. If you do not sign up before the expiration date, you must request a new code. · [employer] Access Code: [email or web address] Expires: 5/8/2018 12:25 PM 
 
4. Enter the last four digits of your Social Security Number (xxxx) and Date of Birth (mm/dd/yyyy) as indicated and click Submit. You will be taken to the next sign-up page. 5. Create a [employer] ID. This will be your [employer] login ID and cannot be changed, so think of one that is secure and easy to remember. 6. Create a [address] password. You can change your password at any time. 7. Enter your Password Reset Question and Answer. This can be used at a later time if you forget your password. 8. Enter your e-mail address. You will receive e-mail notification when new information is available in [address] 9. Click Sign Up. You can now view and download portions of your medical record. 10. Click the Download Summary menu link to download a portable copy of your medical information. If you have questions, please visit the Frequently Asked Questions section of the [employer] website. Remember, [employer] is NOT to be used for urgent needs. For medical emergencies, dial 911. Now available from your iPhone and Android! Please provide this summary of care documentation to your next provider. Your primary care clinician is listed as Mirza Sharma. If you have any questions after today's visit, please call [phone].

## 2018-04-21 NOTE — PROGRESS NOTES
Consult  REFERRED BY:  Liam Curry MD    CHIEF COMPLAINT: Increasing lethargy and decreasing ability to walk      Subjective:     Sergio Jack is a 48 y.o. right-handed -American male seen for evaluation urgent on work in basis at the request of Dr. Lazara Arana for new problem of not eating, and progressive weight loss for the last several months, that did not get better even though he stopped the Depakote because of increased ammonia from that medication, for us to reevaluate. Patient is now on Vimpat 100 mg twice a day instead of the Depakote, and has not had any clear seizures since his last visit 3 weeks ago. He seem to be tolerating the medication otherwise fairly well. His last Keppra and Dilantin levels were actually therapeutic with a Dilantin level being a little bit on the low side. His tremors are gotten much better in the interim. His drowsiness, sedation, and leaning over in his chair have all gotten better. We will check his levels again of his medications, and include an ammonia level, and metabolic parameters and liver function tests also check in addition to make sure there is no other cause for his symptoms. He discussed his condition with the patient and the caregiver in detail and they agreed to therapy and plans as above. He has not had any recurrent seizures in the interim and seem to be doing well as far as the problem goes on the new medications. Patient has known history of Down syndrome and seizures and mental impairment, and basically is nonconversant, quadriplegic, and is able to walk some with a walker at times. He still has about 1 seizure a month according to the staff, where he will have a staring off spell and then some tonic-clonic activity for about 30 seconds followed by mild postictal confusion for a minute or 2 and he quickly comes around.   He has intractable seizures and has a vagus nerve stimulator and use the [employer] stimulator with the magnet and he will stop the seizures. He is been toxic in the past on medications several times. He had an EEG at [facility] that just showed mild generalized slowing in February and imaging of the brain showed no acute changes. He has had no other focal weakness, sensory loss, or other focal signs. He does not seem to have any major new medical illnesses now. We discussed his condition with the staff in detail, we will check metabolic parameters to rule out any other treatable cause of his deterioration. Past Medical History:   Diagnosis Date    Anemia     Anxiety     Blindness of left eye     Cerebral palsy (HCC)     DEMENTIA     Down syndrome     Endocrine disease     thyroid disorder    GERD (gastroesophageal reflux disease)     Ill-defined condition     blind in left eye    Ill-defined condition     imparied gait - uses walker    Ill-defined condition     dermatitis    Ill-defined condition     cerebral palsy    Ill-defined condition     prone to decubitus ulcers    Ill-defined condition     anemia    Neurological disorder     Other ill-defined conditions(799.89)     Down syndrome    Psychiatric disorder     mental retardation    Psychiatric disorder     anxiety disorder    Seizures ([address]     Sleep apnea     Thyroid disease     Unspecified epilepsy without mention of intractable epilepsy       Past Surgical History:   Procedure Laterality Date    HX OTHER SURGICAL  03/13/2015     VNS  Dr. Darby Yuan.  Chantelle Vega  @ [facility]     Family History   Problem Relation Age of Onset    Hypertension Mother     Cancer Mother     Lupus Mother    Sim Powell Arthritis-osteo Mother     Heart Disease Mother     Heart Disease Father     Diabetes Father     Hypertension Father     Elevated Lipids Father     Diabetes Brother     Elevated Lipids Brother     Hypertension Brother     Mental Retardation Brother     Cancer Maternal Grandmother     Arthritis-osteo Maternal Grandmother     Alcohol abuse Maternal Grandfather     Cancer Maternal Grandfather     Arthritis-osteo Paternal Grandmother     Cancer Paternal Grandfather       Social History   Substance Use Topics    Smoking status: Never Smoker    Smokeless tobacco: Never Used    Alcohol use No         Current Outpatient Prescriptions:     Z4-I9-G0-B5-B6-iron-met-choln (GERITOL TONIC WITH FERREX 18) 2.5 mg-50 mg-18 iron/15 mL liqd, Take 15 mL by mouth daily. , Disp: , Rfl:     sodium chloride (DEEP SEA NASAL) 0.65 % nasal squeeze bottle, 1 Spray as needed for Congestion. , Disp: , Rfl:     HYDROcodone-acetaminophen (HYCET) 0.5-21.7 mg/mL oral solution, Take 10 mL by mouth four (4) times daily as needed for Pain., Disp: , Rfl:     lacosamide (VIMPAT) 100 mg tab tablet, Take 100 mg by mouth two (2) times a day., Disp: , Rfl:     loratadine (CLARITIN) 10 mg tablet, Take 1 Tab by mouth daily. , Disp: 30 Tab, Rfl: 1    fluticasone (FLONASE) 50 mcg/actuation nasal spray, 2 Sprays by Both Nostrils route daily. , Disp: 1 Bottle, Rfl: 2    ascorbic acid, vitamin C, (VITAMIN C) 500 mg tablet, Take 1 Tab by mouth daily. , Disp: 30 Tab, Rfl: 6    phenytoin (DILANTIN-125) suspension, Take 4 mL by mouth three (3) times daily. (Patient taking differently: Take 125 mg by mouth two (2) times a day.), Disp: 2 Bottle, Rfl: 5    ferrous sulfate 325 mg (65 mg iron) tablet, Take  by mouth Daily (before breakfast). , Disp: , Rfl:     levETIRAcetam (KEPPRA) 100 mg/mL solution, 15ml by mouth twice a day., Disp: 600 mL, Rfl: 5    levothyroxine (SYNTHROID) 50 mcg tablet, Take 1 Tab by mouth Daily (before breakfast). , Disp: 90 Tab, Rfl: 2    cholecalciferol (VITAMIN D3) 1,000 unit tablet, Take 1 Tab by mouth daily. , Disp: 90 Tab, Rfl: 4    OTHER, Benefiber Clear Powder. Mix two teaspoonfuls in 4-8 oz of suitable liquid and give by mouth twice daily as needed. , Disp: , Rfl:         Allergies   Allergen Reactions    Morphine Not Reported This Time    Tegretol [Carbamazepine] Other (comments)     \"bad reaction\" \"changes attitude\"        Review of Systems:  A comprehensive review of systems was negative except for: Constitutional: positive for fatigue and malaise  Musculoskeletal: positive for myalgias, arthralgias and stiff joints  Neurological: positive for dizziness, seizures, memory problems, speech problems, coordination problems, gait problems, tremor and weakness   Vitals:    04/20/18 1438   BP: 126/82   Pulse: 83   SpO2: 97%   Weight: 145 lb (65.8 kg)   Height: 5' 3\" (1.6 m)     Objective:     I    NEUROLOGICAL EXAM:     Appearance: The patient is well developed, well nourished, provides no history and is nonverbal and in no acute distress with severe mental impairment. Mental Status: Nonverbal but does follow simple commands. Mood and affect lethargic. Cranial Nerves:   Intact visual fields. Fundi are benign but poorly seen on the right. Right pupil reacts, left pupil is scarred over, EOM's full, no nystagmus, no ptosis. Facial sensation is normal. Corneal reflexes are intact. Facial movement is symmetric. Hearing is normal bilaterally. Palate is midline with normal sternocleidomastoid and trapezius muscles are normal. Tongue is midline  Neck without meningismus or bruits  Temporal arteries not tender or enlarged. Motor:  3/5 strength in upper and lower proximal and distal muscles. Normal bulk and tone. No fasciculations. Reflexes:   Deep tendon reflexes 1+/4 and symmetrical. Patient has no clonus or Babinski signs present   Sensory:   Normal to touch, pinprick and temperature and vibration unreliable as is DSS   Gait:  Not testable gait. Tremor: Moderate bilateral intention tremor noted. Cerebellar: Moderate abnormal Romberg and tandem cerebellar signs present. Neurovascular:  Normal heart sounds and regular rhythm, peripheral pulses decreased, and no carotid bruits.            Assessment:       ICD-10-CM ICD-9-CM    1.  Partial epilepsy with impairment of consciousness ([facility] G40.209 345.40 LEVETIRACETAM (KEPPRA)      LACOSAMIDE      PHENYTOIN, TOTAL & FREE      CBC WITH AUTOMATED DIFF      METABOLIC PANEL, COMPREHENSIVE      AMMONIA   2. Memory loss R41.3 780.93 LEVETIRACETAM (KEPPRA)      LACOSAMIDE      PHENYTOIN, TOTAL & FREE      CBC WITH AUTOMATED DIFF      METABOLIC PANEL, COMPREHENSIVE      AMMONIA   3. Acute encephalopathy G93.40 348.30 LEVETIRACETAM (KEPPRA)      LACOSAMIDE      PHENYTOIN, TOTAL & FREE      CBC WITH AUTOMATED DIFF      METABOLIC PANEL, COMPREHENSIVE      AMMONIA   4. Complex partial seizure evolving to generalized seizure ([address] G40.209 345.40 LEVETIRACETAM (KEPPRA)      LACOSAMIDE      PHENYTOIN, TOTAL & FREE      CBC WITH AUTOMATED DIFF      METABOLIC PANEL, COMPREHENSIVE      AMMONIA   5. S/P placement of VNS (vagus nerve stimulation) device Z96.89 V45.89 LEVETIRACETAM (KEPPRA)      LACOSAMIDE      PHENYTOIN, TOTAL & FREE      CBC WITH AUTOMATED DIFF      METABOLIC PANEL, COMPREHENSIVE      AMMONIA   6. Blind left eye H54.40 369.60 LEVETIRACETAM (KEPPRA)      LACOSAMIDE      PHENYTOIN, TOTAL & FREE      CBC WITH AUTOMATED DIFF      METABOLIC PANEL, COMPREHENSIVE      AMMONIA   7. Down syndrome Q90.9 758.0 LEVETIRACETAM (KEPPRA)      LACOSAMIDE      PHENYTOIN, TOTAL & FREE      CBC WITH AUTOMATED DIFF      METABOLIC PANEL, COMPREHENSIVE      AMMONIA     Active Problems:    * No active hospital problems. *      Plan:     Patient better now that his Depakote has been discontinued, probably reflecting the fact that his ammonia levels probably down to normal.  We will recheck that his metabolic parameters to make sure there is no other cause for his not eating and weight loss. Patient doing well without seizures we will continue his current dose of medication. Overall he is better, more awake, but still not eating. Recent imaging of the brain has shown no new structural lesion, and EEGs showed no seizure just mild generalized slowing.   These were all done in February at [facility]. Patient will continue his current medications until we get his levels back to decide him with the treatment should be  Discussed with the staff in detail, reviewed all [facility] records, reviewed the CT scan at [facility] reviewed EEG, all on the PACS system, and I agree with reports and findings as described above. Follow-up in 6 month's time as a scheduled visit or earlier if needed. If we can find a cause of his anorexia he is to see his PCP for medical evaluation. We will check my chart for results of this test, or give us a call for further interpretation of his lab results. Signed By: Madhavi Bazan MD     April 20, 2018       CC: Sofie Nageotte, MD  FAX: [phone]    This note will not be viewable in [address]

## 2018-04-24 DIAGNOSIS — G40.209 PARTIAL EPILEPSY WITH IMPAIRMENT OF CONSCIOUSNESS (HCC): ICD-10-CM

## 2018-04-24 RX ORDER — PHENYTOIN 125 MG/5ML
125 SUSPENSION ORAL 2 TIMES DAILY
Qty: 1 BOTTLE | Refills: 5 | Status: SHIPPED | OUTPATIENT
Start: 2018-04-24 | End: 2018-04-26 | Stop reason: SDUPTHER

## 2018-04-24 NOTE — TELEPHONE ENCOUNTER
I spoke with Neelima Lira at his facility and per her records, the patient is taking 4mL twice daily (100mg twice daily). She is faxing over the [facility] for the patient to our office now.     Please advise about sending this script with changes to pharmacy    Will be getting medication labs very soon (today)

## 2018-04-26 ENCOUNTER — TELEPHONE (OUTPATIENT)
Dept: NEUROLOGY | Age: 53
End: 2018-04-26

## 2018-04-26 DIAGNOSIS — G40.209 PARTIAL EPILEPSY WITH IMPAIRMENT OF CONSCIOUSNESS (HCC): ICD-10-CM

## 2018-04-26 RX ORDER — PHENYTOIN 125 MG/5ML
100 SUSPENSION ORAL 2 TIMES DAILY
Qty: 1 BOTTLE | Refills: 5 | Status: SHIPPED | OUTPATIENT
Start: 2018-04-26 | End: 2018-10-19 | Stop reason: SDUPTHER

## 2018-04-26 NOTE — TELEPHONE ENCOUNTER
I received the MARS on this patient and it states that the patient takes Phenytoin 4mL twice daily. Some of his labs have come back. So far the total phenytoin number is 14.9 with free still pending  The ammonia level was 115 which has come down from 134 a month ago. Please advise if you want the patient on 4mL BID or 5mL BID from now on and I will call the facility.   Sushila at [phone]

## 2018-05-02 LAB
ALBUMIN SERPL-MCNC: 3.6 G/DL (ref 3.5–5.5)
ALBUMIN/GLOB SERPL: 0.7 {RATIO} (ref 1.2–2.2)
ALP SERPL-CCNC: 114 IU/L (ref 39–117)
ALT SERPL-CCNC: 21 IU/L (ref 0–44)
AMMONIA PLAS-MCNC: 115 UG/DL (ref 27–102)
AST SERPL-CCNC: 25 IU/L (ref 0–40)
BASOPHILS # BLD AUTO: 0 X10E3/UL (ref 0–0.2)
BASOPHILS NFR BLD AUTO: 1 %
BILIRUB SERPL-MCNC: <0.2 MG/DL (ref 0–1.2)
BUN SERPL-MCNC: 4 MG/DL (ref 6–24)
BUN/CREAT SERPL: 5 (ref 9–20)
CALCIUM SERPL-MCNC: 9 MG/DL (ref 8.7–10.2)
CHLORIDE SERPL-SCNC: 100 MMOL/L (ref 96–106)
CO2 SERPL-SCNC: 27 MMOL/L (ref 18–29)
CREAT SERPL-MCNC: 0.8 MG/DL (ref 0.76–1.27)
EOSINOPHIL # BLD AUTO: 0.1 X10E3/UL (ref 0–0.4)
EOSINOPHIL NFR BLD AUTO: 2 %
ERYTHROCYTE [DISTWIDTH] IN BLOOD BY AUTOMATED COUNT: 14.4 % (ref 12.3–15.4)
GFR SERPLBLD CREATININE-BSD FMLA CKD-EPI: 102 ML/MIN/1.73
GFR SERPLBLD CREATININE-BSD FMLA CKD-EPI: 118 ML/MIN/1.73
GLOBULIN SER CALC-MCNC: 5.1 G/DL (ref 1.5–4.5)
GLUCOSE SERPL-MCNC: 102 MG/DL (ref 65–99)
HCT VFR BLD AUTO: 35.3 % (ref 37.5–51)
HGB BLD-MCNC: 11.8 G/DL (ref 13–17.7)
IMM GRANULOCYTES # BLD: 0 X10E3/UL (ref 0–0.1)
IMM GRANULOCYTES NFR BLD: 0 %
LACOSAMIDE SERPL-MCNC: 8.3 UG/ML (ref 5–10)
LEVETIRACETAM SERPL-MCNC: 50 UG/ML (ref 10–40)
LYMPHOCYTES # BLD AUTO: 1.1 X10E3/UL (ref 0.7–3.1)
LYMPHOCYTES NFR BLD AUTO: 30 %
MCH RBC QN AUTO: 33.9 PG (ref 26.6–33)
MCHC RBC AUTO-ENTMCNC: 33.4 G/DL (ref 31.5–35.7)
MCV RBC AUTO: 101 FL (ref 79–97)
MONOCYTES # BLD AUTO: 0.6 X10E3/UL (ref 0.1–0.9)
MONOCYTES NFR BLD AUTO: 18 %
NEUTROPHILS # BLD AUTO: 1.7 X10E3/UL (ref 1.4–7)
NEUTROPHILS NFR BLD AUTO: 49 %
PHENYTOIN FREE SERPL-MCNC: 1.3 UG/ML (ref 1–2)
PHENYTOIN SERPL-MCNC: 14.9 UG/ML (ref 10–20)
PLATELET # BLD AUTO: 401 X10E3/UL (ref 150–379)
POTASSIUM SERPL-SCNC: 3.8 MMOL/L (ref 3.5–5.2)
PROT SERPL-MCNC: 8.7 G/DL (ref 6–8.5)
RBC # BLD AUTO: 3.48 X10E6/UL (ref 4.14–5.8)
SODIUM SERPL-SCNC: 142 MMOL/L (ref 134–144)
WBC # BLD AUTO: 3.5 X10E3/UL (ref 3.4–10.8)

## 2018-05-03 NOTE — PROGRESS NOTES
Mildly elevated ammonia level, dropping and almost normal range now, we will repeat at next follow-up

## 2018-05-23 DIAGNOSIS — R09.81 NASAL CONGESTION: ICD-10-CM

## 2018-05-23 RX ORDER — LORATADINE 10 MG/1
10 TABLET ORAL DAILY
Qty: 30 TAB | Refills: 1 | Status: SHIPPED | OUTPATIENT
Start: 2018-05-23 | End: 2018-08-06 | Stop reason: SDUPTHER

## 2018-05-23 RX ORDER — LANOLIN ALCOHOL/MO/W.PET/CERES
325 CREAM (GRAM) TOPICAL
Qty: 90 TAB | Refills: 2 | Status: SHIPPED | OUTPATIENT
Start: 2018-05-23 | End: 2018-09-27 | Stop reason: SDUPTHER

## 2018-05-23 RX ORDER — LEVOTHYROXINE SODIUM 50 UG/1
50 TABLET ORAL
Qty: 90 TAB | Refills: 1 | Status: SHIPPED | OUTPATIENT
Start: 2018-05-23 | End: 2019-01-08 | Stop reason: SDUPTHER

## 2018-08-06 DIAGNOSIS — R09.81 NASAL CONGESTION: ICD-10-CM

## 2018-08-06 RX ORDER — LORATADINE 10 MG/1
10 TABLET ORAL DAILY
Qty: 30 TAB | Refills: 1 | Status: SHIPPED | OUTPATIENT
Start: 2018-08-06 | End: 2018-09-27 | Stop reason: SDUPTHER

## 2018-08-06 RX ORDER — ASCORBIC ACID 500 MG
500 TABLET ORAL DAILY
Qty: 30 TAB | Refills: 6 | Status: SHIPPED | OUTPATIENT
Start: 2018-08-06 | End: 2019-04-16 | Stop reason: SDUPTHER

## 2018-09-27 DIAGNOSIS — R09.81 NASAL CONGESTION: ICD-10-CM

## 2018-09-27 RX ORDER — LORATADINE 10 MG/1
10 TABLET ORAL DAILY
Qty: 90 TAB | Refills: 1 | Status: SHIPPED | OUTPATIENT
Start: 2018-09-27 | End: 2019-02-08 | Stop reason: SDUPTHER

## 2018-09-27 RX ORDER — LANOLIN ALCOHOL/MO/W.PET/CERES
325 CREAM (GRAM) TOPICAL
Qty: 90 TAB | Refills: 2 | Status: SHIPPED | OUTPATIENT
Start: 2018-09-27 | End: 2019-01-21 | Stop reason: SDUPTHER

## 2018-09-30 ENCOUNTER — HOSPITAL ENCOUNTER (INPATIENT)
Age: 53
LOS: 4 days | Discharge: HOME | DRG: 208 | End: 2018-10-04
Attending: EMERGENCY MEDICINE | Admitting: HOSPITALIST
Payer: MEDICARE

## 2018-09-30 ENCOUNTER — APPOINTMENT (OUTPATIENT)
Dept: GENERAL RADIOLOGY | Age: 53
DRG: 208 | End: 2018-09-30
Attending: HOSPITALIST
Payer: MEDICARE

## 2018-09-30 ENCOUNTER — APPOINTMENT (OUTPATIENT)
Dept: CT IMAGING | Age: 53
DRG: 208 | End: 2018-09-30
Attending: EMERGENCY MEDICINE
Payer: MEDICARE

## 2018-09-30 ENCOUNTER — APPOINTMENT (OUTPATIENT)
Dept: GENERAL RADIOLOGY | Age: 53
DRG: 208 | End: 2018-09-30
Attending: EMERGENCY MEDICINE
Payer: MEDICARE

## 2018-09-30 DIAGNOSIS — G40.901 STATUS EPILEPTICUS (HCC): Primary | ICD-10-CM

## 2018-09-30 DIAGNOSIS — Q90.9 DOWN SYNDROME: ICD-10-CM

## 2018-09-30 DIAGNOSIS — G40.909 RECURRENT SEIZURES (HCC): ICD-10-CM

## 2018-09-30 LAB
ALBUMIN SERPL-MCNC: 3.5 G/DL (ref 3.5–5)
ALBUMIN/GLOB SERPL: 0.5 {RATIO} (ref 1.1–2.2)
ALP SERPL-CCNC: 186 U/L (ref 45–117)
ALT SERPL-CCNC: 46 U/L (ref 12–78)
AMPHET UR QL SCN: NEGATIVE
ANION GAP BLD CALC-SCNC: 24 MMOL/L (ref 10–20)
ANION GAP SERPL CALC-SCNC: 27 MMOL/L (ref 5–15)
ARTERIAL PATENCY WRIST A: YES
AST SERPL-CCNC: 34 U/L (ref 15–37)
ATRIAL RATE: 468 BPM
BARBITURATES UR QL SCN: NEGATIVE
BASE DEFICIT BLD-SCNC: 8 MMOL/L
BASOPHILS # BLD: 0 K/UL (ref 0–0.1)
BASOPHILS NFR BLD: 0 % (ref 0–1)
BDY SITE: ABNORMAL
BENZODIAZ UR QL: POSITIVE
BILIRUB SERPL-MCNC: 0.2 MG/DL (ref 0.2–1)
BUN BLD-MCNC: 7 MG/DL (ref 9–20)
BUN SERPL-MCNC: 7 MG/DL (ref 6–20)
BUN/CREAT SERPL: 6 (ref 12–20)
CA-I BLD-MCNC: 1.08 MMOL/L (ref 1.12–1.32)
CALCIUM SERPL-MCNC: 8.9 MG/DL (ref 8.5–10.1)
CALCULATED R AXIS, ECG10: 69 DEGREES
CALCULATED T AXIS, ECG11: 68 DEGREES
CANNABINOIDS UR QL SCN: NEGATIVE
CHLORIDE BLD-SCNC: 102 MMOL/L (ref 98–107)
CHLORIDE SERPL-SCNC: 99 MMOL/L (ref 97–108)
CK MB CFR SERPL CALC: 0.7 % (ref 0–2.5)
CK MB SERPL-MCNC: 3.2 NG/ML (ref 5–25)
CK SERPL-CCNC: 445 U/L (ref 39–308)
CO2 BLD-SCNC: 17 MMOL/L (ref 21–32)
CO2 SERPL-SCNC: 14 MMOL/L (ref 21–32)
COCAINE UR QL SCN: NEGATIVE
COMMENT, HOLDF: NORMAL
CREAT BLD-MCNC: 0.8 MG/DL (ref 0.6–1.3)
CREAT SERPL-MCNC: 1.25 MG/DL (ref 0.7–1.3)
DIAGNOSIS, 93000: NORMAL
DIFFERENTIAL METHOD BLD: ABNORMAL
DRUG SCRN COMMENT,DRGCM: ABNORMAL
EOSINOPHIL # BLD: 0 K/UL (ref 0–0.4)
EOSINOPHIL NFR BLD: 0 % (ref 0–7)
ERYTHROCYTE [DISTWIDTH] IN BLOOD BY AUTOMATED COUNT: 13 % (ref 11.5–14.5)
ETHANOL SERPL-MCNC: <10 MG/DL
GAS FLOW.O2 O2 DELIVERY SYS: ABNORMAL L/MIN
GAS FLOW.O2 SETTING OXYMISER: 14 BPM
GLOBULIN SER CALC-MCNC: 6.6 G/DL (ref 2–4)
GLUCOSE BLD STRIP.AUTO-MCNC: 239 MG/DL (ref 65–100)
GLUCOSE BLD-MCNC: 237 MG/DL (ref 65–100)
GLUCOSE SERPL-MCNC: 238 MG/DL (ref 65–100)
HCO3 BLD-SCNC: 19.1 MMOL/L (ref 22–26)
HCT VFR BLD AUTO: 41.1 % (ref 36.6–50.3)
HCT VFR BLD CALC: 41 % (ref 36.6–50.3)
HGB BLD-MCNC: 13 G/DL (ref 12.1–17)
IMM GRANULOCYTES # BLD: 0 K/UL
IMM GRANULOCYTES NFR BLD AUTO: 0 %
LACTATE BLD-SCNC: >20 MMOL/L (ref 0.4–2)
LACTATE SERPL-SCNC: 22.1 MMOL/L (ref 0.4–2)
LYMPHOCYTES # BLD: 2.4 K/UL (ref 0.8–3.5)
LYMPHOCYTES NFR BLD: 26 % (ref 12–49)
MAGNESIUM SERPL-MCNC: 2.9 MG/DL (ref 1.6–2.4)
MCH RBC QN AUTO: 34.8 PG (ref 26–34)
MCHC RBC AUTO-ENTMCNC: 31.6 G/DL (ref 30–36.5)
MCV RBC AUTO: 109.9 FL (ref 80–99)
METHADONE UR QL: NEGATIVE
MONOCYTES # BLD: 1.2 K/UL (ref 0–1)
MONOCYTES NFR BLD: 13 % (ref 5–13)
NEUTS BAND NFR BLD MANUAL: 13 % (ref 0–6)
NEUTS SEG # BLD: 5.8 K/UL (ref 1.8–8)
NEUTS SEG NFR BLD: 48 % (ref 32–75)
NRBC # BLD: 0 K/UL (ref 0–0.01)
NRBC BLD-RTO: 0 PER 100 WBC
O2/TOTAL GAS SETTING VFR VENT: 100 %
OPIATES UR QL: NEGATIVE
PCO2 BLD: 43.1 MMHG (ref 35–45)
PCP UR QL: NEGATIVE
PEEP RESPIRATORY: 5 CMH2O
PH BLD: 7.25 [PH] (ref 7.35–7.45)
PHENYTOIN SERPL-MCNC: 4.9 UG/ML (ref 10–20)
PLATELET # BLD AUTO: 266 K/UL (ref 150–400)
PMV BLD AUTO: 9.3 FL (ref 8.9–12.9)
PO2 BLD: 552 MMHG (ref 80–100)
POTASSIUM BLD-SCNC: 3.9 MMOL/L (ref 3.5–5.1)
POTASSIUM SERPL-SCNC: 3.5 MMOL/L (ref 3.5–5.1)
PROT SERPL-MCNC: 10.1 G/DL (ref 6.4–8.2)
Q-T INTERVAL, ECG07: 318 MS
QRS DURATION, ECG06: 118 MS
QTC CALCULATION (BEZET), ECG08: 505 MS
RBC # BLD AUTO: 3.74 M/UL (ref 4.1–5.7)
RBC MORPH BLD: ABNORMAL
SAMPLES BEING HELD,HOLD: NORMAL
SAO2 % BLD: 100 % (ref 92–97)
SERVICE CMNT-IMP: ABNORMAL
SERVICE CMNT-IMP: ABNORMAL
SODIUM BLD-SCNC: 139 MMOL/L (ref 136–145)
SODIUM SERPL-SCNC: 140 MMOL/L (ref 136–145)
SPECIMEN TYPE: ABNORMAL
TROPONIN I SERPL-MCNC: <0.05 NG/ML
VENTILATION MODE VENT: ABNORMAL
VENTRICULAR RATE, ECG03: 152 BPM
VOLUME CONTROL IVLC: YES
VT SETTING VENT: 450 ML
WBC # BLD AUTO: 9.4 K/UL (ref 4.1–11.1)
WBC MORPH BLD: ABNORMAL

## 2018-09-30 PROCEDURE — 36415 COLL VENOUS BLD VENIPUNCTURE: CPT | Performed by: EMERGENCY MEDICINE

## 2018-09-30 PROCEDURE — 80053 COMPREHEN METABOLIC PANEL: CPT | Performed by: EMERGENCY MEDICINE

## 2018-09-30 PROCEDURE — 82962 GLUCOSE BLOOD TEST: CPT

## 2018-09-30 PROCEDURE — 65610000006 HC RM INTENSIVE CARE

## 2018-09-30 PROCEDURE — 74011250636 HC RX REV CODE- 250/636

## 2018-09-30 PROCEDURE — 0BH17EZ INSERTION OF ENDOTRACHEAL AIRWAY INTO TRACHEA, VIA NATURAL OR ARTIFICIAL OPENING: ICD-10-PCS | Performed by: EMERGENCY MEDICINE

## 2018-09-30 PROCEDURE — 93005 ELECTROCARDIOGRAM TRACING: CPT

## 2018-09-30 PROCEDURE — 31500 INSERT EMERGENCY AIRWAY: CPT

## 2018-09-30 PROCEDURE — 51702 INSERT TEMP BLADDER CATH: CPT

## 2018-09-30 PROCEDURE — 80047 BASIC METABLC PNL IONIZED CA: CPT

## 2018-09-30 PROCEDURE — 83735 ASSAY OF MAGNESIUM: CPT | Performed by: EMERGENCY MEDICINE

## 2018-09-30 PROCEDURE — 80307 DRUG TEST PRSMV CHEM ANLYZR: CPT | Performed by: EMERGENCY MEDICINE

## 2018-09-30 PROCEDURE — 74011000250 HC RX REV CODE- 250: Performed by: HOSPITALIST

## 2018-09-30 PROCEDURE — 70450 CT HEAD/BRAIN W/O DYE: CPT

## 2018-09-30 PROCEDURE — 96365 THER/PROPH/DIAG IV INF INIT: CPT

## 2018-09-30 PROCEDURE — 74018 RADEX ABDOMEN 1 VIEW: CPT

## 2018-09-30 PROCEDURE — 74011250637 HC RX REV CODE- 250/637: Performed by: INTERNAL MEDICINE

## 2018-09-30 PROCEDURE — 80185 ASSAY OF PHENYTOIN TOTAL: CPT | Performed by: EMERGENCY MEDICINE

## 2018-09-30 PROCEDURE — 36600 WITHDRAWAL OF ARTERIAL BLOOD: CPT

## 2018-09-30 PROCEDURE — 83605 ASSAY OF LACTIC ACID: CPT

## 2018-09-30 PROCEDURE — 82550 ASSAY OF CK (CPK): CPT | Performed by: EMERGENCY MEDICINE

## 2018-09-30 PROCEDURE — 74011250636 HC RX REV CODE- 250/636: Performed by: EMERGENCY MEDICINE

## 2018-09-30 PROCEDURE — 74011250637 HC RX REV CODE- 250/637: Performed by: HOSPITALIST

## 2018-09-30 PROCEDURE — 84484 ASSAY OF TROPONIN QUANT: CPT | Performed by: EMERGENCY MEDICINE

## 2018-09-30 PROCEDURE — 95816 EEG AWAKE AND DROWSY: CPT | Performed by: EMERGENCY MEDICINE

## 2018-09-30 PROCEDURE — 83605 ASSAY OF LACTIC ACID: CPT | Performed by: EMERGENCY MEDICINE

## 2018-09-30 PROCEDURE — 74011250636 HC RX REV CODE- 250/636: Performed by: INTERNAL MEDICINE

## 2018-09-30 PROCEDURE — 74011250636 HC RX REV CODE- 250/636: Performed by: HOSPITALIST

## 2018-09-30 PROCEDURE — 99285 EMERGENCY DEPT VISIT HI MDM: CPT

## 2018-09-30 PROCEDURE — 5A1945Z RESPIRATORY VENTILATION, 24-96 CONSECUTIVE HOURS: ICD-10-PCS | Performed by: EMERGENCY MEDICINE

## 2018-09-30 PROCEDURE — 74011000258 HC RX REV CODE- 258: Performed by: EMERGENCY MEDICINE

## 2018-09-30 PROCEDURE — 80177 DRUG SCRN QUAN LEVETIRACETAM: CPT | Performed by: EMERGENCY MEDICINE

## 2018-09-30 PROCEDURE — 82803 BLOOD GASES ANY COMBINATION: CPT

## 2018-09-30 PROCEDURE — 77030005514 HC CATH URETH FOL14 BARD -A

## 2018-09-30 PROCEDURE — 85025 COMPLETE CBC W/AUTO DIFF WBC: CPT | Performed by: EMERGENCY MEDICINE

## 2018-09-30 PROCEDURE — 77030008683 HC TU ET CUF COVD -A

## 2018-09-30 PROCEDURE — 71045 X-RAY EXAM CHEST 1 VIEW: CPT

## 2018-09-30 PROCEDURE — 74011000258 HC RX REV CODE- 258: Performed by: HOSPITALIST

## 2018-09-30 RX ORDER — SODIUM CHLORIDE 0.9 % (FLUSH) 0.9 %
5-10 SYRINGE (ML) INJECTION EVERY 8 HOURS
Status: DISCONTINUED | OUTPATIENT
Start: 2018-09-30 | End: 2018-10-04 | Stop reason: HOSPADM

## 2018-09-30 RX ORDER — ENOXAPARIN SODIUM 100 MG/ML
40 INJECTION SUBCUTANEOUS EVERY 24 HOURS
Status: DISCONTINUED | OUTPATIENT
Start: 2018-09-30 | End: 2018-10-04 | Stop reason: HOSPADM

## 2018-09-30 RX ORDER — PROPOFOL 10 MG/ML
0-50 VIAL (ML) INTRAVENOUS
Status: DISCONTINUED | OUTPATIENT
Start: 2018-09-30 | End: 2018-10-02

## 2018-09-30 RX ORDER — LACOSAMIDE 50 MG/1
100 TABLET ORAL 2 TIMES DAILY
Status: DISCONTINUED | OUTPATIENT
Start: 2018-09-30 | End: 2018-09-30

## 2018-09-30 RX ORDER — PROPOFOL 10 MG/ML
INJECTION, EMULSION INTRAVENOUS
Status: COMPLETED
Start: 2018-09-30 | End: 2018-09-30

## 2018-09-30 RX ORDER — LORAZEPAM 2 MG/ML
2 INJECTION INTRAMUSCULAR
Status: DISCONTINUED | OUTPATIENT
Start: 2018-09-30 | End: 2018-09-30

## 2018-09-30 RX ORDER — SODIUM CHLORIDE 0.9 % (FLUSH) 0.9 %
5-10 SYRINGE (ML) INJECTION AS NEEDED
Status: DISCONTINUED | OUTPATIENT
Start: 2018-09-30 | End: 2018-10-04 | Stop reason: HOSPADM

## 2018-09-30 RX ORDER — PHENYLEPHRINE HCL IN 0.9% NACL 30MG/250ML
10-300 PLASTIC BAG, INJECTION (ML) INTRAVENOUS
Status: DISCONTINUED | OUTPATIENT
Start: 2018-10-01 | End: 2018-10-02

## 2018-09-30 RX ORDER — LANOLIN ALCOHOL/MO/W.PET/CERES
325 CREAM (GRAM) TOPICAL
Status: DISCONTINUED | OUTPATIENT
Start: 2018-10-01 | End: 2018-10-04 | Stop reason: HOSPADM

## 2018-09-30 RX ORDER — LORAZEPAM 2 MG/ML
1 INJECTION INTRAMUSCULAR
Status: DISCONTINUED | OUTPATIENT
Start: 2018-09-30 | End: 2018-10-04 | Stop reason: HOSPADM

## 2018-09-30 RX ORDER — LEVOFLOXACIN 5 MG/ML
750 INJECTION, SOLUTION INTRAVENOUS ONCE
Status: COMPLETED | OUTPATIENT
Start: 2018-09-30 | End: 2018-10-01

## 2018-09-30 RX ORDER — LORATADINE 10 MG/1
10 TABLET ORAL DAILY
Status: DISCONTINUED | OUTPATIENT
Start: 2018-10-01 | End: 2018-10-04 | Stop reason: HOSPADM

## 2018-09-30 RX ORDER — PHENYTOIN 125 MG/5ML
100 SUSPENSION ORAL 2 TIMES DAILY
Status: DISCONTINUED | OUTPATIENT
Start: 2018-09-30 | End: 2018-10-01 | Stop reason: SDUPTHER

## 2018-09-30 RX ORDER — LORAZEPAM 2 MG/ML
INJECTION INTRAMUSCULAR
Status: COMPLETED
Start: 2018-09-30 | End: 2018-09-30

## 2018-09-30 RX ORDER — LORAZEPAM 2 MG/ML
2 INJECTION INTRAMUSCULAR ONCE
Status: COMPLETED | OUTPATIENT
Start: 2018-09-30 | End: 2018-09-30

## 2018-09-30 RX ORDER — ASCORBIC ACID 500 MG
500 TABLET ORAL DAILY
Status: DISCONTINUED | OUTPATIENT
Start: 2018-10-01 | End: 2018-10-04 | Stop reason: HOSPADM

## 2018-09-30 RX ORDER — LEVOTHYROXINE SODIUM 50 UG/1
50 TABLET ORAL
Status: DISCONTINUED | OUTPATIENT
Start: 2018-10-01 | End: 2018-09-30

## 2018-09-30 RX ADMIN — LORAZEPAM 2 MG: 2 INJECTION INTRAMUSCULAR at 12:25

## 2018-09-30 RX ADMIN — SODIUM CHLORIDE 500 ML: 900 INJECTION, SOLUTION INTRAVENOUS at 23:00

## 2018-09-30 RX ADMIN — PROPOFOL 40 MCG/KG/MIN: 10 INJECTION, EMULSION INTRAVENOUS at 20:41

## 2018-09-30 RX ADMIN — SODIUM BICARBONATE: 84 INJECTION, SOLUTION INTRAVENOUS at 16:35

## 2018-09-30 RX ADMIN — ACETAMINOPHEN 650 MG: 650 SOLUTION ORAL at 22:42

## 2018-09-30 RX ADMIN — PROPOFOL 10 MCG/KG/MIN: 10 INJECTION, EMULSION INTRAVENOUS at 12:40

## 2018-09-30 RX ADMIN — PHENYTOIN 100 MG: 125 SUSPENSION ORAL at 20:19

## 2018-09-30 RX ADMIN — SODIUM CHLORIDE 750 MG PE: 900 INJECTION, SOLUTION INTRAVENOUS at 14:14

## 2018-09-30 RX ADMIN — ENOXAPARIN SODIUM 40 MG: 40 INJECTION, SOLUTION INTRAVENOUS; SUBCUTANEOUS at 15:17

## 2018-09-30 RX ADMIN — PIPERACILLIN SODIUM,TAZOBACTAM SODIUM 3.38 G: 3; .375 INJECTION, POWDER, FOR SOLUTION INTRAVENOUS at 22:16

## 2018-09-30 RX ADMIN — PIPERACILLIN SODIUM,TAZOBACTAM SODIUM 3.38 G: 3; .375 INJECTION, POWDER, FOR SOLUTION INTRAVENOUS at 15:17

## 2018-09-30 RX ADMIN — LORAZEPAM 2 MG: 2 INJECTION INTRAMUSCULAR; INTRAVENOUS at 12:25

## 2018-09-30 RX ADMIN — Medication 10 ML: at 14:00

## 2018-09-30 RX ADMIN — PROPOFOL 40 MCG/KG/MIN: 10 INJECTION, EMULSION INTRAVENOUS at 20:37

## 2018-09-30 RX ADMIN — Medication 10 ML: at 22:00

## 2018-09-30 RX ADMIN — SODIUM CHLORIDE 1000 ML: 900 INJECTION, SOLUTION INTRAVENOUS at 13:43

## 2018-09-30 NOTE — ED PROVIDER NOTES
HPI Comments: 48 y.o. male with extensive past medical history, please see list, significant for epilepsy, seizures, dementia, Down Syndrome, cerebral palsy, hypothyroid, anemia, anxiety, who presents via EMS from group home to the ED with cc of seizures. Pt presented in status epilepticus upon arrival to ED. EMS reports pt started seizing at approximately 11:20AM, and they gave him 5mg Versed IM en route without improvement. In ED, after IV access obtained and 2mg Ativan received, pt continued to actively seize, at which point pt was given RSI with Etomidate and Rocuronium and was subsequently intubated orally; seizures ceased after these treatments, totaling ~1 hour of continuous seizing except for a brief \"slow-down\" of seizure activity en route per EMS. Per family, pt appeared normal at baseline today prior to seizures, stating he was engaging in his usual activities such as stacking blocks. Family states pt has been admitted in the past for seizures, and his last seizure was ~6 months ago in March. Family states pt has a VNS in place in his left chest, which was placed to reduce the high amount of anti-convulsant medications he was on; last anti-convulsant medication reduction was in February per family. Family notes pt is nonverbal at baseline. Full history, physical exam, and ROS unable to be obtained due to:  Condition (actively seizing), dementia and nonverbal at baseline. Social Hx: Never Tobacco use, denies EtOH use, denies Illicit Drug use PCP: Favio Vanegas MD 
Neurologist: Charlies Carrel Deanne Burrows, MD 
 
Note written by Jose F Crouch, as dictated by Iris Richter MD 12:12 PM 
 
 
The history is provided by the EMS personnel and a relative. No  was used. Past Medical History:  
Diagnosis Date  Anemia  Anxiety  Blindness of left eye  Cerebral palsy ([facility] [address]  DEMENTIA  Down syndrome  Endocrine disease   
 thyroid disorder  GERD (gastroesophageal reflux disease)  Ill-defined condition   
 blind in left eye  Ill-defined condition   
 imparied gait - uses walker  Ill-defined condition   
 dermatitis  Ill-defined condition   
 cerebral palsy  Ill-defined condition   
 prone to decubitus ulcers  Ill-defined condition   
 anemia  Neurological disorder  Other ill-defined conditions(799.89) Down syndrome  Psychiatric disorder   
 mental retardation  Psychiatric disorder   
 anxiety disorder  Seizures ([facility] [address]  Sleep apnea  Thyroid disease  Unspecified epilepsy without mention of intractable epilepsy Past Surgical History:  
Procedure Laterality Date  HX OTHER SURGICAL  03/13/2015 VNS  Dr. Jordan Almendarez. [facility]  @ [id number] Family History:  
Problem Relation Age of Onset  Hypertension Mother  Cancer Mother  Lupus Mother Narvis Smith Arthritis-osteo Mother  Heart Disease Mother  Heart Disease Father  Diabetes Father  Hypertension Father  Elevated Lipids Father  Diabetes Brother  Elevated Lipids Brother  Hypertension Brother  Mental Retardation Brother  Cancer Maternal Grandmother  Arthritis-osteo Maternal Grandmother  Alcohol abuse Maternal Grandfather  Cancer Maternal Grandfather  Arthritis-osteo Paternal Grandmother  Cancer Paternal Grandfather Social History Social History  Marital status: SINGLE Spouse name: N/A  
 Number of children: N/A  
 Years of education: N/A Occupational History  Not on file. Social History Main Topics  Smoking status: Never Smoker  Smokeless tobacco: Never Used  Alcohol use No  
 Drug use: No  
 Sexual activity: Not Currently Other Topics Concern  Not on file Social History Narrative ALLERGIES: Morphine and Tegretol [carbamazepine] Review of Systems Unable to perform ROS: Acuity of condition (also dementia and nonverbal at baseline) Neurological: Positive for seizures. Vitals:  
 09/30/18 1215 09/30/18 1235 09/30/18 1245 BP: (!) 229/121 122/60 116/69 Pulse: (!) 150 (!) 134 (!) 122 Resp: (!) 59 26 15 SpO2: 98% 98% Weight: 64.8 kg (142 lb 14.4 oz) Physical Exam  
Constitutional: He appears well-developed and well-nourished. No distress. Appears to be actively seizing HENT:  
Intraoral exam: dried blood from laceration to R side of tongue Eyes: Both eyes deviated to the left. Pacified blind left eye; right pupil 3 mm, minimally reactive Neck: Normal range of motion. Cardiovascular: Regular rhythm. Tachycardia present. No murmur heard. Pulmonary/Chest: Effort normal and breath sounds normal. No respiratory distress. Abdominal: Soft. He exhibits distension (slightly). There is no tenderness. Musculoskeletal: Normal range of motion. He exhibits no edema. Neurological: He has normal strength. Seizure activity in all 4 extremities Skin: Skin is warm and dry. Psychiatric: He has a normal mood and affect. His behavior is normal.  
Nursing note and vitals reviewed. Note written by Jose F Haddad, as dictated by Beryl Altamirano MD 12:12 PM 
 
MDM Number of Diagnoses or Management Options Status epilepticus ([facility] [address]): Amount and/or Complexity of Data Reviewed Clinical lab tests: ordered and reviewed Obtain history from someone other than the patient: yes Review and summarize past medical records: yes Discuss the patient with other providers: yes Independent visualization of images, tracings, or specimens: yes Risk of Complications, Morbidity, and/or Mortality Presenting problems: high Diagnostic procedures: high Management options: high Critical Care Total time providing critical care:  minutes ED Course Intubation Date/Time: 9/30/2018 12:32 PM 
Performed by: Leana Mortimer Authorized by: Leana Mortimer Consent:  
  Consent obtained:  Emergent situation Pre-procedure details:  
  Patient status:  Unresponsive Mallampati score:  III Pretreatment meds: etomidate. Paralytics:  Rocuronium Procedure details:  
  Preoxygenation:  Nonrebreather mask Intubation method:  Oral 
  Laryngoscope blade:  Andrade 3 Tube size (mm):  7.5 Tube type:  Cuffed Number of attempts:  1 Ventilation between attempts: no   
  Cricoid pressure: no   
  Tube visualized through cords: yes Placement assessment:  
  Tube secured with:  ETT flynn Breath sounds:  Equal 
  Placement verification: chest rise and ETCO2 detector CXR findings:  ETT in proper place Post-procedure details:  
  Patient tolerance of procedure: Tolerated well, no immediate complications ED EKG interpretation: 
SVT, rate 152, tremendous amount of artifact but no ST changes. Note written by Jose F Mccauley, as dictated by Ada Siddiqui MD 12:45 PM 
 
CONSULT NOTE: 
1:20 PM Ada Siddiqui MD spoke with Dr. Navarro Almonte, Consult for Hospitalist.  Discussed available diagnostic tests and clinical findings. Dr. Navarro Almonte will admit pt. CONSULT NOTE: 
1:40 PM Ada Siddiqui MD spoke with Dr. Dorita Montoya, Consult for Neurology. Discussed available diagnostic tests and clinical findings. Dr. Dorita Montoya recommends stat EEG to make sure he is not still seizing.

## 2018-09-30 NOTE — ROUTINE PROCESS
TRANSFER - OUT REPORT: 
 
Verbal report given to JACOB Horne (name) on Miroslava Weber  being transferred to ICU (unit) for routine progression of care Report consisted of patients Situation, Background, Assessment and  
Recommendations(SBAR). Information from the following report(s) SBAR, ED Summary, [facility] and Recent Results was reviewed with the receiving nurse. Lines:  
Peripheral IV 09/30/18 Left Antecubital (Active) Site Assessment Clean, dry, & intact 9/30/2018 12:37 PM  
Phlebitis Assessment 0 9/30/2018 12:37 PM  
Infiltration Assessment 0 9/30/2018 12:37 PM  
Dressing Status Clean, dry, & intact 9/30/2018 12:37 PM  
Dressing Type Transparent 9/30/2018 12:37 PM  
Hub Color/Line Status Pink;Flushed;Patent 9/30/2018 12:37 PM  
Action Taken Blood drawn 9/30/2018 12:37 PM  
   
Peripheral IV 09/30/18 Right Arm (Active) Site Assessment Clean, dry, & intact 9/30/2018 12:38 PM  
Phlebitis Assessment 0 9/30/2018 12:38 PM  
Infiltration Assessment 0 9/30/2018 12:38 PM  
Dressing Status Clean, dry, & intact 9/30/2018 12:38 PM  
  
 
Opportunity for questions and clarification was provided. Patient transported with: 
 Monitor Registered Nurse Tech

## 2018-09-30 NOTE — H&P
Dictation ID :[id number] 1. Status epilepticus 2. JOHN PAUL 3. Acidosis Mary Patel MD FACP Adult Hospitalist

## 2018-09-30 NOTE — ED NOTES
1242: Pt not adequately sedated,  verbal order 30mg Jaiden.  
 
1242: 30mg jaiden pushed and flushed 1242: Propofol drip started at 10mcg/kg/min (3.9ml/hour)

## 2018-09-30 NOTE — ED TRIAGE NOTES
Pt to ED for c/o uncontrolled seizures for approx 30 minutes Pt given 5mg Versed by EMS. Has vagus nerve stimulator to rib on chest. . /50 during seizure. Pt presents with gasping respirations, non rebreather in place, blood in mask from possible tongue lac.

## 2018-09-30 NOTE — IP AVS SNAPSHOT
[address] [facility] [address] [place] [address] 
[phone] Patient: Adrianne Erazo MRN: [id number] :[id number] About your hospitalization You were admitted on:  2018 You last received care in the:  [facility] 6S NEURO-SCI TELE You were discharged on:  2018 Why you were hospitalized Your primary diagnosis was:  Seizure (Hcc) Follow-up Information Follow up With Details Comments Contact Info Holli Wallace MD On 10/9/2018 Hospital PCP f/u appointment on Tuesday 10/9 @ 11:30 a.m. [address] [place] [address] 
[phone] Ragini Lopez NP On 10/15/2018 check for SAVITA, alternatives to CPAP mask if not tolerated. Follow-up appointment on Monday 10/15 @ 11:00 a.m. Arrive 15 minutes prior to appointment. Please bring Photo ID,insurance card, medications. [id number] [id number] 
[phone] Tamara Keita MD In 1 month  [address] [place] [address] [address] [place] [address] 
[phone] Your Scheduled Appointments 2018 11:30 AM EDT TRANSITIONAL CARE MANAGEMENT with Holli Wallace MD  
[facility] [address]. ([facility]) 800 [facility] 57  
[phone] 2018  2:00 PM EST New Patient with Shala Mo MD  
[address] [place] [address] ([facility]) [address] [phone]  
[phone] Discharge Orders None A check juan ramon indicates which time of day the medication should be taken. My Medications START taking these medications Instructions Each Dose to Equal  
 Morning Noon Evening Bedtime  
 amoxicillin-clavulanate 875-125 mg per tablet Commonly known as:  AUGMENTIN Your last dose was: Your next dose is: Take 1 Tab by mouth two (2) times a day. 1 Tab  
    
   
   
   
  
 multivit-folic acid-herbal 495 107 mcg-200 mg/30 mL Liqd oral liquid Commonly known as:  Serenity Mireles Replaces:  GERITOL TONIC WITH FERREX 18 2.5 mg-50 mg-18 iron/15 mL Liqd Your last dose was: Your next dose is: Take 30 mL by mouth daily. 30 mL CHANGE how you take these medications Instructions Each Dose to Equal  
 Morning Noon Evening Bedtime  
 lacosamide 100 mg Tab tablet Commonly known as:  VIMPAT What changed:  how much to take Your last dose was: Your next dose is: Take 1.5 Tabs by mouth two (2) times a day. Max Daily Amount: 300 mg.  
 150 mg CONTINUE taking these medications Instructions Each Dose to Equal  
 Morning Noon Evening Bedtime  
 ascorbic acid (vitamin C) 500 mg tablet Commonly known as:  VITAMIN C Your last dose was: Your next dose is: Take 1 Tab by mouth daily. 500 mg  
    
   
   
   
  
 cholecalciferol 1,000 unit tablet Commonly known as:  VITAMIN D3 Your last dose was: Your next dose is: Take 1 Tab by mouth daily. 1000 Units DEEP SEA NASAL 0.65 % nasal squeeze bottle Generic drug:  sodium chloride Your last dose was: Your next dose is:    
   
   
 1 Spray as needed for Congestion. 1 Spray  
    
   
   
   
  
 ferrous sulfate 325 mg (65 mg iron) tablet Your last dose was: Your next dose is: Take 1 Tab by mouth Daily (before breakfast). 325 mg  
    
   
   
   
  
 fluticasone 50 mcg/actuation nasal spray Commonly known as:  Shivani Plater Your last dose was: Your next dose is: 2 Sprays by Both Nostrils route daily. 2 [place] HYDROcodone-acetaminophen 0.5-21.7 mg/mL oral solution Commonly known as:  HYCET Your last dose was: Your next dose is: Take 10 mL by mouth four (4) times daily as needed for Pain. 10 mL  
    
   
   
   
  
 levETIRAcetam 100 mg/mL solution Commonly known as:  KEPPRA Your last dose was: Your next dose is:    
   
   
 15ml by mouth twice a day. levothyroxine 50 mcg tablet Commonly known as:  SYNTHROID Your last dose was: Your next dose is: Take 1 Tab by mouth Daily (before breakfast). 50 mcg  
    
   
   
   
  
 loratadine 10 mg tablet Commonly known as:  Laura Stajameser Your last dose was: Your next dose is: Take 1 Tab by mouth daily. 10 mg  
    
   
   
   
  
 phenytoin suspension Commonly known as:  [id number]-220 Your last dose was: Your next dose is: Take 4 mL by mouth two (2) times a day. 100 mg  
    
   
   
   
  
  
STOP taking these medications GERITOL TONIC WITH FERREX 18 2.5 mg-50 mg-18 iron/15 mL Liqd Generic drug:  I3-N4-U9-B5-B6-iron-met-choln Replaced by:  multivit-folic acid-herbal 889 739 mcg-200 mg/30 mL Liqd oral liquid Where to Get Your Medications These medications were sent to [address] [place] [address] Phone:  [phone]  
  multivit-folic acid-herbal 103 834 mcg-200 mg/30 mL Liqd oral liquid Information on where to get these meds will be given to you by the nurse or doctor. ! Ask your nurse or doctor about these medications  
  amoxicillin-clavulanate 875-125 mg per tablet  
 lacosamide 100 mg Tab tablet Opioid Education Prescription Opioids: What You Need to Know: 
 
 
ATTENDING PHYSICIAN: Karolina Gonzalez MD 
DISCHARGING PROVIDER: Karolina Gonzalez MD   
To contact this individual call [phone] and ask the  to page. If unavailable ask to be transferred the Adult Hospitalist Department. DISCHARGE DIAGNOSES  Status epilepticus and aspiration pneumonia CONSULTATIONS: IP CONSULT TO NEUROLOGY 
IP CONSULT TO INTENSIVIST 
IP CONSULT TO HOSPITALIST 
 
PROCEDURES/SURGERIES: * No surgery found * FOLLOW UP APPOINTMENTS:  
Follow-up Information Follow up With Details Comments Contact Info Ravindra Gayle MD   [address] [place] [address] 
[phone] [facility] [address] [place] [address] In 1 month check for SVAITA, alternatives to CPAP mask if not tolerated. [address] [place] [address] [address] [place] [address] 
[phone] ADDITIONAL CARE RECOMMENDATIONS: follow up with Pulmonary team for reassessment for SAVITA, and treatment. DIET: Resume previous diet DISCHARGE MEDICATIONS:  2 more days of Augmentin to finish course, adjustable bed · It is important that you take the medication exactly as they are prescribed. · Keep your medication in the bottles provided by the pharmacist and keep a list of the medication names, dosages, and times to be taken in your wallet. · Do not take other medications without consulting your doctor. NOTIFY YOUR PHYSICIAN FOR ANY OF THE FOLLOWING:  
Fever over 101 degrees for 24 hours. Chest pain, shortness of breath, fever, chills, nausea, vomiting, diarrhea, change in mentation, falling, weakness, bleeding.  Severe pain or pain not relieved by medications. Or, any other signs or symptoms that you may have questions about. It was our pleasure to help take care of you and we hope you get well very soon. DISPOSITION: 
  Home With: 
 OT  PT  [facility]  RN  
  
 SNF/Inpatient Rehab/LTAC Independent/assisted living Hospice  
x Other: Group Home CDMP Checked:  
Yes x PROBLEM LIST Updated: 
Yes x Signed:  
Sue Aburto MD 
10/4/2018 
9:04 AM 
 
ACO Transitions of Care [facility] offers a voluntary care coordination program to provide high quality service and care to [facility] fee-for-service beneficiaries. Em Ramey was designed to help you enhance your health and well-being through the following services: ? Transitions of Care  support for individuals who are transitioning from one care setting to another (example: Hospital to home). ? Chronic and Complex Care Coordination  support for individuals and caregivers of those with serious or chronic illnesses or with more than one chronic (ongoing) condition and those who take a number of different medications. If you meet specific medical criteria, a [address] [place] [address] Rd may call you directly to coordinate your care with your primary care physician and your other care providers. For questions about the [facility] programs, please, contact your physicians office. For general questions or additional information about Accountable Care Organizations: 
Please visit www.medicare.gov/acos. html or call 1-800-MEDICARE (8-136.177.8220) TTY users should call 1-353.409.3390. Aiming Announcement We are excited to announce that we are making your provider's discharge notes available to you in Aiming.   You will see these notes when they are completed and signed by the physician that discharged you from your recent hospital stay. If you have any questions or concerns about any information you see in LivePerson, please call the Health Information Department where you were seen or reach out to your Primary Care Provider for more information about your plan of care. Introducing [facility] HEALTH SERVICES! [facility] introduces LivePerson patient portal. Now you can access parts of your medical record, email your doctor's office, and request medication refills online. 1. In your internet browser, go to [email or web address] 2. Click on the First Time User? Click Here link in the Sign In box. You will see the New Member Sign Up page. 3. Enter your LivePerson Access Code exactly as it appears below. You will not need to use this code after youve completed the sign-up process. If you do not sign up before the expiration date, you must request a new code. · LivePerson Access Code: [email or web address] Expires: 12/29/2018 12:14 PM 
 
4. Enter the last four digits of your Social Security Number (xxxx) and Date of Birth (mm/dd/yyyy) as indicated and click Submit. You will be taken to the next sign-up page. 5. Create a LivePerson ID. This will be your LivePerson login ID and cannot be changed, so think of one that is secure and easy to remember. 6. Create a LivePerson password. You can change your password at any time. 7. Enter your Password Reset Question and Answer. This can be used at a later time if you forget your password. 8. Enter your e-mail address. You will receive e-mail notification when new information is available in [address] 9. Click Sign Up. You can now view and download portions of your medical record. 10. Click the Download Summary menu link to download a portable copy of your medical information. If you have questions, please visit the Frequently Asked Questions section of the LivePerson website. Remember, LivePerson is NOT to be used for urgent needs. For medical emergencies, dial 911. Now available from your iPhone and Android! Introducing Mina Cano As a [employer] patient, I wanted to make you aware of our electronic visit tool called Mina Cano. [employer] allows you to connect within minutes with a medical provider 24 hours a day, seven days a week via a mobile device or tablet or logging into a secure website from your computer. You can access Mina Cano from anywhere in the [place]. A virtual visit might be right for you when you have a simple condition and feel like you just dont want to get out of bed, or cant get away from work for an appointment, when your regular [employer] provider is not available (evenings, weekends or holidays), or when youre out of town and need minor care. Electronic visits cost only $49 and if the [employer] provider determines a prescription is needed to treat your condition, one can be electronically transmitted to a nearby pharmacy*. Please take a moment to enroll today if you have not already done so. The enrollment process is free and takes just a few minutes. To enroll, please download the [employer] abi to your tablet or phone, or visit [email or web address] to enroll on your computer. And, as an [address] [place] [address] patient with a [employer] account, the results of your visits will be scanned into your electronic medical record and your primary care provider will be able to view the scanned results. We urge you to continue to see your regular [employer] provider for your ongoing medical care. And while your primary care provider may not be the one available when you seek a Mina Cano virtual visit, the peace of mind you get from getting a real diagnosis real time can be priceless. For more information on Mina Cano, view our Frequently Asked Questions (FAQs) at [email or web address]. Sincerely, 
 
So Jiménez MD 
Chief Medical Officer Gabriela8 Liza José *:  certain medications cannot be prescribed via Mina Cano Unresulted Labs-Please follow up with your PCP about these lab tests Order Current Status CULTURE, BLOOD, PAIRED Preliminary result Providers Seen During Your Hospitalization Provider Specialty Primary office phone Funmilayo Redmond MD Emergency Medicine [phone] Jose Martin Leija MD Internal Medicine [phone] Akshat Zayas MD Internal Medicine [phone] Bonney Bernheim, MD Internal Medicine [phone] Immunizations Administered for This Admission Name Date Influenza Vaccine (Quad) PF 10/4/2018 Your Primary Care Physician (PCP) Primary Care Physician Office Phone Office Fax [address] [phone] You are allergic to the following Allergen Reactions Morphine Not Reported This Time Tegretol (Carbamazepine) Other (comments) \"bad reaction\" \"changes attitude\" Recent Documentation Height Weight BMI Smoking Status 1.6 m 70.5 kg 27.53 kg/m2 Never Smoker Emergency Contacts Name Discharge Info Relation Home Work Mobile Aniyah Gordillo DISCHARGE CAREGIVER [3] Other Relative [6] [phone] [facility] DISCHARGE CAREGIVER [3] Brother [24] [phone] Staci Sanchez DISCHARGE CAREGIVER [3] Other Relative [6] [phone] Patient Belongings The following personal items are in your possession at time of discharge: 
  Dental Appliances: None  Visual Aid: None      Home Medications: None   Jewelry: None  Clothing: Footwear    Other Valuables: None Please provide this summary of care documentation to your next provider. Signatures-by signing, you are acknowledging that this After Visit Summary has been reviewed with you and you have received a copy.   
  
 
  
    
    
 Patient Signature: ____________________________________________________________ Date:  ____________________________________________________________  
  
Jonny Frohlich Provider Signature:  ____________________________________________________________ Date:  ____________________________________________________________

## 2018-09-30 NOTE — PROGRESS NOTES
Pt received from ED on vent. Diprivan, bicarb infusing. No seizure activity. Pt baseline nonverbal, smiles, makes sounds. Ambulates w/ walker. Lives in group home, [facility], will close supervision. Pt vagus nerve stimulator in chest that may not be functioning. Family/caregivers updated and given patient code. 7307: Dr. Rosette Hood called, EEG negative. She will round in AM.

## 2018-09-30 NOTE — IP AVS SNAPSHOT
[address] [facility] [address] [place] [address] 
[phone] Patient: Kecia Rivera MRN: [id number] :1965 A check juan ramon indicates which time of day the medication should be taken. My Medications START taking these medications Instructions Each Dose to Equal  
 Morning Noon Evening Bedtime  
 amoxicillin-clavulanate 875-125 mg per tablet Commonly known as:  AUGMENTIN Your last dose was: Your next dose is: Take 1 Tab by mouth two (2) times a day. 1 Tab  
    
   
   
   
  
 multivit-folic acid-herbal 293 040 mcg-200 mg/30 mL Liqd oral liquid Commonly known as:  [facility] Replaces:  GERITOL TONIC WITH FERREX 18 2.5 mg-50 mg-18 iron/15 mL Liqd Your last dose was: Your next dose is: Take 30 mL by mouth daily. 30 mL CHANGE how you take these medications Instructions Each Dose to Equal  
 Morning Noon Evening Bedtime  
 lacosamide 100 mg Tab tablet Commonly known as:  VIMPAT What changed:  how much to take Your last dose was: Your next dose is: Take 1.5 Tabs by mouth two (2) times a day. Max Daily Amount: 300 mg.  
 150 mg CONTINUE taking these medications Instructions Each Dose to Equal  
 Morning Noon Evening Bedtime  
 ascorbic acid (vitamin C) 500 mg tablet Commonly known as:  VITAMIN C Your last dose was: Your next dose is: Take 1 Tab by mouth daily. 500 mg  
    
   
   
   
  
 cholecalciferol 1,000 unit tablet Commonly known as:  VITAMIN D3 Your last dose was: Your next dose is: Take 1 Tab by mouth daily. 1000 Units DEEP SEA NASAL 0.65 % nasal squeeze bottle Generic drug:  sodium chloride Your last dose was: Your next dose is:    
   
   
 1 Spray as needed for Congestion. 1 Spray  
    
   
   
   
  
 ferrous sulfate 325 mg (65 mg iron) tablet Your last dose was: Your next dose is: Take 1 Tab by mouth Daily (before breakfast). 325 mg  
    
   
   
   
  
 fluticasone 50 mcg/actuation nasal spray Commonly known as:  Nnea Perez Your last dose was: Your next dose is: 2 Sprays by Both Nostrils route daily. 2 [place] HYDROcodone-acetaminophen 0.5-21.7 mg/mL oral solution Commonly known as:  HYCET Your last dose was: Your next dose is: Take 10 mL by mouth four (4) times daily as needed for Pain. 10 mL  
    
   
   
   
  
 levETIRAcetam 100 mg/mL solution Commonly known as:  KEPPRA Your last dose was: Your next dose is:    
   
   
 15ml by mouth twice a day. levothyroxine 50 mcg tablet Commonly known as:  SYNTHROID Your last dose was: Your next dose is: Take 1 Tab by mouth Daily (before breakfast). 50 mcg  
    
   
   
   
  
 loratadine 10 mg tablet Commonly known as:  Karoline Contreras Your last dose was: Your next dose is: Take 1 Tab by mouth daily. 10 mg  
    
   
   
   
  
 phenytoin suspension Commonly known as:  [id number] Your last dose was: Your next dose is: Take 4 mL by mouth two (2) times a day. 100 mg  
    
   
   
   
  
  
STOP taking these medications GERITOL TONIC WITH FERREX 18 2.5 mg-50 mg-18 iron/15 mL Liqd Generic drug:  [id number]-iron-met-choln Replaced by:  multivit-folic acid-herbal 816 246 mcg-200 mg/30 mL Liqd oral liquid Where to Get Your Medications These medications were sent to [address] [place] [address] Phone:  [phone]  
  multivit-folic acid-herbal 131 175 mcg-200 mg/30 mL Liqd oral liquid Information on where to get these meds will be given to you by the nurse or doctor. ! Ask your nurse or doctor about these medications  
  amoxicillin-clavulanate 875-125 mg per tablet  
 lacosamide 100 mg Tab tablet

## 2018-09-30 NOTE — ROUTINE PROCESS
TRANSFER - IN REPORT: 
 
Verbal report received from Brittney RN(name) on Marquez Doradoome  being received from ED(unit) for routine progression of care Report consisted of patients Situation, Background, Assessment and  
Recommendations(SBAR). Information from the following report(s) SBAR, Kardex, Procedure Summary, Intake/Output, MAR and Recent Results was reviewed with the receiving nurse. Opportunity for questions and clarification was provided. Assessment completed upon patients arrival to unit and care assumed.

## 2018-09-30 NOTE — PROCEDURES
PROCEDURE: ROUTINE INPATIENT EEG  NAME:   Risa Rao  ACCOUNT NUMBER : [de-identified]  MRN:   [id number]  DATE OF SERVICE: 9/30/2018     HISTORY/INDICATION: Pt is a 52yo male with Down Syndrome and seizure disorder presenting with status epilepticus, subsequently intubated and sedated on propofol gtt. EEG is performed to exclude subclinical status epilepticus. MEDICATIONS:   Current Facility-Administered Medications   Medication Dose Route Frequency Provider Last Rate Last Dose    propofol (DIPRIVAN) infusion  0-50 mcg/kg/min IntraVENous TITRATE Kaya Schaffer MD 7.8 mL/hr at 09/30/18 1345 20 mcg/kg/min at 09/30/18 1345    sodium chloride (NS) flush 5-10 mL  5-10 mL IntraVENous Q8H Audelia Torrez MD        sodium chloride (NS) flush 5-10 mL  5-10 mL IntraVENous PRN Audelia Torrez MD        enoxaparin (LOVENOX) injection 40 mg  40 mg SubCUTAneous Q24H Audelia Torrez MD   40 mg at 09/30/18 1517    0.45% sodium chloride 1,000 mL with sodium bicarbonate (8.4%) 75 mEq infusion   IntraVENous CONTINUOUS Audelia Torrez MD        LORazepam (ATIVAN) injection 1 mg  1 mg IntraVENous Q2H PRN Audelia Torrez MD       [address] [place] [address] ON 10/1/2018] ferrous sulfate tablet 325 mg  325 mg Oral ACB Audelia Torrez MD        [START ON 10/1/2018] loratadine (CLARITIN) tablet 10 mg  10 mg Oral DAILY Audelia Torrez MD       [address] [place] [address] ON 10/1/2018] ascorbic acid (vitamin C) (VITAMIN C) tablet 500 mg  500 mg Oral DAILY Audelia Torrez MD        . PHARMACY TO SUBSTITUTE PER PROTOCOL (Reordered from: phenytoin (DILANTIN-125) suspension)    Per Protocol Audelia Torrez MD        . PHARMACY TO SUBSTITUTE PER PROTOCOL (Reordered from: [id number]-Z6-B5-B6-iron-met-choln (GERITOL TONIC WITH FERREX 18) 2.5 mg-50 mg-18 iron/15 mL liqd)    Per Protocol Audelia Torrez MD        sodium chloride (OCEAN) 0.65 % nasal squeeze bottle 1 Spray  1 Spray Both Nostrils PRN Audelia Torrez MD        [START ON 10/1/2018] levETIRAcetam (KEPPRA) 1,500 mg in 0.9% sodium chloride 100 mL IVPB  1,500 mg IntraVENous Q12H Berry Crenshaw MD        piperacillin-tazobactam (ZOSYN) 3.375 g in 0.9% sodium chloride (MBP/ADV) 100 mL  3.375 g IntraVENous Q8H Berry Crenshaw MD 25 mL/hr at 09/30/18 1517 3.375 g at 09/30/18 1517     Current Outpatient Prescriptions   Medication Sig Dispense Refill    ferrous sulfate 325 mg (65 mg iron) tablet Take 1 Tab by mouth Daily (before breakfast). 90 Tab 2    loratadine (CLARITIN) 10 mg tablet Take 1 Tab by mouth daily. 90 Tab 1    ascorbic acid, vitamin C, (VITAMIN C) 500 mg tablet Take 1 Tab by mouth daily. 30 Tab 6    levothyroxine (SYNTHROID) 50 mcg tablet Take 1 Tab by mouth Daily (before breakfast). 90 Tab 1    phenytoin (DILANTIN-125) suspension Take 4 mL by mouth two (2) times a day. 1 Bottle 5    sodium chloride (DEEP SEA NASAL) 0.65 % nasal squeeze bottle 1 Spray as needed for Congestion.  HYDROcodone-acetaminophen (HYCET) 0.5-21.7 mg/mL oral solution Take 10 mL by mouth four (4) times daily as needed for Pain.  lacosamide (VIMPAT) 100 mg tab tablet Take 100 mg by mouth two (2) times a day.  fluticasone (FLONASE) 50 mcg/actuation nasal spray 2 Sprays by Both Nostrils route daily. 1 Bottle 2    levETIRAcetam (KEPPRA) 100 mg/mL solution 15ml by mouth twice a day. 600 mL 5    cholecalciferol (VITAMIN D3) 1,000 unit tablet Take 1 Tab by mouth daily. 90 Tab 4       CONDITIONS OF RECORDING: This is a routine 21-channel EEG recording performed in accordance with the international 10-20 system with one channel devoted to limited EKG. This study was done during an unresponsive state. Photic stimulation was performed as an activating procedure. DESCRIPTION:   As the record opens, there is diffuse generalized polymorphic delta and theta activity seen across all head regions. At times a posterior dominant rhythm is seen with a frequency of 4Hz and amplitude of 20uV.  This activity is symmetric over the bilateral posterior derivations, but not well sustained. Photic stimulation did not significantly alter the tracing. No normal sleep architecture is seen. There are no focal abnormalities, epileptiform discharges, or electrographic seizures seen. INTERPRETATION: Abnormal EEG due to diffuse generalized delta and theta slowing. No electrographic seizure activity seen. CLINICAL CORRELATION: Finding is c/w a moderate encephalopathy which can have many etiologies including toxic, metabolic, or medication effect, or may represent a post-ictal state. Clinical correlation is advised.      Juan Peralta MD

## 2018-09-30 NOTE — PROGRESS NOTES
Admission Medication Reconciliation: 
 
Information obtained from:  Family, RN from [facility] facility Comments: Updated PTA meds 1)  Spoke with Dillon (RN) from facility, she reported that mediations are monitored during administration and patient is adherent to medications. Of note patient's weight last March of 2017 was 68kg during admission. Per RN, there is no recent changes to patient's medications. 2)  Medication changes (since last review): Added: 
-Eldertonic elixir (vit), replaced geritol Removed -Benefiber 
-Geritol (replace by eldertonic elixir) Allergies:  Morphine and Tegretol [carbamazepine] Significant PMH/Disease States:  
Past Medical History:  
Diagnosis Date  Anemia  Anxiety  Blindness of left eye  Cerebral palsy ([address]  DEMENTIA  Down syndrome  Endocrine disease   
 thyroid disorder  GERD (gastroesophageal reflux disease)  Ill-defined condition   
 blind in left eye  Ill-defined condition   
 imparied gait - uses walker  Ill-defined condition   
 dermatitis  Ill-defined condition   
 cerebral palsy  Ill-defined condition   
 prone to decubitus ulcers  Ill-defined condition   
 anemia  Neurological disorder  Other ill-defined conditions(799.89) Down syndrome  Psychiatric disorder   
 mental retardation  Psychiatric disorder   
 anxiety disorder  Seizures ([address]  Sleep apnea  Thyroid disease  Unspecified epilepsy without mention of intractable epilepsy Chief Complaint for this Admission: Chief Complaint Patient presents with  Seizure Prior to Admission Medications:  
Prior to Admission Medications Prescriptions Last Dose Informant Patient Reported? Taking? HYDROcodone-acetaminophen (HYCET) 0.5-21.7 mg/mL oral solution   Yes Yes Sig: Take 10 mL by mouth four (4) times daily as needed for Pain. ascorbic acid, vitamin C, (VITAMIN C) 500 mg tablet   No Yes Sig: Take 1 Tab by mouth daily. cholecalciferol (VITAMIN D3) 1,000 unit tablet   No Yes Sig: Take 1 Tab by mouth daily. ferrous sulfate 325 mg (65 mg iron) tablet   No Yes Sig: Take 1 Tab by mouth Daily (before breakfast). fluticasone (FLONASE) 50 mcg/actuation nasal spray   No Yes Si Sprays by Both Nostrils route daily. geriatric multivitamins & minerals (ELDERTONIC) elix   Yes Yes Sig: Take 15 mL by mouth daily. lacosamide (VIMPAT) 100 mg tab tablet   Yes Yes Sig: Take 100 mg by mouth two (2) times a day. levETIRAcetam (KEPPRA) 100 mg/mL solution   No Yes Sig: 15ml by mouth twice a day. levothyroxine (SYNTHROID) 50 mcg tablet   No Yes Sig: Take 1 Tab by mouth Daily (before breakfast). loratadine (CLARITIN) 10 mg tablet   No Yes Sig: Take 1 Tab by mouth daily. phenytoin (DILANTIN-125) suspension   No Yes Sig: Take 4 mL by mouth two (2) times a day. sodium chloride (DEEP SEA NASAL) 0.65 % nasal squeeze bottle   Yes Yes Si Spray as needed for Congestion. Facility-Administered Medications: None

## 2018-09-30 NOTE — ED NOTES
1226: Dr. Nereida Alonso at bedside. 2 IVs established. RSI kit at bedside. 1228: Verbal order for: 30mg  etomidate, 60mg maxwell to intubate. 1230: 30 etomidate in and flushed by [facility] 1231: 60mg maxwell in and flushed by [facility] 1232: et tube in, +color change, pt suctioned,  
 
Pulse 128, O2 100%, RR 15, /109 Tube 23 at the lip

## 2018-09-30 NOTE — H&P
[address] [facility] HISTORY AND PHYSICAL Mariana Salomon 
MR#: [id number] : 1965 ACCOUNT #: [de-identified] ADMIT DATE: 2018 History obtained from ER physician; primary care physician, Dr. Berna Mcleod; neurologist, Dr. Digna Johnson. CHIEF COMPLAINT:  Status epilepticus. HISTORY OF PRESENT ILLNESS:  Patient is a 14-year-old male with past medical history of seizure, dementia, Down syndrome, cerebral palsy, hypothyroid, anemia, who presented from a group home via EMS with a complaint of seizure. En route as per EMS, the patient was in status epilepticus and given 5 mg of Versed in the ED. After IV access obtained, 2 mg of Ativan was given, and the patient continued seizing actively, so to protect airway the patient was intubated with etomidate and rocuronium. The patient was intubated, placed on vent. Consulted Neurology and found to have a low level of phenytoin. The patient was loaded with phenytoin and a 500 mg Keppra was also given in the ER. The patient was sent for ruling out other issues related to seizure. He was getting a CT scan at the time of exam.  The patient was admitted to ICU for subsequent management. REVIEW OF SYSTEMS:  Unable to obtain due to above. SOCIAL HISTORY:  Nonsmoker, nonalcoholic. PAST MEDICAL HISTORY:  As stated in the HPI. PAST SURGICAL HISTORY:  None. FAMILY HISTORY:  Reviewed from the chart. Positive for hypertension, cancer, lupus in mother; diabetes, hypertension in father; and cancer and arthritis in maternal grandmother. SOCIAL HISTORY:  Patient is single, lives in group home. Nonsmoker, nonalcoholic. ALLERGIES: 
1. MORPHINE. 2.  TEGRETOL. 3.  CARBAMAZEPINE. MEDICATIONS:  Reviewed from the chart. Med reconciliation was requested, will review when available. The patient was on: 1. Ascorbic acid. 2.  Vitamin B complex. 3.  Vitamin D3. 
4.  Ferrous sulfate. 5.  Flonase. 6.  Hydrocodone. 7.  Lacosamide (Vimpat) 100 mg by mouth twice daily. 8.  Keppra 100 mg per mL solution 1500 twice a day. 9.  Levothyroxine 50 mcg daily. 10.  Claritin. 11.  Phenytoin 125 suspension  4 mL by mouth 2 times a day. PHYSICAL EXAMINATION: 
VITAL SIGNS:  Blood pressure on arrival to the /121, pulse 150, respiratory rate 59, saturation 98% on room air. Later when examined the patient's blood pressure was normal 160/69, slightly tachycardic, respiratory rate 15. HEENT:  Pupils equally reactive to light and accommodation. NECK:  Supple. JVD is not raised. LUNGS:  Clear to auscultation, on vent. HEART:  S1, S2 normal, regular. ABDOMEN:  Soft, bowel sounds positive. EXTREMITIES:  No edema. CENTRAL NERVOUS SYSTEM:  Sedated. LABORATORY DATA:  WBC count 9.4, hemoglobin 13, hematocrit 41, platelet count of 027. Chemistry:  Electrolytes:  Sodium 140, potassium 3.5, chloride 99, carbon dioxide 14, anion gap of 27, glucose 238, BUN 7, creatinine 1.25, which is raised from his baseline, bilirubin 0.2, total protein 10.1, lactate 22.1, alkaline phosphatase 186. , troponin less than 0.05. CT head was pending at the time of dictation. Chest x-ray:  ET tube at the lillian. EKG showed supraventricular tachycardia with frequent premature ventricular complexes and right bundle branch block. Therapeutic drug levels were drawn and phenytoin level was found to be 4.9. 
 
ASSESSMENT AND PLAN: 
3  A 48year-old came with status epilepticus. He was found to have a low level of phenytoin. Plan:  Status epilepticus. Patient's seizure was controlled with Versed and Ativan en route and Keppra 500 mg was given in the ER. We will continue his home dose 1500 mg twice a day, and we will load with phenytoin and will consult Neurology. We will also get CT scan which is pending at this point and get EEG stat. We will also continue seizure precaution and admit to intensive care unit. 2.  Acute hypoxic respiratory failure due to status epilepticus, on vent management per Pulmonary Critical Care. Wean off vent as possible. 3.  Acute kidney injury with acidosis, possibly seizure related. We will start IV fluid, and we will push normal saline 100 mL per hour with bicarbonate. We will also repeat lab tomorrow morning. Further management as per clinical course. 4.  History of hypothyroidism. Continue thyroid replacement. 5.  High lactate from seizure. We will follow until normalized. 6.  High glucose, possibly also in the setting of seizure. We will check POC glucose q.6h. Discussed with ER physician. Further management as per clinical course. MD PARKER Rowe/CAPRICE 
D: 09/30/2018 14:10    
T: 09/30/2018 16:06 JOB #: [id number]

## 2018-10-01 ENCOUNTER — APPOINTMENT (OUTPATIENT)
Dept: GENERAL RADIOLOGY | Age: 53
DRG: 208 | End: 2018-10-01
Attending: INTERNAL MEDICINE
Payer: MEDICARE

## 2018-10-01 LAB
ALBUMIN SERPL-MCNC: 2.6 G/DL (ref 3.5–5)
ALBUMIN/GLOB SERPL: 0.5 {RATIO} (ref 1.1–2.2)
ALP SERPL-CCNC: 132 U/L (ref 45–117)
ALT SERPL-CCNC: 116 U/L (ref 12–78)
ANION GAP SERPL CALC-SCNC: 6 MMOL/L (ref 5–15)
ANION GAP SERPL CALC-SCNC: 9 MMOL/L (ref 5–15)
ARTERIAL PATENCY WRIST A: YES
ARTERIAL PATENCY WRIST A: YES
AST SERPL-CCNC: 90 U/L (ref 15–37)
BASE EXCESS BLD CALC-SCNC: 3 MMOL/L
BASE EXCESS BLD CALC-SCNC: 4 MMOL/L
BASOPHILS # BLD: 0 K/UL (ref 0–0.1)
BASOPHILS # BLD: 0 K/UL (ref 0–0.1)
BASOPHILS NFR BLD: 0 % (ref 0–1)
BASOPHILS NFR BLD: 0 % (ref 0–1)
BDY SITE: ABNORMAL
BDY SITE: ABNORMAL
BILIRUB SERPL-MCNC: 0.4 MG/DL (ref 0.2–1)
BUN SERPL-MCNC: 5 MG/DL (ref 6–20)
BUN SERPL-MCNC: 5 MG/DL (ref 6–20)
BUN/CREAT SERPL: 6 (ref 12–20)
BUN/CREAT SERPL: 7 (ref 12–20)
CALCIUM SERPL-MCNC: 6 MG/DL (ref 8.5–10.1)
CALCIUM SERPL-MCNC: 7 MG/DL (ref 8.5–10.1)
CHLORIDE SERPL-SCNC: 108 MMOL/L (ref 97–108)
CHLORIDE SERPL-SCNC: 110 MMOL/L (ref 97–108)
CO2 SERPL-SCNC: 23 MMOL/L (ref 21–32)
CO2 SERPL-SCNC: 26 MMOL/L (ref 21–32)
CORTIS SERPL-MCNC: 21.3 UG/DL
CREAT SERPL-MCNC: 0.71 MG/DL (ref 0.7–1.3)
CREAT SERPL-MCNC: 0.82 MG/DL (ref 0.7–1.3)
DIFFERENTIAL METHOD BLD: ABNORMAL
DIFFERENTIAL METHOD BLD: ABNORMAL
EOSINOPHIL # BLD: 0 K/UL (ref 0–0.4)
EOSINOPHIL # BLD: 0 K/UL (ref 0–0.4)
EOSINOPHIL NFR BLD: 0 % (ref 0–7)
EOSINOPHIL NFR BLD: 0 % (ref 0–7)
ERYTHROCYTE [DISTWIDTH] IN BLOOD BY AUTOMATED COUNT: 12.4 % (ref 11.5–14.5)
ERYTHROCYTE [DISTWIDTH] IN BLOOD BY AUTOMATED COUNT: 12.8 % (ref 11.5–14.5)
GAS FLOW.O2 O2 DELIVERY SYS: ABNORMAL L/MIN
GAS FLOW.O2 O2 DELIVERY SYS: ABNORMAL L/MIN
GAS FLOW.O2 SETTING OXYMISER: 14 BPM
GLOBULIN SER CALC-MCNC: 4.9 G/DL (ref 2–4)
GLUCOSE SERPL-MCNC: 111 MG/DL (ref 65–100)
GLUCOSE SERPL-MCNC: 124 MG/DL (ref 65–100)
HCO3 BLD-SCNC: 25.5 MMOL/L (ref 22–26)
HCO3 BLD-SCNC: 25.6 MMOL/L (ref 22–26)
HCT VFR BLD AUTO: 30 % (ref 36.6–50.3)
HCT VFR BLD AUTO: 31.8 % (ref 36.6–50.3)
HGB BLD-MCNC: 10.2 G/DL (ref 12.1–17)
HGB BLD-MCNC: 10.9 G/DL (ref 12.1–17)
IMM GRANULOCYTES # BLD: 0 K/UL
IMM GRANULOCYTES # BLD: 0.1 K/UL (ref 0–0.04)
IMM GRANULOCYTES NFR BLD AUTO: 0 %
IMM GRANULOCYTES NFR BLD AUTO: 1 % (ref 0–0.5)
LACTATE SERPL-SCNC: 1.1 MMOL/L (ref 0.4–2)
LYMPHOCYTES # BLD: 0.5 K/UL (ref 0.8–3.5)
LYMPHOCYTES # BLD: 0.9 K/UL (ref 0.8–3.5)
LYMPHOCYTES NFR BLD: 13 % (ref 12–49)
LYMPHOCYTES NFR BLD: 8 % (ref 12–49)
MCH RBC QN AUTO: 34.5 PG (ref 26–34)
MCH RBC QN AUTO: 34.5 PG (ref 26–34)
MCHC RBC AUTO-ENTMCNC: 34 G/DL (ref 30–36.5)
MCHC RBC AUTO-ENTMCNC: 34.3 G/DL (ref 30–36.5)
MCV RBC AUTO: 100.6 FL (ref 80–99)
MCV RBC AUTO: 101.4 FL (ref 80–99)
METAMYELOCYTES NFR BLD MANUAL: 1 %
MONOCYTES # BLD: 0.3 K/UL (ref 0–1)
MONOCYTES # BLD: 1.1 K/UL (ref 0–1)
MONOCYTES NFR BLD: 17 % (ref 5–13)
MONOCYTES NFR BLD: 6 % (ref 5–13)
NEUTS BAND NFR BLD MANUAL: 7 % (ref 0–6)
NEUTS SEG # BLD: 4.6 K/UL (ref 1.8–8)
NEUTS SEG # BLD: 4.9 K/UL (ref 1.8–8)
NEUTS SEG NFR BLD: 69 % (ref 32–75)
NEUTS SEG NFR BLD: 78 % (ref 32–75)
NRBC # BLD: 0 K/UL (ref 0–0.01)
NRBC # BLD: 0 K/UL (ref 0–0.01)
NRBC BLD-RTO: 0 PER 100 WBC
NRBC BLD-RTO: 0 PER 100 WBC
O2/TOTAL GAS SETTING VFR VENT: 40 %
O2/TOTAL GAS SETTING VFR VENT: 40 %
PCO2 BLD: 25.4 MMHG (ref 35–45)
PCO2 BLD: 28.5 MMHG (ref 35–45)
PEEP RESPIRATORY: 5 CMH2O
PEEP RESPIRATORY: 5 CMH2O
PH BLD: 7.56 [PH] (ref 7.35–7.45)
PH BLD: 7.61 [PH] (ref 7.35–7.45)
PIP ISTAT,IPIP: 21
PLATELET # BLD AUTO: 190 K/UL (ref 150–400)
PLATELET # BLD AUTO: 212 K/UL (ref 150–400)
PLATELET COMMENTS,PCOM: ABNORMAL
PMV BLD AUTO: 9.2 FL (ref 8.9–12.9)
PMV BLD AUTO: 9.4 FL (ref 8.9–12.9)
PO2 BLD: 230 MMHG (ref 80–100)
PO2 BLD: 262 MMHG (ref 80–100)
POTASSIUM SERPL-SCNC: 2.6 MMOL/L (ref 3.5–5.1)
POTASSIUM SERPL-SCNC: 3.2 MMOL/L (ref 3.5–5.1)
PRESSURE SUPPORT SETTING VENT: 5 CMH2O
PROT SERPL-MCNC: 7.5 G/DL (ref 6.4–8.2)
RBC # BLD AUTO: 2.96 M/UL (ref 4.1–5.7)
RBC # BLD AUTO: 3.16 M/UL (ref 4.1–5.7)
RBC MORPH BLD: ABNORMAL
SAO2 % BLD: 100 % (ref 92–97)
SAO2 % BLD: 100 % (ref 92–97)
SODIUM SERPL-SCNC: 140 MMOL/L (ref 136–145)
SODIUM SERPL-SCNC: 142 MMOL/L (ref 136–145)
SPECIMEN TYPE: ABNORMAL
SPECIMEN TYPE: ABNORMAL
TOTAL RESP. RATE, ITRR: 14
TOTAL RESP. RATE, ITRR: 19
VENTILATION MODE VENT: ABNORMAL
VENTILATION MODE VENT: ABNORMAL
VT SETTING VENT: 450 ML
WBC # BLD AUTO: 5.8 K/UL (ref 4.1–11.1)
WBC # BLD AUTO: 6.6 K/UL (ref 4.1–11.1)

## 2018-10-01 PROCEDURE — 94760 N-INVAS EAR/PLS OXIMETRY 1: CPT

## 2018-10-01 PROCEDURE — 36600 WITHDRAWAL OF ARTERIAL BLOOD: CPT

## 2018-10-01 PROCEDURE — 74011250637 HC RX REV CODE- 250/637: Performed by: HOSPITALIST

## 2018-10-01 PROCEDURE — 77010033678 HC OXYGEN DAILY

## 2018-10-01 PROCEDURE — 94640 AIRWAY INHALATION TREATMENT: CPT

## 2018-10-01 PROCEDURE — 82533 TOTAL CORTISOL: CPT | Performed by: INTERNAL MEDICINE

## 2018-10-01 PROCEDURE — 74011250636 HC RX REV CODE- 250/636: Performed by: EMERGENCY MEDICINE

## 2018-10-01 PROCEDURE — 74011250636 HC RX REV CODE- 250/636: Performed by: INTERNAL MEDICINE

## 2018-10-01 PROCEDURE — 80053 COMPREHEN METABOLIC PANEL: CPT | Performed by: HOSPITALIST

## 2018-10-01 PROCEDURE — 36415 COLL VENOUS BLD VENIPUNCTURE: CPT | Performed by: HOSPITALIST

## 2018-10-01 PROCEDURE — 87086 URINE CULTURE/COLONY COUNT: CPT | Performed by: INTERNAL MEDICINE

## 2018-10-01 PROCEDURE — 74011000250 HC RX REV CODE- 250: Performed by: HOSPITALIST

## 2018-10-01 PROCEDURE — 71045 X-RAY EXAM CHEST 1 VIEW: CPT

## 2018-10-01 PROCEDURE — 74011250636 HC RX REV CODE- 250/636: Performed by: HOSPITALIST

## 2018-10-01 PROCEDURE — 65610000006 HC RM INTENSIVE CARE

## 2018-10-01 PROCEDURE — 82803 BLOOD GASES ANY COMBINATION: CPT

## 2018-10-01 PROCEDURE — 74011250636 HC RX REV CODE- 250/636: Performed by: PSYCHIATRY & NEUROLOGY

## 2018-10-01 PROCEDURE — 87070 CULTURE OTHR SPECIMN AEROBIC: CPT | Performed by: INTERNAL MEDICINE

## 2018-10-01 PROCEDURE — 74011000258 HC RX REV CODE- 258: Performed by: PSYCHIATRY & NEUROLOGY

## 2018-10-01 PROCEDURE — C8929 TTE W OR WO FOL WCON,DOPPLER: HCPCS

## 2018-10-01 PROCEDURE — C9113 INJ PANTOPRAZOLE SODIUM, VIA: HCPCS | Performed by: INTERNAL MEDICINE

## 2018-10-01 PROCEDURE — C9254 INJECTION, LACOSAMIDE: HCPCS | Performed by: PSYCHIATRY & NEUROLOGY

## 2018-10-01 PROCEDURE — 74011000258 HC RX REV CODE- 258: Performed by: INTERNAL MEDICINE

## 2018-10-01 PROCEDURE — 85025 COMPLETE CBC W/AUTO DIFF WBC: CPT | Performed by: HOSPITALIST

## 2018-10-01 PROCEDURE — 83605 ASSAY OF LACTIC ACID: CPT | Performed by: INTERNAL MEDICINE

## 2018-10-01 PROCEDURE — 74011000258 HC RX REV CODE- 258: Performed by: HOSPITALIST

## 2018-10-01 PROCEDURE — 94003 VENT MGMT INPAT SUBQ DAY: CPT

## 2018-10-01 PROCEDURE — 87040 BLOOD CULTURE FOR BACTERIA: CPT | Performed by: INTERNAL MEDICINE

## 2018-10-01 PROCEDURE — 74011250637 HC RX REV CODE- 250/637: Performed by: INTERNAL MEDICINE

## 2018-10-01 PROCEDURE — 74011000250 HC RX REV CODE- 250: Performed by: INTERNAL MEDICINE

## 2018-10-01 RX ORDER — LACOSAMIDE 10 MG/ML
150 INJECTION, SOLUTION INTRAVENOUS 2 TIMES DAILY
Status: DISCONTINUED | OUTPATIENT
Start: 2018-10-01 | End: 2018-10-01

## 2018-10-01 RX ORDER — SCOLOPAMINE TRANSDERMAL SYSTEM 1 MG/1
1 PATCH, EXTENDED RELEASE TRANSDERMAL
Status: DISCONTINUED | OUTPATIENT
Start: 2018-10-01 | End: 2018-10-04 | Stop reason: HOSPADM

## 2018-10-01 RX ORDER — CHLORHEXIDINE GLUCONATE 1.2 MG/ML
15 RINSE ORAL 2 TIMES DAILY
Status: DISCONTINUED | OUTPATIENT
Start: 2018-10-01 | End: 2018-10-02

## 2018-10-01 RX ORDER — POTASSIUM CHLORIDE 14.9 MG/ML
10 INJECTION INTRAVENOUS
Status: COMPLETED | OUTPATIENT
Start: 2018-10-01 | End: 2018-10-01

## 2018-10-01 RX ORDER — ALBUTEROL SULFATE 0.83 MG/ML
2.5 SOLUTION RESPIRATORY (INHALATION)
Status: DISCONTINUED | OUTPATIENT
Start: 2018-10-01 | End: 2018-10-03

## 2018-10-01 RX ORDER — PHENYTOIN SODIUM 50 MG/ML
100 INJECTION, SOLUTION INTRAMUSCULAR; INTRAVENOUS EVERY 12 HOURS
Status: DISCONTINUED | OUTPATIENT
Start: 2018-10-01 | End: 2018-10-02

## 2018-10-01 RX ORDER — SODIUM CHLORIDE 450 MG/100ML
125 INJECTION, SOLUTION INTRAVENOUS CONTINUOUS
Status: DISCONTINUED | OUTPATIENT
Start: 2018-10-01 | End: 2018-10-03

## 2018-10-01 RX ADMIN — POTASSIUM CHLORIDE 10 MEQ: 200 INJECTION, SOLUTION INTRAVENOUS at 06:34

## 2018-10-01 RX ADMIN — OXYCODONE HYDROCHLORIDE AND ACETAMINOPHEN 500 MG: 500 TABLET ORAL at 08:19

## 2018-10-01 RX ADMIN — Medication 250 MCG/MIN: at 03:03

## 2018-10-01 RX ADMIN — SODIUM CHLORIDE 40 MG: 9 INJECTION INTRAMUSCULAR; INTRAVENOUS; SUBCUTANEOUS at 08:19

## 2018-10-01 RX ADMIN — SODIUM CHLORIDE 1500 MG: 900 INJECTION, SOLUTION INTRAVENOUS at 03:04

## 2018-10-01 RX ADMIN — ALBUTEROL SULFATE 2.5 MG: 2.5 SOLUTION RESPIRATORY (INHALATION) at 09:13

## 2018-10-01 RX ADMIN — PHENYTOIN 100 MG: 125 SUSPENSION ORAL at 08:19

## 2018-10-01 RX ADMIN — SODIUM CHLORIDE 125 ML/HR: 450 INJECTION, SOLUTION INTRAVENOUS at 18:41

## 2018-10-01 RX ADMIN — Medication 190 MCG/MIN: at 08:33

## 2018-10-01 RX ADMIN — PIPERACILLIN SODIUM,TAZOBACTAM SODIUM 3.38 G: 3; .375 INJECTION, POWDER, FOR SOLUTION INTRAVENOUS at 13:49

## 2018-10-01 RX ADMIN — POTASSIUM CHLORIDE 10 MEQ: 200 INJECTION, SOLUTION INTRAVENOUS at 11:53

## 2018-10-01 RX ADMIN — Medication 30 MCG/MIN: at 00:09

## 2018-10-01 RX ADMIN — SODIUM CHLORIDE 150 MG: 900 INJECTION, SOLUTION INTRAVENOUS at 12:27

## 2018-10-01 RX ADMIN — POTASSIUM CHLORIDE 10 MEQ: 200 INJECTION, SOLUTION INTRAVENOUS at 10:27

## 2018-10-01 RX ADMIN — Medication 10 ML: at 14:00

## 2018-10-01 RX ADMIN — SODIUM CHLORIDE 1.5 ML: 9 INJECTION INTRAMUSCULAR; INTRAVENOUS; SUBCUTANEOUS at 10:08

## 2018-10-01 RX ADMIN — LACTULOSE 20 G: 20 SOLUTION ORAL at 08:19

## 2018-10-01 RX ADMIN — LORATADINE 10 MG: 10 TABLET ORAL at 08:19

## 2018-10-01 RX ADMIN — SODIUM CHLORIDE 125 ML/HR: 450 INJECTION, SOLUTION INTRAVENOUS at 08:34

## 2018-10-01 RX ADMIN — Medication 10 ML: at 06:00

## 2018-10-01 RX ADMIN — PROPOFOL 10 MCG/KG/MIN: 10 INJECTION, EMULSION INTRAVENOUS at 08:02

## 2018-10-01 RX ADMIN — ALBUTEROL SULFATE 2.5 MG: 2.5 SOLUTION RESPIRATORY (INHALATION) at 20:10

## 2018-10-01 RX ADMIN — POTASSIUM CHLORIDE 10 MEQ: 200 INJECTION, SOLUTION INTRAVENOUS at 08:19

## 2018-10-01 RX ADMIN — Medication 30 ML: at 08:26

## 2018-10-01 RX ADMIN — CALCIUM GLUCONATE 2 G: 98 INJECTION, SOLUTION INTRAVENOUS at 09:01

## 2018-10-01 RX ADMIN — PIPERACILLIN SODIUM,TAZOBACTAM SODIUM 3.38 G: 3; .375 INJECTION, POWDER, FOR SOLUTION INTRAVENOUS at 06:29

## 2018-10-01 RX ADMIN — SODIUM CHLORIDE 500 ML: 900 INJECTION, SOLUTION INTRAVENOUS at 02:00

## 2018-10-01 RX ADMIN — Medication 100 MCG/MIN: at 00:38

## 2018-10-01 RX ADMIN — PIPERACILLIN SODIUM,TAZOBACTAM SODIUM 3.38 G: 3; .375 INJECTION, POWDER, FOR SOLUTION INTRAVENOUS at 23:08

## 2018-10-01 RX ADMIN — LEVOFLOXACIN 750 MG: 5 INJECTION, SOLUTION INTRAVENOUS at 00:43

## 2018-10-01 RX ADMIN — ALBUTEROL SULFATE 2.5 MG: 2.5 SOLUTION RESPIRATORY (INHALATION) at 14:52

## 2018-10-01 RX ADMIN — SODIUM CHLORIDE 1500 MG: 900 INJECTION, SOLUTION INTRAVENOUS at 13:38

## 2018-10-01 RX ADMIN — ENOXAPARIN SODIUM 40 MG: 40 INJECTION, SOLUTION INTRAVENOUS; SUBCUTANEOUS at 13:39

## 2018-10-01 RX ADMIN — Medication 10 ML: at 23:09

## 2018-10-01 RX ADMIN — FERROUS SULFATE TAB 325 MG (65 MG ELEMENTAL FE) 325 MG: 325 (65 FE) TAB at 08:19

## 2018-10-01 RX ADMIN — Medication 220 MCG/MIN: at 05:51

## 2018-10-01 RX ADMIN — PHENYTOIN SODIUM 100 MG: 50 INJECTION INTRAMUSCULAR; INTRAVENOUS at 20:35

## 2018-10-01 NOTE — CONSULTS
Neuro consult completed. Dictated note to follow. Pt with Down syndrome, dementia, and epilepsy presenting in SE without any known cause - no recent illness, med changes, sleep deprivation, though he is chronically sleep deprived due to untreated SAVITA intolerant of CPAP. Continue Dilantin 100mg bid for now, though level very low. Continue Keppra 1500mg bid. Increased Vimpat to 150mg bid. Will assess VNS later today or tomorrow.

## 2018-10-01 NOTE — CDMP QUERY
The diagnosis of Acute Kidney Injury has been documented for this patient. Based on the noted clinical findings, the diagnosis of JOHN PAUL may be challenged by an external reviewer. Please document if JOHN PAUL has been ruled out. Current (2012) RIFLE criteria notes the following: Section 2: JOHN PAUL Definition: \"JOHN PAUL is defined as any of the following\":  
 
RIFLE  (BSV Approved) RISK: Increased Scr x 1.5 or GFR decrease > 25% (within 7 days) INJURY:  Increased SCr x 2.0 or GFR decreased > 50% FAILURE:  Increased SCr x 3.0 or GFR decrease > 75% or SCr >4.0 mg/dL or acute increase >0.5 mg/dL LOSS:  Persistent acute renal failure = complete loss of kidney function > 4 weeks END STAGE:  End stage of kidney disease > 3 months The record reflects:  
range of lab values during this admit: BUN: 5 - 7; Creatinine: 0.71 - 1.25; GFR est AA: >60 Please clarify and document your clinical opinion in the progress notes and discharge summary including the definitive and/or presumptive diagnosis, (suspected or probable), related to the above clinical findings. Please include clinical findings supporting your diagnosis. Thank you, Ed Shepherd, RN 
[phone]

## 2018-10-01 NOTE — PROGRESS NOTES
2100 Patient febrile. Paged hospitalist. Spoke w/Dr. Racquel Saul. MD orders given. 2249 Paged hospitalist. Spoke w/ Dr. Racquel Saul about hypotension. MD orders given. NS bolus given. Ricardo 70 Paged hospitalist. Crys Gonzalez w/ Allyson Canales. MD orders given for second bolus. To start Attila if no response. 2015 Patient not responding to bolus. Attila started. Levoquin and paired cultures ordered. 8603 Paged hospitalist. ABD ordered. MD approval for central line. Car Abelino [phone]. Voicemail left. 
0102 Called Sharita Price. Voicemail left. 0200 Paired cultures and ABD obtained per Iam, RT 
0200 113/88 (96)

## 2018-10-01 NOTE — PROGRESS NOTES
Clinical Pharmacy Note: Stress Ulcer Prophylaxis Protocol (ICU patients) Pantoprazole has been ordered for Kecia Rivera for stress ulcer prophylaxis. Kecia Rivera does not have an indication for a PPI and therefore it has been changed to famotidine per the [facility] Stress Ulcer Prophylaxis Protocol. See the below indications for stress ulcer prophylaxis as well as the exclusion criteria. Please call with any questions. Stress ulcer prophylaxis is indicated in the following ICU patients: ? Mechanical ventilation expected to be >48 hours ? Coagulopathy (platelet count <21,791 mm3, INR >1.5 or aPTT >2 times the control value [not attributable to anticoagulant therapy]) ? Shock requiring vasopressor therapy ? Conway coma score <10 or intracranial hemorrhage ? Thermal injury to >35% of body surface area, multiple trauma or spinal cord injury ? Hepatic failure or partial hepatectomy ? Organ transplant (perioperatively) ? Two or more of the following risk factors: sepsis, ICU stay >1 week, occult bleeding for ?6 days, high dose corticosteroid use (>250 mg hydrocortisone equivalents/day) Indications for PPI Therapy (vs. H2RAs) for SUP: 
? Documented GI bleed or ulceration within the previous one year ? Platelet count <21,089 mm3 Patients are excluded from this protocol if they are: 
? Continuing a proton pump inhibitor (PPI) from prior to admission ? Receiving a PPI for an indication other than SUP 
o GI bleeding, GERD, gastritis, PUD, Albrechts esophagitis, Zollinger-White Syndrome, H.pylori eradication ? Cardiac surgery patients ? Receiving a PPI to prevent GI bleeding while on dual antiplatelet therapy ? Receiving a PPI for prevention of marginal ulcers after gastric bypass surgery (Didier-en-Y, sleeve gastrectomy, etc)

## 2018-10-01 NOTE — CONSULTS
[facility]  Pulmonary, Critical Care, and Sleep Medicine  Name: Dawna Bloch MRN: [id number]   : 1965 [facility]   Date: 10/1/2018          IMPRESSION:   1. Status ellipticus-known sz d/o  2. Respiratory failure/ vent  3. Hypotensive shock- pressors  4. Baseline CP/ non-verbal MS  5.  Elevated NH3      RECOMMENDATIONS:   · Vent support  · Pressors as needed- f/u repeat lactate/cortisol/echo/ cvp  · empiric abx for now (LQ/Zosyn)  · IVF d/c bicarb  · lactulose  · AED per neurology  · SUP- protonix  · DVT prophylaxis-lovenox    Unstable at hi risk further decompensation       Radiology  ( personally reviewed) Head CT/ cxr reviewed   ABG Recent Labs      10/01/18   0225  18   1308   PHI  7.562*  7.254*   PO2I  230*  552*   PCO2I  28.5*  43.1          Subjective/Interval History:     Asked to see pt for ICU management  Complicated pt with sz/ CP/hypothyroid  Presented in status from group home    Intubated in ED  no further sz on vent/propofol--> EEG neg  Initial DPH level low  Loaded DPH/ given Keppra  To ICU  Last night hypotension--> pressors and empiric abx started    ROS unobtainable  No family present  D/w RN at bedside    Patient Active Problem List   Diagnosis Code    Recurrent seizures ([address] G40.909    Partial epilepsy with impairment of consciousness ([address] G40.209    Ataxia R27.0    Memory loss R41.3    Seizure ([address] R56.9    S/P placement of VNS (vagus nerve stimulation) device Z96.89    ACP (advance care planning) Z71.89    Low vitamin D level R79.89    Blind left eye H54.40    Pneumonia J18.9    Down syndrome Q90.9    Acute encephalopathy G93.40    Complex partial seizure evolving to generalized seizure ([address] G40.209    Acquired hypothyroidism E03.9     Past Medical History:   Diagnosis Date    Anemia     Anxiety     Blindness of left eye     Cerebral palsy (HCC)     DEMENTIA     Down syndrome     Endocrine disease     thyroid disorder    GERD (gastroesophageal reflux disease)     Ill-defined condition     blind in left eye    Ill-defined condition     imparied gait - uses walker    Ill-defined condition     dermatitis    Ill-defined condition     cerebral palsy    Ill-defined condition     prone to decubitus ulcers    Ill-defined condition     anemia    Neurological disorder     Other ill-defined conditions(799.89)     Down syndrome    Psychiatric disorder     mental retardation    Psychiatric disorder     anxiety disorder    Seizures ([address]     Sleep apnea     Thyroid disease     Unspecified epilepsy without mention of intractable epilepsy      Past Surgical History:   Procedure Laterality Date    HX OTHER SURGICAL  03/13/2015     VNS  Dr. Riley Gardner. Nadeem Tavares  @ [facility]     Social History     Social History    Marital status: SINGLE     Spouse name: N/A    Number of children: N/A    Years of education: N/A     Occupational History    Not on file. Social History Main Topics    Smoking status: Never Smoker    Smokeless tobacco: Never Used    Alcohol use No    Drug use: No    Sexual activity: Not Currently     Other Topics Concern    Not on file     Social History Narrative     Family History   Problem Relation Age of Onset    Hypertension Mother     Cancer Mother     Lupus Mother     Arthritis-osteo Mother     Heart Disease Mother     Heart Disease Father     Diabetes Father     Hypertension Father     Elevated Lipids Father     Diabetes Brother     Elevated Lipids Brother     Hypertension Brother     Mental Retardation Brother     Cancer Maternal Grandmother     Arthritis-osteo Maternal Grandmother     Alcohol abuse Maternal Grandfather     Cancer Maternal Grandfather     Arthritis-osteo Paternal Grandmother     Cancer Paternal Grandfather      Prior to Admission Medications   Prescriptions Last Dose Informant Patient Reported? Taking?    HYDROcodone-acetaminophen (HYCET) 0.5-21.7 mg/mL oral solution Yes Yes   Sig: Take 10 mL by mouth four (4) times daily as needed for Pain. ascorbic acid, vitamin C, (VITAMIN C) 500 mg tablet   No Yes   Sig: Take 1 Tab by mouth daily. cholecalciferol (VITAMIN D3) 1,000 unit tablet   No Yes   Sig: Take 1 Tab by mouth daily. ferrous sulfate 325 mg (65 mg iron) tablet   No Yes   Sig: Take 1 Tab by mouth Daily (before breakfast). fluticasone (FLONASE) 50 mcg/actuation nasal spray   No Yes   Si Sprays by Both Nostrils route daily. geriatric multivitamins & minerals (ELDERTONIC) elix   Yes Yes   Sig: Take 15 mL by mouth daily. lacosamide (VIMPAT) 100 mg tab tablet   Yes Yes   Sig: Take 100 mg by mouth two (2) times a day. levETIRAcetam (KEPPRA) 100 mg/mL solution   No Yes   Sig: 15ml by mouth twice a day. levothyroxine (SYNTHROID) 50 mcg tablet   No Yes   Sig: Take 1 Tab by mouth Daily (before breakfast). loratadine (CLARITIN) 10 mg tablet   No Yes   Sig: Take 1 Tab by mouth daily. phenytoin (DILANTIN-125) suspension   No Yes   Sig: Take 4 mL by mouth two (2) times a day. sodium chloride (DEEP SEA NASAL) 0.65 % nasal squeeze bottle   Yes Yes   Si Spray as needed for Congestion.       Facility-Administered Medications: None     Allergies   Allergen Reactions    Morphine Not Reported This Time    Tegretol [Carbamazepine] Other (comments)     \"bad reaction\" \"changes attitude\"               Objective:     Mode Rate Tidal Volume Pressure FiO2 PEEP   Assist control 14 450 ml    40 % 5 cm H20     Vital Signs:      Patient Vitals for the past 24 hrs:   Temp Pulse Resp BP SpO2   10/01/18 0700 - (!) 56 14 151/77 100 %   10/01/18 0600 - 60 14 129/70 100 %   10/01/18 0500 - 67 14 122/69 100 %   10/01/18 0449 - 73 14 - 100 %   10/01/18 0400 98.5 °F (36.9 °C) 74 14 103/62 100 %   10/01/18 0300 - 64 14 128/64 100 %   10/01/18 0230 - 65 14 129/62 100 %   10/01/18 0200 - 90 23 - 100 %   10/01/18 0130 - 85 14 (!) 83/48 100 %   10/01/18 0110 - - - (!) 84/49 -   10/01/18 0100 - 93 17 (!) 84/49 100 %   10/01/18 0046 - 69 14 - 100 %   10/01/18 0045 - 90 14 - 100 %   10/01/18 0030 - 87 15 (!) 79/49 100 %   10/01/18 0015 - 84 14 - -   10/01/18 0000 99.5 °F (37.5 °C) 86 16 (!) 75/49 98 %   09/30/18 2345 - 88 14 - -   09/30/18 2330 - 91 14 (!) 74/35 -   09/30/18 2315 - 93 14 - -   09/30/18 2300 - 96 14 (!) 84/45 -   09/30/18 2230 - 96 14 (!) 78/43 100 %   09/30/18 2200 - 96 14 (!) 84/43 100 %   09/30/18 2130 - 98 14 92/48 99 %   09/30/18 2100 - (!) 101 14 98/53 100 %   09/30/18 2030 - 98 13 103/66 100 %   09/30/18 2018 - 99 17 - 100 %   09/30/18 2000 (!) 100.6 °F (38.1 °C) (!) 104 14 96/59 100 %   09/30/18 1900 - 98 15 105/66 100 %   09/30/18 1801 - (!) 102 14 110/64 100 %   09/30/18 1745 - (!) 104 16 104/66 100 %   09/30/18 1715 - (!) 101 15 105/63 -   09/30/18 1700 99.6 °F (37.6 °C) 95 15 (!) 121/91 100 %   09/30/18 1600 - 99 16 112/65 100 %   09/30/18 1530 - 95 14 105/77 -   09/30/18 1345 - (!) 112 17 138/71 100 %   09/30/18 1245 - (!) 122 15 116/69 -   09/30/18 1235 - (!) 134 26 122/60 98 %   09/30/18 1215 98.9 °F (37.2 °C) (!) 150 (!) 59 (!) 229/121 98 %           Ventilator Pressures  PIP Observed (cm H2O): 22 cm H2O  MAP (cm H2O): 10  PEEP/VENT (cm H2O): 5 cm [id number](24)Ventilator Pressures  PIP Observed (cm H2O): 22 cm H2O  MAP (cm H2O): 10  PEEP/VENT (cm H2O): 5 cm H20  Safety & Alarms  Backup Mode Checked/Apnea: Yes  Pressure Max: 40 cm H2O  Ve Min: 2  Ve Max: 20  Vt Min: 200 ml  Vt Max: 1000 ml  RR Max: 40  Intake/Output:   Last shift:      Ventilator Volumes  Vt Set (ml): 450 ml  Vt Exhaled (Machine Breath) (ml): 488 ml  Vt Spont (ml): 450 ml  Ve Observed (l/min): 6.6 l/min  Last 3 shifts:  [id number]  Intake/Output Summary (Last 24 hours) at 10/01/18 0733  Last data filed at 10/01/18 0300   Gross per 24 hour   Intake          1689.84 ml   Output             1080 ml   Net           609.84 ml     EXAM:     Sedated BM in NAD  Responsive  NC/AT  Grimaces/opens eyes  Oral ETT- swollen tongue  Neck no JVD  Chest symmetrical CV S1S2   Abd soft  Conklin--> clear yellow urine  LE no edema  Moves all   skin no rash      Data    I have personally reviewed data, flowsheets for the last 24 hours.         Labs:  Recent Labs      10/01/18   0442  09/30/18   1222   WBC  5.8  9.4   HGB  10.2*  13.0   HCT  30.0*  41.1   PLT  190  266     Recent Labs      10/01/18   0442  09/30/18   1222   NA  142  140   K  2.6*  3.5   CL  110*  99   CO2  23  14*   GLU  111*  238*   BUN  5*  7   CREA  0.71  1.25   CA  6.0*  8.9   MG   --   2.9*   ALB   --   3.5   SGOT   --   34   ALT   --   [address] [facility], MD  [facility]

## 2018-10-01 NOTE — PROGRESS NOTES
Problem: Discharge Planning Goal: *Discharge to safe environment Outcome: Not Progressing Towards Goal 
See Care management note.

## 2018-10-01 NOTE — PROGRESS NOTES
Reason for Admission:  Status epilepticus RRAT Score:     17 Do you (patient/family) have any concerns for transition/discharge? Facility will transport patient Plan for utilizing home health:  TBD Likelihood of readmission? Low Transition of Care Plan:   Return to [facility] Patient admitted to ICU with status epilepticus, currently intubated. Patient has a history of Down syndrome and dementia and is non verbal. Care manager spoke with Ms Hung Ahuja ([phone]) to obtain functional status. Patient is ambulatory with a walker and a gait belt, moves very slow, patient is able to feed himself but again is very slow and picky with his food choices. CM faxed clinical information to [phone]. At discharge the facility will send a  to transport patient back to the home. CM also met with patient's brother Sukhi Chang [phone] to introduce self and explain role of care management. Cassandra Green RN,CRM Care Management Interventions PCP Verified by CM: Yes (Dr Irasema Ortiz) Last Visit to PCP: 04/20/18 Palliative Care Criteria Met (RRAT>21 & CHF Dx)?: No 
MyChart Signup: No 
Discharge Durable Medical Equipment: No 
Physical Therapy Consult: No 
Occupational Therapy Consult: No 
Speech Therapy Consult: No 
Current Support Network: Adult Group Home (Brother Sukhi Chang [phone]) Confirm Follow Up Transport:  (The facility will send a ) Plan discussed with Pt/Family/Caregiver:  Yes

## 2018-10-01 NOTE — PROGRESS NOTES
0800 Bedside and Verbal shift change report given to Hannah Wheeler, JACOB (oncoming nurse) by Flavio Smalls RN (offgoing nurse). Report included the following information SBAR, Kardex, ED Summary, Procedure Summary, Intake/Output, MAR and Recent Results. 0800 Assumed care of pt, resting comfortably, VSS. 0912 RT at bedside for ART stick AM labs redrawn, lactic, cortisol sent. Sputum culture sent. 0945 Echo tech at bedside. 1000 Attila gtt OFF. Sedation OFF. 
1130 Interdisciplinary rounds held. OK for SBT per Dr [facility] 1200 Dr Marine Landry in to see, plan of care discussed with pt's brother at bedside. 1400 RT at bedside for ABG. Pt extubated to 4L NC, tolerating well. 2000 Bedside and Verbal shift change report given to Flavio Smalls RN (oncoming nurse) by Hannah Wheeler RN (offgoing nurse). Report included the following information SBAR, Kardex, ED Summary, Procedure Summary, Intake/Output, MAR and Recent Results.

## 2018-10-01 NOTE — PROGRESS NOTES
Hospitalist Progress Note Kayla Hale MD 
Answering service: [phone] from in house phone Cell: [phone] Date of Service:  10/1/2018 NAME:  Debby Cuadra :  1965 MRN:  [id number] Admission Summary:  
Patient is a 51-year-old male with past medical history of seizure, dementia, Down syndrome, cerebral palsy, hypothyroid, anemia, who presented from a group home via EMS with a complaint of seizure. En route as per EMS, the patient was in status epilepticus and given 5 mg of Versed in the ED. After IV access obtained, 2 mg of Ativan was given, and the patient continued seizing actively, so to protect airway the patient was intubated with etomidate and rocuronium. The patient was intubated, placed on vent. Consulted Neurology and found to have a low level of phenytoin. The patient was loaded with phenytoin and a 500 mg Keppra was also given in the ER. The patient was sent for ruling out other issues related to seizure. He was getting a CT scan at the time of exam.  The patient was admitted to ICU for subsequent management. Interval history / Subjective:  
  F/u seizure Intubated Assessment & Plan:  
 
Status epilepticus 
-CT head  No acute changes 
-On Keppra/Dilantin 
-Seizure/Aspiration precautions -EEG reviewed, consistent with generalized encephalopathy 
-Appreciate Neurology 
-reportedly no more seizure Hypotension 
-on pressors, wean as tolerated Acute respiratory failure 
-intubated 
-SBT today 
-Appreciate Pulmonary Hypokalemia 
-replace as tolerated Mild renal dysfunction 
-The patient did not have JOHN PAUL 
-likely pre-renal 
-Continue fluids Transaminitis -Monitor, would hold work up for now Down's syndrome Hypothyroidism 
-follow TFTs 
 
NPO Code status: FULL CODE 
DVT prophylaxis: scd PTA: Group home Plan: Follow Neurology, Pulmonary Care Plan discussed with: Patient/Family Disposition: [facility] Hospital Problems  Date Reviewed: 9/30/2018 Codes Class Noted POA * (Principal)Seizure ([address] ICD-10-CM: R56.9 ICD-9-CM: 780.39  11/22/2013 Yes Review of Systems: A comprehensive review of systems was negative except for that written in the HPI. Vital Signs:  
 Last 24hrs VS reviewed since prior progress note. Most recent are: 
Visit Vitals  /68  Pulse (!) 55  Temp 99 °F (37.2 °C)  Resp 12  Wt 68.9 kg (151 lb 12.8 oz)  SpO2 100%  BMI 26.89 kg/m2 Intake/Output Summary (Last 24 hours) at 10/01/18 1120 Last data filed at 10/01/18 1100 Gross per 24 hour Intake          3286.39 ml Output             1440 ml Net          1846.39 ml Physical Examination:  
 
 
     
Constitutional:  No acute distress, intubated ENT:  Oral mucous moist, oropharynx benign. Neck supple, Resp:  CTA bilaterally. No wheezing/rhonchi/rales. No accessory muscle use CV:  Regular rhythm, normal rate, no murmurs, gallops, rubs GI:  Soft, non distended, non tender. normoactive bowel sounds, no hepatosplenomegaly Musculoskeletal:  No edema, warm, 2+ pulses throughout Neurologic:  intubated, sedated Skin:  Good turgor, no rashes or ulcers Data Review:  
 Review and/or order of clinical lab test 
 
 
Labs:  
 
Recent Labs 10/01/18 
 0912  10/01/18 
 0721 WBC  6.6  5.8 HGB  10.9*  10.2* HCT  31.8*  30.0*  
PLT  212  190 Recent Labs 10/01/18 
 0912  10/01/18 
 7897  09/30/18 
 1222 NA  140  142  140  
K  3.2*  2.6*  3.5 CL  108  110*  99  
CO2  26  23  14* BUN  5*  5*  7 CREA  0.82  0.71  1.25 GLU  124*  111*  238* CA  7.0*  6.0*  8.9 MG   --    --   2.9* Recent Labs 10/01/18 
 0912  09/30/18 
 1222 SGOT  90*  34 ALT  116*  46 AP  132*  186* TBILI  0.4  0.2 TP  7.5  10.1* ALB  2.6*  3.5 GLOB  4.9*  6.6*  
 
 No results for input(s): INR, PTP, APTT in the last 72 hours. No lab exists for component: INREXT No results for input(s): FE, TIBC, PSAT, FERR in the last 72 hours. Lab Results Component Value Date/Time Folate 8.5 01/19/2015 01:06 PM  
  
No results for input(s): PH, PCO2, PO2 in the last 72 hours. Recent Labs  
   09/30/18 
 1222 CPK  445* CKNDX  0.7  
TROIQ  <0.05 Lab Results Component Value Date/Time Cholesterol, total 271 (H) 12/10/2015 12:30 PM  
 HDL Cholesterol 64 12/10/2015 12:30 PM  
 LDL, calculated 183 (H) 12/10/2015 12:30 PM  
 Triglyceride 120 12/10/2015 12:30 PM  
 
Lab Results Component Value Date/Time Glucose (POC) 237 (H) 09/30/2018 12:51 PM  
 Glucose (POC) 239 (H) 09/30/2018 12:25 PM  
 Glucose (POC) 98 02/11/2009 12:15 PM  
 
Lab Results Component Value Date/Time Color YELLOW/STRAW 02/03/2018 10:16 AM  
 Appearance CLOUDY (A) 02/03/2018 10:16 AM  
 Specific gravity 1.018 02/03/2018 10:16 AM  
 pH (UA) 8.0 02/03/2018 10:16 AM  
 Protein NEGATIVE  02/03/2018 10:16 AM  
 Glucose NEGATIVE  02/03/2018 10:16 AM  
 Ketone NEGATIVE  02/03/2018 10:16 AM  
 Bilirubin NEGATIVE  02/03/2018 10:16 AM  
 Urobilinogen 0.2 02/03/2018 10:16 AM  
 Nitrites POSITIVE (A) 02/03/2018 10:16 AM  
 Leukocyte Esterase SMALL (A) 02/03/2018 10:16 AM  
 Epithelial cells FEW 02/03/2018 10:16 AM  
 Bacteria NEGATIVE  02/03/2018 10:16 AM  
 WBC 10-20 02/03/2018 10:16 AM  
 RBC 0-5 02/03/2018 10:16 AM  
 
 
 
Medications Reviewed:  
 
Current Facility-Administered Medications Medication Dose Route Frequency  pantoprazole (PROTONIX) 40 mg in sodium chloride 0.9% 10 mL injection  40 mg IntraVENous Q12H  
 albuterol (PROVENTIL VENTOLIN) nebulizer solution 2.5 mg  2.5 mg Nebulization Q6H RT  
 lactulose (CHRONULAC) solution 20 g  20 g Oral BID  
 0.45% sodium chloride infusion  125 mL/hr IntraVENous CONTINUOUS  chlorhexidine (PERIDEX) 0.12 % mouthwash 15 mL  15 mL Oral BID  
  propofol (DIPRIVAN) infusion  0-50 mcg/kg/min IntraVENous TITRATE  sodium chloride (NS) flush 5-10 mL  5-10 mL IntraVENous Q8H  
 sodium chloride (NS) flush 5-10 mL  5-10 mL IntraVENous PRN  
 enoxaparin (LOVENOX) injection 40 mg  40 mg SubCUTAneous Q24H  
 LORazepam (ATIVAN) injection 1 mg  1 mg IntraVENous Q2H PRN  
 ferrous sulfate tablet 325 mg  325 mg Oral ACB  loratadine (CLARITIN) tablet 10 mg  10 mg Oral DAILY  ascorbic acid (vitamin C) (VITAMIN C) tablet 500 mg  500 mg Oral DAILY  phenytoin (DILANTIN) 100 mg/4 mL oral suspension 100 mg  100 mg Oral BID  multivit-folic acid-herbal 008 (WELLESSE PLUS) oral liquid 30 mL  30 mL Oral DAILY  sodium chloride (OCEAN) 0.65 % nasal squeeze bottle 1 Spray  1 Spray Both Nostrils PRN  
 levETIRAcetam (KEPPRA) 1,500 mg in 0.9% sodium chloride 100 mL IVPB  1,500 mg IntraVENous Q12H  piperacillin-tazobactam (ZOSYN) 3.375 g in 0.9% sodium chloride (MBP/ADV) 100 mL  3.375 g IntraVENous Q8H  
 influenza vaccine 2018-19 (6 mos+)(PF) (FLUARIX QUAD/FLULAVAL QUAD) injection 0.5 mL  0.5 mL IntraMUSCular PRIOR TO DISCHARGE  acetaminophen (TYLENOL) solution 650 mg  650 mg Per NG tube Q4H PRN  
 PHENYLephrine (PF)(VAL-SYNEPHRINE) 30 mg in 0.9% sodium chloride 250 mL infusion   mcg/min IntraVENous TITRATE  
 
______________________________________________________________________ EXPECTED LENGTH OF STAY: 2d 14h ACTUAL LENGTH OF STAY:          1 Godfrey Gill MD

## 2018-10-02 ENCOUNTER — APPOINTMENT (OUTPATIENT)
Dept: GENERAL RADIOLOGY | Age: 53
DRG: 208 | End: 2018-10-02
Attending: ANESTHESIOLOGY
Payer: MEDICARE

## 2018-10-02 ENCOUNTER — APPOINTMENT (OUTPATIENT)
Dept: GENERAL RADIOLOGY | Age: 53
DRG: 208 | End: 2018-10-02
Attending: INTERNAL MEDICINE
Payer: MEDICARE

## 2018-10-02 LAB
ALBUMIN SERPL-MCNC: 2.5 G/DL (ref 3.5–5)
ALBUMIN/GLOB SERPL: 0.5 {RATIO} (ref 1.1–2.2)
ALP SERPL-CCNC: 117 U/L (ref 45–117)
ALT SERPL-CCNC: 73 U/L (ref 12–78)
ANION GAP SERPL CALC-SCNC: 10 MMOL/L (ref 5–15)
ARTERIAL PATENCY WRIST A: YES
AST SERPL-CCNC: 60 U/L (ref 15–37)
BACTERIA SPEC CULT: NORMAL
BASE DEFICIT BLD-SCNC: 2 MMOL/L
BDY SITE: ABNORMAL
BILIRUB SERPL-MCNC: 0.4 MG/DL (ref 0.2–1)
BUN SERPL-MCNC: 4 MG/DL (ref 6–20)
BUN/CREAT SERPL: 5 (ref 12–20)
CALCIUM SERPL-MCNC: 7.4 MG/DL (ref 8.5–10.1)
CC UR VC: NORMAL
CHLORIDE SERPL-SCNC: 109 MMOL/L (ref 97–108)
CO2 SERPL-SCNC: 22 MMOL/L (ref 21–32)
CREAT SERPL-MCNC: 0.76 MG/DL (ref 0.7–1.3)
ERYTHROCYTE [DISTWIDTH] IN BLOOD BY AUTOMATED COUNT: 13.2 % (ref 11.5–14.5)
GAS FLOW.O2 O2 DELIVERY SYS: ABNORMAL L/MIN
GAS FLOW.O2 SETTING OXYMISER: 2 L/M
GLOBULIN SER CALC-MCNC: 4.7 G/DL (ref 2–4)
GLUCOSE SERPL-MCNC: 78 MG/DL (ref 65–100)
HCO3 BLD-SCNC: 23.6 MMOL/L (ref 22–26)
HCT VFR BLD AUTO: 30.8 % (ref 36.6–50.3)
HGB BLD-MCNC: 10.3 G/DL (ref 12.1–17)
LEVETIRACETAM SERPL-MCNC: 14.3 UG/ML (ref 10–40)
MCH RBC QN AUTO: 34.3 PG (ref 26–34)
MCHC RBC AUTO-ENTMCNC: 33.4 G/DL (ref 30–36.5)
MCV RBC AUTO: 102.7 FL (ref 80–99)
NRBC # BLD: 0 K/UL (ref 0–0.01)
NRBC BLD-RTO: 0 PER 100 WBC
PCO2 BLD: 40.3 MMHG (ref 35–45)
PH BLD: 7.38 [PH] (ref 7.35–7.45)
PLATELET # BLD AUTO: 185 K/UL (ref 150–400)
PMV BLD AUTO: 9.6 FL (ref 8.9–12.9)
PO2 BLD: 142 MMHG (ref 80–100)
POTASSIUM SERPL-SCNC: 4.3 MMOL/L (ref 3.5–5.1)
PROT SERPL-MCNC: 7.2 G/DL (ref 6.4–8.2)
RBC # BLD AUTO: 3 M/UL (ref 4.1–5.7)
SAO2 % BLD: 99 % (ref 92–97)
SERVICE CMNT-IMP: NORMAL
SODIUM SERPL-SCNC: 141 MMOL/L (ref 136–145)
SPECIMEN TYPE: ABNORMAL
T3FREE SERPL-MCNC: 2.3 PG/ML (ref 2.2–4)
TSH SERPL DL<=0.05 MIU/L-ACNC: 2.33 UIU/ML (ref 0.36–3.74)
WBC # BLD AUTO: 7.5 K/UL (ref 4.1–11.1)

## 2018-10-02 PROCEDURE — 74011250636 HC RX REV CODE- 250/636: Performed by: PSYCHIATRY & NEUROLOGY

## 2018-10-02 PROCEDURE — 84481 FREE ASSAY (FT-3): CPT | Performed by: INTERNAL MEDICINE

## 2018-10-02 PROCEDURE — 65660000000 HC RM CCU STEPDOWN

## 2018-10-02 PROCEDURE — 84443 ASSAY THYROID STIM HORMONE: CPT | Performed by: INTERNAL MEDICINE

## 2018-10-02 PROCEDURE — 94640 AIRWAY INHALATION TREATMENT: CPT

## 2018-10-02 PROCEDURE — 85027 COMPLETE CBC AUTOMATED: CPT | Performed by: INTERNAL MEDICINE

## 2018-10-02 PROCEDURE — 77010033678 HC OXYGEN DAILY

## 2018-10-02 PROCEDURE — 74011250637 HC RX REV CODE- 250/637: Performed by: INTERNAL MEDICINE

## 2018-10-02 PROCEDURE — 71045 X-RAY EXAM CHEST 1 VIEW: CPT

## 2018-10-02 PROCEDURE — 80053 COMPREHEN METABOLIC PANEL: CPT | Performed by: INTERNAL MEDICINE

## 2018-10-02 PROCEDURE — 92610 EVALUATE SWALLOWING FUNCTION: CPT

## 2018-10-02 PROCEDURE — C9254 INJECTION, LACOSAMIDE: HCPCS | Performed by: PSYCHIATRY & NEUROLOGY

## 2018-10-02 PROCEDURE — 74011250636 HC RX REV CODE- 250/636: Performed by: HOSPITALIST

## 2018-10-02 PROCEDURE — 36415 COLL VENOUS BLD VENIPUNCTURE: CPT | Performed by: INTERNAL MEDICINE

## 2018-10-02 PROCEDURE — 77030018846 HC SOL IRR STRL H20 ICUM -A

## 2018-10-02 PROCEDURE — 82803 BLOOD GASES ANY COMBINATION: CPT

## 2018-10-02 PROCEDURE — 74011250636 HC RX REV CODE- 250/636: Performed by: INTERNAL MEDICINE

## 2018-10-02 PROCEDURE — 74011000258 HC RX REV CODE- 258: Performed by: HOSPITALIST

## 2018-10-02 PROCEDURE — 94664 DEMO&/EVAL PT USE INHALER: CPT

## 2018-10-02 PROCEDURE — 36600 WITHDRAWAL OF ARTERIAL BLOOD: CPT

## 2018-10-02 PROCEDURE — 74011000258 HC RX REV CODE- 258: Performed by: PSYCHIATRY & NEUROLOGY

## 2018-10-02 PROCEDURE — 74011000258 HC RX REV CODE- 258: Performed by: INTERNAL MEDICINE

## 2018-10-02 PROCEDURE — 74011000250 HC RX REV CODE- 250: Performed by: INTERNAL MEDICINE

## 2018-10-02 RX ORDER — FOSPHENYTOIN SODIUM 50 MG/ML
100 INJECTION, SOLUTION INTRAMUSCULAR; INTRAVENOUS EVERY 12 HOURS
Status: DISCONTINUED | OUTPATIENT
Start: 2018-10-02 | End: 2018-10-03

## 2018-10-02 RX ADMIN — SODIUM CHLORIDE 1500 MG: 900 INJECTION, SOLUTION INTRAVENOUS at 01:28

## 2018-10-02 RX ADMIN — PIPERACILLIN SODIUM,TAZOBACTAM SODIUM 3.38 G: 3; .375 INJECTION, POWDER, FOR SOLUTION INTRAVENOUS at 21:49

## 2018-10-02 RX ADMIN — SODIUM CHLORIDE 150 MG: 900 INJECTION, SOLUTION INTRAVENOUS at 00:09

## 2018-10-02 RX ADMIN — CHLORHEXIDINE GLUCONATE 15 ML: 1.2 RINSE ORAL at 09:08

## 2018-10-02 RX ADMIN — PIPERACILLIN SODIUM,TAZOBACTAM SODIUM 3.38 G: 3; .375 INJECTION, POWDER, FOR SOLUTION INTRAVENOUS at 14:17

## 2018-10-02 RX ADMIN — LACTULOSE 20 G: 20 SOLUTION ORAL at 19:00

## 2018-10-02 RX ADMIN — Medication 10 ML: at 21:44

## 2018-10-02 RX ADMIN — ALBUTEROL SULFATE 2.5 MG: 2.5 SOLUTION RESPIRATORY (INHALATION) at 07:51

## 2018-10-02 RX ADMIN — SODIUM CHLORIDE 125 ML/HR: 450 INJECTION, SOLUTION INTRAVENOUS at 12:49

## 2018-10-02 RX ADMIN — PHENYTOIN SODIUM 100 MG: 50 INJECTION INTRAMUSCULAR; INTRAVENOUS at 09:07

## 2018-10-02 RX ADMIN — ALBUTEROL SULFATE 2.5 MG: 2.5 SOLUTION RESPIRATORY (INHALATION) at 21:14

## 2018-10-02 RX ADMIN — SODIUM CHLORIDE 1500 MG: 900 INJECTION, SOLUTION INTRAVENOUS at 14:19

## 2018-10-02 RX ADMIN — ALBUTEROL SULFATE 2.5 MG: 2.5 SOLUTION RESPIRATORY (INHALATION) at 14:07

## 2018-10-02 RX ADMIN — ALBUTEROL SULFATE 2.5 MG: 2.5 SOLUTION RESPIRATORY (INHALATION) at 01:31

## 2018-10-02 RX ADMIN — FAMOTIDINE 20 MG: 10 INJECTION, SOLUTION INTRAVENOUS at 21:43

## 2018-10-02 RX ADMIN — Medication 10 ML: at 14:17

## 2018-10-02 RX ADMIN — FAMOTIDINE 20 MG: 10 INJECTION, SOLUTION INTRAVENOUS at 09:03

## 2018-10-02 RX ADMIN — PIPERACILLIN SODIUM,TAZOBACTAM SODIUM 3.38 G: 3; .375 INJECTION, POWDER, FOR SOLUTION INTRAVENOUS at 06:53

## 2018-10-02 RX ADMIN — ENOXAPARIN SODIUM 40 MG: 40 INJECTION, SOLUTION INTRAVENOUS; SUBCUTANEOUS at 14:17

## 2018-10-02 RX ADMIN — FOSPHENYTOIN SODIUM 100 MG PE: 50 INJECTION, SOLUTION INTRAMUSCULAR; INTRAVENOUS at 21:43

## 2018-10-02 RX ADMIN — SODIUM CHLORIDE 125 ML/HR: 450 INJECTION, SOLUTION INTRAVENOUS at 21:16

## 2018-10-02 RX ADMIN — SODIUM CHLORIDE 150 MG: 900 INJECTION, SOLUTION INTRAVENOUS at 23:18

## 2018-10-02 RX ADMIN — SODIUM CHLORIDE 150 MG: 900 INJECTION, SOLUTION INTRAVENOUS at 11:17

## 2018-10-02 RX ADMIN — Medication 10 ML: at 06:00

## 2018-10-02 NOTE — PROGRESS NOTES
Bedside and Verbal shift change report given to Jeff Cardenas RN (oncoming nurse) by Ariana Enriquez  (offgoing nurse). Report included the following information SBAR, Kardex, Intake/Output, MAR, Recent Results, Cardiac Rhythm Sinus tach and Alarm Parameters . 2000 VSS. Patient drowsy. No command following. Moves RUE and LUE spontaneously. Peripheral access. Conklin.

## 2018-10-02 NOTE — PROGRESS NOTES
Problem: Dysphagia (Adult) Goal: *Acute Goals and Plan of Care (Insert Text) Speech pathology goals Initiated 10/2/2018 1. Patient will tolerate dysphagia 2/thin liquid diet with no overt s/s aspiration within 7 days Speech LAnguage Pathology bedside swallow evaluation Patient: Ganga Norman (78 y.o. male) Date: 10/2/2018 Primary Diagnosis: Seizure ([facility] [address] Precautions: swallow ASSESSMENT : 
Patient with Down syndrome, s/p 2-day intubation due to seizures. At baseline, patient self-feeds dysphagia 2/thin liquid diet. Patient is nonverbal. Brother, Christy Bejarano, present at bedside and reported that patient with some pocketing with solids which is baseline. Brother also reported patient eats slowly. Based on the objective data described below, the patient presents with suspected baseline swallow function. Patient with moderate oral dysphagia characterized by prolonged mastication, intermittent oral holding, suspected premature spillage, and delayed posterior propulsion. Patient with mild lingual and lateral residue, and suspect some residue spilled into the pharynx after the swallow. Patient also with mild pharyngeal dysphagia characterized by delayed swallow initiation. Patient with delayed cough x1, suspect related to oral residue. No additional s/s aspiration observed, however patient will always be at risk for aspiration secondary to Down syndrome and cognitive status. Patient will benefit from skilled intervention to address the above impairments. Patients rehabilitation potential is considered to be Fair Factors which may influence rehabilitation potential include:  
[]            None noted [x]            Mental ability/status [x]            Medical condition []            Home/family situation and support systems [x]            Safety awareness 
[]            Pain tolerance/management []            Other: PLAN : 
Recommendations and Planned Interventions: --Dysphagia 2/thin liquid diet. If extensive residue noted, may require downgrade to puree diet 
--Strict 1:1 supervision with all PO intake 
--Straws ok 
--Meds crushed 
--SLP to follow for diet tolerance Frequency/Duration: Patient will be followed by speech-language pathology 3 times a week to address goals. Discharge Recommendations: None SUBJECTIVE:  
Patient nonverbal. Laughs and smiles when brother says \"Good googly moogly! \" OBJECTIVE:  
 
Past Medical History:  
Diagnosis Date  Anemia  Anxiety  Blindness of left eye  Cerebral palsy ([address]  DEMENTIA  Down syndrome  Endocrine disease   
 thyroid disorder  GERD (gastroesophageal reflux disease)  Ill-defined condition   
 blind in left eye  Ill-defined condition   
 imparied gait - uses walker  Ill-defined condition   
 dermatitis  Ill-defined condition   
 cerebral palsy  Ill-defined condition   
 prone to decubitus ulcers  Ill-defined condition   
 anemia  Neurological disorder  Other ill-defined conditions(799.89) Down syndrome  Psychiatric disorder   
 mental retardation  Psychiatric disorder   
 anxiety disorder  Seizures ([address]  Sleep apnea  Thyroid disease  Unspecified epilepsy without mention of intractable epilepsy Past Surgical History:  
Procedure Laterality Date  HX OTHER SURGICAL  03/13/2015 VNS  Dr. Rodri Leung. [facility]  @ [facility] Prior Level of Function/Home Situation:  
Home Situation Home Environment: Group home Care Facility Name: [facility] One/Two Story Residence: One story Living Alone: No 
Support Systems: Other (comments) (group home) Patient Expects to be Discharged to[de-identified] Group home Current DME Used/Available at Home: Raised toilet seat, Walker (gait belt) Diet prior to admission: dysphagia 2/thin liquid Current Diet:  NPO Cognitive and Communication Status: 
Neurologic State: Alert Orientation Level: Unable to verbalize (nonverbal at baseline) Cognition: Decreased command following, Decreased attention/concentration Perseveration: No perseveration noted Safety/Judgement: Not assessed Oral Assessment: 
Oral Assessment Labial: Other (comment) (unable to assess; poor command following) Dentition: Natural;Limited Oral Hygiene: moist oral mucosa P.O. Trials: 
Patient Position: upright in bed Vocal quality prior to P.O.: Other (comment) (no verbalizations) Consistency Presented: Ice chips; Thin liquid;Puree; Solid How Presented: Self-fed/presented;Straw;Successive swallows;Spoon Bolus Acceptance: No impairment Bolus Formation/Control: Impaired Type of Impairment: Delayed; Incomplete;Mastication;Premature spillage;Piecemeal 
Propulsion: Delayed (# of seconds) Oral Residue: Less than 10% of bolus; Lingual 
Initiation of Swallow: Delayed (# of seconds) Laryngeal Elevation: Functional 
Aspiration Signs/Symptoms: Strong cough (x1, ?related to oral residue) Pharyngeal Phase Characteristics: Multiple swallows (suspect due to piecemeal swallowing) Effective Modifications: Alternate liquids/solids Cues for Modifications: Moderate Oral Phase Severity: Moderate Pharyngeal Phase Severity : Mild NOMS:  
The NOMS functional outcome measure was used to quantify this patient's level of swallowing impairment. Based on the NOMS, the patient was determined to be at level 4 for swallow function G Codes: In compliance with CMSs Claims Based Outcome Reporting, the following G-code set was chosen for this patient based the use of the NOMS functional outcome to quantify this patient's level of swallowing impairment. Using the NOMS, the patient was determined to be at level 4 for swallow function which correlates with the CK= 40-59% level of severity. Based on the objective assessment provided within this note, the current, goal, and discharge g-codes are as follows: Swallow  Swallowing: 
 Swallow Current Status CK= 40-59%  Swallow Goal Status CK= 40-59% NOMS Swallowing Levels: 
Level 1 (CN): NPO Level 2 (CM): NPO but takes consistency in therapy Level 3 (CL): Takes less than 50% of nutrition p.o. and continues with nonoral feedings; and/or safe with mod cues; and/or max diet restriction Level 4 (CK): Safe swallow but needs mod cues; and/or mod diet restriction; and/or still requires some nonoral feeding/supplements Level 5 (CJ): Safe swallow with min diet restriction; and/or needs min cues Level 6 (CI): Independent with p.o.; rare cues; usually self cues; may need to avoid some foods or needs extra time Level 7 (CH): Independent for all p.o. NATALIA. (2003). National Outcomes Measurement System (NOMS): Adult Speech-Language Pathology User's Guide. Pain: 
Pain Scale 1: Adult Nonverbal Pain Scale Pain Intensity 1: 0 After treatment:  
[]            Patient left in no apparent distress sitting up in chair 
[x]            Patient left in no apparent distress in bed 
[x]            Call bell left within reach [x]            Nursing notified 
[x]            Caregiver present 
[]            Bed alarm activated COMMUNICATION/EDUCATION:  
The patients plan of care including recommendations, planned interventions, and recommended diet changes were discussed with: Registered Nurse. [x]            Patient/family have participated as able in goal setting and plan of care. [x]            Patient/family agree to work toward stated goals and plan of care. []            Patient understands intent and goals of therapy, but is neutral about his/her participation. []            Patient is unable to participate in goal setting and plan of care. Thank you for this referral. 
ANDREW Barrett Time Calculation: 20 mins

## 2018-10-02 NOTE — PROGRESS NOTES
TRANSFER - OUT REPORT: 
 
Verbal report given to Marycruz Mcnulty RN (name) on Brad Sal  being transferred to NSTU(unit) for routine progression of care. Report consisted of patients Situation, Background, Assessment and  
Recommendations(SBAR). Information from the following report(s) SBAR was reviewed with the receiving nurse. Lines:  
Quad Lumen 10/02/18 Right Subclavian (Active) Central Line Being Utilized Yes 10/2/2018 12:00 PM  
Criteria for Appropriate Use Limited/no vessel suitable for conventional peripheral access 10/2/2018 12:00 PM  
Site Assessment Clean, dry, & intact 10/2/2018 12:00 PM  
Infiltration Assessment 0 10/2/2018 12:00 PM  
Affected Extremity/Extremities Color distal to insertion site pink (or appropriate for race) 10/2/2018 12:00 PM  
Date of Last Dressing Change 10/02/18 10/2/2018 12:00 PM  
Dressing Status Clean, dry, & intact 10/2/2018 12:00 PM  
Dressing Type Disk with Chlorhexadine gluconate (CHG) 10/2/2018 12:00 PM  
Action Taken Open ports on tubing capped 10/2/2018 12:00 PM  
Proximal Hub Color/Line Status White 10/2/2018 12:00 PM  
Positive Blood Return (Medial Site) Yes 10/2/2018  5:30 AM  
Medial 1 Hub Color/Line Status Selman [place] 10/2/2018 12:00 PM  
Positive Blood Return (Lateral Site) Yes 10/2/2018  5:30 AM  
Medial 2 Hub Color/Line Status Blue 10/2/2018 12:00 PM  
Positive Blood Return (Site #3) Yes 10/2/2018  5:30 AM  
Distal Hub Color/Line Status Brown 10/2/2018 12:00 PM  
Positive Blood Return (Site #4) Yes 10/2/2018  5:30 AM  
Alcohol Cap Used Yes 10/2/2018  5:30 AM  
  
 
Opportunity for questions and clarification was provided. Patient transported with: 
 phorus

## 2018-10-02 NOTE — CONSULTS
[address]    Corinne Patten.  MR#: [id number]  : 1965  ACCOUNT #: [de-identified]   DATE OF SERVICE: 10/01/2018    HISTORY OF PRESENT ILLNESS:  This is a 59-year-old left-handed male who presented to the emergency department yesterday evening in status epilepticus. Patient is seen with his brother at the bedside who provides all of the history. The patient has Down syndrome and history of epilepsy and is nonverbal at baseline, ambulating with a walker and a gait belt, does not ambulate alone. He was reportedly in his normal state of health, doing his usual activities, stacking and arranging blocks, just prior to onset of seizure. He was noted start seizing at 11:20 a.m., continued to seize until EMS arrived. They gave him 5 mg of Versed IM without resolution. In the emergency department, an IV was placed and received Ativan 2 mg but yet continued to have tonic-clonic activity. Therefore, he was given etomidate and rocuronium and intubated and placed on propofol drip without subsequent clinical seizure activity seen. He had a stat EEG last night to ensure he was not in subclinical status at that point and this was negative. Patient's last seizure was about 6 months ago in March. He was seen by Dr. Kylie Jerry in the clinic  and at that time family reported about one seizure a month that consisted of staring, followed by general tonic-clonic activity lasting for about 30 seconds followed by mild altered mental status for 1-2 minutes. Patient was on Dilantin 100 mg b.i.d., Keppra 1500 mg b.i.d. and Depakote at his March visit, but labs revealed an ammonia level of 136, Depakote level was 69. Dr. Kylie Jerry stopped Depakote and switched him to Vimpat 100 mg b.i.d. He had been doing well on current AEDs since then. His Keppra level yesterday is pending. His Dilantin level is only 4.9,  in April it was 14.9, but one month prior it was only 6.4.   His Vimpat level in followup in April was 8.3. His brother denies that the patient has had any recent illness. He may be sleep deprived, but this is baseline because he has untreated obstructive sleep apnea, unable to tolerate CPAP. His blood pressure at presentation was 229/121, it dropped down into the 70s/40s overnight for unclear reasons and he was started on yimi drip, however, this has subsequently been stopped with stabilization of his blood pressures. Patient is currently intubated, off sedation and undergoing a spontaneous breathing trial.    PAST MEDICAL HISTORY:  1. Down syndrome, epilepsy. 2.  Nonverbal.  3.  Ambulates with walker. 4. VNS placed. 5.  Left eye blindness. 6.  Gastroesophageal reflux disease. 7.  Hypothyroidism. 8.  Anemia. 9.  History of decubitus ulcer. 10.  Obstructive sleep apnea, untreated. REVIEW OF SYSTEMS:  Cannot be obtained secondary to patient factors. MEDICATIONS:  At home:  Keppra 1500 mg b.i.d., Dilantin suspension 100 mg b.i.d., Vimpat 100 mg b.i.d., vitamin D, Flonase, hydrocodone, Synthroid, vitamin C, Claritin, iron, multivitamins. Here, Zosyn and Levaquin had been given. Again, he was on propofol, now stopped. ALLERGIES:  MORPHINE AND TEGRETOL. SOCIAL HISTORY:  Lives in a group home. No tobacco, alcohol or drug use. FAMILY HISTORY:  Mom hypertension, lupus, osteoarthritis. Dad heart disease, diabetes, hypertension, hypercholesterolemia. Brother, hypercholesterolemia, diabetes, hypertension. His parents are . His brother is his power of . PHYSICAL EXAMINATION:  VITAL SIGNS:  Blood pressure currently 155/68, pulse 55, respiratory rate 12, satting 100% on room air, temperature is 99. GENERAL:  He is a well-nourished, well-developed male lying in bed, intubated, in no obvious distress. CARDIOVASCULAR:  Has regular rate and rhythm without murmurs. Carotids 2+. No bruits. EXTREMITIES:  Warm. He has 2+ radial pulses.   NEUROLOGIC: Mental status:  He is alert. He follows some simple commands but does not follow commands consistently. Speech and language cannot be assessed. He is nonverbal at baseline. Cranial nerves:  No obvious facial asymmetry. Pupils were round and reactive. Tongue was protruded. Motor exam:  He has 5/5  on the left. He has minimal  effort on the right. His brother notes he uses his left a lot more than his right at baseline. He moves his bilateral lower extremities minimally but equally. Reflexes were 2+ and symmetric in the upper extremities, unable to elicit in the lower extremities. Toes are downgoing. Coordination and gait cannot be assessed due to patient factors. STUDIES AND REPORTS:  In addition to that mentioned above in the HPI his CMP today with a potassium of 3.2, glucose 124, calcium 7, , AST 90. At presentation glucose was 238. Liver enzymes were normal.  Again, phenytoin level 4.9. Keppra level pending. Lactic acid was 22.1. . Troponin normal.  Magnesium 2.9. Urine drug screen positive for benzos. He had been given these in the ambulance. Blood cultures are pending. CBC today:  White count is normal, hemoglobin 10.2. ASSESSMENT AND PLAN:  This is a 49-year-old [address] male with Down syndrome and epilepsy on Keppra, Dilantin and Vimpat and status post VNS presenting in status epilepticus with out any inciting event or cause for lowered seizure threshold. Exam is limited due to intubated state and patient factors. He does move his left upper extremity more than the right, but this may be baseline. Otherwise, no obvious focal deficits. Recommend evaluation for underlying infection or metabolic abnormalities that may have led to lowered seizure threshold. So far no obvious etiology identified. Continue Keppra 1500 mg b.i.d., Dilantin 100 mg b.i.d. and increase Vimpat to 150 mg b.i.d. We will assess VNS either later today or tomorrow.       Josiah Richmond MD  / Raiza Ozuna  D: 10/01/2018 21:44     T: 10/01/2018 22:12  JOB #: [id number]

## 2018-10-02 NOTE — PROGRESS NOTES
Central Line Procedure Note Indication: Inadequate venous access Sepsis protocol Risks, benefits, alternatives explained and patient agrees to proceed. Patient positioned in Trendelenburg. 7-Step Sterility Protocol followed. (cap, mask sterile gown, sterile gloves, large sterile sheet, hand hygiene, 2% chlorhexidine for cutaneous antisepsis) 5 mL 1% Lidocaine placed at insertion site. Right subclavian cannulated x 1 attempt(s) utilizing the Seldinger technique. Catheter secured with suture & Biopatch and sterile Tegaderm applied. All ports were aspirated and flushed. CXR pending.

## 2018-10-02 NOTE — PROGRESS NOTES
Problem: Pressure Injury - Risk of 
Goal: *Prevention of pressure injury Document Salinas Scale and appropriate interventions in the flowsheet. Outcome: Progressing Towards Goal 
Pressure Injury Interventions: 
Sensory Interventions: Avoid rigorous massage over bony prominences, Check visual cues for pain, Float heels, Keep linens dry and wrinkle-free, Minimize linen layers, Monitor skin under medical devices, Turn and reposition approx. every two hours (pillows and wedges if needed), Assess changes in LOC Activity Interventions: Pressure redistribution bed/mattress(bed type), PT/OT evaluation, Increase time out of bed Mobility Interventions: HOB 30 degrees or less, Pressure redistribution bed/mattress (bed type), PT/OT evaluation Nutrition Interventions: Document food/fluid/supplement intake Friction and Shear Interventions: Feet elevated on foot rest, Apply protective barrier, creams and emollients, Lift sheet, Transferring/repositioning devices, HOB 30 degrees or less

## 2018-10-02 NOTE — PROGRESS NOTES
Bedside and Verbal shift change report given to Willa Machado RN (oncoming nurse) by Carina Bedolla RN (offgoing nurse). Report included the following information SBAR, Kardex, MAR, Recent Results and Cardiac Rhythm NSR.

## 2018-10-02 NOTE — PROGRESS NOTES
Hospitalist Progress Note Taylor Love MD 
Answering service: [phone] OR [phone] from in house phone Cell: [phone] Date of Service:  10/2/2018 NAME:  Michelle Hernandez :  1965 MRN:  [id number] Admission Summary:  
Patient is a 66-year-old male with past medical history of seizure, dementia, Down syndrome, cerebral palsy, hypothyroid, anemia, who presented from a group home via EMS with a complaint of seizure. En route as per EMS, the patient was in status epilepticus and given 5 mg of Versed in the ED. After IV access obtained, 2 mg of Ativan was given, and the patient continued seizing actively, so to protect airway the patient was intubated with etomidate and rocuronium. The patient was intubated, placed on vent. Consulted Neurology and found to have a low level of phenytoin. The patient was loaded with phenytoin and a 500 mg Keppra was also given in the ER. The patient was sent for ruling out other issues related to seizure. He was getting a CT scan at the time of exam.  The patient was admitted to ICU for subsequent management. Interval history / Subjective:  
 moved to the floor today, in no distress, not communicating much with CP. Assessment & Plan:  
 
Status epilepticus 
-CT head  No acute changes 
-On Keppra/Dilantin 
-Seizure/Aspiration precautions -EEG reviewed, consistent with generalized encephalopathy 
-Neurology Following 
-no more seizure noted Hypotension- resolved, off pressors Acute respiratory failure- resolved, extubated Hypokalemia-replace as needed Mild renal dysfunction - resolved Transaminitis- improving, Monitor, would hold work up for now Down's syndrome Hypothyroidism-follow TFTs Code status: FULL CODE 
DVT prophylaxis: scd PTA: Group home Care Plan discussed with: Patient/Family Disposition: TBD Hospital Problems  Date Reviewed: 2018 Codes Class Noted POA * (Principal)Seizure ([facility] [address] ICD-10-CM: R56.9 ICD-9-CM: 780.39  11/22/2013 Yes Review of Systems: A comprehensive review of systems was negative except for that written in the HPI. Vital Signs:  
 Last 24hrs VS reviewed since prior progress note. Most recent are: 
Visit Vitals  BP (!) 150/92 (BP 1 Location: Right arm, BP Patient Position: At rest)  Pulse (!) 101  Temp 99.2 °F (37.3 °C)  Resp 21  
 Ht 5' 2.99\" (1.6 m)  Wt 71.7 kg (158 lb)  SpO2 100%  BMI 28 kg/m2 Intake/Output Summary (Last 24 hours) at 10/02/18 1731 Last data filed at 10/02/18 1626 Gross per 24 hour Intake             1790 ml Output             4575 ml Net            -2785 ml Physical Examination:  
 
 
     
Constitutional:  No acute distress, extubated ENT:  Oral mucous moist, oropharynx benign. Neck supple, Resp:  CTA bilaterally. No wheezing/rhonchi/rales. No accessory muscle use CV:  Regular rhythm, normal rate, no murmurs, gallops, rubs GI:  Soft, non distended, non tender. normoactive bowel sounds Musculoskeletal:  No edema, warm, Neurologic:  alert, no focal neurodeficits,not communicating much. Skin:  Good turgor, no rashes or ulcers Data Review:  
 Review and/or order of clinical lab test 
 
 
Labs:  
 
Recent Labs 10/02/18 
 7916  10/01/18 
 5511 WBC  7.5  6.6 HGB  10.3*  10.9* HCT  30.8*  31.8*  
PLT  185  212 Recent Labs 10/02/18 
 0915  10/01/18 
 0912  10/01/18 
 3344  09/30/18 
 1222 NA  141  140  142  140  
K  4.3  3.2*  2.6*  3.5 CL  109*  108  110*  99  
CO2  22  26  23  14* BUN  4*  5*  5*  7 CREA  0.76  0.82  0.71  1.25  
GLU  78  124*  111*  238* CA  7.4*  7.0*  6.0*  8.9 MG   --    --    --   2.9* Recent Labs 10/02/18 
 6790  10/01/18 
 0912 09/30/18 
 1222 SGOT  60*  90*  34 ALT  73  116*  46 AP  117  132*  186* TBILI  0.4  0.4  0.2 TP  7.2  7.5  10.1* ALB  2.5*  2.6*  3.5 GLOB  4.7*  4.9*  6.6* No results for input(s): INR, PTP, APTT in the last 72 hours. No lab exists for component: INREXT, INREXT No results for input(s): FE, TIBC, PSAT, FERR in the last 72 hours. Lab Results Component Value Date/Time Folate 8.5 01/19/2015 01:06 PM  
  
No results for input(s): PH, PCO2, PO2 in the last 72 hours. Recent Labs  
   09/30/18 
 1222 CPK  445* CKNDX  0.7  
TROIQ  <0.05 Lab Results Component Value Date/Time Cholesterol, total 271 (H) 12/10/2015 12:30 PM  
 HDL Cholesterol 64 12/10/2015 12:30 PM  
 LDL, calculated 183 (H) 12/10/2015 12:30 PM  
 Triglyceride 120 12/10/2015 12:30 PM  
 
Lab Results Component Value Date/Time Glucose (POC) 237 (H) 09/30/2018 12:51 PM  
 Glucose (POC) 239 (H) 09/30/2018 12:25 PM  
 Glucose (POC) 98 02/11/2009 12:15 PM  
 
Lab Results Component Value Date/Time Color YELLOW/STRAW 02/03/2018 10:16 AM  
 Appearance CLOUDY (A) 02/03/2018 10:16 AM  
 Specific gravity 1.018 02/03/2018 10:16 AM  
 pH (UA) 8.0 02/03/2018 10:16 AM  
 Protein NEGATIVE  02/03/2018 10:16 AM  
 Glucose NEGATIVE  02/03/2018 10:16 AM  
 Ketone NEGATIVE  02/03/2018 10:16 AM  
 Bilirubin NEGATIVE  02/03/2018 10:16 AM  
 Urobilinogen 0.2 02/03/2018 10:16 AM  
 Nitrites POSITIVE (A) 02/03/2018 10:16 AM  
 Leukocyte Esterase SMALL (A) 02/03/2018 10:16 AM  
 Epithelial cells FEW 02/03/2018 10:16 AM  
 Bacteria NEGATIVE  02/03/2018 10:16 AM  
 WBC 10-20 02/03/2018 10:16 AM  
 RBC 0-5 02/03/2018 10:16 AM  
 
 
 
Medications Reviewed:  
 
Current Facility-Administered Medications Medication Dose Route Frequency  fosphenytoin (CEREBYX) injection 100 mg PE  100 mg PE IntraVENous Q12H  
 albuterol (PROVENTIL VENTOLIN) nebulizer solution 2.5 mg  2.5 mg Nebulization Q6H RT  
 lactulose (CHRONULAC) solution 20 g  20 g Oral BID  
 0.45% sodium chloride infusion  125 mL/hr IntraVENous CONTINUOUS  
  famotidine (PF) (PEPCID) 20 mg in sodium chloride 0.9% 10 mL injection  20 mg IntraVENous Q12H  
 lacosamide (VIMPAT) 150 mg in 0.9% sodium chloride 100 mL IVPB  150 mg IntraVENous Q12H  
 scopolamine (TRANSDERM-SCOP) 1 mg over 3 days 1 Patch  1 Patch TransDERmal Q72H  sodium chloride (NS) flush 5-10 mL  5-10 mL IntraVENous Q8H  
 sodium chloride (NS) flush 5-10 mL  5-10 mL IntraVENous PRN  
 enoxaparin (LOVENOX) injection 40 mg  40 mg SubCUTAneous Q24H  
 LORazepam (ATIVAN) injection 1 mg  1 mg IntraVENous Q2H PRN  
 ferrous sulfate tablet 325 mg  325 mg Oral ACB  loratadine (CLARITIN) tablet 10 mg  10 mg Oral DAILY  ascorbic acid (vitamin C) (VITAMIN C) tablet 500 mg  500 mg Oral DAILY  multivit-folic acid-herbal 346 (WELLESSE PLUS) oral liquid 30 mL  30 mL Oral DAILY  sodium chloride (OCEAN) 0.65 % nasal squeeze bottle 1 Spray  1 Spray Both Nostrils PRN  
 levETIRAcetam (KEPPRA) 1,500 mg in 0.9% sodium chloride 100 mL IVPB  1,500 mg IntraVENous Q12H  piperacillin-tazobactam (ZOSYN) 3.375 g in 0.9% sodium chloride (MBP/ADV) 100 mL  3.375 g IntraVENous Q8H  
 influenza vaccine 2018-19 (6 mos+)(PF) (FLUARIX QUAD/FLULAVAL QUAD) injection 0.5 mL  0.5 mL IntraMUSCular PRIOR TO DISCHARGE  acetaminophen (TYLENOL) solution 650 mg  650 mg Per NG tube Q4H PRN  
 
______________________________________________________________________ EXPECTED LENGTH OF STAY: 4d 4h 
ACTUAL LENGTH OF STAY:          2 Jennifer Ceja MD

## 2018-10-02 NOTE — PROGRESS NOTES
Problem: Falls - Risk of 
Goal: *Absence of Falls Document Aleksandr Levels Fall Risk and appropriate interventions in the flowsheet. Outcome: Progressing Towards Goal 
Fall Risk Interventions: 
  
 
Mentation Interventions: Door open when patient unattended, Reorient patient, More frequent rounding, Increase mobility, Toileting rounds, Room close to nurse's station, Update white board Medication Interventions: Patient to call before getting OOB Elimination Interventions: Call light in reach Problem: Pressure Injury - Risk of 
Goal: *Prevention of pressure injury Document Salinas Scale and appropriate interventions in the flowsheet. Outcome: Progressing Towards Goal 
Pressure Injury Interventions: 
Sensory Interventions: Assess changes in LOC, Avoid rigorous massage over bony prominences, Keep linens dry and wrinkle-free, Minimize linen layers, Pad between skin to skin, Turn and reposition approx. every two hours (pillows and wedges if needed) Activity Interventions: Pressure redistribution bed/mattress(bed type) Mobility Interventions: Assess need for specialty bed, HOB 30 degrees or less, Turn and reposition approx. every two hours(pillow and wedges) Nutrition Interventions: Document food/fluid/supplement intake Friction and Shear Interventions: HOB 30 degrees or less, Transferring/repositioning devices

## 2018-10-02 NOTE — PROGRESS NOTES
Problem: Falls - Risk of 
Goal: *Absence of Falls Document Alek Kathystu Fall Risk and appropriate interventions in the flowsheet. Outcome: Progressing Towards Goal 
Fall Risk Interventions: 
  
 
Mentation Interventions: Door open when patient unattended, Reorient patient, More frequent rounding, Increase mobility, Toileting rounds, Room close to nurse's station, Update white board Medication Interventions: Patient to call before getting OOB Elimination Interventions: Call light in reach Problem: Pressure Injury - Risk of 
Goal: *Prevention of pressure injury Document Salinas Scale and appropriate interventions in the flowsheet. Outcome: Progressing Towards Goal 
Pressure Injury Interventions: 
Sensory Interventions: Assess changes in LOC, Avoid rigorous massage over bony prominences, Keep linens dry and wrinkle-free, Minimize linen layers, Pad between skin to skin, Turn and reposition approx. every two hours (pillows and wedges if needed) Activity Interventions: Pressure redistribution bed/mattress(bed type) Mobility Interventions: Assess need for specialty bed, HOB 30 degrees or less, Turn and reposition approx. every two hours(pillow and wedges) Nutrition Interventions: Document food/fluid/supplement intake Friction and Shear Interventions: HOB 30 degrees or less, Transferring/repositioning devices

## 2018-10-02 NOTE — CONSULTS
Neurology Progress Note     NAME: Remberto Patino   :  1965   MRN:  [id number]   DATE:  10/2/2018    Assessment:     Principal Problem:    Seizure ([address] (2013)      Pt is a 50yo LH male with Down syndrome and epilepsy on Keppra, Dilantin and Vimpat and status post VNS presenting in status epilepticus with out any inciting event or cause for lowered seizure threshold. Plan:   Continue Keppra 1500 mg b.i.d.,   Continue Dilantin 100 mg b.i.d. Continue Vimpat 150 mg b.i.d. Will change meds to PO  VNS check in AM    Subjective:   Pt unable to provide history, non-verbal. RN reports no seizures overnight. Pt now taking POs.   Pt has been extubated and transferred out of ICU    Objective:   Chart reviewed since last seen    Current Facility-Administered Medications   Medication Dose Route Frequency    fosphenytoin (CEREBYX) injection 100 mg PE  100 mg PE IntraVENous Q12H    albuterol (PROVENTIL VENTOLIN) nebulizer solution 2.5 mg  2.5 mg Nebulization Q6H RT    lactulose (CHRONULAC) solution 20 g  20 g Oral BID    0.45% sodium chloride infusion  125 mL/hr IntraVENous CONTINUOUS    famotidine (PF) (PEPCID) 20 mg in sodium chloride 0.9% 10 mL injection  20 mg IntraVENous Q12H    lacosamide (VIMPAT) 150 mg in 0.9% sodium chloride 100 mL IVPB  150 mg IntraVENous Q12H    scopolamine (TRANSDERM-SCOP) 1 mg over 3 days 1 Patch  1 Patch TransDERmal Q72H    sodium chloride (NS) flush 5-10 mL  5-10 mL IntraVENous Q8H    sodium chloride (NS) flush 5-10 mL  5-10 mL IntraVENous PRN    enoxaparin (LOVENOX) injection 40 mg  40 mg SubCUTAneous Q24H    LORazepam (ATIVAN) injection 1 mg  1 mg IntraVENous Q2H PRN    ferrous sulfate tablet 325 mg  325 mg Oral ACB    loratadine (CLARITIN) tablet 10 mg  10 mg Oral DAILY    ascorbic acid (vitamin C) (VITAMIN C) tablet 500 mg  500 mg Oral DAILY    multivit-folic acid-herbal 527 (WELLESSE PLUS) oral liquid 30 mL  30 mL Oral DAILY    sodium chloride (OCEAN) 0.65 % nasal squeeze bottle 1 Spray  1 Spray Both Nostrils PRN    levETIRAcetam (KEPPRA) 1,500 mg in 0.9% sodium chloride 100 mL IVPB  1,500 mg IntraVENous Q12H    piperacillin-tazobactam (ZOSYN) 3.375 g in 0.9% sodium chloride (MBP/ADV) 100 mL  3.375 g IntraVENous Q8H    influenza vaccine 2018- (6 mos+)(PF) (FLUARIX QUAD/FLULAVAL QUAD) injection 0.5 mL  0.5 mL IntraMUSCular PRIOR TO DISCHARGE    acetaminophen (TYLENOL) solution 650 mg  650 mg Per NG tube Q4H PRN       Visit Vitals    BP (!) 150/92 (BP 1 Location: Right arm, BP Patient Position: At rest)    Pulse (!) 101    Temp 99.2 °F (37.3 °C)    Resp 21    Ht 5' 2.99\" (1.6 m)  Comment: documentation from 18    Wt 71.7 kg (158 lb)    SpO2 100%    BMI 28 kg/m2     Temp (24hrs), Av.2 °F (37.3 °C), Min:98.8 °F (37.1 °C), Max:100 °F (37.8 °C)      10/02 0701 - 10/02 1900  In: 75 [P.O.:75]  Out: 2750 [Urine:2750]  1901 - 10/02 0700  In: 5839.4 [I.V.:5589.4]  Out: 4200 [Urine:4200]      Physical Exam:  General: Well developed well nourished patient in no apparent distress. Cardiac: Regular rate and rhythm with no murmurs. Extremities: 2+ Radial pulses, no cyanosis or edema    Neurological Exam:  Mental Status: Pt says hello. Smiles, follows a few commands. Cranial Nerves:   EOMI to spontaneous movement, no nystagmus, no ptosis. Facial movement is symmetric. Unable to visualize palate, would not protrude tongue.     Motor:  5/5 strength in right hand, moves bilateral legs, but would not activate muscles for me   Reflexes:   Deep tendon reflexes 1+ and symmetric   Sensory:   Unable to assess due to patient factors   Gait:  Unable to assess due to patient factors   Cerebellar:  Unable to assess due to patient factors         Lab Review   Recent Results (from the past 24 hour(s))   METABOLIC PANEL, COMPREHENSIVE    Collection Time: 10/02/18  4:20 AM   Result Value Ref Range    Sodium 141 136 - 145 mmol/L    Potassium 4.3 3.5 - 5.1 mmol/L    Chloride 109 (H) 97 - 108 mmol/L    CO2 22 21 - 32 mmol/L    Anion gap 10 5 - 15 mmol/L    Glucose 78 65 - 100 mg/dL    BUN 4 (L) 6 - 20 MG/DL    Creatinine 0.76 0.70 - 1.30 MG/DL    BUN/Creatinine ratio 5 (L) 12 - 20      GFR est AA >60 >60 ml/min/1.73m2    GFR est non-AA >60 >60 ml/min/1.73m2    Calcium 7.4 (L) 8.5 - 10.1 MG/DL    Bilirubin, total 0.4 0.2 - 1.0 MG/DL    ALT (SGPT) 73 12 - 78 U/L    AST (SGOT) 60 (H) 15 - 37 U/L    Alk. phosphatase 117 45 - 117 U/L    Protein, total 7.2 6.4 - 8.2 g/dL    Albumin 2.5 (L) 3.5 - 5.0 g/dL    Globulin 4.7 (H) 2.0 - 4.0 g/dL    A-G Ratio 0.5 (L) 1.1 - 2.2     CBC W/O DIFF    Collection Time: 10/02/18  4:20 AM   Result Value Ref Range    WBC 7.5 4.1 - 11.1 K/uL    RBC 3.00 (L) 4.10 - 5.70 M/uL    HGB 10.3 (L) 12.1 - 17.0 g/dL    HCT 30.8 (L) 36.6 - 50.3 %    .7 (H) 80.0 - 99.0 FL    MCH 34.3 (H) 26.0 - 34.0 PG    MCHC 33.4 30.0 - 36.5 g/dL    RDW 13.2 11.5 - 14.5 %    PLATELET 558 628 - 043 K/uL    MPV 9.6 8.9 - 12.9 FL    NRBC 0.0 0  WBC    ABSOLUTE NRBC 0.00 0.00 - 0.01 K/uL   T3, FREE    Collection Time: 10/02/18  4:20 AM   Result Value Ref Range    Free Triiodothyronine (T3) 2.3 2.2 - 4.0 pg/mL   TSH 3RD GENERATION    Collection Time: 10/02/18  4:20 AM   Result Value Ref Range    TSH 2.33 0.36 - 3.74 uIU/mL   POC G3 - PUL    Collection Time: 10/02/18  4:27 AM   Result Value Ref Range    pH (POC) 7.376 7.35 - 7.45      pCO2 (POC) 40.3 35.0 - 45.0 MMHG    pO2 (POC) 142 (H) 80 - 100 MMHG    HCO3 (POC) 23.6 22 - 26 MMOL/L    sO2 (POC) 99 (H) 92 - 97 %    Base deficit (POC) 2 mmol/L    Site LEFT RADIAL      Device: NASAL CANNULA      Flow rate (POC) 2 L/M    Allens test (POC) YES      Specimen type (POC) ARTERIAL         Additional comments:  I have reviewed the patient's new clinical lab test results.   I have personally reviewed the patient's radiographs. CT scan   CT Results (most recent):    Results from Hospital Encounter encounter on 09/30/18   CT HEAD WO CONT   Narrative EXAM:  CT HEAD WO CONT    INDICATION:   sz; sz    COMPARISON: February 3, 2018    CONTRAST:  None. TECHNIQUE: Unenhanced CT of the head was performed using 5 mm images. Brain and  bone windows were generated. CT dose reduction was achieved through use of a  standardized protocol tailored for this examination and automatic exposure  control for dose modulation. FINDINGS:  There is no extra-axial fluid collection hemorrhage or shift. Atrophy and white  matter disease are prominent for age. Impression impression: No acute changes. Care Plan discussed with:  Patient x   Family    RN x   Care Manager    Consultant/Specialist:       Signed: Kimberly Sahu MD

## 2018-10-02 NOTE — PROGRESS NOTES
[facility] Pulmonary, Critical Care, and Sleep Medicine Name: Karly Piedra MRN: [id number] : 1965 [facility] Date: 10/2/2018 IMPRESSION:  
1. Status ellipticus-known sz d/o 2. Respiratory failure/ vent--> extubated w left basilar atx 3. Hypotensive shock- pressors--> resolved 4. Baseline CP/ limited verbal MS 
5. Elevated NH3 RECOMMENDATIONS:  
· Pulm toliet · Wean oxygen · Continue abx for now for ? pna  (LQ/Zosyn)--> f/u cxr and cx · Lactulose · speech eval 
· AED per neurology keppra/DPH/Vimpat · Vagal stim per neuro · SUP- protonix · DVT prophylaxis-lovenox Transfer from ICU Radiology ( personally reviewed) cxr reviewed ABG Recent Labs 10/02/18 
 0427  10/01/18 
 1401  10/01/18 
 0225 PHI  7.376  7.610*  7.562* PO2I  142*  262*  230* PCO2I  40.3  25.4*  28.5* Subjective/Interval History:  
 
10/2 Extubated to NC oxygen No sz actvity BP ok 
? Back baseline MS Sputum cx pending- gm stain + 
 
----------------------------------------------------- Asked to see pt for ICU management Complicated pt with sz/ CP/hypothyroid Presented in status from group home Intubated in ED 
no further sz on vent/propofol--> EEG neg Initial DPH level low Loaded DPH/ given Keppra To ICU Last night hypotension--> pressors and empiric abx started ROS unobtainable No family present D/w RN at bedside Patient Active Problem List  
Diagnosis Code  Recurrent seizures ([address] G40.909  Partial epilepsy with impairment of consciousness ([address] G40.209  Ataxia R27.0  Memory loss R41.3  Seizure ([address] R56.9  S/P placement of VNS (vagus nerve stimulation) device Z96.89  
 ACP (advance care planning) Z71.89  Low vitamin D level R79.89  Blind left eye H54.40  Pneumonia J18.9  Down syndrome Q90.9  Acute encephalopathy G93.40  Complex partial seizure evolving to generalized seizure ([address] G40.209  Acquired hypothyroidism E03.9 Past Medical History:  
Diagnosis Date  Anemia  Anxiety  Blindness of left eye  Cerebral palsy ([address]  DEMENTIA  Down syndrome  Endocrine disease   
 thyroid disorder  GERD (gastroesophageal reflux disease)  Ill-defined condition   
 blind in left eye  Ill-defined condition   
 imparied gait - uses walker  Ill-defined condition   
 dermatitis  Ill-defined condition   
 cerebral palsy  Ill-defined condition   
 prone to decubitus ulcers  Ill-defined condition   
 anemia  Neurological disorder  Other ill-defined conditions(799.89) Down syndrome  Psychiatric disorder   
 mental retardation  Psychiatric disorder   
 anxiety disorder  Seizures ([address]  Sleep apnea  Thyroid disease  Unspecified epilepsy without mention of intractable epilepsy Past Surgical History:  
Procedure Laterality Date  HX OTHER SURGICAL  03/13/2015 VNS  Dr. Valerie Rivera. Carlitos Cantu  @ [facility] Social History Social History  Marital status: SINGLE Spouse name: N/A  
 Number of children: N/A  
 Years of education: N/A Occupational History  Not on file. Social History Main Topics  Smoking status: Never Smoker  Smokeless tobacco: Never Used  Alcohol use No  
 Drug use: No  
 Sexual activity: Not Currently Other Topics Concern  Not on file Social History Narrative Family History Problem Relation Age of Onset  Hypertension Mother  Cancer Mother  Lupus Mother Brewster.Mariana Arthritis-osteo Mother  Heart Disease Mother  Heart Disease Father  Diabetes Father  Hypertension Father  Elevated Lipids Father  Diabetes Brother  Elevated Lipids Brother  Hypertension Brother  Mental Retardation Brother  Cancer Maternal Grandmother  Arthritis-osteo Maternal Grandmother  Alcohol abuse Maternal Grandfather  Cancer Maternal Grandfather  Arthritis-osteo Paternal Grandmother  Cancer Paternal Grandfather Prior to Admission Medications Prescriptions Last Dose Informant Patient Reported? Taking? HYDROcodone-acetaminophen (HYCET) 0.5-21.7 mg/mL oral solution   Yes Yes Sig: Take 10 mL by mouth four (4) times daily as needed for Pain. ascorbic acid, vitamin C, (VITAMIN C) 500 mg tablet   No Yes Sig: Take 1 Tab by mouth daily. cholecalciferol (VITAMIN D3) 1,000 unit tablet   No Yes Sig: Take 1 Tab by mouth daily. ferrous sulfate 325 mg (65 mg iron) tablet   No Yes Sig: Take 1 Tab by mouth Daily (before breakfast). fluticasone (FLONASE) 50 mcg/actuation nasal spray   No Yes Si Sprays by Both Nostrils route daily. geriatric multivitamins & minerals (ELDERTONIC) elix   Yes Yes Sig: Take 15 mL by mouth daily. lacosamide (VIMPAT) 100 mg tab tablet   Yes Yes Sig: Take 100 mg by mouth two (2) times a day. levETIRAcetam (KEPPRA) 100 mg/mL solution   No Yes Sig: 15ml by mouth twice a day. levothyroxine (SYNTHROID) 50 mcg tablet   No Yes Sig: Take 1 Tab by mouth Daily (before breakfast). loratadine (CLARITIN) 10 mg tablet   No Yes Sig: Take 1 Tab by mouth daily. phenytoin (DILANTIN-125) suspension   No Yes Sig: Take 4 mL by mouth two (2) times a day. sodium chloride (DEEP SEA NASAL) 0.65 % nasal squeeze bottle   Yes Yes Si Spray as needed for Congestion. Facility-Administered Medications: None Allergies Allergen Reactions  Morphine Not Reported This Time  Tegretol [Carbamazepine] Other (comments) \"bad reaction\" \"changes attitude\" Objective: Mode Rate Tidal Volume Pressure FiO2 PEEP Spontaneous 14 450 ml  5 cm H2O 40 % 5 cm H20 Vital Signs:   
 
Patient Vitals for the past 24 hrs: 
 Temp Pulse Resp BP SpO2  
10/02/18 1030 - 83 19 107/58 100 % 10/02/18 1000 - 84 15 (!) 89/79 100 % 10/02/18 0900 - 90 21 119/63 100 % 10/02/18 0800 99.2 °F (37.3 °C) 92 20 118/65 100 % 10/02/18 0751 - - - - 100 % 10/02/18 0700 - 85 17 119/68 100 % 10/02/18 0630 - 99 13 125/64 100 % 10/02/18 0600 - 87 26 118/66 100 % 10/02/18 0530 - 93 15 - 100 % 10/02/18 0500 - 96 - - 99 % 10/02/18 0430 - 90 19 126/71 100 % 10/02/18 0400 98.9 °F (37.2 °C) (!) 107 20 120/71 100 % 10/02/18 0330 - 99 20 - 98 % 10/02/18 0300 - 88 26 129/80 96 % 10/02/18 0230 - (!) 102 20 115/68 98 % 10/02/18 0200 - 100 20 106/66 99 % 10/02/18 0131 - - - - 99 % 10/02/18 0130 - 87 15 106/79 99 % 10/02/18 0100 - 88 14 109/65 100 % 10/02/18 0030 - 90 16 113/70 100 % 10/02/18 0000 100 °F (37.8 °C) - 21 110/56 100 % 10/01/18 2330 - 90 8 98/61 100 % 10/01/18 2300 - 95 26 101/66 97 % 10/01/18 2230 - 99 19 115/57 100 % 10/01/18 2200 - 95 18 96/61 98 % 10/01/18 2130 - (!) 107 24 104/66 99 % 10/01/18 2100 - (!) 111 23 (!) 89/60 98 % 10/01/18 2033 - (!) 103 16 97/58 99 % 10/01/18 2030 - (!) 103 19 (!) 82/60 99 % 10/01/18 2011 - - - - 100 % 10/01/18 2000 - (!) 103 (!) 3 93/64 99 % 10/01/18 1900 - 98 18 99/62 99 % 10/01/18 1800 - 99 20 92/61 98 % 10/01/18 1700 - (!) 107 21 105/59 99 % 10/01/18 1600 98.6 °F (37 °C) 89 22 102/63 98 % 10/01/18 1500 - 87 22 121/71 99 % 10/01/18 1452 - - - - 100 % 10/01/18 1400 - 81 14 123/77 100 % 10/01/18 1300 - 71 9 127/72 99 % 10/01/18 1200 98 °F (36.7 °C) 71 27 133/77 100 % 10/01/18 1158 - 75 28 - 100 % Ventilator Pressures Pressure Support (cm H2O): 5 cm H2O 
PIP Observed (cm H2O): 10 cm H2O 
MAP (cm H2O): 7.1 PEEP/VENT (cm H2O): 5 cm [id number](24)Ventilator Pressures Pressure Support (cm H2O): 5 cm H2O 
PIP Observed (cm H2O): 10 cm H2O 
MAP (cm H2O): 7.1 PEEP/VENT (cm H2O): 5 cm H20 Safety & Alarms Circuit Temperature:  (hme) Backup Mode Checked/Apnea: Yes 
Pressure Max: 40 cm H2O Ve Min: 2 Ve Max: 20 Vt Min: 200 ml Vt Max: 1000 ml RR Max: 40 Intake/Output:  
Last shift:      Ventilator Volumes Vt Set (ml): 450 ml Vt Exhaled (Machine Breath) (ml): 536 ml Vt Spont (ml): 284 ml Ve Observed (l/min): 6.68 l/min Last 3 shifts: 10/02 0701 - 10/02 1900 In: -  
Out: 700 [Urine:700]RRIOLAST3 Intake/Output Summary (Last 24 hours) at 10/02/18 1137 Last data filed at 10/02/18 1025 Gross per 24 hour Intake             2980 ml Output             4275 ml Net            -1295 ml EXAM:  
 
Age-appropriate BM in NAD Responsive says \"yeah\" NC/AT 
opens eyes tracks Sl. swollen tongue Neck no JVD Chest symmetrical CV S1S2 Abd soft Conklin--> clear yellow urine LE no edema Hands mittens R SC line Moves all  
skin no rash Data I have personally reviewed data, flowsheets for the last 24 hours. Labs: 
Recent Labs 10/02/18 
 2966  10/01/18 
 5153  10/01/18 
 8763 WBC  7.5  6.6  5.8 HGB  10.3*  10.9*  10.2* HCT  30.8*  31.8*  30.0*  
PLT  185  212  190 Recent Labs 10/02/18 
 1824  10/01/18 
 0912  10/01/18 
 0268  09/30/18 
 1222 NA  141  140  142  140  
K  4.3  3.2*  2.6*  3.5 CL  109*  108  110*  99  
CO2  22  26  23  14* GLU  78  124*  111*  238* BUN  4*  5*  5*  7 CREA  0.76  0.82  0.71  1.25  
CA  7.4*  7.0*  6.0*  8.9 MG   --    --    --   2.9* ALB  2.5*  2.6*   --   3.5 SGOT  60*  90*   --   34 ALT  73  116*   --   46 Ariel Schaefer MD 
[facility]

## 2018-10-02 NOTE — PROGRESS NOTES
Bedside and Verbal shift change report given to Flavio Smalls RN (oncoming nurse) by Hannah Wheeler RN (offgoing nurse). Report included the following information SBAR, Kardex, Intake/Output, MAR, Recent Results, Cardiac Rhythm NSR and Alarm Parameters . 2000 VSS. Patient drowsy. No command following. Peripheral access. Conklin. 1257-3425 RT had to arterial stick patient >5x to obtain am labs and ABG. Peripheral access leaking and removed. 0500 Called anesthesiology for central line placement to enable administration of IV medications and to decrease trauma to patient. 0530 Line obtained. 0600 Chest XR performed. 0630 Placement verified.

## 2018-10-03 LAB
BACTERIA SPEC CULT: ABNORMAL
GRAM STN SPEC: ABNORMAL
SERVICE CMNT-IMP: ABNORMAL

## 2018-10-03 PROCEDURE — 97530 THERAPEUTIC ACTIVITIES: CPT

## 2018-10-03 PROCEDURE — G8978 MOBILITY CURRENT STATUS: HCPCS

## 2018-10-03 PROCEDURE — 94760 N-INVAS EAR/PLS OXIMETRY 1: CPT

## 2018-10-03 PROCEDURE — 77010033678 HC OXYGEN DAILY

## 2018-10-03 PROCEDURE — 74011250637 HC RX REV CODE- 250/637: Performed by: HOSPITALIST

## 2018-10-03 PROCEDURE — 74011000250 HC RX REV CODE- 250: Performed by: INTERNAL MEDICINE

## 2018-10-03 PROCEDURE — 97165 OT EVAL LOW COMPLEX 30 MIN: CPT

## 2018-10-03 PROCEDURE — 94664 DEMO&/EVAL PT USE INHALER: CPT

## 2018-10-03 PROCEDURE — G8979 MOBILITY GOAL STATUS: HCPCS

## 2018-10-03 PROCEDURE — 74011250637 HC RX REV CODE- 250/637: Performed by: INTERNAL MEDICINE

## 2018-10-03 PROCEDURE — 74011250636 HC RX REV CODE- 250/636: Performed by: INTERNAL MEDICINE

## 2018-10-03 PROCEDURE — 92526 ORAL FUNCTION THERAPY: CPT

## 2018-10-03 PROCEDURE — 74011250637 HC RX REV CODE- 250/637: Performed by: PSYCHIATRY & NEUROLOGY

## 2018-10-03 PROCEDURE — 97161 PT EVAL LOW COMPLEX 20 MIN: CPT

## 2018-10-03 PROCEDURE — 65660000000 HC RM CCU STEPDOWN

## 2018-10-03 PROCEDURE — 74011000258 HC RX REV CODE- 258: Performed by: INTERNAL MEDICINE

## 2018-10-03 PROCEDURE — 74011000258 HC RX REV CODE- 258: Performed by: HOSPITALIST

## 2018-10-03 PROCEDURE — 94640 AIRWAY INHALATION TREATMENT: CPT

## 2018-10-03 PROCEDURE — 97167 OT EVAL HIGH COMPLEX 60 MIN: CPT

## 2018-10-03 PROCEDURE — 74011250636 HC RX REV CODE- 250/636: Performed by: HOSPITALIST

## 2018-10-03 RX ORDER — LACOSAMIDE 50 MG/1
150 TABLET ORAL 2 TIMES DAILY
Status: DISCONTINUED | OUTPATIENT
Start: 2018-10-03 | End: 2018-10-04 | Stop reason: HOSPADM

## 2018-10-03 RX ORDER — SODIUM CHLORIDE 9 MG/ML
75 INJECTION, SOLUTION INTRAVENOUS CONTINUOUS
Status: DISCONTINUED | OUTPATIENT
Start: 2018-10-03 | End: 2018-10-04 | Stop reason: HOSPADM

## 2018-10-03 RX ORDER — LEVETIRACETAM 500 MG/1
1500 TABLET ORAL 2 TIMES DAILY
Status: DISCONTINUED | OUTPATIENT
Start: 2018-10-03 | End: 2018-10-03 | Stop reason: SDUPTHER

## 2018-10-03 RX ORDER — ALBUTEROL SULFATE 0.83 MG/ML
2.5 SOLUTION RESPIRATORY (INHALATION) AS NEEDED
Status: DISCONTINUED | OUTPATIENT
Start: 2018-10-03 | End: 2018-10-04 | Stop reason: HOSPADM

## 2018-10-03 RX ORDER — IPRATROPIUM BROMIDE AND ALBUTEROL SULFATE 2.5; .5 MG/3ML; MG/3ML
SOLUTION RESPIRATORY (INHALATION)
Status: DISPENSED
Start: 2018-10-03 | End: 2018-10-03

## 2018-10-03 RX ORDER — PHENYTOIN 125 MG/5ML
100 SUSPENSION ORAL 2 TIMES DAILY
Status: DISCONTINUED | OUTPATIENT
Start: 2018-10-03 | End: 2018-10-04 | Stop reason: HOSPADM

## 2018-10-03 RX ADMIN — FERROUS SULFATE TAB 325 MG (65 MG ELEMENTAL FE) 325 MG: 325 (65 FE) TAB at 06:39

## 2018-10-03 RX ADMIN — LORATADINE 10 MG: 10 TABLET ORAL at 12:39

## 2018-10-03 RX ADMIN — ENOXAPARIN SODIUM 40 MG: 40 INJECTION, SOLUTION INTRAVENOUS; SUBCUTANEOUS at 14:25

## 2018-10-03 RX ADMIN — ALBUTEROL SULFATE 2.5 MG: 2.5 SOLUTION RESPIRATORY (INHALATION) at 08:15

## 2018-10-03 RX ADMIN — LACTULOSE 20 G: 20 SOLUTION ORAL at 21:08

## 2018-10-03 RX ADMIN — LACTULOSE 20 G: 20 SOLUTION ORAL at 12:39

## 2018-10-03 RX ADMIN — LEVETIRACETAM 1500 MG: 100 SOLUTION ORAL at 13:20

## 2018-10-03 RX ADMIN — SODIUM CHLORIDE 1500 MG: 900 INJECTION, SOLUTION INTRAVENOUS at 01:49

## 2018-10-03 RX ADMIN — ALBUTEROL SULFATE 2.5 MG: 2.5 SOLUTION RESPIRATORY (INHALATION) at 21:52

## 2018-10-03 RX ADMIN — PIPERACILLIN SODIUM,TAZOBACTAM SODIUM 3.38 G: 3; .375 INJECTION, POWDER, FOR SOLUTION INTRAVENOUS at 22:31

## 2018-10-03 RX ADMIN — PHENYTOIN 100 MG: 125 SUSPENSION ORAL at 11:00

## 2018-10-03 RX ADMIN — PHENYTOIN 100 MG: 125 SUSPENSION ORAL at 21:09

## 2018-10-03 RX ADMIN — ACETAMINOPHEN 650 MG: 650 SOLUTION ORAL at 19:29

## 2018-10-03 RX ADMIN — LACOSAMIDE 150 MG: 50 TABLET, FILM COATED ORAL at 12:43

## 2018-10-03 RX ADMIN — LEVETIRACETAM 1500 MG: 100 SOLUTION ORAL at 21:09

## 2018-10-03 RX ADMIN — Medication 10 ML: at 21:09

## 2018-10-03 RX ADMIN — SODIUM CHLORIDE 125 ML/HR: 450 INJECTION, SOLUTION INTRAVENOUS at 05:28

## 2018-10-03 RX ADMIN — FAMOTIDINE 20 MG: 10 INJECTION, SOLUTION INTRAVENOUS at 10:19

## 2018-10-03 RX ADMIN — OXYCODONE HYDROCHLORIDE AND ACETAMINOPHEN 500 MG: 500 TABLET ORAL at 12:39

## 2018-10-03 RX ADMIN — Medication 30 ML: at 12:39

## 2018-10-03 RX ADMIN — ACETAMINOPHEN 650 MG: 650 SOLUTION ORAL at 02:30

## 2018-10-03 RX ADMIN — PIPERACILLIN SODIUM,TAZOBACTAM SODIUM 3.38 G: 3; .375 INJECTION, POWDER, FOR SOLUTION INTRAVENOUS at 14:28

## 2018-10-03 RX ADMIN — ALBUTEROL SULFATE 2.5 MG: 2.5 SOLUTION RESPIRATORY (INHALATION) at 01:13

## 2018-10-03 RX ADMIN — FAMOTIDINE 20 MG: 10 INJECTION, SOLUTION INTRAVENOUS at 21:08

## 2018-10-03 RX ADMIN — Medication 10 ML: at 05:29

## 2018-10-03 RX ADMIN — ACETAMINOPHEN 650 MG: 650 SOLUTION ORAL at 22:31

## 2018-10-03 RX ADMIN — Medication 10 ML: at 14:28

## 2018-10-03 RX ADMIN — PIPERACILLIN SODIUM,TAZOBACTAM SODIUM 3.38 G: 3; .375 INJECTION, POWDER, FOR SOLUTION INTRAVENOUS at 06:39

## 2018-10-03 NOTE — PROCEDURES
VNS interrogated:  Current VNS Readings:  Output Current                    1.75mA    Signal Frequency                20Hz  Pulse Width                         250uSec   Signal On Time                   30Sec  Signal Off Time                   5.0Min   Magnet Current                     2. 0mA   Magnet On Time                   60Sec  Magnet Pulse Width              250uSec         System Diagnostics: Pt ID TCL, Model ID [id number] [id number] Serial [id number], Implanted 3/13/15. Output current OK, Comm OK, Lead Impedence OK, Current delivered 1.75mA, Impedence value 1885 Ohms, IFI NO      The device was interrogated at the end of the procedure with the settings still in effect.

## 2018-10-03 NOTE — PROGRESS NOTES
Problem: Dysphagia (Adult) Goal: *Acute Goals and Plan of Care (Insert Text) Speech pathology goals Initiated 10/2/2018 1. Patient will tolerate dysphagia 2/thin liquid diet with no overt s/s aspiration within 7 days not met/downgraded to puree/thin liquids free of s/s aspiration within 7 days Speech language pathology dysphagia treatment Patient: Kierra Alcantar (41 y.o. male) Date: 10/3/2018 Diagnosis: Seizure ([address] Seizure ([address] Precautions: aspiration ASSESSMENT: 
Patient wide awake and interactive with his brother and caregiver from group home present. Patient agreeable to lunch and continues with mod oral and mild pharyngeal dysphagia. Prolonged and incomplete mastication with moderate oral residue with minced items. Improved oral clearance with pureed items. Pharyngeal swallow initiation is suspected to be mildly delayed via palpation. No overt s/s aspiration with any consistency. Discussed recommendation for changing to pureed consistency with patient's brother and caregiver who are completely in agreement. Time spent assisting RN administer meds. Patient requires liquid form of meds vs crushable. Liquid form is placed in applesauce. Progression toward goals: 
[]         Improving appropriately and progressing toward goals [x]         Improving slowly and progressing toward goals 
[]         Not making progress toward goals and plan of care will be adjusted PLAN: 
Recommendations and Planned Interventions: 
-Puree diet/ thin liquids. Straws ok 
-Strict upright positioning with all PO 
-ALL meds should be crushed in applesauce or liquid form mixed with applesauce (this is how he takes at the group home) -1:1 assistance with all PO 
-- will f/u x1 to ensure diet tolerance Patient continues to benefit from skilled intervention to address the above impairments. Continue treatment per established plan of care. Discharge Recommendations:  Return to group home SUBJECTIVE:  
 Patient stated hi. OBJECTIVE:  
Cognitive and Communication Status: 
Neurologic State: Alert Orientation Level: Unable to verbalize Cognition: Follows commands Perception: Appears intact Perseveration: No perseveration noted Safety/Judgement: Not assessed Dysphagia Treatment: 
Oral Assessment: 
Oral Assessment Labial: No impairment Dentition: Natural;Limited Oral Hygiene:  (clean, moist) Lingual: No impairment P.O. Trials: 
Patient Position:  (up in bed) Vocal quality prior to P.O.: No impairment Consistency Presented: Thin liquid;Puree (and egg salad) How Presented: SLP-fed/presented;Straw;Spoon Bolus Acceptance: No impairment Bolus Formation/Control: Impaired Type of Impairment: Delayed; Incomplete;Mastication Propulsion: Delayed (# of seconds) Oral Residue: 10-50% of bolus Initiation of Swallow: Delayed (# of seconds) Laryngeal Elevation: Functional 
Aspiration Signs/Symptoms: None Pharyngeal Phase Characteristics: Double swallow; Suspected pharyngeal residue Oral Phase Severity: Moderate Pharyngeal Phase Severity : Mild Pain: 
Pain Scale 1: Numeric (0 - 10) Pain Intensity 1: 0 After treatment:  
[]              Patient left in no apparent distress sitting up in chair 
[x]              Patient left in no apparent distress in bed 
[x]              Call bell left within reach [x]              Nursing notified 
[x]              Caregiver present 
[]              Bed alarm activated COMMUNICATION/EDUCATION:  
 
 
The patients plan of care including recommendations, planned interventions, and recommended diet changes were discussed with: Registered Nurse. []              Posted safety precautions in patient's room. ANDREW Henley Time Calculation: 24 mins

## 2018-10-03 NOTE — PROGRESS NOTES
Problem: Falls - Risk of 
Goal: *Absence of Falls Document Ceferino Cho Fall Risk and appropriate interventions in the flowsheet. Outcome: Progressing Towards Goal 
Fall Risk Interventions: 
  
 
Mentation Interventions: Adequate sleep, hydration, pain control, Bed/chair exit alarm, Door open when patient unattended, Evaluate medications/consider consulting pharmacy, Familiar objects from home, More frequent rounding, Reorient patient, Room close to nurse's station, Toileting rounds, Update white board Medication Interventions: Bed/chair exit alarm, Evaluate medications/consider consulting pharmacy, Patient to call before getting OOB, Teach patient to arise slowly, Assess postural VS orthostatic hypotension Elimination Interventions: Bed/chair exit alarm, Call light in reach, Patient to call for help with toileting needs, Toilet paper/wipes in reach, Toileting schedule/hourly rounds

## 2018-10-03 NOTE — PROGRESS NOTES
Problem: Pressure Injury - Risk of 
Goal: *Prevention of pressure injury Document Salinas Scale and appropriate interventions in the flowsheet. Outcome: Progressing Towards Goal 
Pressure Injury Interventions: 
Sensory Interventions: Assess changes in LOC, Avoid rigorous massage over bony prominences, Check visual cues for pain, Float heels, Keep linens dry and wrinkle-free, Maintain/enhance activity level, Monitor skin under medical devices Activity Interventions: Increase time out of bed, Pressure redistribution bed/mattress(bed type), PT/OT evaluation Mobility Interventions: Float heels, HOB 30 degrees or less, Pressure redistribution bed/mattress (bed type), PT/OT evaluation, Suspension boots Nutrition Interventions: Document food/fluid/supplement intake Friction and Shear Interventions: Foam dressings/transparent film/skin sealants, HOB 30 degrees or less, Lift sheet, Lift team/patient mobility team

## 2018-10-03 NOTE — PROGRESS NOTES
Hospitalist Progress Note Karolina Gonzalez MD 
Answering service: [phone] OR [phone] from in house phone Cell: [phone] Date of Service:  10/3/2018 NAME:  Ganga Norman :  1965 MRN:  [id number] Admission Summary:  
Patient is a 51-year-old male with past medical history of seizure, dementia, Down syndrome, cerebral palsy, hypothyroid, anemia, who presented from a group home via EMS with a complaint of seizure. En route as per EMS, the patient was in status epilepticus and given 5 mg of Versed in the ED. After IV access obtained, 2 mg of Ativan was given, and the patient continued seizing actively, so to protect airway the patient was intubated with etomidate and rocuronium. The patient was intubated, placed on vent. Consulted Neurology and found to have a low level of phenytoin. The patient was loaded with phenytoin and a 500 mg Keppra was also given in the ER. The patient was sent for ruling out other issues related to seizure. He was getting a CT scan at the time of exam. The patient was admitted to ICU for subsequent management. Interval history / Subjective: In no distress, non communicative, this is his baseline, I saw family and group home staff at bedside who confirmed he is at his baseline now, might be distressed at times with all the tethers, will take rudd out, if able to pass urine, and PT check on him will discharge, unlikely to happen today. Assessment & Plan:  
 
Status epilepticus 
-CT head  No acute changes 
-On Keppra/Dilantin 
-Seizure/Aspiration precautions -EEG reviewed, consistent with generalized encephalopathy 
-Neurology Following, switched his AntiEpileptics to oral, passed SLP, able to have po and meds ok, taking rudd out, and if able to pass urine will discharge, this evening of tomorrow morning. 
-no more seizure noted Hypotension- resolved, off pressors Acute respiratory failure- resolved, extubated Hypokalemia-replace as needed Mild renal dysfunction - resolved Transaminitis- improving, Monitor, would hold work up for now Down's syndrome Hypothyroidism-follow TFTs Code status: FULL CODE 
DVT prophylaxis: scd PTA: Group home Care Plan discussed with: Patient/Family Disposition: back to group home, once cleared by PT and passed TWOC Hospital Problems  Date Reviewed: 9/30/2018 Codes Class Noted POA * (Principal)Seizure ([address] ICD-10-CM: R56.9 ICD-9-CM: 780.39  11/22/2013 Yes Review of Systems: A comprehensive review of systems was negative except for that written in the HPI. Vital Signs:  
 Last 24hrs VS reviewed since prior progress note. Most recent are: 
Visit Vitals  /54 (BP Patient Position: At rest)  Pulse 71  Temp 97.7 °F (36.5 °C)  Resp 20  
 Ht 5' 2.99\" (1.6 m)  Wt 70.6 kg (155 lb 9.6 oz)  SpO2 99%  BMI 27.57 kg/m2 Intake/Output Summary (Last 24 hours) at 10/03/18 1503 Last data filed at 10/03/18 0202 Gross per 24 hour Intake              475 ml Output             2750 ml Net            -2275 ml Physical Examination:  
   
Constitutional:  No acute distress, extubated ENT:  Oral mucous moist, oropharynx benign. Neck supple, Resp:  CTA bilaterally. No wheezing/rhonchi/rales. No accessory muscle use CV:  Regular rhythm, normal rate, no murmurs, gallops, rubs GI:  Soft, non distended, non tender. normoactive bowel sounds Musculoskeletal:  No edema, warm, Neurologic:  alert, no focal neurodeficits,not communicating much. Skin:  Good turgor, no rashes or ulcers Data Review:  
 Review and/or order of clinical lab test 
 
 
Labs:  
 
Recent Labs 10/02/18 
 2034  10/01/18 
 3404 WBC  7.5  6.6 HGB  10.3*  10.9* HCT  30.8*  31.8*  
PLT  185  212 Recent Labs 10/02/18 
 7755  10/01/18 
 2557  10/01/18 
 4085 NA  141  140  142  
K  4.3  3.2*  2.6*  
CL  109*  108  110* CO2  22  26  23 BUN  4*  5*  5*  
CREA  0.76  0.82  0.71 GLU  78  124*  111* CA  7.4*  7.0*  6.0* Recent Labs 10/02/18 
 4741  10/01/18 
 0825 SGOT  60*  90* ALT  73  116* AP  117  132* TBILI  0.4  0.4 TP  7.2  7.5 ALB  2.5*  2.6*  
GLOB  4.7*  4.9* No results for input(s): INR, PTP, APTT in the last 72 hours. No lab exists for component: INREXT, INREXT No results for input(s): FE, TIBC, PSAT, FERR in the last 72 hours. Lab Results Component Value Date/Time Folate 8.5 01/19/2015 01:06 PM  
  
No results for input(s): PH, PCO2, PO2 in the last 72 hours. No results for input(s): CPK, CKNDX, TROIQ in the last 72 hours. No lab exists for component: CPKMB Lab Results Component Value Date/Time Cholesterol, total 271 (H) 12/10/2015 12:30 PM  
 HDL Cholesterol 64 12/10/2015 12:30 PM  
 LDL, calculated 183 (H) 12/10/2015 12:30 PM  
 Triglyceride 120 12/10/2015 12:30 PM  
 
Lab Results Component Value Date/Time Glucose (POC) 237 (H) 09/30/2018 12:51 PM  
 Glucose (POC) 239 (H) 09/30/2018 12:25 PM  
 Glucose (POC) 98 02/11/2009 12:15 PM  
 
Lab Results Component Value Date/Time Color YELLOW/STRAW 02/03/2018 10:16 AM  
 Appearance CLOUDY (A) 02/03/2018 10:16 AM  
 Specific gravity 1.018 02/03/2018 10:16 AM  
 pH (UA) 8.0 02/03/2018 10:16 AM  
 Protein NEGATIVE  02/03/2018 10:16 AM  
 Glucose NEGATIVE  02/03/2018 10:16 AM  
 Ketone NEGATIVE  02/03/2018 10:16 AM  
 Bilirubin NEGATIVE  02/03/2018 10:16 AM  
 Urobilinogen 0.2 02/03/2018 10:16 AM  
 Nitrites POSITIVE (A) 02/03/2018 10:16 AM  
 Leukocyte Esterase SMALL (A) 02/03/2018 10:16 AM  
 Epithelial cells FEW 02/03/2018 10:16 AM  
 Bacteria NEGATIVE  02/03/2018 10:16 AM  
 WBC 10-20 02/03/2018 10:16 AM  
 RBC 0-5 02/03/2018 10:16 AM  
 
 
 
Medications Reviewed:  
 
Current Facility-Administered Medications Medication Dose Route Frequency  phenytoin (DILANTIN) 100 mg/4 mL oral suspension 100 mg  100 mg Oral BID  lacosamide (VIMPAT) tablet 150 mg  150 mg Oral BID  levETIRAcetam (KEPPRA) oral solution 1,500 mg  1,500 mg Oral BID  albuterol (PROVENTIL VENTOLIN) nebulizer solution 2.5 mg  2.5 mg Nebulization Q6H RT  
 lactulose (CHRONULAC) solution 20 g  20 g Oral BID  famotidine (PF) (PEPCID) 20 mg in sodium chloride 0.9% 10 mL injection  20 mg IntraVENous Q12H  
 scopolamine (TRANSDERM-SCOP) 1 mg over 3 days 1 Patch  1 Patch TransDERmal Q72H  sodium chloride (NS) flush 5-10 mL  5-10 mL IntraVENous Q8H  
 sodium chloride (NS) flush 5-10 mL  5-10 mL IntraVENous PRN  
 enoxaparin (LOVENOX) injection 40 mg  40 mg SubCUTAneous Q24H  
 LORazepam (ATIVAN) injection 1 mg  1 mg IntraVENous Q2H PRN  
 ferrous sulfate tablet 325 mg  325 mg Oral ACB  loratadine (CLARITIN) tablet 10 mg  10 mg Oral DAILY  ascorbic acid (vitamin C) (VITAMIN C) tablet 500 mg  500 mg Oral DAILY  multivit-folic acid-herbal 269 (WELLESSE PLUS) oral liquid 30 mL  30 mL Oral DAILY  sodium chloride (OCEAN) 0.65 % nasal squeeze bottle 1 Spray  1 Spray Both Nostrils PRN  piperacillin-tazobactam (ZOSYN) 3.375 g in 0.9% sodium chloride (MBP/ADV) 100 mL  3.375 g IntraVENous Q8H  
 influenza vaccine 2018-19 (6 mos+)(PF) (FLUARIX QUAD/FLULAVAL QUAD) injection 0.5 mL  0.5 mL IntraMUSCular PRIOR TO DISCHARGE  acetaminophen (TYLENOL) solution 650 mg  650 mg Per NG tube Q4H PRN  
 
______________________________________________________________________ EXPECTED LENGTH OF STAY: 4d 4h 
ACTUAL LENGTH OF STAY:          3 Karolina Gonzalez MD

## 2018-10-03 NOTE — PROGRESS NOTES
Bedside and Verbal shift change report given to Kandice Gaytan RN (oncoming nurse) by Toi Rubinstein, RN (offgoing nurse). Report included the following information SBAR, Kardex, Intake/Output, MAR, Recent Results and Cardiac Rhythm NSR.

## 2018-10-03 NOTE — PROGRESS NOTES
Problem: Mobility Impaired (Adult and Pediatric) Goal: *Acute Goals and Plan of Care (Insert Text) Physical Therapy Goals Initiated 10/3/2018 1. Patient will move from supine to sit and sit to supine , scoot up and down and roll side to side in bed with minimal assistance/contact guard assist within 7 day(s). 2.  Patient will transfer from bed to chair and chair to bed with minimal assistance of 2 people using the least restrictive device within 7 day(s). 3.  Patient will perform sit to stand with minimal assistance/contact guard assist within 7 day(s). 4.  Patient will ambulate with minimal assistance/contact guard assist for 30 feet with the least restrictive device within 7 day(s). physical Therapy EVALUATION Patient: Domitila Edwards (45 y.o. male) Date: 10/3/2018 Primary Diagnosis: Seizure ([facility] [address] Precautions:   Seizure, Fall ASSESSMENT : 
Based on the objective data described below, the patient presents with generalized weakness, decreased ROM, and decreased activity tolerance s/p admission for seizure. Patient presents with difficulty following commands and with motor planning secondary to diagnosis of downs syndrome. Patient's brother and group home owner present throughout session for patient comfort. Chart reviewed and RN cleared patient for mobility assessment at this time. OT present throughout session for co-treatment. Patient received lying in bed. Rolled R and L with modA x2 for placement of brief. Supine to sitting EOB transfer with Noemi. Stand pivot transfer using RW with modA x2. Patient performed several repetitions of sit<>stand transfers initially modA x2 but improving to Noemi x2. Session ended with patient sitting up in chair with naima restraints put back in place, brother present, all needs met, and nursing updated. At baseline, patient lives in a group home with no WILLEM.   He ambulates short/moderate distances with a RW with assist of 2, and has a wheelchair for longer distances. Patient presenting close to his prior baseline of function. Recommending d/c back to group home when medically stable. Patient will benefit from skilled intervention to address the above impairments. Patients rehabilitation potential is considered to be Good Factors which may influence rehabilitation potential include:  
[]         None noted 
[x]         Mental ability/status (downs syndrome) []         Medical condition 
[]         Home/family situation and support systems 
[]         Safety awareness 
[]         Pain tolerance/management 
[]         Other: PLAN : 
Recommendations and Planned Interventions: 
[x]           Bed Mobility Training             []    Neuromuscular Re-Education 
[x]           Transfer Training                   []    Orthotic/Prosthetic Training 
[x]           Gait Training                         []    Modalities [x]           Therapeutic Exercises           []    Edema Management/Control 
[x]           Therapeutic Activities            [x]    Patient and Family Training/Education 
[]           Other (comment): Frequency/Duration: Patient will be followed by physical therapy  3 times a week to address goals. Discharge Recommendations: None Further Equipment Recommendations for Discharge: none SUBJECTIVE:  
Patient minimally verbal throughout session. History obtained from brother and group home employee. OBJECTIVE DATA SUMMARY:  
HISTORY:   
Past Medical History:  
Diagnosis Date  Anemia  Anxiety  Blindness of left eye  Cerebral palsy ([facility] [address]  DEMENTIA  Down syndrome  Endocrine disease   
 thyroid disorder  GERD (gastroesophageal reflux disease)  Ill-defined condition   
 blind in left eye  Ill-defined condition   
 imparied gait - uses walker  Ill-defined condition   
 dermatitis  Ill-defined condition   
 cerebral palsy  Ill-defined condition   
 prone to decubitus ulcers  Ill-defined condition   
 anemia  Neurological disorder  Other ill-defined conditions(799.89) Down syndrome  Psychiatric disorder   
 mental retardation  Psychiatric disorder   
 anxiety disorder  Seizures ([address]  Sleep apnea  Thyroid disease  Unspecified epilepsy without mention of intractable epilepsy Past Surgical History:  
Procedure Laterality Date  HX OTHER SURGICAL  03/13/2015 VNS  Dr. Shirley Villafuerte. [facility]  @ [facility] Prior Level of Function/Home Situation: Patient lives in a group home ([address]). At baseline, he ambulates short/moderate distances with assist of 2 using a RW. He was a wheelchair for long distances. Personal factors and/or comorbidities impacting plan of care: Downs Syndrome Home Situation Home Environment: Group home Care Facility Name: [facility] # Steps to Enter: 0 One/Two Story Residence: One story Living Alone: No 
Support Systems: Family member(s) (Group home) Patient Expects to be Discharged to[de-identified] Group home Current DME Used/Available at Home: Walker, rolling, Wheelchair EXAMINATION/PRESENTATION/DECISION MAKING:  
Critical Behavior: 
Neurologic State: Alert Orientation Level: Unable to verbalize Cognition: Follows commands Safety/Judgement: Not assessed Hearing: Auditory Auditory Impairment: None Range Of Motion: 
AROM: Generally decreased, functional 
  
  
  
PROM: Generally decreased, functional 
  
  
  
Strength:   
Strength: Generally decreased, functional 
  
  
  
  
  
  
Tone & Sensation:  
Tone: Normal 
  
  
  
  
Sensation: Intact Coordination: 
Coordination: Generally decreased, functional 
Vision:  
  
Functional Mobility: 
Bed Mobility: 
Rolling: Moderate assistance;Assist x2 Supine to Sit: Minimum assistance Scooting: Stand-by assistance Transfers: 
Sit to Stand: Minimum assistance; Moderate assistance;Assist x2 
 Stand to Sit: Minimum assistance; Moderate assistance;Assist x2 Stand Pivot Transfers: Moderate assistance Balance:  
  
Ambulation/Gait Training: 
  
  
  
  
  
  
  
  
  
  
  
  
  
  
  
  
  
Functional Measure: 
Dickerson Balance Test: 
 
Sitting to Standin Standing Unsupported: 0 Sitting with Back Unsupported: 3 Standing to Sittin Transfers: 0 Standing Unsupported with Eyes Closed: 0 Standing Unsupported with Feet Together: 0 Reach Forward with Outstretched Arm: 0  Object: 0 Turn to Look Over Shoulders: 0 Turn 360 Degrees: 0 Alternate Foot on Step/Stool: 0 Standing Unsupported One Foot in Front: 0 Stand on One Le Total: 3 
  
 
 
56=Maximum possible score;  
0-20=High fall risk 21-40=Moderate fall risk 41-56=Low fall risk Dickerson Balance Test and G-code impairment scale: 
Percentage of Impairment CH 
 
0% 
 CI 
 
1-19% CJ 
 
20-39% CK 
 
40-59% CL 
 
60-79% CM 
 
80-99% CN  
 
100% Castillo Score 0-56 56 45-55 34-44 23-33 12-22 1-11 0  
 
 
 
G codes: In compliance with CMSs Claims Based Outcome Reporting, the following G-code set was chosen for this patient based on their primary functional limitation being treated: The outcome measure chosen to determine the severity of the functional limitation was the Castillo with a score of 3/56 which was correlated with the impairment scale. ? Mobility - Walking and Moving Around:  
  - CURRENT STATUS: CM - 80%-99% impaired, limited or restricted  - GOAL STATUS: CL - 60%-79% impaired, limited or restricted  - D/C STATUS:  ---------------To be determined--------------- Physical Therapy Evaluation Charge Determination History Examination Presentation Decision-Making HIGH Complexity :3+ comorbidities / personal factors will impact the outcome/ POC  HIGH Complexity : 4+ Standardized tests and measures addressing body structure, function, activity limitation and / or participation in recreation  MEDIUM Complexity : Evolving with changing characteristics  MEDIUM Complexity : FOTO score of 26-74 Based on the above components, the patient evaluation is determined to be of the following complexity level: MEDIUM Pain: 
Pain Scale 1: Numeric (0 - 10) Pain Intensity 1: 0 Activity Tolerance:  
VSS, NAD Please refer to the flowsheet for vital signs taken during this treatment. After treatment:  
[x]         Patient left in no apparent distress sitting up in chair 
[]         Patient left in no apparent distress in bed 
[x]         Call bell left within reach [x]         Nursing notified 
[x]         Caregiver present 
[]         Bed alarm activated COMMUNICATION/EDUCATION:  
The patients plan of care was discussed with: Occupational Therapist, Registered Nurse and . [x]         Fall prevention education was provided and the patient/caregiver indicated understanding. [x]         Patient/family have participated as able in goal setting and plan of care. [x]         Patient/family agree to work toward stated goals and plan of care. []         Patient understands intent and goals of therapy, but is neutral about his/her participation. [x]         Patient is unable to participate in goal setting and plan of care. Thank you for this referral. 
Mirlande Bynum, PT, DPT Time Calculation: 40 mins

## 2018-10-03 NOTE — PROGRESS NOTES
Speech pathology Attempted to see patient for diet check. Patient lethargic, only briefly opening his eyes in response to upright positioning, sternal rub and verbal stimuli. RN present and needing to give oral meds. Present spoon, straw and cup to patient's lips but absent acceptance with swatting. Patient too lethargic for PO consideration at this time. Will f/u this pm vs tomorrow as alertness allows.   
Thanks, Kip James M.S. CCC-SLP

## 2018-10-03 NOTE — CONSULTS
Neurology Progress Note     NAME: Marquez Gallegos   :  1965   MRN:  [id number]   DATE:  10/3/2018    Assessment:     Principal Problem:    Seizure ([address] (2013)      Pt is a 50yo LH male with Down syndrome and epilepsy on Keppra, Dilantin and Vimpat and status post VNS presenting in status epilepticus with out any inciting event or cause for lowered seizure threshold. VNS interrogated, not at end of life, no changes made. RN will talk to his brother about helping him take meds. It could be that he sleeps in at the group home and this is early for him and he may be sleepy due to sleep disruption in the hospital environment. Plan:   Continue Keppra 1500 mg b.i.d.,   Continue Dilantin 100 mg b.i.d. Continue Vimpat 150 mg b.i.d. If pt is able to take meds, he could be d/c today from neuro standpoint  Subjective:   Pt unable to provide history, non-verbal. RN reports no seizures overnight. Pt passed his swallow eval yesterday, but this morning is refusing to wake up and take his meds.      Objective:   Chart reviewed since last seen    Current Facility-Administered Medications   Medication Dose Route Frequency    albuterol-ipratropium (DUO-NEB) 2.5 mg-0.5 mg/3 ml nebulizer solution        phenytoin (DILANTIN) 100 mg/4 mL oral suspension 100 mg  100 mg Oral BID    lacosamide (VIMPAT) tablet 150 mg  150 mg Oral BID    levETIRAcetam (KEPPRA) tablet 1,500 mg  1,500 mg Oral BID    albuterol (PROVENTIL VENTOLIN) nebulizer solution 2.5 mg  2.5 mg Nebulization Q6H RT    lactulose (CHRONULAC) solution 20 g  20 g Oral BID    famotidine (PF) (PEPCID) 20 mg in sodium chloride 0.9% 10 mL injection  20 mg IntraVENous Q12H    scopolamine (TRANSDERM-SCOP) 1 mg over 3 days 1 Patch  1 Patch TransDERmal Q72H    sodium chloride (NS) flush 5-10 mL  5-10 mL IntraVENous Q8H    sodium chloride (NS) flush 5-10 mL  5-10 mL IntraVENous PRN    enoxaparin (LOVENOX) injection 40 mg  40 mg SubCUTAneous Q24H    LORazepam (ATIVAN) injection 1 mg  1 mg IntraVENous Q2H PRN    ferrous sulfate tablet 325 mg  325 mg Oral ACB    loratadine (CLARITIN) tablet 10 mg  10 mg Oral DAILY    ascorbic acid (vitamin C) (VITAMIN C) tablet 500 mg  500 mg Oral DAILY    multivit-folic acid-herbal 648 (WELLESSE PLUS) oral liquid 30 mL  30 mL Oral DAILY    sodium chloride (OCEAN) 0.65 % nasal squeeze bottle 1 Spray  1 Spray Both Nostrils PRN    piperacillin-tazobactam (ZOSYN) 3.375 g in 0.9% sodium chloride (MBP/ADV) 100 mL  3.375 g IntraVENous Q8H    influenza vaccine - (6 mos+)(PF) (FLUARIX QUAD/FLULAVAL QUAD) injection 0.5 mL  0.5 mL IntraMUSCular PRIOR TO DISCHARGE    acetaminophen (TYLENOL) solution 650 mg  650 mg Per NG tube Q4H PRN       Visit Vitals    /56 (BP 1 Location: Left arm, BP Patient Position: At rest)    Pulse 78    Temp 98.5 °F (36.9 °C)    Resp 20    Ht 5' 2.99\" (1.6 m)  Comment: documentation from 18    Wt 70.6 kg (155 lb 9.6 oz)    SpO2 100%    BMI 27.57 kg/m2     Temp (24hrs), Av °F (37.2 °C), Min:98.5 °F (36.9 °C), Max:99.9 °F (37.7 °C)         10/01 1901 - 10/03 0700  In: 1940 [P.O.:475; I.V.:1465]  Out: 5650 [Urine:5650]      Physical Exam:  General: Well developed well nourished patient in no apparent distress. Cardiac: Regular rate and rhythm with no murmurs. Extremities: 2+ Radial pulses, no cyanosis or edema    Neurological Exam:  Mental Status: Pt sleeping, did open his eyes for me, but would not interact and quickly went back to sleep.     Cranial Nerves:   No obvious facial asym    Motor:  Moving all extremities   Reflexes:      Sensory:   Unable to assess due to patient factors   Gait:  Unable to assess due to patient factors   Cerebellar:  Unable to assess due to patient factors         Lab Review   No results found for this or any previous visit (from the past 24 hour(s)). Additional comments:  I have reviewed the patient's new clinical lab test results. I have personally reviewed the patient's radiographs. CT scan   CT Results (most recent):    Results from Hospital Encounter encounter on 09/30/18   CT HEAD WO CONT   Narrative EXAM:  CT HEAD WO CONT    INDICATION:   sz; sz    COMPARISON: February 3, 2018    CONTRAST:  None. TECHNIQUE: Unenhanced CT of the head was performed using 5 mm images. Brain and  bone windows were generated. CT dose reduction was achieved through use of a  standardized protocol tailored for this examination and automatic exposure  control for dose modulation. FINDINGS:  There is no extra-axial fluid collection hemorrhage or shift. Atrophy and white  matter disease are prominent for age. Impression impression: No acute changes. Care Plan discussed with:  Patient x   Family    RN x   Care Manager    Consultant/Specialist:       Signed: Ariana Miller MD

## 2018-10-03 NOTE — PROGRESS NOTES
Problem: Falls - Risk of 
Goal: *Absence of Falls Document Cami Hickey Fall Risk and appropriate interventions in the flowsheet. Outcome: Progressing Towards Goal 
Fall Risk Interventions: 
  
 
Mentation Interventions: Adequate sleep, hydration, pain control, Bed/chair exit alarm, Door open when patient unattended, Room close to nurse's station, More frequent rounding Medication Interventions: Assess postural VS orthostatic hypotension, Patient to call before getting OOB, Teach patient to arise slowly Elimination Interventions: Bed/chair exit alarm, Call light in reach, Patient to call for help with toileting needs Problem: Pressure Injury - Risk of 
Goal: *Prevention of pressure injury Document Salinas Scale and appropriate interventions in the flowsheet. Outcome: Progressing Towards Goal 
Pressure Injury Interventions: 
Sensory Interventions: Assess changes in LOC, Check visual cues for pain, Keep linens dry and wrinkle-free, Minimize linen layers Activity Interventions: Assess need for specialty bed, Pressure redistribution bed/mattress(bed type), PT/OT evaluation Mobility Interventions: Assess need for specialty bed, PT/OT evaluation Nutrition Interventions: Document food/fluid/supplement intake, Discuss nutritional consult with provider Friction and Shear Interventions: Apply protective barrier, creams and emollients, Lift sheet, Minimize layers

## 2018-10-03 NOTE — PROGRESS NOTES
JULIO called and spoke with Edda Carson with [address] [place] [address] in order to assess patient's functioning status and discuss transition of care when patient is medically stable. Edda Carson stated that at baseline patient is able to walk with a walker, however, requires staffing assistance and contact guard, has wheelchair for long distances. The facility assists with ADLs (bathing, dressing, etc.) and assists with medication administration- staff endorses that the majority of medications are liquid, any PO medications are crushed and given in applesauce. The patient has PT/OT consultations- Eddajohn Almendarezster stated that she would like to send staff from group [place] to be with patient during evaluation to assist in determining if patient is at baseline, and ensure they can meet need of patient- CM notified therapy team to please notify with eta of when assessment will be completed to see if staff member can come be present during evaluation. Yissel Ling endorsed that the group home will be able to provide transportation back to home upon discharge. CM faxed clinical updates to group home- patient not on Oxygen at baseline, will need to be weaned. The CM will speak with patient's brother Frederick Lindsey to discuss transition of care. ANDREW Landa 
 
13:28 p.m.- JULIO met with patient, brother Azul Barnes, and staff from [address] [place] [address] at bedside, family states the plan is for patient to return to group home when medically stable. Staff from group home would like to be present for PT/OT evaluation to ensure they are able to meet patient's needs- CM will reach out to therapy to see if they can evaluate patient ASAP since group home staff is present to assess. ANDREW Landa CM spoke with Ms. Allen, (owner and mental health worker at [facility]) and provided update- the plan is for the patient to work with PT/OT with group home staff present- JULIO updated staff members at bedside with [facility], PT/OT to be in shortly. ANDREW Mcgarry 
 
15:39 p.m.- CM faxed PT note to facility- group home staff present during therapy evaluation. The CM attempted to call Ms. Allen with [facility] to discuss possible discharge tomorrow- patient may benefit from home health- will ask group home if this is feasible and what agencies they work with.  ANDREW Mcgarry

## 2018-10-03 NOTE — PROGRESS NOTES
ADULT PROTOCOL: JET AEROSOL ASSESSMENT Patient  Espinoza Saez     48 y.o.   male     10/3/2018  3:02 PM 
 
Breath Sounds Pre Procedure: Right Breath Sounds: Coarse Left Breath Sounds: Coarse Breath Sounds Post Procedure: Right Breath Sounds: Clear Left Breath Sounds: Clear Breathing pattern: Pre procedure Breathing Pattern: Regular Post procedure Breathing Pattern: Regular Heart Rate: Pre procedure Pulse: 74 Post procedure Pulse: 76 Resp Rate: Pre procedure Respirations: 23 
         Post procedure Respirations: 18 Peak Flow: Pre bronchodilator Post bronchodilator FVC/FEV1:   
 
Incentive Spirometry:    
     
 
Cough: Pre procedure Cough: Non-productive Post procedure Cough: Non-productive Suctioned: NO Sputum: Pre procedure Post procedure Oxygen: O2 Device: Nasal cannula   Flow rate (L/min) 2 Changed: NO SpO2: Pre procedure SpO2: 100 %   without oxygen Post procedure SpO2: 100 %  with oxygen Nebulizer Therapy: Current medications Aerosolized Medications: Albuterol Changed: YES Smoking History: never Problem List:  
Patient Active Problem List  
Diagnosis Code  Recurrent seizures ([address] G40.909  Partial epilepsy with impairment of consciousness ([address] G40.209  Ataxia R27.0  Memory loss R41.3  Seizure ([address] R56.9  S/P placement of VNS (vagus nerve stimulation) device Z96.89  
 ACP (advance care planning) Z71.89  Low vitamin D level R79.89  Blind left eye H54.40  Pneumonia J18.9  Down syndrome Q90.9  Acute encephalopathy G93.40  Complex partial seizure evolving to generalized seizure ([address] G40.209  Acquired hypothyroidism E03.9 Respiratory Therapist: Mariana Guillermo RT

## 2018-10-03 NOTE — PROGRESS NOTES
Problem: Self Care Deficits Care Plan (Adult) Goal: *Acute Goals and Plan of Care (Insert Text) Occupational Therapy Goals Initiated 10/3/2018 1. Patient will perform one grooming task sitting EOB with minimal assistance/contact guard assist within 7 day(s). 2.  Patient will perform upper body dressing sitting EOB with moderate assistance  within 7 day(s). 3.  Patient will perform sit <> stand with CGA at RW level to prepare for ADL tasks with minimal assistance/contact guard assist within 7 day(s). 4.  Patient will perform toilet transfers with supervision/set-up within 7 day(s). Occupational Therapy EVALUATION Patient: Kit French (50 y.o. male) Date: 10/3/2018 Primary Diagnosis: Seizure ([facility] Precautions:  Seizure, Fall ASSESSMENT : 
Based on the objective data described below, the patient presents with impaired functional mobility and balance necessary in ADL tasks with admission for seizure. Nursing cleared for therapy. Hx of Down Syndrome and lives in group home. Patient overall non verbal, brother and group home staff member present and provided PLOF. Patient amb at [facility] level for short distance and requires approx 85% assist for ADL tasks. [place] for community mobility. No O2 at baseline. Command follow < 50% needing verbal and tactile cues, increase command follow with interaction with Ahsan Barron. Completed bed mob with assist from Ahsan Barron with approx mod A. Sitting EOB with CGA, sit <> stand fluctuating min-mod Ax 2 and transfer at RW level to chair with min-mod A x2. Anticipate good progression with time OOB in hospital, acute care therapy, and patient returning to group home which is familiar environment and staffing. Brother and group home staff in agreement. Received on 2L O2: 
2L rest: % 2 L Activity: 96% RA rest: 98% RA Activity: 95-96% Returned to 2L after session. Left up in chair with brother and house staff present. Replaced BEA gaines on. HR in 90s with activity. Recommend with nursing patient to complete as able in order to maintain strength, endurance and independence: OOB to chair 3x/day and mobilizing to the [facility] as appropriate at are level with mod A x2 for safety. Encourage highest level of indep with self feeding tasks at chair level or with HOB full elevation. Patient will benefit from skilled intervention to address the above impairments. Patients rehabilitation potential is considered to be Fair Factors which may influence rehabilitation potential include:  
[]             None noted []             Mental ability/status [x]             Medical condition [x]             Home/family situation and support systems []             Safety awareness []             Pain tolerance/management 
[]             Other: PLAN : 
Recommendations and Planned Interventions: 
[x]               Self Care Training                  [x]        Therapeutic Activities [x]               Functional Mobility Training    []        Cognitive Retraining 
[x]               Therapeutic Exercises           [x]        Endurance Activities [x]               Balance Training                   [x]        Neuromuscular Re-Education []               Visual/Perceptual Training     [x]   Home Safety Training 
[x]               Patient Education                 [x]        Family Training/Education []               Other (comment): Frequency/Duration: Patient will be followed by occupational therapy 3 times a week to address goals. Discharge Recommendations: Return to group home with support from staff Further Equipment Recommendations for Discharge: has all needed DME SUBJECTIVE:  
Patient stated . OBJECTIVE DATA SUMMARY:  
HISTORY:  
Past Medical History:  
Diagnosis Date  Anemia  Anxiety  Blindness of left eye  Cerebral palsy ([address]  DEMENTIA  Down syndrome  Endocrine disease   
 thyroid disorder  GERD (gastroesophageal reflux disease)  Ill-defined condition   
 blind in left eye  Ill-defined condition   
 imparied gait - uses walker  Ill-defined condition   
 dermatitis  Ill-defined condition   
 cerebral palsy  Ill-defined condition   
 prone to decubitus ulcers  Ill-defined condition   
 anemia  Neurological disorder  Other ill-defined conditions(799.89) Down syndrome  Psychiatric disorder   
 mental retardation  Psychiatric disorder   
 anxiety disorder  Seizures ([facility]  Sleep apnea  Thyroid disease  Unspecified epilepsy without mention of intractable epilepsy Past Surgical History:  
Procedure Laterality Date  HX OTHER SURGICAL  03/13/2015 VNS  Dr. Salome Lackey. Jyoti Peters  @ [facility] Prior Level of Function/Environment/Context: Unable to provide history, home staff and brother providing info. Group Home. Varying assist needed by staff depending on the day. Amb at [facility] level with staff assist.   
Expanded or extensive additional review of patient history:  
 
Home Situation Home Environment: Group home Care Facility Name[facility] # Steps to Enter: 0 One/Two Story Residence: One story Living Alone: No 
Support Systems: Family member(s) (Group home) Patient Expects to be Discharged to[de-identified] Group home Current DME Used/Available at Home: Walker, rolling, Wheelchair Hand dominance: Right EXAMINATION OF PERFORMANCE DEFICITS: 
Cognitive/Behavioral Status: 
Neurologic State: Alert Orientation Level: Unable to verbalize Cognition: Follows commands Perception: Appears intact Perseveration: No perseveration noted Safety/Judgement: Not assessed Hearing: Auditory Auditory Impairment: None Vision/Perceptual:   
    
Acuity:  (L eye pupil glassy) Range of Motion: 
AROM: Generally decreased, functional 
PROM: Generally decreased, functional 
  
 
Strength: 
Strength: Generally decreased, functional 
  
Coordination: Coordination: Generally decreased, functional 
Fine Motor Skills-Upper: Left Intact; Right Intact Gross Motor Skills-Upper: Left Intact; Right Intact Tone & Sensation: 
Tone: Normal 
Sensation: Intact Balance: 
Sitting: Impaired Sitting - Static: Fair (occasional) Sitting - Dynamic: Fair (occasional) Standing: Impaired Standing - Static: Constant support; Fair 
Standing - Dynamic : Fair Functional Mobility and Transfers for ADLs: 
Bed Mobility: 
Rolling: Moderate assistance;Assist x2 Supine to Sit: Minimum assistance Scooting: Stand-by assistance Transfers: 
Sit to Stand: Minimum assistance; Moderate assistance;Assist x2 Stand to Sit: Minimum assistance; Moderate assistance;Assist x2 Bed to Chair: Minimum assistance; Moderate assistance;Assist x2;Adaptive equipment ADL Assessment: 
Feeding: Setup; Moderate assistance Oral Facial Hygiene/Grooming: Maximum assistance Bathing: Maximum assistance Upper Body Dressing: Maximum assistance Lower Body Dressing: Total assistance Toileting: Maximum assistance ADL Intervention and task modifications: 
 Provided verbal and tactile cues for all aspects of bed mob, sit <> stand and self care transfers. Increased command follow with Chavez Cooney, group home staff present. Mod A for bed mob, sit <> stand initially with mod A x2 at RW level, transfer to chair with mod A X 2 at RW level. Patient with increased erect posture and balance following additional sit <> stand trials, improving to min A x2 pulling up on RW. Replaced B mits and left up in chair with brother and group home staff present. Cognitive Retraining Safety/Judgement: Not assessed Functional Measure: 
Barthel Index: 
 
Bathin Bladder: 0 Bowels: 0 Groomin Dressin Feedin Mobility: 0 Stairs: 0 Toilet Use: 0 Transfer (Bed to Chair and Back): 5 Total: 5 Barthel and G-code impairment scale: 
Percentage of impairment CH 
0% CI 
1-19% CJ 
20-39% CK 
 40-59% CL 
60-79% CM 
80-99% CN 
100% Barthel Score 0-100 100 99-80 79-60 59-40 20-39 1-19 
 0 Barthel Score 0-20 20 17-19 13-16 9-12 5-8 1-4 0 The Barthel ADL Index: Guidelines 1. The index should be used as a record of what a patient does, not as a record of what a patient could do. 2. The main aim is to establish degree of independence from any help, physical or verbal, however minor and for whatever reason. 3. The need for supervision renders the patient not independent. 4. A patient's performance should be established using the best available evidence. Asking the patient, friends/relatives and nurses are the usual sources, but direct observation and common sense are also important. However direct testing is not needed. 5. Usually the patient's performance over the preceding 24-48 hours is important, but occasionally longer periods will be relevant. 6. Middle categories imply that the patient supplies over 50 per cent of the effort. 7. Use of aids to be independent is allowed. Noemi Daniel., Barthel, D.W. (8607). Functional evaluation: the Barthel Index. [facility] (14)2. Em Husain shea THUY Blue, Lewis Colon., Yamilka Doran., [place], [address] [place] [address] (1999). Measuring the change indisability after inpatient rehabilitation; comparison of the responsiveness of the Barthel Index and Functional [place] Measure. Journal of Neurology, Neurosurgery, and Psychiatry, 66(4), 539-620. Geronimo Cisneros, N.J.A, ÁLVARO Pritchard, & Sofia Jaramillo MCASSY. (2004.) Assessment of post-stroke quality of life in cost-effectiveness studies: The usefulness of the Barthel Index and the EuroQoL-5D. [facility], 21, 194-47 G codes: In compliance with CMSs Claims Based Outcome Reporting, the following G-code set was chosen for this patient based on their primary functional limitation being treated:  
 
The outcome measure chosen to determine the severity of the functional limitation was the Barthel index with a score of 5/100 which was correlated with the impairment scale. ? Self Care:  
  - CURRENT STATUS: CM - 80%-99% impaired, limited or restricted  - GOAL STATUS: CL - 60%-79% impaired, limited or restricted  - D/C STATUS:  ---------------To be determined--------------- Occupational Therapy Evaluation Charge Determination History Examination Decision-Making HIGH Complexity : Extensive review of history including physical, cognitive and psychosocial history  HIGH Complexity : 5 or more performance deficits relating to physical, cognitive , or psychosocial skils that result in activity limitations and / or participation restrictions HIGH Complexity : Patient presents with comorbidities that affect occupational performance. Signifigant modification of tasks or assistance (eg, physical or verbal) with assessment (s) is necessary to enable patient to complete evaluation Based on the above components, the patient evaluation is determined to be of the following complexity level: HIGH Pain: 
Pain Scale 1: Numeric (0 - 10) Pain Intensity 1: 0 Activity Tolerance:  
See above assessment section After treatment:  
[x] Patient left in no apparent distress sitting up in chair 
[] Patient left in no apparent distress in bed 
[x] Call bell left within reach [x] Nursing notified 
[] Caregiver present 
[] Bed alarm activated COMMUNICATION/EDUCATION:  
The patients plan of care was discussed with: Physical Therapist and Registered Nurse. [x] Home safety education was provided and the patient/caregiver indicated understanding. [x] Patient/family have participated as able in goal setting and plan of care. [x] Patient/family agree to work toward stated goals and plan of care. [] Patient understands intent and goals of therapy, but is neutral about his/her participation. [] Patient is unable to participate in goal setting and plan of care. This patients plan of care is appropriate for delegation to AUGUSTINA. Thank you for this referral. 
Kia Harmon, OT Time Calculation: 35 mins

## 2018-10-03 NOTE — PROGRESS NOTES
Bedside and Verbal shift change report given to Shalom Dudley RN (oncoming nurse) by Eddie Pino RN (offgoing nurse). Report included the following information SBAR, Kardex, ED Summary, Procedure Summary, Intake/Output, MAR, Accordion and Recent Results.

## 2018-10-04 VITALS
OXYGEN SATURATION: 96 % | DIASTOLIC BLOOD PRESSURE: 78 MMHG | BODY MASS INDEX: 27.54 KG/M2 | SYSTOLIC BLOOD PRESSURE: 125 MMHG | RESPIRATION RATE: 18 BRPM | WEIGHT: 155.4 LBS | HEIGHT: 63 IN | TEMPERATURE: 98.9 F | HEART RATE: 92 BPM

## 2018-10-04 PROCEDURE — 90471 IMMUNIZATION ADMIN: CPT

## 2018-10-04 PROCEDURE — 74011250637 HC RX REV CODE- 250/637: Performed by: HOSPITALIST

## 2018-10-04 PROCEDURE — 74011250636 HC RX REV CODE- 250/636: Performed by: INTERNAL MEDICINE

## 2018-10-04 PROCEDURE — 74011000250 HC RX REV CODE- 250: Performed by: INTERNAL MEDICINE

## 2018-10-04 PROCEDURE — 74011000258 HC RX REV CODE- 258: Performed by: HOSPITALIST

## 2018-10-04 PROCEDURE — 74011250636 HC RX REV CODE- 250/636: Performed by: HOSPITALIST

## 2018-10-04 PROCEDURE — 74011250637 HC RX REV CODE- 250/637: Performed by: PSYCHIATRY & NEUROLOGY

## 2018-10-04 PROCEDURE — 90686 IIV4 VACC NO PRSV 0.5 ML IM: CPT | Performed by: HOSPITALIST

## 2018-10-04 PROCEDURE — 74011250637 HC RX REV CODE- 250/637: Performed by: INTERNAL MEDICINE

## 2018-10-04 RX ORDER — AMOXICILLIN AND CLAVULANATE POTASSIUM 875; 125 MG/1; MG/1
1 TABLET, FILM COATED ORAL 2 TIMES DAILY
Qty: 4 TAB | Refills: 0 | Status: SHIPPED | OUTPATIENT
Start: 2018-10-04 | End: 2018-10-09 | Stop reason: ALTCHOICE

## 2018-10-04 RX ORDER — AMOXICILLIN AND CLAVULANATE POTASSIUM 875; 125 MG/1; MG/1
1 TABLET, FILM COATED ORAL 2 TIMES DAILY
Qty: 4 TAB | Refills: 0 | Status: SHIPPED | OUTPATIENT
Start: 2018-10-04 | End: 2018-10-04

## 2018-10-04 RX ORDER — BACITRACIN 500 UNIT/G
1 PACKET (EA) TOPICAL ONCE
Status: DISCONTINUED | OUTPATIENT
Start: 2018-10-04 | End: 2018-10-04 | Stop reason: HOSPADM

## 2018-10-04 RX ORDER — LACOSAMIDE 100 MG/1
150 TABLET ORAL 2 TIMES DAILY
Qty: 90 TAB | Refills: 0 | Status: SHIPPED | OUTPATIENT
Start: 2018-10-04 | End: 2018-11-14 | Stop reason: SDUPTHER

## 2018-10-04 RX ADMIN — FERROUS SULFATE TAB 325 MG (65 MG ELEMENTAL FE) 325 MG: 325 (65 FE) TAB at 09:00

## 2018-10-04 RX ADMIN — Medication 30 ML: at 09:37

## 2018-10-04 RX ADMIN — LORATADINE 10 MG: 10 TABLET ORAL at 09:00

## 2018-10-04 RX ADMIN — INFLUENZA VIRUS VACCINE 0.5 ML: 15; 15; 15; 15 SUSPENSION INTRAMUSCULAR at 11:14

## 2018-10-04 RX ADMIN — LACTULOSE 20 G: 20 SOLUTION ORAL at 09:00

## 2018-10-04 RX ADMIN — LEVETIRACETAM 1500 MG: 100 SOLUTION ORAL at 09:37

## 2018-10-04 RX ADMIN — SODIUM CHLORIDE 75 ML/HR: 900 INJECTION, SOLUTION INTRAVENOUS at 00:23

## 2018-10-04 RX ADMIN — Medication 10 ML: at 06:39

## 2018-10-04 RX ADMIN — FAMOTIDINE 20 MG: 10 INJECTION, SOLUTION INTRAVENOUS at 09:01

## 2018-10-04 RX ADMIN — PHENYTOIN 100 MG: 125 SUSPENSION ORAL at 09:00

## 2018-10-04 RX ADMIN — PIPERACILLIN SODIUM,TAZOBACTAM SODIUM 3.38 G: 3; .375 INJECTION, POWDER, FOR SOLUTION INTRAVENOUS at 06:37

## 2018-10-04 RX ADMIN — LACOSAMIDE 150 MG: 50 TABLET, FILM COATED ORAL at 00:23

## 2018-10-04 RX ADMIN — OXYCODONE HYDROCHLORIDE AND ACETAMINOPHEN 500 MG: 500 TABLET ORAL at 08:58

## 2018-10-04 NOTE — PROGRESS NOTES
[facility] follow-up appointment on Tuesday October 9,2018 @ 11:30 a.m. with Dr. Bryan Hernandez. Specialist appointment on Monday October 15,2018 @ 11:00 a.m. With Juanpablo Dave. Added to AVS. Kenzie Elizondo CM Specialist

## 2018-10-04 NOTE — PROGRESS NOTES
Bedside RN performed patient education and medication education. Discharge concerns initiated and discussed with patient, including clarification on \"who\" assists the patient at their home and instructions for when the home going patient should call their provider after discharge. Opportunity for questions and clarification was provided. Patient receptive to education: NO 
Patient stated: Education provided to patient's brother Barriers to Education: Cognitive barriers Diagnosis Education given:  YES Length of stay: 4 Expected Day of Discharge: 10/4/2018 Ask if they have \"Help at Home\" & add to white board? YES Education Day #: 4 Medication Education Given:  YES 
M in the box Medication name: Darrin Marc Pt aware of [facility] survey: YES Patient being discharged to patient's group home w/ brother and caregiver.

## 2018-10-04 NOTE — DISCHARGE INSTRUCTIONS
Discharge Instructions       PATIENT ID: Aldo Booth  MRN: [id number]   YOB: 1965    DATE OF ADMISSION: 9/30/2018 12:10 PM    DATE OF DISCHARGE: 10/4/2018    PRIMARY CARE PROVIDER: Chidi Dey MD     ATTENDING PHYSICIAN: Magui Lao MD  DISCHARGING PROVIDER: Magui Lao MD    To contact this individual call [phone] and ask the  to page. If unavailable ask to be transferred the Adult Hospitalist Department. DISCHARGE DIAGNOSES  Status epilepticus and aspiration pneumonia    CONSULTATIONS: IP CONSULT TO NEUROLOGY  IP CONSULT TO INTENSIVIST  IP CONSULT TO HOSPITALIST    PROCEDURES/SURGERIES: * No surgery found *    FOLLOW UP APPOINTMENTS:   Follow-up Information     Follow up With Details Comments Contact Info    Chidi Dey MD   [address] [place] [address]  [phone]      [facility] [address] [place] [address] In 1 month check for SAVITA, alternatives to CPAP mask if not tolerated. [address]  [phone]           ADDITIONAL CARE RECOMMENDATIONS: follow up with Pulmonary team for reassessment for SAVITA, and treatment. DIET: Resume previous diet    DISCHARGE MEDICATIONS:  2 more days of Augmentin to finish course, adjustable bed    · It is important that you take the medication exactly as they are prescribed. · Keep your medication in the bottles provided by the pharmacist and keep a list of the medication names, dosages, and times to be taken in your wallet. · Do not take other medications without consulting your doctor. NOTIFY YOUR PHYSICIAN FOR ANY OF THE FOLLOWING:   Fever over 101 degrees for 24 hours. Chest pain, shortness of breath, fever, chills, nausea, vomiting, diarrhea, change in mentation, falling, weakness, bleeding. Severe pain or pain not relieved by medications. Or, any other signs or symptoms that you may have questions about.   It was our pleasure to help take care of you and we hope you get well very soon.     DISPOSITION:    Home With:   OT  PT  HH  RN       SNF/Inpatient Rehab/LTAC    Independent/assisted living    Hospice   x Other: Group Home     CDMP Checked:   Yes x     PROBLEM LIST Updated:  Yes x     Signed:   [place] MD  10/4/2018  9:04 AM

## 2018-10-04 NOTE — PROGRESS NOTES
Patient had a MEWS score of 5 due to temperature, elevated heart rate. After giving the patient tylenol, his MEWS score went down to a 2 with a temperature of 99.2 axillary and heart rate went down to 104. Spoke with Dr. Karina Mccarty ordered restarting fluids at 75ml/hr of normal saline. I will continue to monitor the patient. 10/03/18 2300 10/03/18 2330 Vitals Temp (!) 101.1 °F (38.4 °C) 99.2 °F (37.3 °C) Temp Source Axillary Axillary Pulse (Heart Rate) (!) 113 (!) 104 Heart Rate Source Monitor Monitor Resp Rate 18 18  
O2 Sat (%) 98 % 98 % Level of Consciousness Alert Alert /51 115/51 MAP (Calculated) 72 72 BP 1 Location Right arm Right arm BP 1 Method Automatic Automatic  
BP Patient Position At rest At rest  
Cardiac Rhythm Sinus Tach Sinus Tach MEWS Score 5 2 Alarms Set and Audible Cardiac alarms; Respiratory alarms;Pulse ox alarms Cardiac alarms; Respiratory alarms;Pulse ox alarms Box Number [id number] Electrodes Replaced No No

## 2018-10-04 NOTE — INTERDISCIPLINARY ROUNDS
IDR Summary Patient: Дмитрий Cotton MRN: [id number]    Age: 48 y.o. YOB: 1965 Room/Bed: [facility] Admit Diagnosis: Seizure [facility])  Principal Diagnosis: [facility] ([facility] [place]: Attending Dr. Elisabeth Paul RN  PCP: Noe Barrera MD 
 
Readmission: NO Testing due for pt today? NO Discharge plan for the day: Wean O2, PT, meds Discharge plan for the stay: Antibiotics Discharge plan for the way: Home Signed:  
 
Gildardo Paul RN 
10/3/2018 
8:31 PM

## 2018-10-04 NOTE — PROGRESS NOTES
The CM called and spoke with Sendy Hoffman, the owner with [facility] to see if they can accept patient back today- PT/OT recommending to return to group home, [facility] stated they could accept patient back today (group home staff present during evaluation yesterday). The group home will provide transportation for the patient- will be at [facility] at 11:00 a.m., RN notified. CM will fax discharge summary once available. Sendy Yasmin had question regarding an adjustable bed- facility does not want hospital bed (patient would not qualify) but want a prescription for an adjustable bed. CM will ask MD, however, encouraged facility to ask the patient's PCP Dr. Mauricio Perez to follow-up. Sendy Hoffman is aware that patient had mittens on yesterday- willing to accept patient back today. CM will notify patient's brother Nathalia Quintero of anticipated discharge. CM asked Ms. Allen about transport- group home has [facility], facility stated \"we can handle it we transport all the time. \" ANDREW Dailey 
 
8:32 a.m.- JULIO spoke with the patient's brother, Juan M Miranda, and he is aware and agreeable of discharge back to group home today. CM notified RN of \"adjustable bed\" prescription request. CM will continue to follow. ANDREW Dailey CM faxed discharge summary to [facility] [place].

## 2018-10-04 NOTE — DISCHARGE SUMMARY
Discharge Summary       PATIENT ID: Aldo Booth  MRN: [id number]   YOB: 1965    DATE OF ADMISSION: 9/30/2018 12:10 PM    DATE OF DISCHARGE: 10/4/2018   PRIMARY CARE PROVIDER: Chidi Dey MD     ATTENDING PHYSICIAN: Magui Lao MD  DISCHARGING PROVIDER: Magui Lao MD    To contact this individual call [phone] and ask the  to page. If unavailable ask to be transferred the Adult Hospitalist Department. CONSULTATIONS: IP CONSULT TO NEUROLOGY  IP CONSULT TO INTENSIVIST  IP CONSULT TO HOSPITALIST    PROCEDURES/SURGERIES: * No surgery found *    ADMITTING [address] [place] [address] COURSE:   Admitted with status epilepticus, developed aspiration pneumonia because of that, treated in ICU for couple of days, made good recovery, and continues to improve, was able to swallow ok. DISCHARGE DIAGNOSES / PLAN:      Status epilepticus  -CT head 9/30 No acute changes -EEG reviewed, consistent with generalized encephalopathy and postictal phase -Neurology reviewed, switched his AntiEpileptics to oral, increased dose of Vimpat to 150mg BID. passed SLP, able to have po and meds ok.     Hypotension- for short time on admission required pressors. Acute respiratory failure- 2nd to status epilepticus and aspiration pneumonia, was intubated, improved well, extubated without problems. SAVITA: previous hx of diagnosis with sleep study and CPAP mask which pt didn't tolerate, will need re-evaluation with Pulmonary and Sleep medicine in outpatient and discussion of alternatives if not tolerating mask    Hypokalemia- resolved. Mild renal dysfunction - resolved  Transaminitis- improving, continue to monitor in outpatient.        FOLLOW UP APPOINTMENTS:    Follow-up Information     Follow up With Details Comments MD Jamil Arri[address]  [phone]      [facility] In 1 month check for SAVITA, alternatives to CPAP mask if not tolerated. [address]  [phone]           ADDITIONAL CARE RECOMMENDATIONS: f/u with Neurology for Seizure disorder, and pulmonary team for SAVITA    DIET: Resume previous diet    ACTIVITY: Activity as tolerated    DISCHARGE MEDICATIONS:  Current Discharge Medication List      START taking these medications    Details   multivit-folic acid-herbal 881 (WELLESSE PLUS) 400 mcg-200 mg/30 mL liqd oral liquid Take 30 mL by mouth daily. Qty: 1 Bottle, Refills: 0      amoxicillin-clavulanate (AUGMENTIN) 875-125 mg per tablet Take 1 Tab by mouth two (2) times a day. Qty: 4 Tab, Refills: 0         CONTINUE these medications which have NOT CHANGED    Details   geriatric multivitamins & minerals (ELDERTONIC) elix Take 15 mL by mouth daily. ferrous sulfate 325 mg (65 mg iron) tablet Take 1 Tab by mouth Daily (before breakfast). Qty: 90 Tab, Refills: 2      loratadine (CLARITIN) 10 mg tablet Take 1 Tab by mouth daily. Qty: 90 Tab, Refills: 1    Associated Diagnoses: Nasal congestion      ascorbic acid, vitamin C, (VITAMIN C) 500 mg tablet Take 1 Tab by mouth daily. Qty: 30 Tab, Refills: 6      levothyroxine (SYNTHROID) 50 mcg tablet Take 1 Tab by mouth Daily (before breakfast). Qty: 90 Tab, Refills: 1      phenytoin (DILANTIN-125) suspension Take 4 mL by mouth two (2) times a day. Qty: 1 Bottle, Refills: 5    Associated Diagnoses: Partial epilepsy with impairment of consciousness (HCC)      sodium chloride (DEEP SEA NASAL) 0.65 % nasal squeeze bottle 1 Spray as needed for Congestion. HYDROcodone-acetaminophen (HYCET) 0.5-21.7 mg/mL oral solution Take 10 mL by mouth four (4) times daily as needed for Pain. lacosamide (VIMPAT) 100 mg tab tablet Take 100 mg by mouth two (2) times a day. fluticasone (FLONASE) 50 mcg/actuation nasal spray 2 Sprays by Both Nostrils route daily.   Qty: 1 Bottle, Refills: 2    Associated Diagnoses: Nasal congestion      levETIRAcetam (KEPPRA) 100 mg/mL solution 15ml by mouth twice a day. 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 Qty: 90 Tab, Refills: 4         STOP taking these medications       [id number]-iron-met-choln (GERITOL TONIC WITH FERREX 18) 2.5 mg-50 mg-18 iron/15 mL liqd Comments:   Reason for Stopping:               NOTIFY YOUR PHYSICIAN FOR ANY OF THE FOLLOWING:   Fever over 101 degrees for 24 hours. Chest pain, shortness of breath, fever, chills, nausea, vomiting, diarrhea, change in mentation, falling, weakness, bleeding. Severe pain or pain not relieved by medications. Or, any other signs or symptoms that you may have questions about. DISPOSITION:    Home With:   OT  PT  HH  RN       Long term SNF/Inpatient Rehab    Independent/assisted living    Hospice   x Other:       PATIENT CONDITION AT DISCHARGE:     Functional status    Poor     Deconditioned     Independent      Cognition     Lucid     Forgetful     Dementia      Catheters/lines (plus indication)    Conklin     PICC     PEG     None      Code status     Full code     DNR      PHYSICAL EXAMINATION AT DISCHARGE:  Constitutional:  No acute distress, down syndrome face. ENT:  Oral mucous moist, oropharynx benign. Neck supple,    Resp:  CTA bilaterally. No wheezing/rhonchi/rales. No accessory muscle use   CV:  Regular rhythm, normal rate, no murmurs, gallops, rubs    GI:  Soft, non distended, non tender. normoactive bowel sounds     Musculoskeletal:  No edema, warm,     Neurologic:  alert, no focal neurodeficits,not communicating much.                                Skin:  Good turgor, no rashes or ulcers    CHRONIC MEDICAL DIAGNOSES:  Problem List as of 10/4/2018  Date Reviewed: 9/30/2018          Codes Class Noted - Resolved    Acquired hypothyroidism ICD-10-CM: E03.9  ICD-9-CM: 244.9 4/12/2018 - Present        Acute encephalopathy ICD-10-CM: G93.40  ICD-9-CM: 348.30  3/23/2018 - Present        Complex partial seizure evolving to generalized seizure ([facility] ICD-10-CM: G40.209  ICD-9-CM: 345.40  3/23/2018 - Present        Down syndrome ICD-10-CM: Q90.9  ICD-9-CM: 758.0  3/20/2018 - Present        Pneumonia ICD-10-CM: J18.9  ICD-9-CM: 127  2/3/2018 - Present        Low vitamin D level ICD-10-CM: R79.89  ICD-9-CM: 790.6  1/11/2018 - Present        Blind left eye ICD-10-CM: H54.40  ICD-9-CM: 369.60  1/11/2018 - Present        ACP (advance care planning) ICD-10-CM: Z71.89  ICD-9-CM: V65.49  11/9/2016 - Present        S/P placement of VNS (vagus nerve stimulation) device ICD-10-CM: Z96.89  ICD-9-CM: V45.89  4/10/2015 - Present        * (Principal)Seizure ([facility] ICD-10-CM: R56.9  ICD-9-CM: 780.39  11/22/2013 - Present        Partial epilepsy with impairment of consciousness ([facility] ICD-10-CM: G40.209  ICD-9-CM: 345.40  1/21/2013 - Present        Ataxia ICD-10-CM: R27.0  ICD-9-CM: 781.3  1/21/2013 - Present        Memory loss ICD-10-CM: R41.3  ICD-9-CM: 780.93  1/21/2013 - Present        Recurrent seizures ([facility] ICD-10-CM: G40.909  ICD-9-CM: 345.80  8/27/2012 - Present              Greater than 30 minutes were spent with the patient on counseling and coordination of care    Signed:   Fay Hightower MD  10/4/2018  9:06 AM

## 2018-10-05 ENCOUNTER — PATIENT OUTREACH (OUTPATIENT)
Dept: PRIMARY CARE CLINIC | Age: 53
End: 2018-10-05

## 2018-10-05 NOTE — PROGRESS NOTES
Hospital Discharge Follow-Up Date/Time:  10/5/2018 3:58 PM 
 
Patient was admitted to [facility] on 18 and discharged on 10/4/18 for uncontrollable seizures. The physician discharge summary was available at the time of outreach. Patient was contacted within 2 business days of discharge. Top Challenges reviewed with the provider Recheck LFT's - high during admission but trended down at discharge. Consider repeat chest imaging to evaluate for clearing of aspiration pneumonia following Augmentin course Patients Vimpat was increased - He will follow up with neurology for evaluation of seizure disorder-  calling to schedule this follow up, he will also follow up with Pulmonary regarding sleep apnea treatment options Method of communication with provider :chart routing Inpatient RRAT score: 16 Was this a readmission? no  
Patient stated reason for the readmission: none Nurse Navigator (NN) contacted the caregiver Tatianna [facility] home manger by telephone to perform post hospital discharge assessment. Verified name and  with caregiver as identifiers. Provided introduction to self, and explanation of the Nurse Navigator role. Reviewed discharge instructions and red flags with caregiver who verbalized understanding. Caregiver given an opportunity to ask questions and does not have any further questions or concerns at this time. The caregiver agrees to contact the PCP office for questions related to their healthcare. NN provided contact information for future reference. Disease Specific:   N/A Summary of patient's top problems: 
1. Uncontrollable seizures- In to ED via EMS with uncontrolled seizures x 30 minutes. Arrived on non rebreather with gasping respirations and oral bleeding / tongue lac. CT head on  no acute changes. EEG C/w generalized encephalopathy and postictal phase.  Neuro saw and increased Vimpat. He passed SLP and tolerating po and meds. Was intubated and sedated in ICU until seizures controlled. Patient non verbal at baseline. He has a VNS in place and there is some questions as to whether it stopped working properly. He will follow up with neurology- spoke with  today he was scheduled with his neurologist Dr Kylie Jerry for 11/30 but she will call to try to move this up as he did have med changes made. He has not had any seizure activity since back to group home is just tired and moving slow. He is walking short distances and using W/C for long distances for now. 2. Aspiration Pneumonia- This occurred due to seizures. Treated with course of Augmentin. No reported SOB or difficulties per . 3. Underlying down syndrome/ CP/ dementia/ nonverbal/ left eye blindness Home Health orders at discharge: none- gets total care at his group home [address] [place] [address] Way: n/a Date of initial visit:n/a 
 
Durable Medical Equipment ordered/company:n/a 
Durable Medical Equipment received:n/a 
 
Barriers to care? Total care- mentally and physically challenged, blindness left eye, nonverbal lives in group home and care coordinated by manager Robert Maier - his family is in regular contact with him and engaged in his care Advance Care Planning:  
Does patient have an Advance Directive:  not on file Medication(s):  
New Medications at Discharge: Wellesse plus MVI, Augmentin Changed Medications at Discharge: Vimpat increased to 150 mg BID Discontinued Medications at Discharge: geritol tonic with Berne Sanchez Medication reconciliation was performed with caregiver, who verbalizes understanding of administration of home medications. There were no barriers to obtaining medications identified at this time. Referral to Pharm D needed: no  
 
Current Outpatient Prescriptions Medication Sig  
 multivit-folic acid-herbal 673 (WELLESSE PLUS) 400 mcg-200 mg/30 mL liqd oral liquid Take 30 mL by mouth daily.  amoxicillin-clavulanate (AUGMENTIN) 875-125 mg per tablet Take 1 Tab by mouth two (2) times a day.  lacosamide (VIMPAT) 100 mg tab tablet Take 1.5 Tabs by mouth two (2) times a day. Max Daily Amount: 300 mg.  ferrous sulfate 325 mg (65 mg iron) tablet Take 1 Tab by mouth Daily (before breakfast).  loratadine (CLARITIN) 10 mg tablet Take 1 Tab by mouth daily.  ascorbic acid, vitamin C, (VITAMIN C) 500 mg tablet Take 1 Tab by mouth daily.  levothyroxine (SYNTHROID) 50 mcg tablet Take 1 Tab by mouth Daily (before breakfast).  phenytoin (DILANTIN-125) suspension Take 4 mL by mouth two (2) times a day.  sodium chloride (DEEP SEA NASAL) 0.65 % nasal squeeze bottle 1 Spray as needed for Congestion.  fluticasone (FLONASE) 50 mcg/actuation nasal spray 2 Sprays by Both Nostrils route daily.  levETIRAcetam (KEPPRA) 100 mg/mL solution 15ml by mouth twice a day.  cholecalciferol (VITAMIN D3) 1,000 unit tablet Take 1 Tab by mouth daily.  HYDROcodone-acetaminophen (HYCET) 0.5-21.7 mg/mL oral solution Take 10 mL by mouth four (4) times daily as needed for Pain. No current facility-administered medications for this visit. There are no discontinued medications. [facility] follow up appointment(s):  
Future Appointments Date Time Provider Quinn Luna 10/9/2018 11:30 AM Mirza Pineda MD [address] [place] [address]  
11/20/2018 2:00 PM Gogo Ahuja MD [facility]  
11/30/2018 1:00 PM MD Kwasi Sinclair [facility] follow up appointment(s): none [facility]:  n/a  
 
 
Goals  Attends follow-up appointments as directed. 10/5/18- Has PCP follow up on 10/ 9. Caregiver to call and schedule follow up neuro appt sooner than the one currently scheduled for 11/30. He also has upcoming appt with sleep center for sleep apnea on 11/20. LN  
  
  control of seizures 10/5/18- Vimpat increased and per caregiver patient is tolerating well with no further seizure activity. He will see neuro for follow up.  LN

## 2018-10-06 LAB
BACTERIA SPEC CULT: NORMAL
SERVICE CMNT-IMP: NORMAL

## 2018-10-09 ENCOUNTER — TELEPHONE (OUTPATIENT)
Dept: PRIMARY CARE CLINIC | Age: 53
End: 2018-10-09

## 2018-10-09 ENCOUNTER — HOSPITAL ENCOUNTER (OUTPATIENT)
Dept: GENERAL RADIOLOGY | Age: 53
Discharge: HOME | End: 2018-10-09
Payer: MEDICARE

## 2018-10-09 ENCOUNTER — OFFICE VISIT (OUTPATIENT)
Dept: PRIMARY CARE CLINIC | Age: 53
End: 2018-10-09

## 2018-10-09 VITALS
TEMPERATURE: 98.5 F | OXYGEN SATURATION: 99 % | SYSTOLIC BLOOD PRESSURE: 120 MMHG | DIASTOLIC BLOOD PRESSURE: 83 MMHG | HEIGHT: 63 IN | HEART RATE: 75 BPM | RESPIRATION RATE: 16 BRPM

## 2018-10-09 DIAGNOSIS — R74.8 ELEVATED LIVER ENZYMES: ICD-10-CM

## 2018-10-09 DIAGNOSIS — J69.0 ASPIRATION PNEUMONIA OF LEFT LOWER LOBE, UNSPECIFIED ASPIRATION PNEUMONIA TYPE (HCC): ICD-10-CM

## 2018-10-09 DIAGNOSIS — Z09 HOSPITAL DISCHARGE FOLLOW-UP: ICD-10-CM

## 2018-10-09 DIAGNOSIS — Z09 HOSPITAL DISCHARGE FOLLOW-UP: Primary | ICD-10-CM

## 2018-10-09 DIAGNOSIS — J69.0 ASPIRATION PNEUMONIA OF LEFT LOWER LOBE, UNSPECIFIED ASPIRATION PNEUMONIA TYPE (HCC): Primary | ICD-10-CM

## 2018-10-09 DIAGNOSIS — G40.909 RECURRENT SEIZURES (HCC): ICD-10-CM

## 2018-10-09 PROCEDURE — 71046 X-RAY EXAM CHEST 2 VIEWS: CPT

## 2018-10-09 RX ORDER — AMOXICILLIN AND CLAVULANATE POTASSIUM 875; 125 MG/1; MG/1
1 TABLET, FILM COATED ORAL 2 TIMES DAILY
Qty: 14 TAB | Refills: 0 | Status: SHIPPED | OUTPATIENT
Start: 2018-10-09 | End: 2018-10-16

## 2018-10-09 NOTE — PROGRESS NOTES
Please call patient:     No change  from last time. Still showing left lower lobe PNA. Sent a prescription of Augmentin  X 7 days. Need a follow up Chest Xray in 7 weeks.

## 2018-10-09 NOTE — PROGRESS NOTES
Chief Complaint   Patient presents with   [facility] Follow Up     admitted for seizures 9/30/18-10/4/18     1. Have you been to the ER, urgent care clinic since your last visit? Hospitalized since your last visit? Yes Reason for visit: seizures and resp failure    2. Have you seen or consulted any other health care providers outside of the [address] [place] [address] since your last visit? Include any pap smears or colon screening.  No

## 2018-10-09 NOTE — PROGRESS NOTES
Subjective:     Chief Complaint   Patient presents with   [facility] Follow Up     admitted for seizures and resp failure 9/30/18-10/4/18        He  is a 48y.o. year old male with  past medical history of seizure, dementia, Down syndrome, cerebral palsy, hypothyroid, anemia, who presented from a group home for hospital follow up. He is here with her caregiver. Patient is non verbal. All info obtained form chart and caregiver. Mr. Pawan Celestin is a 48y.o. year old male, he is seen today for Transition of Care services following a hospital discharge from [facility] on 9/30/18 and discharged on 10/4/18 for uncontrollable seizures with a complication of aspiration pneumonia. Our office Nurse Navigator performed an outreach to Mr. Zeke Okeefe on 10/4/2018 (within 2 business days of discharge) to complete medication reconciliation and a telephonic assessment of his condition. On 9/30/2018 he was transported via EMS to [facility] with status epilepticus. He was found to have subtherapeutic phenytoin level on admission. The patient was given 5 mg of Versed in the ED. After IV access obtained, 2 mg of Ativan was given,  the patient continued seizing actively, so to protect airway the patient was intubated with etomidate and rocuronium.  The patient was intubated, placed on vent.  Consulted Neurology and found to have a low level of phenytoin.  The patient was treated  with phenytoin and a 500 mg Keppra in the ER.   The patient was admitted to ICU for subsequent management. he was continued on Keppra 1500 mg b.i.d., Dilantin 100 mg b.i.d. and increase Vimpat to 150 mg b.i.d.       He developed aspiration pneumonia because of that, treated with Levaquin in ICU for couple of days, made good recovery, and continues to improve, was able to swallow ok. He was sent home with two days of Augmentin. According to caregiver patient did not have any seizer since the discharge.  He has been eating without any difficulty. He is back to baseline.      Patient has an appointment with sleep specialist  for CPAP eval on 11/20 th. Has an appointment with neurologist on Dr. Rufus Mercado on 11/30/2018 . Saba Carey Lab Results   Component Value Date/Time    Sodium 141 10/02/2018 04:20 AM    Potassium 4.3 10/02/2018 04:20 AM    Chloride 109 (H) 10/02/2018 04:20 AM    CO2 22 10/02/2018 04:20 AM    Anion gap 10 10/02/2018 04:20 AM    Glucose 78 10/02/2018 04:20 AM    BUN 4 (L) 10/02/2018 04:20 AM    Creatinine 0.76 10/02/2018 04:20 AM    BUN/Creatinine ratio 5 (L) 10/02/2018 04:20 AM    GFR est AA >60 10/02/2018 04:20 AM    GFR est non-AA >60 10/02/2018 04:20 AM    Calcium 7.4 (L) 10/02/2018 04:20 AM    Bilirubin, total 0.4 10/02/2018 04:20 AM    AST (SGOT) 60 (H) 10/02/2018 04:20 AM    Alk. phosphatase 117 10/02/2018 04:20 AM    Protein, total 7.2 10/02/2018 04:20 AM    Albumin 2.5 (L) 10/02/2018 04:20 AM    Globulin 4.7 (H) 10/02/2018 04:20 AM    A-G Ratio 0.5 (L) 10/02/2018 04:20 AM    ALT (SGPT) 73 10/02/2018 04:20 AM     I reviewed hospital records. Reviewed hospital records. Review of systems not obtained due to patient factors. Objective:     Vitals:    10/09/18 1128   BP: 120/83   Pulse: 75   Resp: 16   Temp: 98.5 °F (36.9 °C)   TempSrc: Oral   SpO2: 99%   Height: 5' 3\" (1.6 m)       Physical Examination: General appearance - alert, well appearing, and in no distress, oriented to person, and sitting in wheel chair. Mental status - alert, oriented to person, place, and time, normal mood, behavior. Non verbal.   Eyes - right eye normal, blind left eye. Ears - bilateral TM's and external ear canals normal  Nose - normal and patent, no erythema, discharge or polyps  Mouth - patient did not open the mouth.   Neck - supple, no significant adenopathy  Chest - clear to auscultation, no wheezes, rales or rhonchi, symmetric air entry  Heart - normal rate, regular rhythm, normal S1, S2, no murmurs, rubs, clicks or gallops  Neuro: unable to perform due to patient factor. Allergies   Allergen Reactions    Morphine Not Reported This Time    Tegretol [Carbamazepine] Other (comments)     \"bad reaction\" \"changes attitude\"      Social History     Social History    Marital status: SINGLE     Spouse name: N/A    Number of children: N/A    Years of education: N/A     Social History Main Topics    Smoking status: Never Smoker    Smokeless tobacco: Never Used    Alcohol use No    Drug use: No    Sexual activity: Not Currently     Other Topics Concern    None     Social History Narrative      Family History   Problem Relation Age of Onset    Hypertension Mother     Cancer Mother     Lupus Mother     Arthritis-osteo Mother     Heart Disease Mother     Heart Disease Father     Diabetes Father     Hypertension Father     Elevated Lipids Father     Diabetes Brother     Elevated Lipids Brother     Hypertension Brother     Mental Retardation Brother     Cancer Maternal Grandmother     Arthritis-osteo Maternal Grandmother     Alcohol abuse Maternal Grandfather     Cancer Maternal Grandfather     Arthritis-osteo Paternal Grandmother     Cancer Paternal Grandfather       Past Surgical History:   Procedure Laterality Date    HX OTHER SURGICAL  03/13/2015     VNS  Dr. Diallo Burnett.  Dyllan Canales  @ [facility]      Past Medical History:   Diagnosis Date    Anemia     Anxiety     Blindness of left eye     Cerebral palsy ([facility]     DEMENTIA     Down syndrome     Endocrine disease     thyroid disorder    GERD (gastroesophageal reflux disease)     Ill-defined condition     blind in left eye    Ill-defined condition     imparied gait - uses walker    Ill-defined condition     dermatitis    Ill-defined condition     cerebral palsy    Ill-defined condition     prone to decubitus ulcers    Ill-defined condition     anemia    Neurological disorder     Other ill-defined conditions(799.89)     Down syndrome    Psychiatric disorder     mental retardation    Psychiatric disorder     anxiety disorder    Seizures (HCC)     Sleep apnea     Thyroid disease     Unspecified epilepsy without mention of intractable epilepsy       Current Outpatient Prescriptions   Medication Sig Dispense Refill    multivit-folic acid-herbal 457 (WELLESSE PLUS) 400 mcg-200 mg/30 mL liqd oral liquid Take 30 mL by mouth daily. 1 Bottle 0    lacosamide (VIMPAT) 100 mg tab tablet Take 1.5 Tabs by mouth two (2) times a day. Max Daily Amount: 300 mg. 90 Tab 0    ferrous sulfate 325 mg (65 mg iron) tablet Take 1 Tab by mouth Daily (before breakfast). 90 Tab 2    loratadine (CLARITIN) 10 mg tablet Take 1 Tab by mouth daily. 90 Tab 1    ascorbic acid, vitamin C, (VITAMIN C) 500 mg tablet Take 1 Tab by mouth daily. 30 Tab 6    levothyroxine (SYNTHROID) 50 mcg tablet Take 1 Tab by mouth Daily (before breakfast). 90 Tab 1    phenytoin (DILANTIN-125) suspension Take 4 mL by mouth two (2) times a day. 1 Bottle 5    sodium chloride (DEEP SEA NASAL) 0.65 % nasal squeeze bottle 1 Spray as needed for Congestion.  HYDROcodone-acetaminophen (HYCET) 0.5-21.7 mg/mL oral solution Take 10 mL by mouth four (4) times daily as needed for Pain.  fluticasone (FLONASE) 50 mcg/actuation nasal spray 2 Sprays by Both Nostrils route daily. 1 Bottle 2    levETIRAcetam (KEPPRA) 100 mg/mL solution 15ml by mouth twice a day. 600 mL 5    cholecalciferol (VITAMIN D3) 1,000 unit tablet Take 1 Tab by mouth daily. 90 Tab 4        Assessment/ Plan:   Diagnoses and all orders for this visit:    1. Hospital discharge follow-up  -     METABOLIC PANEL, COMPREHENSIVE  -     XR CHEST PA LAT; Future. Xray reviewed. No change from last time. Patient is asymptomatic.   -     Start amoxicillin-clavulanate (AUGMENTIN) 875-125 mg per tablet; Take 1 Tab by mouth two (2) times a day for 7 days. Indications: BACTERIAL PNEUMONIA    2.  Aspiration pneumonia of left lower lobe, unspecified aspiration pneumonia type ([facility]  -     XR CHEST PA LAT; Future. Xray reviewed. No change from last time.   -     Start amoxicillin-clavulanate (AUGMENTIN) 875-125 mg per tablet; Take 1 Tab by mouth two (2) times a day for 7 days. Indications: BACTERIAL PNEUMONIA    3. Recurrent seizures ([facility]       - stable. continue current med. Follow up with neurologist as scheduled. 4. Elevated liver enzymes  -     METABOLIC PANEL, COMPREHENSIVE           Medication risks/benefits/costs/interactions/alternatives discussed with patient. Advised patient to call back or return to office if symptoms worsen/change/persist. If patient cannot reach us or should anything more severe/urgent arise he/she should proceed directly to the nearest emergency department. Discussed expected course/resolution/complications of diagnosis in detail with patient. Patient given a written after visit summary which includes her diagnoses, current medications and vitals. Patient expressed understanding with the diagnosis and plan. Follow-up Disposition:  Return in about 3 months (around 1/9/2019) for complete physical  and fasting blood work., have fasting blood work done 2-3 days prior. . need a follow up Chest Xray in 7 weeks.

## 2018-10-09 NOTE — TELEPHONE ENCOUNTER
Rita Poe states that nobody mentioned to us and they should have but since pt had catheter removed from hospital, they have been noticing blood in his urine. Would like to know what  suggest that he do or should he go to urgent care.

## 2018-10-09 NOTE — TELEPHONE ENCOUNTER
Discussed results and recommendations with Lynn [facility], group home coordinator. She verbalized her understanding and request results and chest xray order be faxed to her at [phone].

## 2018-10-09 NOTE — MR AVS SNAPSHOT
[address] [facility] 
 
 
 [address] [address] 
[phone] Patient: Amira Gonsalez MRN: [id number] :1965 Visit Information Date & Time Provider Department Dept. Phone Encounter #  
 10/9/2018 11:30 AM Mirza Monterroso MD [facility] [phone] [id number] Follow-up Instructions Return in about 3 months (around 2019) for complete physical  and fasting blood work., have fasting blood work done 2-3 days prior. Leah Hollow Your Appointments 2018  2:00 PM  
New Patient with Bebeto Rodríguez MD  
[address] [facility] PSY[facility]IATRIC [place] ([facility]) Appt Note: NP refd for sleep consult. [address] [address]  
[address] [address]  
  
    
 2018  1:00 PM  
Follow Up with Rodrigue Erwin MD  
[facility]) Appt Note: f/u seizure, jrb 3/23/18  
 [address] [place] [address], 
[address] [place] [address], [address] [phone]  
[address] [facility], [address] [place] [address], [address] [phone] Upcoming Health Maintenance Date Due DTaP/Tdap/Td series (1 - Tdap) 1986 Shingrix Vaccine Age 50> (1 of 2) 2015 FOBT Q 1 YEAR AGE 50-75 12/10/2016 MEDICARE YEARLY EXAM 5/10/2018 Allergies as of 10/9/2018  Review Complete On: 10/9/2018 By: Yuliet Hughes Severity Noted Reaction Type Reactions Morphine  2016    Not Reported This Time Tegretol [Carbamazepine]  2013    Other (comments) \"bad reaction\" \"changes attitude\" Current Immunizations  Reviewed on 10/9/2018 Name Date Influenza Vaccine (Quad) PF 10/4/2018 11:14 AM  
 Influenza Vaccine Intradermal PF 2014 TB Skin Test (PPD) Intradermal 2016, 2013  Reviewed by Josue Astorga MD on 10/9/2018 at 11:52 AM  
 You Were Diagnosed With   
  
 Codes Comments Hospital discharge follow-up    -  Primary ICD-10-CM: [facility] [facility] ICD-9-CM: V67.59 Recurrent seizures ([facility] [address]     ICD-10-CM: W19.648 ICD-9-CM: 345.80 Elevated liver enzymes     ICD-10-CM: R74.8 ICD-9-CM: 790.5 Aspiration pneumonia of left lower lobe, unspecified aspiration pneumonia type ([facility] [address]     ICD-10-CM: J69.0 ICD-9-CM: 507.0 Vitals BP Pulse Temp Resp Height(growth percentile) SpO2  
 120/83 (BP 1 Location: Left arm, BP Patient Position: Sitting) 75 98.5 °F (36.9 °C) (Oral) 16 5' 3\" (1.6 m) 99% Smoking Status Never Smoker Preferred Pharmacy Pharmacy Name Phone [facility] [phone] Your Updated Medication List  
  
   
This list is accurate as of 10/9/18 11:55 AM.  Always use your most recent med list.  
  
  
  
  
 ascorbic acid (vitamin C) 500 mg tablet Commonly known as:  VITAMIN C Take 1 Tab by mouth daily. cholecalciferol 1,000 unit tablet Commonly known as:  VITAMIN D3 Take 1 Tab by mouth daily. DEEP SEA NASAL 0.65 % nasal squeeze bottle Generic drug:  sodium chloride 1 Spray as needed for Congestion. ferrous sulfate 325 mg (65 mg iron) tablet Take 1 Tab by mouth Daily (before breakfast). fluticasone 50 mcg/actuation nasal spray Commonly known as:  [place] Ly 2 Sprays by Both Nostrils route daily. HYDROcodone-acetaminophen 0.5-21.7 mg/mL oral solution Commonly known as:  HYCET Take 10 mL by mouth four (4) times daily as needed for Pain. lacosamide 100 mg Tab tablet Commonly known as:  VIMPAT Take 1.5 Tabs by mouth two (2) times a day. Max Daily Amount: 300 mg.  
  
 levETIRAcetam 100 mg/mL solution Commonly known as:  KEPPRA 15ml by mouth twice a day. levothyroxine 50 mcg tablet Commonly known as:  SYNTHROID Take 1 Tab by mouth Daily (before breakfast). loratadine 10 mg tablet Commonly known as:  Alferd Crumb Take 1 Tab by mouth daily. multivit-folic acid-herbal 523 967 mcg-200 mg/30 mL Liqd oral liquid Commonly known as:  Davila  Take 30 mL by mouth daily. phenytoin suspension Commonly known as:  IGXULSUH-000 Take 4 mL by mouth two (2) times a day. We Performed the Following METABOLIC PANEL, COMPREHENSIVE [14860 CPT(R)] Follow-up Instructions Return in about 3 months (around 1/9/2019) for complete physical  and fasting blood work., have fasting blood work done 2-3 days prior. Leah Montiel To-Do List   
 10/09/2018 Imaging:  XR CHEST PA LAT Introducing [facility]! David Cade introduces Appsco patient portal. Now you can access parts of your medical record, email your doctor's office, and request medication refills online. 1. In your internet browser, go to [email or web address] 2. Click on the First Time User? Click Here link in the Sign In box. You will see the New Member Sign Up page. 3. Enter your Appsco Access Code exactly as it appears below. You will not need to use this code after youve completed the sign-up process. If you do not sign up before the expiration date, you must request a new code. · Appsco Access Code: [email or web address] Expires: 12/29/2018 12:14 PM 
 
4. Enter the last four digits of your Social Security Number (xxxx) and Date of Birth (mm/dd/yyyy) as indicated and click Submit. You will be taken to the next sign-up page. 5. Create a Placelingt ID. This will be your Appsco login ID and cannot be changed, so think of one that is secure and easy to remember. 6. Create a Appsco password. You can change your password at any time. 7. Enter your Password Reset Question and Answer. This can be used at a later time if you forget your password. 8. Enter your e-mail address.  You will receive e-mail notification when new information is available in Dealo. 9. Click Sign Up. You can now view and download portions of your medical record. 10. Click the Download Summary menu link to download a portable copy of your medical information. If you have questions, please visit the Frequently Asked Questions section of the Dealo website. Remember, Dealo is NOT to be used for urgent needs. For medical emergencies, dial 911. Now available from your iPhone and Android! Please provide this summary of care documentation to your next provider. Your primary care clinician is listed as Mirza Sharma. If you have any questions after today's visit, please call [phone].

## 2018-10-09 NOTE — TELEPHONE ENCOUNTER
----- Message from Juan Coburn MD sent at 10/9/2018  1:34 PM EDT -----  Please call patient:     No change  from last time. Still showing left lower lobe PNA. Sent a prescription of Augmentin  X 7 days. Need a follow up Chest Xray in 7 weeks.

## 2018-10-10 ENCOUNTER — OFFICE VISIT (OUTPATIENT)
Dept: PRIMARY CARE CLINIC | Age: 53
End: 2018-10-10

## 2018-10-10 VITALS
WEIGHT: 155 LBS | TEMPERATURE: 98.7 F | HEIGHT: 63 IN | BODY MASS INDEX: 27.46 KG/M2 | HEART RATE: 88 BPM | DIASTOLIC BLOOD PRESSURE: 83 MMHG | SYSTOLIC BLOOD PRESSURE: 128 MMHG | RESPIRATION RATE: 18 BRPM | OXYGEN SATURATION: 100 %

## 2018-10-10 DIAGNOSIS — T85.9XXA COMPLICATION OF CATHETER, INITIAL ENCOUNTER: Primary | ICD-10-CM

## 2018-10-10 LAB
ALBUMIN SERPL-MCNC: 3.8 G/DL (ref 3.5–5.5)
ALBUMIN/GLOB SERPL: 0.8 {RATIO} (ref 1.2–2.2)
ALP SERPL-CCNC: 104 IU/L (ref 39–117)
ALT SERPL-CCNC: 30 IU/L (ref 0–44)
AST SERPL-CCNC: 33 IU/L (ref 0–40)
BILIRUB SERPL-MCNC: 0.2 MG/DL (ref 0–1.2)
BILIRUB UR QL STRIP: NEGATIVE
BUN SERPL-MCNC: 3 MG/DL (ref 6–24)
BUN/CREAT SERPL: 5 (ref 9–20)
CALCIUM SERPL-MCNC: 9 MG/DL (ref 8.7–10.2)
CHLORIDE SERPL-SCNC: 99 MMOL/L (ref 96–106)
CO2 SERPL-SCNC: 27 MMOL/L (ref 20–29)
CREAT SERPL-MCNC: 0.63 MG/DL (ref 0.76–1.27)
GLOBULIN SER CALC-MCNC: 4.8 G/DL (ref 1.5–4.5)
GLUCOSE SERPL-MCNC: 89 MG/DL (ref 65–99)
GLUCOSE UR-MCNC: NEGATIVE MG/DL
KETONES P FAST UR STRIP-MCNC: NEGATIVE MG/DL
PH UR STRIP: 7 [PH] (ref 4.6–8)
POTASSIUM SERPL-SCNC: 3.8 MMOL/L (ref 3.5–5.2)
PROT SERPL-MCNC: 8.6 G/DL (ref 6–8.5)
PROT UR QL STRIP: NEGATIVE
SODIUM SERPL-SCNC: 142 MMOL/L (ref 134–144)
SP GR UR STRIP: 1.01 (ref 1–1.03)
UA UROBILINOGEN AMB POC: NORMAL (ref 0.2–1)
URINALYSIS CLARITY POC: CLEAR
URINALYSIS COLOR POC: YELLOW
URINE BLOOD POC: NORMAL
URINE LEUKOCYTES POC: NEGATIVE
URINE NITRITES POC: NEGATIVE

## 2018-10-11 LAB
APPEARANCE UR: CLEAR
BILIRUB UR QL STRIP: NEGATIVE
COLOR UR: YELLOW
GLUCOSE UR QL: NEGATIVE
HGB UR QL STRIP: NEGATIVE
KETONES UR QL STRIP: NEGATIVE
LEUKOCYTE ESTERASE UR QL STRIP: NEGATIVE
MICRO URNS: NORMAL
NITRITE UR QL STRIP: NEGATIVE
PH UR STRIP: 7 [PH] (ref 5–7.5)
PROT UR QL STRIP: NEGATIVE
SP GR UR: 1.01 (ref 1–1.03)
UROBILINOGEN UR STRIP-MCNC: 0.2 MG/DL (ref 0.2–1)

## 2018-10-19 DIAGNOSIS — G40.209 PARTIAL EPILEPSY WITH IMPAIRMENT OF CONSCIOUSNESS (HCC): ICD-10-CM

## 2018-10-19 RX ORDER — LEVETIRACETAM 100 MG/ML
SOLUTION ORAL
Qty: 600 ML | Refills: 5 | Status: SHIPPED | OUTPATIENT
Start: 2018-10-19 | End: 2019-04-11 | Stop reason: SDUPTHER

## 2018-10-19 RX ORDER — PHENYTOIN 125 MG/5ML
100 SUSPENSION ORAL 2 TIMES DAILY
Qty: 1 BOTTLE | Refills: 5 | Status: SHIPPED | OUTPATIENT
Start: 2018-10-19 | End: 2019-03-21 | Stop reason: SDUPTHER

## 2018-10-19 NOTE — TELEPHONE ENCOUNTER
Received refill requests  From [facility] pharmacy   Phenytoin   Last filled 4/26/18 and    keppra   Last filled 10/17/17  Dr. Argueta Query please fill

## 2018-11-14 DIAGNOSIS — G40.909 RECURRENT SEIZURES (HCC): ICD-10-CM

## 2018-11-14 RX ORDER — LACOSAMIDE 100 MG/1
150 TABLET ORAL 2 TIMES DAILY
Qty: 90 TAB | Refills: 0 | Status: SHIPPED | OUTPATIENT
Start: 2018-11-14 | End: 2019-02-08 | Stop reason: SDUPTHER

## 2018-11-14 NOTE — TELEPHONE ENCOUNTER
Future Appointments   Date Time Provider Quinn Carrasco   11/20/2018  2:00 PM Crow Ross MD [facility] PSYCHIATRIC [place] [facility] SONNY   11/30/2018  1:00 PM Anthony Vera MD [address]   12/11/2018 11:00 AM Latonia Joshua MD [facility]                         Last Appointment My Department:  4/20/18    Please advise of refill below. Requested Prescriptions     Pending Prescriptions Disp Refills    lacosamide (VIMPAT) 100 mg tab tablet 90 Tab 0     Sig: Take 1.5 Tabs by mouth two (2) times a day. Max Daily Amount: 300 mg.

## 2018-11-20 ENCOUNTER — OFFICE VISIT (OUTPATIENT)
Dept: SLEEP MEDICINE | Age: 53
End: 2018-11-20

## 2018-11-20 VITALS
DIASTOLIC BLOOD PRESSURE: 73 MMHG | HEIGHT: 63 IN | SYSTOLIC BLOOD PRESSURE: 139 MMHG | BODY MASS INDEX: 25.83 KG/M2 | WEIGHT: 145.8 LBS

## 2018-11-20 DIAGNOSIS — Q90.9 DOWN SYNDROME: ICD-10-CM

## 2018-11-20 DIAGNOSIS — H54.40 BLIND LEFT EYE: ICD-10-CM

## 2018-11-20 DIAGNOSIS — G47.33 OSA (OBSTRUCTIVE SLEEP APNEA): Primary | ICD-10-CM

## 2018-11-20 DIAGNOSIS — G40.909 SEIZURE DISORDER (HCC): ICD-10-CM

## 2018-11-20 NOTE — PATIENT INSTRUCTIONS
[address] [place]. [place], [address]  Tel.  [phone]  Fax. [address] [facility] [address]  [place], [address]  Tel.  [phone]  Fax. [phone] [address] [place]ha [place]agen  Tel.  [phone]  Fax. [phone]     Sleep Apnea: After Your Visit  Your Care Instructions  Sleep apnea occurs when you frequently stop breathing for 10 seconds or longer during sleep. It can be mild to severe, based on the number of times per hour that you stop breathing or have slowed breathing. Blocked or narrowed airways in your nose, mouth, or throat can cause sleep apnea. Your airway can become blocked when your throat muscles and tongue relax during sleep. Sleep apnea is common, occurring in 1 out of 20 individuals. Individuals having any of the following characteristics should be evaluated and treated right away due to high risk and detrimental consequences from untreated sleep apnea:  1. Obesity  2. Congestive Heart failure  3. Atrial Fibrillation  4. Uncontrolled Hypertension  5. Type II Diabetes  6. Night-time Arrhythmias  7. Stroke  8. Pulmonary Hypertension  9. High-risk Driving Populations (pilots, truck drivers, etc.)  10. Patients Considering Weight-loss Surgery    How do you know you have sleep apnea? You probably have sleep apnea if you answer 'yes' to 3 or more of the following questions:  S - Have you been told that you Snore? T - Are you often Tired during the day? O - Has anyone Observed you stop breathing while sleeping? P- Do you have (or are being treated for) high blood Pressure? B - Are you obese (Body Mass Index > 35)? A - Is your Age 48years old or older? N - Is your Neck size greater than 16 inches? G - Are you male Gender? A sleep physician can prescribe a breathing device that prevents tissues in the throat from blocking your airway.  Or your doctor may recommend using a dental device (oral breathing device) to help keep your airway open. In some cases, surgery may be needed to remove enlarged tissues in the throat. Follow-up care is a key part of your treatment and safety. Be sure to make and go to all appointments, and call your doctor if you are having problems. It's also a good idea to know your test results and keep a list of the medicines you take. How can you care for yourself at home? · Lose weight, if needed. It may reduce the number of times you stop breathing or have slowed breathing. · Go to bed at the same time every night. · Sleep on your side. It may stop mild apnea. If you tend to roll onto your back, sew a pocket in the back of your pajama top. Put a tennis ball into the pocket, and stitch the pocket shut. This will help keep you from sleeping on your back. · Avoid alcohol and medicines such as sleeping pills and sedatives before bed. · Do not smoke. Smoking can make sleep apnea worse. If you need help quitting, talk to your doctor about stop-smoking programs and medicines. These can increase your chances of quitting for good. · Prop up the head of your bed 4 to 6 inches by putting bricks under the legs of the bed. · Treat breathing problems, such as a stuffy nose, caused by a cold or allergies. · Use a continuous positive airway pressure (CPAP) breathing machine if lifestyle changes do not help your apnea and your doctor recommends it. The machine keeps your airway from closing when you sleep. · If CPAP does not help you, ask your doctor whether you should try other breathing machines. A bilevel positive airway pressure machine has two types of air pressureâone for breathing in and one for breathing out. Another device raises or lowers air pressure as needed while you breathe. · If your nose feels dry or bleeds when using one of these machines, talk with your doctor about increasing moisture in the air. A humidifier may help.   · If your nose is runny or stuffy from using a breathing machine, talk with your doctor about using decongestants or a corticosteroid nasal spray. When should you call for help? Watch closely for changes in your health, and be sure to contact your doctor if:  · You still have sleep apnea even though you have made lifestyle changes. · You are thinking of trying a device such as CPAP. · You are having problems using a CPAP or similar machine. Where can you learn more? Go to Vopium. Enter H956 in the search box to learn more about \"Sleep Apnea: After Your Visit. \"   © 9911-9752 Healthwise, Incorporated. Care instructions adapted under license by [employer] (which disclaims liability or warranty for this information). This care instruction is for use with your licensed healthcare professional. If you have questions about a medical condition or this instruction, always ask your healthcare professional. Jonelle Needs any warranty or liability for your use of this information. PROPER SLEEP HYGIENE    What to avoid  · Do not have drinks with caffeine, such as coffee or black tea, for 8 hours before bed. · Do not smoke or use other types of tobacco near bedtime. Nicotine is a stimulant and can keep you awake. · Avoid drinking alcohol late in the evening, because it can cause you to wake in the middle of the night. · Do not eat a big meal close to bedtime. If you are hungry, eat a light snack. · Do not drink a lot of water close to bedtime, because the need to urinate may wake you up during the night. · Do not read or watch TV in bed. Use the bed only for sleeping and sexual activity. What to try  · Go to bed at the same time every night, and wake up at the same time every morning. Do not take naps during the day. · Keep your bedroom quiet, dark, and cool. · Get regular exercise, but not within 3 to 4 hours of your bedtime. .  · Sleep on a comfortable pillow and mattress.   · If watching the clock makes you anxious, turn it facing away from you so you cannot see the time. · If you worry when you lie down, start a worry book. Well before bedtime, write down your worries, and then set the book and your concerns aside. · Try meditation or other relaxation techniques before you go to bed. · If you cannot fall asleep, get up and go to another room until you feel sleepy. Do something relaxing. Repeat your bedtime routine before you go to bed again. · Make your house quiet and calm about an hour before bedtime. Turn down the lights, turn off the TV, log off the computer, and turn down the volume on music. This can help you relax after a busy day. Drowsy Driving  The [address] [place] [address] Road Traffic Safety Administration cites drowsiness as a causing factor in more than 124,766 police reported crashes annually, resulting in 76,000 injuries and 1,500 deaths. Other surveys suggest 55% of people polled have driven while drowsy in the past year, 23% had fallen asleep but not crashed, 3% crashed, and 2% had and accident due to drowsy driving. Who is at risk? Young Drivers: One study of drowsy driving accidents states that 55% of the drivers were under 25 years. Of those, 75% were male. Shift Workers and Travelers: People who work overnight or travel across time zones frequently are at higher risk of experiencing Circadian Rhythm Disorders. They are trying to work and function when their body is programed to sleep. Sleep Deprived: Lack of sleep has a serious impact on your ability to pay attention or focus on a task. Consistently getting less than the average of 8 hours your body needs creates partial or cumulative sleep deprivation. Untreated Sleep Disorders: Sleep Apnea, Narcolepsy, R.L.S., and other sleep disorders (untreated) prevent a person from getting enough restful sleep. This leads to excessive daytime sleepiness and increases the risk for drowsy driving accidents by up to 7 times.   Medications / Alcohol: Even over the counter medications can cause drowsiness. Medications that impair a drivers attention should have a warning label. Alcohol naturally makes you sleepy and on its own can cause accidents. Combined with excessive drowsiness its effects are amplified. Signs of Drowsy Driving:   * You don't remember driving the last few miles   * You may drift out of your tobin   * You are unable to focus and your thoughts wander   * You may yawn more often than normal   * You have difficulty keeping your eyes open / nodding off   * Missing traffic signs, speeding, or tailgating  Prevention-   Good sleep hygiene, lifestyle and behavioral choices have the most impact on drowsy driving. There is no substitute for sleep and the average person requires 8 hours nightly. If you find yourself driving drowsy, stop and sleep. Consider the sleep hygiene tips provided during your visit as well. Medication Refill Policy: Refills for all medications require 1 week advance notice. Please have your pharmacy fax a refill request. We are unable to fax, or call in \"controled substance\" medications and you will need to pick these prescriptions up from our office. Pixspan Activation    Thank you for requesting access to Pixspan. Please follow the instructions below to securely access and download your online medical record. Pixspan allows you to send messages to your doctor, view your test results, renew your prescriptions, schedule appointments, and more. How Do I Sign Up? 1. In your internet browser, go to [email or web address]. 2. Click on the First Time User? Click Here link in the Sign In box. You will see the New Member Sign Up page. 3. Enter your Pixspan Access Code exactly as it appears below. You will not need to use this code after youve completed the sign-up process. If you do not sign up before the expiration date, you must request a new code.     Pixspan Access Code: [email or web address]  Expires: 12/29/2018 11:14 AM (This is the date your Medigo access code will )    4. Enter the last four digits of your Social Security Number (xxxx) and Date of Birth (mm/dd/yyyy) as indicated and click Submit. You will be taken to the next sign-up page. 5. Create a CampEasyt ID. This will be your Medigo login ID and cannot be changed, so think of one that is secure and easy to remember. 6. Create a Medigo password. You can change your password at any time. 7. Enter your Password Reset Question and Answer. This can be used at a later time if you forget your password. 8. Enter your e-mail address. You will receive e-mail notification when new information is available in [address] 9. Click Sign Up. You can now view and download portions of your medical record. 10. Click the Download Summary menu link to download a portable copy of your medical information. Additional Information    If you have questions, please call [phone]. Remember, Medigo is NOT to be used for urgent needs. For medical emergencies, dial 911.

## 2018-11-20 NOTE — PROGRESS NOTES
[address] [place], [address]  Tel.  [phone]  Fax. 100 [facility] 60  [place], [address] [facility]  Tel.  [phone]  Fax. [phone] [address] [facility] Talha Hagen   Tel.  [phone]  Fax. [phone]         Subjective:      Adrianne Erazo is an 48 y.o. male referred for evaluation for a sleep disorder. He is here with Mr. Hilda Dillard from the group home where the patient resides. He complains of snoring associated with snorting, periods of not breathing, excessive daytime sleepiness. Symptoms began 5 years ago, unchanged since that time. He usually can fall asleep in 5 minutes. Family or house members note snoring, periods of not breathing. Adrianne Erazo does wake up frequently at night. He is bothered by waking up too early and left unable to get back to sleep. He actually sleeps about 4 hours at night and wakes up about 6 times during the night. He does not work shifts: Rolando Balling indicates he does get too little sleep at night. His bedtime is 2100. He awakens at 5. He does take naps. He takes 7 naps a week lasting 45 min to an hour, Minute(s). He has the following observed behaviors: Loud snoring, Light snoring, Pauses in breathing, Kicking with legs;  . Other remarks: waking with a gasp or snort    [place] Sleepiness Score: 23 which reflect severe daytime drowsiness. Allergies   Allergen Reactions    Morphine Not Reported This Time    Tegretol [Carbamazepine] Other (comments)     \"bad reaction\" \"changes attitude\"         Current Outpatient Medications:     lacosamide (VIMPAT) 100 mg tab tablet, Take 1.5 Tabs by mouth two (2) times a day.  Max Daily Amount: 300 mg., Disp: 90 Tab, Rfl: 0    phenytoin (DILANTIN-125) suspension, Take 4 mL by mouth two (2) times a day., Disp: 1 Bottle, Rfl: 5    levETIRAcetam (KEPPRA) 100 mg/mL solution, 15ml by mouth twice a day., Disp: 600 mL, Rfl: 5    multivit-folic acid-herbal 622 (WELLESSE PLUS) 400 mcg-200 mg/30 mL liqd oral liquid, Take 30 mL by mouth daily. , Disp: 1 Bottle, Rfl: 0    ferrous sulfate 325 mg (65 mg iron) tablet, Take 1 Tab by mouth Daily (before breakfast). , Disp: 90 Tab, Rfl: 2    loratadine (CLARITIN) 10 mg tablet, Take 1 Tab by mouth daily. , Disp: 90 Tab, Rfl: 1    ascorbic acid, vitamin C, (VITAMIN C) 500 mg tablet, Take 1 Tab by mouth daily. , Disp: 30 Tab, Rfl: 6    levothyroxine (SYNTHROID) 50 mcg tablet, Take 1 Tab by mouth Daily (before breakfast). , Disp: 90 Tab, Rfl: 1    sodium chloride (DEEP SEA NASAL) 0.65 % nasal squeeze bottle, 1 Spray as needed for Congestion. , Disp: , Rfl:     fluticasone (FLONASE) 50 mcg/actuation nasal spray, 2 Sprays by Both Nostrils route daily. , Disp: 1 Bottle, Rfl: 2    cholecalciferol (VITAMIN D3) 1,000 unit tablet, Take 1 Tab by mouth daily. , Disp: 90 Tab, Rfl: 4    HYDROcodone-acetaminophen (HYCET) 0.5-21.7 mg/mL oral solution, Take 10 mL by mouth four (4) times daily as needed for Pain., Disp: , Rfl:      He  has a past medical history of Anemia, Anxiety, Blindness of left eye, Cerebral palsy ([address] Dementia, Down syndrome, Endocrine disease, GERD (gastroesophageal reflux disease), Ill-defined condition, Ill-defined condition, Ill-defined condition, Ill-defined condition, Ill-defined condition, Ill-defined condition, Neurological disorder, Other ill-defined conditions(799.89), Psychiatric disorder, Psychiatric disorder, Seizures ([address] Sleep apnea, Thyroid disease, and Unspecified epilepsy without mention of intractable epilepsy. He  has a past surgical history that includes hx other surgical (03/13/2015 ); COLONOSCOPY (N/A, 2/16/2016); and INSERTION VAGUS NERVE STIMULATOR GENERATOR AND LEAD (Left, 3/13/2015).     He family history includes Alcohol abuse in his maternal grandfather; Nasrin Stearns in his maternal grandmother, mother, and paternal grandmother; Cancer in his maternal grandfather, maternal grandmother, mother, and paternal grandfather; Diabetes in his brother and father; Elevated Lipids in his brother and father; Heart Disease in his father and mother; Hypertension in his brother, father, and mother; Lupus in his mother; Mental Retardation in his brother. He  reports that  has never smoked. he has never used smokeless tobacco. He reports that he does not drink alcohol or use drugs. Review of Systems:  Constitutional:  No significant weight loss or weight gain. Eyes:  No blurred vision - Blind left eye. CVS:  No significant chest pain  Pulm:  No significant shortness of breath  GI:  No significant nausea or vomiting  :  No significant nocturia  Musculoskeletal:  No significant joint pain at night  Skin:  No significant rashes  Neuro:  No significant dizziness   Psych:  No active mood issues    Sleep Review of Systems: notable for no difficulty falling asleep; requent awakenings at night; no early morning headaches. Objective:     Visit Vitals  /73   Ht 5' 3\" (1.6 m)   Wt 145 lb 12.8 oz (66.1 kg)   BMI 25.83 kg/m²         General:   Not in acute distress   Eyes:  Anicteric sclerae, no obvious strabismus   Nose:  No obvious nasal septum deviation    Oropharynx:   Class 4 oropharyngeal outlet, thick tongue base, uvula could not be seen due to low-lying soft palate, narrow tonsilo-pharyngeal pilars   Tonsils:   tonsils are not seen due to low-lying soft palate   Neck:   Neck circ. in \"inches\": 15.5; midline trachea   Chest/Lungs:  Equal lung expansion, clear on auscultation    CVS:  Normal rate, regular rhythm; no JVD   Skin:  Warm to touch; no obvious rashes   Neuro:  No focal deficits ; no obvious tremor    Psych:  Normal affect,  normal countenance;          Assessment:       ICD-10-CM ICD-9-CM    1. SAVITA (obstructive sleep apnea) G47.33 327.23 POLYSOMNOGRAPHY 1 NIGHT   2. Seizure disorder ([address] G40.909 345.90    3. Down syndrome Q90.9 758.0    4.  Blind left eye H54.40 369.60          Plan:     * The patient currently has a Moderate Risk for having sleep apnea. STOP-BANG score 5.  * Sleep testing was ordered for initial evaluation. * He was provided information on sleep apnea including coresponding risk factors and the importance of proper treatment. * Treatment options if indicated were reviewed today. Patient agrees to a trial of PAP therapy if indicated. * Counseling was provided regarding proper sleep hygiene (including effect of light on sleep), paradoxical intention, stimulus control, sleep environment safety. * Effect of sleep disturbance on weight was reviewed. We have recommended a dedicated weight loss through appropriate diet and an exercise regiment as significant weight reduction has been shown to reduce severity of obstructive sleep apnea. * Patient agrees to telephone [phone] (Hilario Givens)  follow-up by myself or lead sleep technologist shortly after sleep study to review results and plan final management.     (patient has given permission for a message to be left regarding test results and further management if patient cannot be cannot be reached directly). Thank you for allowing us to participate in your patient's medical care. We'll keep you updated on these investigations. Micah Anders MD, FAASM  Electronically signed.  11/20/18

## 2018-12-31 ENCOUNTER — APPOINTMENT (OUTPATIENT)
Dept: GENERAL RADIOLOGY | Age: 53
DRG: 871 | End: 2018-12-31
Attending: EMERGENCY MEDICINE
Payer: MEDICARE

## 2018-12-31 ENCOUNTER — APPOINTMENT (OUTPATIENT)
Dept: CT IMAGING | Age: 53
DRG: 871 | End: 2018-12-31
Attending: EMERGENCY MEDICINE
Payer: MEDICARE

## 2018-12-31 ENCOUNTER — HOSPITAL ENCOUNTER (INPATIENT)
Age: 53
LOS: 6 days | Discharge: HOME | DRG: 871 | End: 2019-01-07
Attending: EMERGENCY MEDICINE | Admitting: INTERNAL MEDICINE
Payer: MEDICARE

## 2018-12-31 DIAGNOSIS — G93.40 ACUTE ENCEPHALOPATHY: ICD-10-CM

## 2018-12-31 DIAGNOSIS — Z71.89 GOALS OF CARE, COUNSELING/DISCUSSION: ICD-10-CM

## 2018-12-31 DIAGNOSIS — R13.12 OROPHARYNGEAL DYSPHAGIA: ICD-10-CM

## 2018-12-31 DIAGNOSIS — J18.9 PNEUMONIA OF BOTH LOWER LOBES DUE TO INFECTIOUS ORGANISM: ICD-10-CM

## 2018-12-31 DIAGNOSIS — Q90.9 DOWN SYNDROME: ICD-10-CM

## 2018-12-31 DIAGNOSIS — R56.9 SEIZURE (HCC): ICD-10-CM

## 2018-12-31 DIAGNOSIS — R53.83 LETHARGY: ICD-10-CM

## 2018-12-31 DIAGNOSIS — N39.0 URINARY TRACT INFECTION WITHOUT HEMATURIA, SITE UNSPECIFIED: Primary | ICD-10-CM

## 2018-12-31 LAB
ALBUMIN SERPL-MCNC: 3.4 G/DL (ref 3.5–5)
ALBUMIN/GLOB SERPL: 0.5 {RATIO} (ref 1.1–2.2)
ALP SERPL-CCNC: 171 U/L (ref 45–117)
ALT SERPL-CCNC: 39 U/L (ref 12–78)
ANION GAP SERPL CALC-SCNC: 6 MMOL/L (ref 5–15)
APPEARANCE UR: CLEAR
ARTERIAL PATENCY WRIST A: ABNORMAL
AST SERPL-CCNC: 38 U/L (ref 15–37)
ATRIAL RATE: 84 BPM
BACTERIA URNS QL MICRO: NEGATIVE /HPF
BASE EXCESS BLDA CALC-SCNC: 2 MMOL/L
BASOPHILS # BLD: 0 K/UL (ref 0–0.1)
BASOPHILS NFR BLD: 0 % (ref 0–1)
BDY SITE: ABNORMAL
BILIRUB SERPL-MCNC: 0.3 MG/DL (ref 0.2–1)
BILIRUB UR QL: NEGATIVE
BUN SERPL-MCNC: 8 MG/DL (ref 6–20)
BUN/CREAT SERPL: 10 (ref 12–20)
CALCIUM SERPL-MCNC: 8.9 MG/DL (ref 8.5–10.1)
CALCULATED P AXIS, ECG09: 55 DEGREES
CALCULATED R AXIS, ECG10: 37 DEGREES
CALCULATED T AXIS, ECG11: 38 DEGREES
CHLORIDE SERPL-SCNC: 104 MMOL/L (ref 97–108)
CO2 SERPL-SCNC: 31 MMOL/L (ref 21–32)
COLOR UR: NORMAL
CREAT SERPL-MCNC: 0.84 MG/DL (ref 0.7–1.3)
DIAGNOSIS, 93000: NORMAL
DIFFERENTIAL METHOD BLD: ABNORMAL
EOSINOPHIL # BLD: 0.1 K/UL (ref 0–0.4)
EOSINOPHIL NFR BLD: 1 % (ref 0–7)
EPITH CASTS URNS QL MICRO: NORMAL /LPF
ERYTHROCYTE [DISTWIDTH] IN BLOOD BY AUTOMATED COUNT: 12.9 % (ref 11.5–14.5)
GLOBULIN SER CALC-MCNC: 6.6 G/DL (ref 2–4)
GLUCOSE BLD STRIP.AUTO-MCNC: 87 MG/DL (ref 65–100)
GLUCOSE SERPL-MCNC: 102 MG/DL (ref 65–100)
GLUCOSE UR STRIP.AUTO-MCNC: NEGATIVE MG/DL
HCO3 BLDA-SCNC: 28 MMOL/L (ref 22–26)
HCT VFR BLD AUTO: 39.5 % (ref 36.6–50.3)
HGB BLD-MCNC: 13.4 G/DL (ref 12.1–17)
HGB UR QL STRIP: NEGATIVE
HYALINE CASTS URNS QL MICRO: NORMAL /LPF (ref 0–5)
IMM GRANULOCYTES # BLD: 0 K/UL (ref 0–0.04)
IMM GRANULOCYTES NFR BLD AUTO: 0 % (ref 0–0.5)
KETONES UR QL STRIP.AUTO: NEGATIVE MG/DL
LACTATE SERPL-SCNC: 1.6 MMOL/L (ref 0.4–2)
LEUKOCYTE ESTERASE UR QL STRIP.AUTO: NEGATIVE
LYMPHOCYTES # BLD: 0.7 K/UL (ref 0.8–3.5)
LYMPHOCYTES NFR BLD: 13 % (ref 12–49)
MCH RBC QN AUTO: 34.4 PG (ref 26–34)
MCHC RBC AUTO-ENTMCNC: 33.9 G/DL (ref 30–36.5)
MCV RBC AUTO: 101.3 FL (ref 80–99)
MONOCYTES # BLD: 0.6 K/UL (ref 0–1)
MONOCYTES NFR BLD: 11 % (ref 5–13)
NEUTS SEG # BLD: 4.2 K/UL (ref 1.8–8)
NEUTS SEG NFR BLD: 75 % (ref 32–75)
NITRITE UR QL STRIP.AUTO: NEGATIVE
NRBC # BLD: 0 K/UL (ref 0–0.01)
NRBC BLD-RTO: 0 PER 100 WBC
P-R INTERVAL, ECG05: 186 MS
PCO2 BLDA: 48 MMHG (ref 35–45)
PH BLDA: 7.38 [PH] (ref 7.35–7.45)
PH UR STRIP: 7 [PH] (ref 5–8)
PLATELET # BLD AUTO: 270 K/UL (ref 150–400)
PMV BLD AUTO: 9.7 FL (ref 8.9–12.9)
PO2 BLDA: 93 MMHG (ref 80–100)
POTASSIUM SERPL-SCNC: 4.1 MMOL/L (ref 3.5–5.1)
PROT SERPL-MCNC: 10 G/DL (ref 6.4–8.2)
PROT UR STRIP-MCNC: NEGATIVE MG/DL
Q-T INTERVAL, ECG07: 414 MS
QRS DURATION, ECG06: 122 MS
QTC CALCULATION (BEZET), ECG08: 489 MS
RBC # BLD AUTO: 3.9 M/UL (ref 4.1–5.7)
RBC #/AREA URNS HPF: NORMAL /HPF (ref 0–5)
RBC MORPH BLD: ABNORMAL
SAO2 % BLD: 97 % (ref 92–97)
SAO2% DEVICE SAO2% SENSOR NAME: ABNORMAL
SERVICE CMNT-IMP: NORMAL
SODIUM SERPL-SCNC: 141 MMOL/L (ref 136–145)
SP GR UR REFRACTOMETRY: 1.01 (ref 1–1.03)
SPECIMEN SITE: ABNORMAL
TROPONIN I SERPL-MCNC: <0.05 NG/ML
UA: UC IF INDICATED,UAUC: NORMAL
UROBILINOGEN UR QL STRIP.AUTO: 0.2 EU/DL (ref 0.2–1)
VENTRICULAR RATE, ECG03: 84 BPM
WBC # BLD AUTO: 5.6 K/UL (ref 4.1–11.1)
WBC URNS QL MICRO: NORMAL /HPF (ref 0–4)

## 2018-12-31 PROCEDURE — 70450 CT HEAD/BRAIN W/O DYE: CPT

## 2018-12-31 PROCEDURE — 82803 BLOOD GASES ANY COMBINATION: CPT

## 2018-12-31 PROCEDURE — 71045 X-RAY EXAM CHEST 1 VIEW: CPT

## 2018-12-31 PROCEDURE — 83605 ASSAY OF LACTIC ACID: CPT

## 2018-12-31 PROCEDURE — 74011250636 HC RX REV CODE- 250/636: Performed by: EMERGENCY MEDICINE

## 2018-12-31 PROCEDURE — 84484 ASSAY OF TROPONIN QUANT: CPT

## 2018-12-31 PROCEDURE — 74011000258 HC RX REV CODE- 258: Performed by: EMERGENCY MEDICINE

## 2018-12-31 PROCEDURE — 93005 ELECTROCARDIOGRAM TRACING: CPT

## 2018-12-31 PROCEDURE — 99285 EMERGENCY DEPT VISIT HI MDM: CPT

## 2018-12-31 PROCEDURE — 82962 GLUCOSE BLOOD TEST: CPT

## 2018-12-31 PROCEDURE — 87040 BLOOD CULTURE FOR BACTERIA: CPT

## 2018-12-31 PROCEDURE — 85025 COMPLETE CBC W/AUTO DIFF WBC: CPT

## 2018-12-31 PROCEDURE — 81001 URINALYSIS AUTO W/SCOPE: CPT

## 2018-12-31 PROCEDURE — 96361 HYDRATE IV INFUSION ADD-ON: CPT

## 2018-12-31 PROCEDURE — 96367 TX/PROPH/DG ADDL SEQ IV INF: CPT

## 2018-12-31 PROCEDURE — 96366 THER/PROPH/DIAG IV INF ADDON: CPT

## 2018-12-31 PROCEDURE — 96365 THER/PROPH/DIAG IV INF INIT: CPT

## 2018-12-31 PROCEDURE — 36415 COLL VENOUS BLD VENIPUNCTURE: CPT

## 2018-12-31 PROCEDURE — 36600 WITHDRAWAL OF ARTERIAL BLOOD: CPT

## 2018-12-31 PROCEDURE — 80053 COMPREHEN METABOLIC PANEL: CPT

## 2018-12-31 RX ORDER — SODIUM CHLORIDE 0.9 % (FLUSH) 0.9 %
5-10 SYRINGE (ML) INJECTION AS NEEDED
Status: DISCONTINUED | OUTPATIENT
Start: 2018-12-31 | End: 2019-01-07 | Stop reason: HOSPADM

## 2018-12-31 RX ADMIN — SODIUM CHLORIDE 1000 ML: 900 INJECTION, SOLUTION INTRAVENOUS at 16:09

## 2018-12-31 RX ADMIN — AZITHROMYCIN MONOHYDRATE 500 MG: 500 INJECTION, POWDER, LYOPHILIZED, FOR SOLUTION INTRAVENOUS at 18:09

## 2018-12-31 RX ADMIN — CEFTRIAXONE 1 G: 1 INJECTION, POWDER, FOR SOLUTION INTRAMUSCULAR; INTRAVENOUS at 16:09

## 2018-12-31 RX ADMIN — SODIUM CHLORIDE 1000 ML: 900 INJECTION, SOLUTION INTRAVENOUS at 14:01

## 2018-12-31 NOTE — ED PROVIDER NOTES
EMERGENCY DEPARTMENT HISTORY AND PHYSICAL EXAM 
 
 
Date: 12/31/2018 Patient Name: Mendoza Sanchez History of Presenting Illness Chief Complaint Patient presents with  Lethargy History Provided By: Caregiver HPI: Mendoza Sanchez, 48 y.o. male with PMHx significant for down syndrome, seizures, sleep apnea, presents via caretaker to the ED for evaluation of altered mental status this morning. A member of the group home that the pt is staying at had noted the pt is usually up, engaged, and energetic and would normally speak a few words, say Hi, and laugh. Today, he appears sluggish and sleepy despite being asymptomatic last night. Pt normally walks with a walker with the assistance of a gait belt as well. Pt did not have a fever as he was at 98.2F. Pts  denied any recent sick contacts, changes in his appetite, cough, vomiting, diarrhea, urinary symptoms, fever, or recent changes to his medication. History is limited secondary to altered mental status. PCP: Dixie Gordon MD 
 
No current facility-administered medications on file prior to encounter. Current Outpatient Medications on File Prior to Encounter Medication Sig Dispense Refill  lacosamide (VIMPAT) 100 mg tab tablet Take 1.5 Tabs by mouth two (2) times a day. Max Daily Amount: 300 mg. 90 Tab 0  phenytoin (DILANTIN-125) suspension Take 4 mL by mouth two (2) times a day. 1 Bottle 5  
 levETIRAcetam (KEPPRA) 100 mg/mL solution 15ml by mouth twice a day. 600 mL 5  
 multivit-folic acid-herbal 073 (WELLESSE PLUS) 400 mcg-200 mg/30 mL liqd oral liquid Take 30 mL by mouth daily. 1 Bottle 0  
 ferrous sulfate 325 mg (65 mg iron) tablet Take 1 Tab by mouth Daily (before breakfast). 90 Tab 2  
 loratadine (CLARITIN) 10 mg tablet Take 1 Tab by mouth daily. 90 Tab 1  
 ascorbic acid, vitamin C, (VITAMIN C) 500 mg tablet Take 1 Tab by mouth daily. 30 Tab 6  levothyroxine (SYNTHROID) 50 mcg tablet Take 1 Tab by mouth Daily (before breakfast). 90 Tab 1  
 sodium chloride (DEEP SEA NASAL) 0.65 % nasal squeeze bottle 1 Spray as needed for Congestion.  HYDROcodone-acetaminophen (HYCET) 0.5-21.7 mg/mL oral solution Take 10 mL by mouth four (4) times daily as needed for Pain.  fluticasone (FLONASE) 50 mcg/actuation nasal spray 2 Sprays by Both Nostrils route daily. 1 Bottle 2  cholecalciferol (VITAMIN D3) 1,000 unit tablet Take 1 Tab by mouth daily. 90 Tab 4 Past History Past Medical History: 
Past Medical History:  
Diagnosis Date  Anemia  Anxiety  Blindness of left eye  Cerebral palsy ([address]  Dementia  Down syndrome  Endocrine disease   
 thyroid disorder  GERD (gastroesophageal reflux disease)  Ill-defined condition   
 blind in left eye  Ill-defined condition   
 imparied gait - uses walker  Ill-defined condition   
 dermatitis  Ill-defined condition   
 cerebral palsy  Ill-defined condition   
 prone to decubitus ulcers  Ill-defined condition   
 anemia  Neurological disorder  Other ill-defined conditions(799.89) Down syndrome  Psychiatric disorder   
 mental retardation  Psychiatric disorder   
 anxiety disorder  Seizures ([address]  Sleep apnea  Thyroid disease  Unspecified epilepsy without mention of intractable epilepsy Past Surgical History: 
Past Surgical History:  
Procedure Laterality Date  HX OTHER SURGICAL  03/13/2015 VNS  Dr. Joana Godoy. Mee Marina  @ [facility] Family History: 
Family History Problem Relation Age of Onset  Hypertension Mother  Cancer Mother  Lupus Mother Catha Sprout Arthritis-osteo Mother  Heart Disease Mother  Heart Disease Father  Diabetes Father  Hypertension Father  Elevated Lipids Father  Diabetes Brother  Elevated Lipids Brother  Hypertension Brother  Mental Retardation Brother  Cancer Maternal Grandmother  Arthritis-osteo Maternal Grandmother  Alcohol abuse Maternal Grandfather  Cancer Maternal Grandfather  Arthritis-osteo Paternal Grandmother  Cancer Paternal Grandfather Social History: 
Social History Tobacco Use  Smoking status: Never Smoker  Smokeless tobacco: Never Used Substance Use Topics  Alcohol use: No  
 Drug use: No  
 
 
Allergies: Allergies Allergen Reactions  Morphine Not Reported This Time  Tegretol [Carbamazepine] Other (comments) \"bad reaction\" \"changes attitude\" Review of Systems Review of Systems Unable to perform ROS: Mental status change Physical Exam  
Physical Exam  
Constitutional: He appears well-developed and well-nourished. No distress. HENT:  
Head: Normocephalic and atraumatic. Mouth/Throat: Oropharynx is clear and moist.  
Eyes: Conjunctivae and EOM are normal.  
Neck: Neck supple. No JVD present. No tracheal deviation present. Cardiovascular: Regular rhythm and intact distal pulses. Tachycardia present. Exam reveals no gallop and no friction rub. No murmur heard. Pulmonary/Chest: Effort normal and breath sounds normal. No stridor. No respiratory distress. He has no wheezes. Abdominal: Soft. Bowel sounds are normal. He exhibits no distension and no mass. There is no tenderness. There is no guarding. Musculoskeletal: Normal range of motion. He exhibits no edema or tenderness. No deformity, no peripheral edema Neurological: He has normal strength. Unresponsive to verbal/physical stimulus Skin: Skin is warm, dry and intact. No rash noted. Nursing note and vitals reviewed. Diagnostic Study Results Labs - Recent Results (from the past 12 hour(s)) GLUCOSE, POC Collection Time: 12/31/18 12:44 PM  
Result Value Ref Range Glucose (POC) 87 65 - 100 mg/dL Performed by Rose Marie PENN   
EKG, 12 LEAD, INITIAL  Collection Time: 12/31/18 12:48 PM  
 Result Value Ref Range Ventricular Rate 84 BPM  
 Atrial Rate 84 BPM  
 P-R Interval 186 ms QRS Duration 122 ms  
 Q-T Interval 414 ms QTC Calculation (Bezet) 489 ms Calculated P Axis 55 degrees Calculated R Axis 37 degrees Calculated T Axis 38 degrees Diagnosis Normal sinus rhythm Right bundle branch block Confirmed by Venu Tse MD, Yas Tinoco ([id number]) on 12/31/2018 4:47:18 PM 
  
BLOOD GAS, ARTERIAL Collection Time: 12/31/18  1:40 PM  
Result Value Ref Range pH 7.38 7.35 - 7.45    
 PCO2 48 (H) 35.0 - 45.0 mmHg PO2 93 80 - 100 mmHg O2 SAT 97 92 - 97 % BICARBONATE 28 (H) 22 - 26 mmol/L  
 BASE EXCESS 2.0 mmol/L  
 O2 METHOD ROOM AIR Sample source ARTERIAL    
 SITE LEFT BRACHIAL LEIF'S TEST N/A    
LACTIC ACID Collection Time: 12/31/18  1:49 PM  
Result Value Ref Range Lactic acid 1.6 0.4 - 2.0 MMOL/L  
METABOLIC PANEL, COMPREHENSIVE Collection Time: 12/31/18  1:49 PM  
Result Value Ref Range Sodium 141 136 - 145 mmol/L Potassium 4.1 3.5 - 5.1 mmol/L Chloride 104 97 - 108 mmol/L  
 CO2 31 21 - 32 mmol/L Anion gap 6 5 - 15 mmol/L Glucose 102 (H) 65 - 100 mg/dL BUN 8 6 - 20 MG/DL Creatinine 0.84 0.70 - 1.30 MG/DL  
 BUN/Creatinine ratio 10 (L) 12 - 20 GFR est AA >60 >60 ml/min/1.73m2 GFR est non-AA >60 >60 ml/min/1.73m2 Calcium 8.9 8.5 - 10.1 MG/DL Bilirubin, total 0.3 0.2 - 1.0 MG/DL  
 ALT (SGPT) 39 12 - 78 U/L  
 AST (SGOT) 38 (H) 15 - 37 U/L Alk. phosphatase 171 (H) 45 - 117 U/L Protein, total 10.0 (H) 6.4 - 8.2 g/dL Albumin 3.4 (L) 3.5 - 5.0 g/dL Globulin 6.6 (H) 2.0 - 4.0 g/dL A-G Ratio 0.5 (L) 1.1 - 2.2    
CBC WITH AUTOMATED DIFF Collection Time: 12/31/18  1:49 PM  
Result Value Ref Range WBC 5.6 4.1 - 11.1 K/uL  
 RBC 3.90 (L) 4.10 - 5.70 M/uL  
 HGB 13.4 12.1 - 17.0 g/dL HCT 39.5 36.6 - 50.3 % .3 (H) 80.0 - 99.0 FL  
 MCH 34.4 (H) 26.0 - 34.0 PG  
 MCHC 33.9 30.0 - 36.5 g/dL RDW 12.9 11.5 - 14.5 % PLATELET 366 734 - 278 K/uL MPV 9.7 8.9 - 12.9 FL  
 NRBC 0.0 0  WBC ABSOLUTE NRBC 0.00 0.00 - 0.01 K/uL NEUTROPHILS 75 32 - 75 % LYMPHOCYTES 13 12 - 49 % MONOCYTES 11 5 - 13 % EOSINOPHILS 1 0 - 7 % BASOPHILS 0 0 - 1 % IMMATURE GRANULOCYTES 0 0.0 - 0.5 % ABS. NEUTROPHILS 4.2 1.8 - 8.0 K/UL  
 ABS. LYMPHOCYTES 0.7 (L) 0.8 - 3.5 K/UL  
 ABS. MONOCYTES 0.6 0.0 - 1.0 K/UL  
 ABS. EOSINOPHILS 0.1 0.0 - 0.4 K/UL  
 ABS. BASOPHILS 0.0 0.0 - 0.1 K/UL  
 ABS. IMM. GRANS. 0.0 0.00 - 0.04 K/UL  
 DF AUTOMATED    
 RBC COMMENTS NORMOCYTIC, NORMOCHROMIC    
TROPONIN I Collection Time: 12/31/18  1:49 PM  
Result Value Ref Range Troponin-I, Qt. <0.05 <0.05 ng/mL URINALYSIS W/ REFLEX CULTURE Collection Time: 12/31/18  2:32 PM  
Result Value Ref Range Color YELLOW/STRAW Appearance CLEAR CLEAR Specific gravity 1.013 1.003 - 1.030    
 pH (UA) 7.0 5.0 - 8.0 Protein NEGATIVE  NEG mg/dL Glucose NEGATIVE  NEG mg/dL Ketone NEGATIVE  NEG mg/dL Bilirubin NEGATIVE  NEG Blood NEGATIVE  NEG Urobilinogen 0.2 0.2 - 1.0 EU/dL Nitrites NEGATIVE  NEG Leukocyte Esterase NEGATIVE  NEG    
 WBC 0-4 0 - 4 /hpf  
 RBC 0-5 0 - 5 /hpf Epithelial cells FEW FEW /lpf Bacteria NEGATIVE  NEG /hpf  
 UA:UC IF INDICATED CULTURE NOT INDICATED BY UA RESULT CNI Hyaline cast 0-2 0 - 5 /lpf Radiologic Studies -  
CT Results  (Last 48 hours) 12/31/18 1527  CT HEAD WO CONT Final result Impression:  IMPRESSION:  
   
No evidence for acute intracranial abnormality Narrative:  INDICATION:   Confusion/delirium, altered LOC, unexplained EXAM:  HEAD CT WITHOUT CONTRAST  
   
COMPARISON:  9/30/2018 TECHNIQUE:  Routine noncontrast axial head CT was performed. Coronal and  
sagittal multiplanar reconstructions were obtained. CT dose reduction was achieved through use of a standardized protocol tailored for this examination  
and automatic exposure control for dose modulation. FINDINGS:  
   
The ventricles and cortical sulci are within normal limits. No evidence for acute intracranial hemorrhage, midline shift, or mass effect. Gray-white matter differentiation is preserved. The basal cisterns are patent. The visualized paranasal sinuses and mastoid air cells are clear. No calvarial abnormality. CXR Results  (Last 48 hours) 12/31/18 1307  XR CHEST PORT Final result Impression:  IMPRESSION: Heterogeneous retrocardiac opacity, suspicious for left lower lobe  
pneumonia. Narrative:  EXAM:  XR CHEST PORT INDICATION:   meets SIRS criteria COMPARISON: Chest radiograph 10/9/2018. FINDINGS: AP radiograph of the chest was obtained. Stable positioning of left vagal stimulator. Heterogeneous retrocardiac opacity  
is noted. No pleural effusion or pneumothorax. Heart, emigdio, mediastinum are  
within normal limits. No acute osseous abnormalities. Medical Decision Making I am the first provider for this patient. I reviewed the vital signs, available nursing notes, past medical history, past surgical history, family history and social history. Vital Signs-Reviewed the patient's vital signs. Patient Vitals for the past 12 hrs: 
 Temp Pulse Resp BP SpO2  
12/31/18 1645  99 16 136/80 95 % 12/31/18 1615  97 15 150/88 96 % 12/31/18 1400  95 14 (!) 131/98 96 % 12/31/18 1256  (!) 101 14 163/77 100 % 12/31/18 1244 97.3 °F (36.3 °C) 61 16 (!) 141/128 (!) 71 % Pulse Oximetry Analysis - 71% on RA Cardiac Monitor:  
Rate: 99 bpm 
Rhythm: Normal Sinus Rhythm EKG interpretation: (Preliminary): 1248 Rhythm: normal sinus rhythm and RBBB; and regular . Rate (approx.): 84; Axis: normal; QRS interval: widened; ST/T wave: no ST changes. Written by Milagros Moreland, ED Scribe, as dictated by Gabby Hidalgo DO. Records Reviewed: Nursing Notes, Old Medical Records, Previous electrocardiograms, Ambulance Run Sheet, Previous Radiology Studies and Previous Laboratory Studies Provider Notes (Medical Decision Making): Pt presenting with AMS, is maintaining airway. DDx includes ICH, CVA, metabolic abnormality, uti, pneumonia, viral syndrome. Will check labs, ua, CXR, head CT. ED Course:  
Initial assessment performed. The patients presenting problems have been discussed, and they are in agreement with the care plan formulated and outlined with them. I have encouraged them to ask questions as they arise throughout their visit. CONSULT NOTE:  
4:40 PM 
Gabby Hidalgo DO spoke with Dr. April Caldwell, Specialty: Hospitalist 
Discussed pt's hx, disposition, and available diagnostic and imaging results. Reviewed care plans. Consultant will evaluate pt for admission. Written by Milagros Moreland, SHANTE Scribe, as dictated by Gabby Hidalgo DO. Critical Care Time:  
0 Disposition: PLAN: 
1. Admit ADMIT NOTE: 
5:06 PM 
Patient is being admitted to the hospital by Dr. Kisha Olguin. The results of their tests and reasons for their admission have been discussed with them and/or available family. They convey agreement and understanding for the need to be admitted and for their admission diagnosis. Consultation has been made with the inpatient physician specialist for hospitalization. Diagnosis Clinical Impression: 1. Urinary tract infection without hematuria, site unspecified 2. Acute encephalopathy Attestations: This note is prepared by Milagros Moreland, acting as Scribe for Gabby Hidalgo DO. Gabby Hidalgo DO: The scribe's documentation has been prepared under my direction and personally reviewed by me in its entirety.  I confirm that the note above accurately reflects all work, treatment, procedures, and medical decision making performed by me. This note will not be viewable in [address]

## 2018-12-31 NOTE — PROGRESS NOTES
Patient could not hold still for CT exam. RN called. Patient is going back to room to get medication then will come back to CT.

## 2019-01-01 ENCOUNTER — APPOINTMENT (OUTPATIENT)
Dept: CT IMAGING | Age: 54
DRG: 871 | End: 2019-01-01
Attending: INTERNAL MEDICINE
Payer: MEDICARE

## 2019-01-01 LAB
PHENYTOIN SERPL-MCNC: 8.6 UG/ML (ref 10–20)
TSH SERPL DL<=0.05 MIU/L-ACNC: 1.35 UIU/ML (ref 0.36–3.74)

## 2019-01-01 PROCEDURE — C9254 INJECTION, LACOSAMIDE: HCPCS | Performed by: INTERNAL MEDICINE

## 2019-01-01 PROCEDURE — 80185 ASSAY OF PHENYTOIN TOTAL: CPT

## 2019-01-01 PROCEDURE — 74011250636 HC RX REV CODE- 250/636: Performed by: INTERNAL MEDICINE

## 2019-01-01 PROCEDURE — 71250 CT THORAX DX C-: CPT

## 2019-01-01 PROCEDURE — 80339 ANTIEPILEPTICS NOS 1-3: CPT

## 2019-01-01 PROCEDURE — 74011000258 HC RX REV CODE- 258: Performed by: INTERNAL MEDICINE

## 2019-01-01 PROCEDURE — 84443 ASSAY THYROID STIM HORMONE: CPT

## 2019-01-01 PROCEDURE — 74011000250 HC RX REV CODE- 250: Performed by: INTERNAL MEDICINE

## 2019-01-01 PROCEDURE — 65660000000 HC RM CCU STEPDOWN

## 2019-01-01 PROCEDURE — 95816 EEG AWAKE AND DROWSY: CPT | Performed by: INTERNAL MEDICINE

## 2019-01-01 PROCEDURE — 36415 COLL VENOUS BLD VENIPUNCTURE: CPT

## 2019-01-01 PROCEDURE — 80177 DRUG SCRN QUAN LEVETIRACETAM: CPT

## 2019-01-01 RX ORDER — SODIUM CHLORIDE 9 MG/ML
75 INJECTION, SOLUTION INTRAVENOUS CONTINUOUS
Status: DISCONTINUED | OUTPATIENT
Start: 2019-01-01 | End: 2019-01-03

## 2019-01-01 RX ORDER — SODIUM CHLORIDE 0.9 % (FLUSH) 0.9 %
5-10 SYRINGE (ML) INJECTION EVERY 8 HOURS
Status: DISCONTINUED | OUTPATIENT
Start: 2019-01-01 | End: 2019-01-07 | Stop reason: HOSPADM

## 2019-01-01 RX ORDER — ENOXAPARIN SODIUM 100 MG/ML
40 INJECTION SUBCUTANEOUS EVERY 24 HOURS
Status: DISCONTINUED | OUTPATIENT
Start: 2019-01-01 | End: 2019-01-07 | Stop reason: HOSPADM

## 2019-01-01 RX ORDER — ONDANSETRON 2 MG/ML
4 INJECTION INTRAMUSCULAR; INTRAVENOUS
Status: DISCONTINUED | OUTPATIENT
Start: 2019-01-01 | End: 2019-01-07 | Stop reason: HOSPADM

## 2019-01-01 RX ORDER — LEVALBUTEROL INHALATION SOLUTION 1.25 MG/3ML
1.25 SOLUTION RESPIRATORY (INHALATION)
Status: DISCONTINUED | OUTPATIENT
Start: 2019-01-01 | End: 2019-01-07 | Stop reason: HOSPADM

## 2019-01-01 RX ORDER — PHENYTOIN SODIUM 50 MG/ML
100 INJECTION, SOLUTION INTRAMUSCULAR; INTRAVENOUS EVERY 12 HOURS
Status: DISCONTINUED | OUTPATIENT
Start: 2019-01-01 | End: 2019-01-03

## 2019-01-01 RX ORDER — SODIUM CHLORIDE 0.9 % (FLUSH) 0.9 %
5-10 SYRINGE (ML) INJECTION AS NEEDED
Status: DISCONTINUED | OUTPATIENT
Start: 2019-01-01 | End: 2019-01-07 | Stop reason: HOSPADM

## 2019-01-01 RX ORDER — ACETAMINOPHEN 325 MG/1
650 TABLET ORAL
Status: DISCONTINUED | OUTPATIENT
Start: 2019-01-01 | End: 2019-01-07 | Stop reason: HOSPADM

## 2019-01-01 RX ADMIN — PHENYTOIN SODIUM 100 MG: 50 INJECTION INTRAMUSCULAR; INTRAVENOUS at 02:12

## 2019-01-01 RX ADMIN — PIPERACILLIN SODIUM,TAZOBACTAM SODIUM 3.38 G: 3; .375 INJECTION, POWDER, FOR SOLUTION INTRAVENOUS at 11:29

## 2019-01-01 RX ADMIN — SODIUM CHLORIDE 75 ML/HR: 900 INJECTION, SOLUTION INTRAVENOUS at 19:43

## 2019-01-01 RX ADMIN — PHENYTOIN SODIUM 100 MG: 50 INJECTION INTRAMUSCULAR; INTRAVENOUS at 20:48

## 2019-01-01 RX ADMIN — SODIUM CHLORIDE 1500 MG: 900 INJECTION, SOLUTION INTRAVENOUS at 15:54

## 2019-01-01 RX ADMIN — PIPERACILLIN SODIUM,TAZOBACTAM SODIUM 3.38 G: 3; .375 INJECTION, POWDER, FOR SOLUTION INTRAVENOUS at 19:46

## 2019-01-01 RX ADMIN — LEVALBUTEROL HYDROCHLORIDE 1.25 MG: 1.25 SOLUTION RESPIRATORY (INHALATION) at 09:30

## 2019-01-01 RX ADMIN — SODIUM CHLORIDE 150 MG: 900 INJECTION, SOLUTION INTRAVENOUS at 20:43

## 2019-01-01 RX ADMIN — PHENYTOIN SODIUM 100 MG: 50 INJECTION INTRAMUSCULAR; INTRAVENOUS at 09:30

## 2019-01-01 RX ADMIN — Medication 10 ML: at 15:54

## 2019-01-01 RX ADMIN — SODIUM CHLORIDE 1500 MG: 900 INJECTION, SOLUTION INTRAVENOUS at 03:57

## 2019-01-01 RX ADMIN — Medication 10 ML: at 20:52

## 2019-01-01 RX ADMIN — SODIUM CHLORIDE 150 MG: 900 INJECTION, SOLUTION INTRAVENOUS at 09:29

## 2019-01-01 RX ADMIN — SODIUM CHLORIDE 150 MG: 900 INJECTION, SOLUTION INTRAVENOUS at 02:15

## 2019-01-01 RX ADMIN — PIPERACILLIN SODIUM,TAZOBACTAM SODIUM 3.38 G: 3; .375 INJECTION, POWDER, FOR SOLUTION INTRAVENOUS at 04:48

## 2019-01-01 RX ADMIN — SODIUM CHLORIDE 75 ML/HR: 900 INJECTION, SOLUTION INTRAVENOUS at 02:08

## 2019-01-01 RX ADMIN — ENOXAPARIN SODIUM 40 MG: 40 INJECTION SUBCUTANEOUS at 09:30

## 2019-01-01 NOTE — PROGRESS NOTES
Bedside and Verbal shift change report given to Edin Arnold (oncoming nurse) by Octavia Danielle (offgoing nurse). Report included the following information SBAR, Kardex, ED Summary and MAR.

## 2019-01-01 NOTE — PROGRESS NOTES
Report given to Roxane RN by Shayla Bumpers, RN Zone Phone: 
 
 
Significant changes during shift: admitted to [facility] Patient Information Stella Chapa 48 y.o. 
12/31/2018 12:43 PM by Cher Claire MD. Stella Chapa was admitted from adult home Problem List 
 
Patient Active Problem List  
 Diagnosis Date Noted  Acquired hypothyroidism 04/12/2018  Acute encephalopathy 03/23/2018  Complex partial seizure evolving to generalized seizure ([address] 03/23/2018  Down syndrome 03/20/2018  Pneumonia 02/03/2018  Low vitamin D level 01/11/2018  Blind left eye 01/11/2018  ACP (advance care planning) 11/09/2016  S/P placement of VNS (vagus nerve stimulation) device 04/10/2015  Seizure ([address] 11/22/2013  Partial epilepsy with impairment of consciousness ([address] 01/21/2013  Ataxia 01/21/2013  Memory loss 01/21/2013  Recurrent seizures ([address] 08/27/2012 Past Medical History:  
Diagnosis Date  Anemia  Anxiety  Blindness of left eye  Cerebral palsy ([address]  Dementia  Down syndrome  Endocrine disease   
 thyroid disorder  GERD (gastroesophageal reflux disease)  Ill-defined condition   
 blind in left eye  Ill-defined condition   
 imparied gait - uses walker  Ill-defined condition   
 dermatitis  Ill-defined condition   
 cerebral palsy  Ill-defined condition   
 prone to decubitus ulcers  Ill-defined condition   
 anemia  Neurological disorder  Other ill-defined conditions(799.89) Down syndrome  Psychiatric disorder   
 mental retardation  Psychiatric disorder   
 anxiety disorder  Seizures ([address]  Sleep apnea  Thyroid disease  Unspecified epilepsy without mention of intractable epilepsy Core Measures: PNA:probable Activity Status: OOB to Chair no Ambulated this shift no Bed Rest yes DVT prophylaxis: DVT prophylaxis Med- y Patient Safety: Falls Score Total Score: 3 Safety Level_______ Bed Alarm On? y 
Sitter? n 
 
Plan for upcoming shift:cont antibiotics Discharge Plan:return to adult home when stable Active Consults: 
IP CONSULT TO HOSPITALIST 
IP CONSULT TO PALLIATIVE CARE - PROVIDER 
IP CONSULT TO NEUROLOGY

## 2019-01-01 NOTE — H&P
Hospitalist Admission NoteNAME: Debbie Lezama :  1965 MRN:  [id number] Date/Time:  2019 1:50 AM 
 
Patient PCP: Kallie Hernández MD 
______________________________________________________________________ Given the patient's current clinical presentation, I have a high level of concern for decompensation if discharged from the emergency department. Complex decision making was performed, which includes reviewing the patient's available past medical records, laboratory results, and x-ray films. My assessment of this patient's clinical condition and my plan of care is as follows. Assessment / Plan: 
Acute encephalopathy POA with altered responsiveness with baseline Down Syndrome Admit to neuro tele Suspect Aspiration PNA 
NPO 
IVF IV Zosyn Speech Consult R/o seizure of seizure meds related Check EEG Check TSH Neurology consult CT head negative for acute changes Palliative care consult for recurrent history os aspiration PNAs Check CT Chest 
F/o blood cultures ABGs without respiratory acidosis CXR with Heterogeneous retrocardiac opacity, suspicious for left lower lobe 
pneumonia. SAVITA Unable to tolerate CPAP in the past  
Care giver and brother very optimistic with newer mask, he will be able to tolerate and has plan for another sleep study Seizure DO Continue antiseizure meds in IV form Check drug levels Hypothyroidism Continue synthroid in IV form Check TSH Code Status: Full d/w brother Surrogate Decision Maker: brother Ronna Grimaldo is his legal guardian DVT Prophylaxis: Lovenox Baseline: lives at group home Subjective: CHIEF COMPLAINT: altered mental status HISTORY OF PRESENT ILLNESS:    
Maria T Perez is a 48 y.o.  male who presents with altered mental status.  As per brother and /caretaker, pt is usually cooperative at his baseline but today he noted to be very lethargic and is no engaging in any activity or cooperating. He noted to have history of down syndrome and aspiration PNAs in the past. History is limited as pt is non verbal and no cooperative and lethargic looking. In ED pt noted to have CXR with Heterogeneous retrocardiac opacity, suspicious for left lower lobe pneumonia. We were asked to admit for work up and evaluation of the above problems. Past Medical History:  
Diagnosis Date  Anemia  Anxiety  Blindness of left eye  Cerebral palsy ([address]  Dementia  Down syndrome  Endocrine disease   
 thyroid disorder  GERD (gastroesophageal reflux disease)  Ill-defined condition   
 blind in left eye  Ill-defined condition   
 imparied gait - uses walker  Ill-defined condition   
 dermatitis  Ill-defined condition   
 cerebral palsy  Ill-defined condition   
 prone to decubitus ulcers  Ill-defined condition   
 anemia  Neurological disorder  Other ill-defined conditions(799.89) Down syndrome  Psychiatric disorder   
 mental retardation  Psychiatric disorder   
 anxiety disorder  Seizures ([address]  Sleep apnea  Thyroid disease  Unspecified epilepsy without mention of intractable epilepsy Past Surgical History:  
Procedure Laterality Date  HX OTHER SURGICAL  03/13/2015 [facility]  Dr. Kayla Vegas. Britta Mendoza  [id number] Social History Tobacco Use  Smoking status: Never Smoker  Smokeless tobacco: Never Used Substance Use Topics  Alcohol use: No  
  
 
Family History Problem Relation Age of Onset  Hypertension Mother  Cancer Mother  Lupus Mother [facility] Arthritis-osteo Mother  Heart Disease Mother  Heart Disease Father  Diabetes Father  Hypertension Father  Elevated Lipids Father  Diabetes Brother  Elevated Lipids Brother  Hypertension Brother  Mental Retardation Brother  Cancer Maternal Grandmother  Arthritis-osteo Maternal Grandmother  Alcohol abuse Maternal Grandfather  Cancer Maternal Grandfather  Arthritis-osteo Paternal Grandmother  Cancer Paternal Grandfather Allergies Allergen Reactions  Morphine Not Reported This Time  Tegretol [Carbamazepine] Other (comments) \"bad reaction\" \"changes attitude\" Prior to Admission medications Medication Sig Start Date End Date Taking? Authorizing Provider  
lacosamide (VIMPAT) 100 mg tab tablet Take 1.5 Tabs by mouth two (2) times a day. Max Daily Amount: 300 mg. 11/14/18  Yes August Mosquera MD  
phenytoin Dayana Has) suspension Take 4 mL by mouth two (2) times a day. 10/19/18  Yes August Mosquera MD  
levETIRAcetam (KEPPRA) 100 mg/mL solution 15ml by mouth twice a day. 10/19/18  Yes August Mosquera MD  
multivit-folic acid-herbal 377 (WELLESSE PLUS) 400 mcg-200 mg/30 mL liqd oral liquid Take 30 mL by mouth daily. 10/4/18  Yes AlmsallamZachary MD  
ferrous sulfate 325 mg (65 mg iron) tablet Take 1 Tab by mouth Daily (before breakfast). 9/27/18  Yes Mirza Sharma MD  
loratadine (CLARITIN) 10 mg tablet Take 1 Tab by mouth daily. 9/27/18  Yes Mirza Sharma MD  
ascorbic acid, vitamin C, (VITAMIN C) 500 mg tablet Take 1 Tab by mouth daily. 8/6/18  Yes Mirza Sharma MD  
levothyroxine (SYNTHROID) 50 mcg tablet Take 1 Tab by mouth Daily (before breakfast). 5/23/18  Yes Mirza Sharma MD  
sodium chloride (DEEP SEA NASAL) 0.65 % nasal squeeze bottle 1 Spray as needed for Congestion. Yes Provider, Historical  
fluticasone (FLONASE) 50 mcg/actuation nasal spray 2 Sprays by Both Nostrils route daily. 2/13/18  Yes Mirza Sharma MD  
cholecalciferol (VITAMIN D3) 1,000 unit tablet Take 1 Tab by mouth daily. 3/10/17  Yes Abhishek Ham MD  
HYDROcodone-acetaminophen (HYCET) 0.5-21.7 mg/mL oral solution Take 10 mL by mouth four (4) times daily as needed for Pain.     Provider, Historical  
 
 
REVIEW OF SYSTEMS:    
 I am not able to complete the review of systems because: The patient is intubated and sedated The patient has altered mental status due to his acute medical problems The patient has baseline aphasia from prior stroke(s)  
y The patient is baseline non verbal due to down syndrome The patient is in acute medical distress and unable to provide information Objective: VITALS:   
Visit Vitals /86 Pulse (!) 117 Temp 98.5 °F (36.9 °C) Resp 18 Ht 5' 4\" (1.626 m) Wt 65.8 kg (145 lb) SpO2 100% BMI 24.89 kg/m² PHYSICAL EXAM: 
 
General:    Lethargic, non cooperative, appears stated age. HEENT: Atraumatic, anicteric sclerae, pink conjunctivae No oral ulcers, mucosa moist, throat clear, dentition fair Neck:  Supple, symmetrical,  thyroid: non tender Lungs:   Clear to auscultation bilaterally. No Wheezing or Rhonchi. No rales. Chest wall:  No tenderness  No Accessory muscle use. Heart:   Regular  rhythm,  No  murmur   No edema Abdomen:   Soft, non-tender. Not distended. Bowel sounds normal 
Extremities: No cyanosis. No clubbing,   
  Skin turgor normal, Capillary refill normal, Radial dial pulse 2+ Skin:     Not pale. Not Jaundiced  No rashes Psych:  Exam limited as non verbal and doesn't follow commands Neurologic: Exam limited as non verbal and doesn't follow commands 
 
_______________________________________________________________________ Care Plan discussed with: 
  Comments Patient Family  y At bedside RN y   
Care Manager Consultant:     
_______________________________________________________________________ Expected  Disposition:  
Home with Family HH/PT/OT/RN   
SNF/LTC TBD  
TRENT   
________________________________________________________________________ TOTAL TIME: 60 Minutes Critical Care Provided     Minutes non procedure based Comments  
 y Reviewed previous records >50% of visit spent in counseling and coordination of care y Discussion with family and questions answered 
  
 
________________________________________________________________________ Signed: Kristy De La Paz MD 
 
Procedures: see electronic medical records for all procedures/Xrays and details which were not copied into this note but were reviewed prior to creation of Plan. LAB DATA REVIEWED:   
Recent Results (from the past 24 hour(s)) GLUCOSE, POC Collection Time: 12/31/18 12:44 PM  
Result Value Ref Range Glucose (POC) 87 65 - 100 mg/dL Performed by Jennifer PENN   
EKG, 12 LEAD, INITIAL Collection Time: 12/31/18 12:48 PM  
Result Value Ref Range Ventricular Rate 84 BPM  
 Atrial Rate 84 BPM  
 P-R Interval 186 ms QRS Duration 122 ms  
 Q-T Interval 414 ms QTC Calculation (Bezet) 489 ms Calculated P Axis 55 degrees Calculated R Axis 37 degrees Calculated T Axis 38 degrees Diagnosis Normal sinus rhythm Right bundle branch block Confirmed by Edward Bryant MD, Devin Funez ([id number]) on 12/31/2018 4:47:18 PM 
  
BLOOD GAS, ARTERIAL Collection Time: 12/31/18  1:40 PM  
Result Value Ref Range pH 7.38 7.35 - 7.45    
 PCO2 48 (H) 35.0 - 45.0 mmHg PO2 93 80 - 100 mmHg O2 SAT 97 92 - 97 % BICARBONATE 28 (H) 22 - 26 mmol/L  
 BASE EXCESS 2.0 mmol/L  
 O2 METHOD ROOM AIR Sample source ARTERIAL    
 SITE LEFT BRACHIAL LEIF'S TEST N/A    
LACTIC ACID Collection Time: 12/31/18  1:49 PM  
Result Value Ref Range Lactic acid 1.6 0.4 - 2.0 MMOL/L  
METABOLIC PANEL, COMPREHENSIVE Collection Time: 12/31/18  1:49 PM  
Result Value Ref Range Sodium 141 136 - 145 mmol/L Potassium 4.1 3.5 - 5.1 mmol/L Chloride 104 97 - 108 mmol/L  
 CO2 31 21 - 32 mmol/L Anion gap 6 5 - 15 mmol/L Glucose 102 (H) 65 - 100 mg/dL BUN 8 6 - 20 MG/DL  Creatinine 0.84 0.70 - 1.30 MG/DL  
 BUN/Creatinine ratio 10 (L) 12 - 20    
 GFR est AA >60 >60 ml/min/1.73m2 GFR est non-AA >60 >60 ml/min/1.73m2 Calcium 8.9 8.5 - 10.1 MG/DL Bilirubin, total 0.3 0.2 - 1.0 MG/DL  
 ALT (SGPT) 39 12 - 78 U/L  
 AST (SGOT) 38 (H) 15 - 37 U/L Alk. phosphatase 171 (H) 45 - 117 U/L Protein, total 10.0 (H) 6.4 - 8.2 g/dL Albumin 3.4 (L) 3.5 - 5.0 g/dL Globulin 6.6 (H) 2.0 - 4.0 g/dL A-G Ratio 0.5 (L) 1.1 - 2.2    
CBC WITH AUTOMATED DIFF Collection Time: 12/31/18  1:49 PM  
Result Value Ref Range WBC 5.6 4.1 - 11.1 K/uL  
 RBC 3.90 (L) 4.10 - 5.70 M/uL  
 HGB 13.4 12.1 - 17.0 g/dL HCT 39.5 36.6 - 50.3 % .3 (H) 80.0 - 99.0 FL  
 MCH 34.4 (H) 26.0 - 34.0 PG  
 MCHC 33.9 30.0 - 36.5 g/dL  
 RDW 12.9 11.5 - 14.5 % PLATELET 282 157 - 937 K/uL MPV 9.7 8.9 - 12.9 FL  
 NRBC 0.0 0  WBC ABSOLUTE NRBC 0.00 0.00 - 0.01 K/uL NEUTROPHILS 75 32 - 75 % LYMPHOCYTES 13 12 - 49 % MONOCYTES 11 5 - 13 % EOSINOPHILS 1 0 - 7 % BASOPHILS 0 0 - 1 % IMMATURE GRANULOCYTES 0 0.0 - 0.5 % ABS. NEUTROPHILS 4.2 1.8 - 8.0 K/UL  
 ABS. LYMPHOCYTES 0.7 (L) 0.8 - 3.5 K/UL  
 ABS. MONOCYTES 0.6 0.0 - 1.0 K/UL  
 ABS. EOSINOPHILS 0.1 0.0 - 0.4 K/UL  
 ABS. BASOPHILS 0.0 0.0 - 0.1 K/UL  
 ABS. IMM. GRANS. 0.0 0.00 - 0.04 K/UL  
 DF AUTOMATED    
 RBC COMMENTS NORMOCYTIC, NORMOCHROMIC    
TROPONIN I Collection Time: 12/31/18  1:49 PM  
Result Value Ref Range Troponin-I, Qt. <0.05 <0.05 ng/mL URINALYSIS W/ REFLEX CULTURE Collection Time: 12/31/18  2:32 PM  
Result Value Ref Range Color YELLOW/STRAW Appearance CLEAR CLEAR Specific gravity 1.013 1.003 - 1.030    
 pH (UA) 7.0 5.0 - 8.0 Protein NEGATIVE  NEG mg/dL Glucose NEGATIVE  NEG mg/dL Ketone NEGATIVE  NEG mg/dL Bilirubin NEGATIVE  NEG Blood NEGATIVE  NEG Urobilinogen 0.2 0.2 - 1.0 EU/dL Nitrites NEGATIVE  NEG  Leukocyte Esterase NEGATIVE  NEG    
 WBC 0-4 0 - 4 /hpf  
 RBC 0-5 0 - 5 /hpf  
 Epithelial cells FEW FEW /lpf Bacteria NEGATIVE  NEG /hpf  
 UA:UC IF INDICATED CULTURE NOT INDICATED BY UA RESULT CNI Hyaline cast 0-2 0 - 5 /lpf

## 2019-01-01 NOTE — CONSULTS
IP CONSULT TO NEUROLOGY  Consult performed by: Héctor Luong MD  Consult ordered by: Salome Lester MD            NEUROLOGY CONSULT    NAME Nakia Jo AGE 48 y.o. MRN [id number]  1965     REQUESTING PHYSICIAN: Joaquina Paz MD      CHIEF COMPLAINT:  Altered mental status     This is a 48 y.o. male with a medical history of downs syndrome. He is admitted after being found confused. No mention of convulsions. Has been known to have aspiration pneumonia in the past.             ASSESSMENT AND PLAN     1. Altered mental status  EEG  B12  TSH 1.35    2. Bilateral lower lobe pneumonia  Continue zosyn. 3. Down's Syndrome    4. Seizure disorder  Continue vimpat dilantin and keppra. In the past year 1.9 free phenytoin equated to 9.1 total  Will order free phenytoin to confirm therpeutic levels.          ALLERGIES:  Morphine and Tegretol [carbamazepine]     Current Facility-Administered Medications   Medication Dose Route Frequency    levETIRAcetam (KEPPRA) 1,500 mg in 0.9% sodium chloride 100 mL IVPB  1,500 mg IntraVENous Q12H    lacosamide (VIMPAT) 150 mg in 0.9% sodium chloride 100 mL IVPB  150 mg IntraVENous Q12H    phenytoin (DILANTIN) injection 100 mg  100 mg IntraVENous Q12H    0.9% sodium chloride infusion  75 mL/hr IntraVENous CONTINUOUS    levalbuterol (XOPENEX) nebulizer soln 1.25 mg/3 mL  1.25 mg Nebulization Q6H PRN    [START ON 1/3/2019] levothyroxine (SYNTHROID) injection 37.5 mcg  37.5 mcg IntraVENous Q24H    sodium chloride (NS) flush 5-10 mL  5-10 mL IntraVENous Q8H    sodium chloride (NS) flush 5-10 mL  5-10 mL IntraVENous PRN    acetaminophen (TYLENOL) tablet 650 mg  650 mg Oral Q6H PRN    ondansetron (ZOFRAN) injection 4 mg  4 mg IntraVENous Q4H PRN    enoxaparin (LOVENOX) injection 40 mg  40 mg SubCUTAneous Q24H    piperacillin-tazobactam (ZOSYN) 3.375 g in 0.9% sodium chloride (MBP/ADV) 100 mL  3.375 g IntraVENous Q8H    sodium chloride (NS) flush 5-10 mL  5-10 mL IntraVENous PRN       Past Medical History:   Diagnosis Date    Anemia     Anxiety     Blindness of left eye     Cerebral palsy (HCC)     Dementia     Down syndrome     Endocrine disease     thyroid disorder    GERD (gastroesophageal reflux disease)     Ill-defined condition     blind in left eye    Ill-defined condition     imparied gait - uses walker    Ill-defined condition     dermatitis    Ill-defined condition     cerebral palsy    Ill-defined condition     prone to decubitus ulcers    Ill-defined condition     anemia    Neurological disorder     Other ill-defined conditions(799.89)     Down syndrome    Psychiatric disorder     mental retardation    Psychiatric disorder     anxiety disorder    Seizures ([address]     Sleep apnea     Thyroid disease     Unspecified epilepsy without mention of intractable epilepsy        Social History     Tobacco Use    Smoking status: Never Smoker    Smokeless tobacco: Never Used   Substance Use Topics    Alcohol use: No       Family History   Problem Relation Age of Onset    Hypertension Mother     Cancer Mother     Lupus Mother     Arthritis-osteo Mother     Heart Disease Mother     Heart Disease Father     Diabetes Father     Hypertension Father     Elevated Lipids Father     Diabetes Brother     Elevated Lipids Brother     Hypertension Brother     Mental Retardation Brother     Cancer Maternal Grandmother     Arthritis-osteo Maternal Grandmother     Alcohol abuse Maternal Grandfather     Cancer Maternal Grandfather     Arthritis-osteo Paternal Grandmother     Cancer Paternal Grandfather      ROS  Cant be obtained on account of cognitive state        Visit Vitals  /68   Pulse 90   Temp 98.1 °F (36.7 °C)   Resp 16   Ht 5' 4\" (1.626 m)   Wt 145 lb (65.8 kg)   SpO2 99%   BMI 24.89 kg/m²      General: Well developed, well nourished. Lethargic   Head: Normocephalic, atraumatic, anicteric sclera   Neck Normal ROM, No thyromegally Lungs:  Clear to auscultation bilaterally, No wheezes or rubs   Cardiac: Regular rate and rhythm with no murmurs. Abd: Bowel sounds were audible. No tenderness on palpation   Ext: No pedal edema   Skin: Supple no rash     NeurologicExam:  Mental Status: lethargic   Speech: Fluent no aphasia or dysarthria. Cranial Nerves:  II - XII Intact blind left eye   Motor:  Moves all extremities hypotonic   Reflexes:   Deep tendon reflexes 1+/4 and symmetric. Sensory:   Responds to touch. Gait:  Bed ridden    Tremor:   No tremor noted. Cerebellar:  Coordination intact. Neurovascular: No carotid bruits. No JVD       REVIEWED IMAGING:    MRI :  No results found for this or any previous visit. CT:  Results from Hospital Encounter encounter on 12/31/18   CT CHEST WO CONT    Narrative INDICATION: Lethargy. Suspected aspiration pneumonia. Previous abnormal chest  radiograph. COMPARISON: December 31, 2018 chest radiograph    CONTRAST: None. TECHNIQUE:  5 mm axial images were obtained through the chest. Coronal and  sagittal reconstructions were generated. CT dose reduction was achieved through  use of a standardized protocol tailored for this examination and automatic  exposure control for dose modulation. The absence of intravenous contrast reduces the sensitivity for evaluation of  the mediastinum and upper abdominal organs. FINDINGS:    THYROID: No nodule. MEDIASTINUM: No mass or lymphadenopathy. AUNDREA: No mass or lymphadenopathy. THORACIC AORTA: No aneurysm. MAIN PULMONARY ARTERY: Normal in caliber. TRACHEA/BRONCHI: Patent. ESOPHAGUS: Mildly dilated and fluid-filled. HEART: Normal in size. Small pericardial effusion. PLEURA: No effusion or pneumothorax. LUNGS: Patchy bilateral bilateral lower lobe airspace disease. INCIDENTALLY IMAGED UPPER ABDOMEN: No focal abnormality. BONES: No destructive bone lesion.       Impression IMPRESSION:  Patchy bilateral lower lobe airspace disease may represent atelectasis,  pneumonia, or aspiration. Radiographic follow-up is recommended to assure  complete resolution. Mildly dilated, fluid-filled esophagus, suggestive of  esophageal dysmotility or gastroesophageal reflux. Small pericardial effusion.        REVIEWED LABS:  Lab Results   Component Value Date/Time    WBC 5.6 12/31/2018 01:49 PM    HCT 39.5 12/31/2018 01:49 PM    HGB 13.4 12/31/2018 01:49 PM    PLATELET 699 94/96/9318 01:49 PM     Lab Results   Component Value Date/Time    Sodium 141 12/31/2018 01:49 PM    Potassium 4.1 12/31/2018 01:49 PM    Chloride 104 12/31/2018 01:49 PM    CO2 31 12/31/2018 01:49 PM    Glucose 102 (H) 12/31/2018 01:49 PM    BUN 8 12/31/2018 01:49 PM    Creatinine 0.84 12/31/2018 01:49 PM    Calcium 8.9 12/31/2018 01:49 PM     Lab Results   Component Value Date/Time    Vitamin B12 729 01/19/2015 01:06 PM    Folate 8.5 01/19/2015 01:06 PM     Lab Results   Component Value Date/Time    LDL, calculated 183 (H) 12/10/2015 12:30 PM

## 2019-01-01 NOTE — PROGRESS NOTES
Spiritual Care Assessment/Progress Note [address] 
 
 
NAME: Carlin Avalos      MRN: [id number] AGE: 48 y.o. SEX: male Restoration Affiliation: Dario  
Language: [place] 1/1/2019     Total Time (in minutes): 7 Spiritual Assessment begun in [facility] NEUROSCI[facility] TELEMETRY through conversation with: 
  
    [x]Patient        [] Family    [] Friend(s) Reason for Consult: Initial/Spiritual assessment, patient floor Spiritual beliefs: (Please include comment if needed) [x] Identifies with a lesly tradition: Confucianism    
   [] Supported by a lesly community:        
   [] Claims no spiritual orientation:       
   [] Seeking spiritual identity:            
   [] Adheres to an individual form of spirituality:       
   [] Not able to assess:                   
 
    
Identified resources for coping:  
   [] Prayer                           
   [] Music                  [] Guided Imagery [x] Family/friends                 [] Pet visits [] Devotional reading                         [] Unknown 
   [] Other:                                         
 
 
Interventions offered during this visit: (See comments for more details) Patient Interventions: Iconic (affirming the presence of God/Higher Power), Coping skills reviewed/reinforced, Affirmation of lesly Plan of Care: 
 
 [x] Support spiritual and/or cultural needs  
 [] Support AMD and/or advance care planning process    
 [] Support grieving process 
 [] Coordinate Rites and/or Rituals  
 [] Coordination with community clergy [] No spiritual needs identified at this time 
 [] Detailed Plan of Care below (See Comments)  [] Make referral to Music Therapy 
[] Make referral to Pet Therapy    
[] Make referral to Addiction services 
[] Make referral to [facility] 
[] Make referral to Spiritual Care Partner 
[] No future visits requested       
[] Follow up visits as needed Comments: Patient resting, awakened to verbal stimuli. Much eye contact, using bi-pap for breathing. Says he is feeling comfortable, wanted me to let the staff know that he was feeling a little hungry. Spoke briefly about present thoughts, feelings, and concerns. Says he has good family support from his wife. Desire is to recover his health and be able to return home. Appeared encouraged as a result of this visit and expressed gratitude for this visit. Visited by Rev. Jean Ledesma, [facility]  paging service: [phone])

## 2019-01-01 NOTE — PROGRESS NOTES
Pharmacy Automatic Renal Dosing Protocol - Antimicrobials Indication for Antimicrobials: aspiration PNA Current Regimen of Each Antimicrobial: 
Zosyn 3. 3.75g IV every 8hr (Start Date 18; Day # 1) Previous Antimicrobial Therapy: 
 
Goal Level: N/A Date Dose & Interval Measured (mcg/mL) Extrapolated (mcg/mL) Date & time of next level: n/A Significant Cultures:  
18: blood - pending Radiology / Imaging results: (X-ray, CT scan or MRI):  
18: chest xray - IMPRESSION: Heterogeneous retrocardiac opacity, suspicious for left lower lobe 
pneumonia. Paralysis, amputations, malnutrition: none Labs: 
Recent Labs 18 
1349 CREA 0.84 BUN 8  
WBC 5.6 Temp (24hrs), Av.9 °F (36.6 °C), Min:97.3 °F (36.3 °C), Max:98.5 °F (36.9 °C) Creatinine Clearance (mL/min) or Dialysis: ~85ml/min Impression/Plan:  
· Changed zosyn to 3.375g IV every 8hr per protocol for renal function and indication. · Antimicrobial stop date to be determined Pharmacy will follow daily and adjust medications as appropriate for renal function and/or serum levels. Thank you, Joseph Velasquez, PHARMD 
 
Recommended duration of therapy 
http://[facility]/[place]/[facility]/Pharmacy/Clinical%20Companion/Duration%20of%20ABX%20therapy. docx Renal Dosing 
http://[facility]/[place]/[facility]/Pharmacy/Clinical%20Companion/Renal%[id number]. pdf

## 2019-01-01 NOTE — ED NOTES
Emergency Department Nursing Progress Note: 
 
Patient moved to room 27 for privacy while awaiting IP bed assignment. Handoff to [facility] [employer].

## 2019-01-01 NOTE — ED NOTES
Emergency Department Nursing Progress Note: 
 
Family requests patient's evening seizure medications, feels patient is acting as he does prior to most seizures. Home medication list reconciled. Dr Rodriguez Query notified, no orders at this time, currently awaiting hospitalist evaluation.

## 2019-01-01 NOTE — PROGRESS NOTES
Reason for Admission:  Acute encephalopathy RRAT Score: 16 MODERATE Do you (patient/family) have any concerns for transition/discharge? Pt has no questions or concerns in regards to discharge planning Plan for utilizing home health: Per recommendation Likelihood of readmission? Moderate per pt acuity Transition of Care Plan:        
Pt is a 48year old, admitted with acute encephalopathy. Pt was sleeping when CM attempted to meet with him. CM met with pt sister who confirming address, emergency contact and PCP. Pt sister states pt lives in the [address] [place] [address]. Pt has a walker and does not drive. Pt sister does not believe pt has had [place] or been to a SNF in the past. Pt utilizes the pharmacy through the group home. Pt does have an advanced directive, family is working on getting that to the hospital. Pt family has no questions or concerns at this time. CM will need to speak with group home in regards to transportation at time of discharge if rehab is not recommended. CM will continue to follow pt for discharge planning. Care Management Interventions PCP Verified by CM: Yes Mode of Transport at Discharge: Self Transition of Care Consult (CM Consult): Discharge Planning MyChart Signup: No 
Discharge Durable Medical Equipment: No 
Health Maintenance Reviewed: Yes Physical Therapy Consult: No 
Occupational Therapy Consult: No 
Speech Therapy Consult: No 
Current Support Network: Adult Group Home Confirm Follow Up Transport: Family Plan discussed with Pt/Family/Caregiver: Yes Discharge Location Discharge Placement: Other:(TBD) ANDREW Pollock, CM [facility] [phone]

## 2019-01-01 NOTE — PROGRESS NOTES
Chart reviewed. Patient still altered. Sister at bedside. Reviewed CT chest Patchy bilateral lower lobe airspace disease may represent atelectasis, 
pneumonia, or aspiration Continue IV abx 
MS should improve soon

## 2019-01-02 ENCOUNTER — APPOINTMENT (OUTPATIENT)
Dept: GENERAL RADIOLOGY | Age: 54
DRG: 871 | End: 2019-01-02
Attending: INTERNAL MEDICINE
Payer: MEDICARE

## 2019-01-02 LAB
ALBUMIN SERPL-MCNC: 3.1 G/DL (ref 3.5–5)
ALBUMIN/GLOB SERPL: 0.5 {RATIO} (ref 1.1–2.2)
ALP SERPL-CCNC: 163 U/L (ref 45–117)
ALT SERPL-CCNC: 29 U/L (ref 12–78)
ANION GAP SERPL CALC-SCNC: 7 MMOL/L (ref 5–15)
AST SERPL-CCNC: 30 U/L (ref 15–37)
BASOPHILS # BLD: 0 K/UL (ref 0–0.1)
BASOPHILS NFR BLD: 1 % (ref 0–1)
BILIRUB SERPL-MCNC: 0.3 MG/DL (ref 0.2–1)
BUN SERPL-MCNC: 6 MG/DL (ref 6–20)
BUN/CREAT SERPL: 8 (ref 12–20)
CALCIUM SERPL-MCNC: 8.6 MG/DL (ref 8.5–10.1)
CHLORIDE SERPL-SCNC: 105 MMOL/L (ref 97–108)
CO2 SERPL-SCNC: 28 MMOL/L (ref 21–32)
CREAT SERPL-MCNC: 0.79 MG/DL (ref 0.7–1.3)
DIFFERENTIAL METHOD BLD: ABNORMAL
EOSINOPHIL # BLD: 0.1 K/UL (ref 0–0.4)
EOSINOPHIL NFR BLD: 2 % (ref 0–7)
ERYTHROCYTE [DISTWIDTH] IN BLOOD BY AUTOMATED COUNT: 12.9 % (ref 11.5–14.5)
FOLATE SERPL-MCNC: 39.2 NG/ML (ref 5–21)
GLOBULIN SER CALC-MCNC: 5.9 G/DL (ref 2–4)
GLUCOSE BLD STRIP.AUTO-MCNC: 130 MG/DL (ref 65–100)
GLUCOSE SERPL-MCNC: 77 MG/DL (ref 65–100)
HCT VFR BLD AUTO: 37.2 % (ref 36.6–50.3)
HGB BLD-MCNC: 12.6 G/DL (ref 12.1–17)
IMM GRANULOCYTES # BLD: 0 K/UL (ref 0–0.04)
IMM GRANULOCYTES NFR BLD AUTO: 1 % (ref 0–0.5)
LYMPHOCYTES # BLD: 0.6 K/UL (ref 0.8–3.5)
LYMPHOCYTES NFR BLD: 14 % (ref 12–49)
MCH RBC QN AUTO: 34.1 PG (ref 26–34)
MCHC RBC AUTO-ENTMCNC: 33.9 G/DL (ref 30–36.5)
MCV RBC AUTO: 100.8 FL (ref 80–99)
MONOCYTES # BLD: 0.5 K/UL (ref 0–1)
MONOCYTES NFR BLD: 13 % (ref 5–13)
NEUTS SEG # BLD: 2.7 K/UL (ref 1.8–8)
NEUTS SEG NFR BLD: 69 % (ref 32–75)
NRBC # BLD: 0 K/UL (ref 0–0.01)
NRBC BLD-RTO: 0 PER 100 WBC
PLATELET # BLD AUTO: 230 K/UL (ref 150–400)
PMV BLD AUTO: 9 FL (ref 8.9–12.9)
POTASSIUM SERPL-SCNC: 3.5 MMOL/L (ref 3.5–5.1)
PROT SERPL-MCNC: 9 G/DL (ref 6.4–8.2)
RBC # BLD AUTO: 3.69 M/UL (ref 4.1–5.7)
SERVICE CMNT-IMP: ABNORMAL
SODIUM SERPL-SCNC: 140 MMOL/L (ref 136–145)
VIT B12 SERPL-MCNC: 513 PG/ML (ref 193–986)
WBC # BLD AUTO: 3.8 K/UL (ref 4.1–11.1)

## 2019-01-02 PROCEDURE — C9254 INJECTION, LACOSAMIDE: HCPCS | Performed by: INTERNAL MEDICINE

## 2019-01-02 PROCEDURE — 74220 X-RAY XM ESOPHAGUS 1CNTRST: CPT

## 2019-01-02 PROCEDURE — 92610 EVALUATE SWALLOWING FUNCTION: CPT

## 2019-01-02 PROCEDURE — 36415 COLL VENOUS BLD VENIPUNCTURE: CPT

## 2019-01-02 PROCEDURE — 74011250636 HC RX REV CODE- 250/636: Performed by: INTERNAL MEDICINE

## 2019-01-02 PROCEDURE — 82962 GLUCOSE BLOOD TEST: CPT

## 2019-01-02 PROCEDURE — 85025 COMPLETE CBC W/AUTO DIFF WBC: CPT

## 2019-01-02 PROCEDURE — 80053 COMPREHEN METABOLIC PANEL: CPT

## 2019-01-02 PROCEDURE — 82607 VITAMIN B-12: CPT

## 2019-01-02 PROCEDURE — 65660000000 HC RM CCU STEPDOWN

## 2019-01-02 PROCEDURE — 76450000000

## 2019-01-02 PROCEDURE — 74011000258 HC RX REV CODE- 258: Performed by: INTERNAL MEDICINE

## 2019-01-02 RX ORDER — PANTOPRAZOLE SODIUM 40 MG/1
40 TABLET, DELAYED RELEASE ORAL
Status: DISCONTINUED | OUTPATIENT
Start: 2019-01-03 | End: 2019-01-02

## 2019-01-02 RX ORDER — DEXTROSE 50 % IN WATER (D50W) INTRAVENOUS SYRINGE
12.5-25 AS NEEDED
Status: DISCONTINUED | OUTPATIENT
Start: 2019-01-02 | End: 2019-01-07 | Stop reason: HOSPADM

## 2019-01-02 RX ORDER — MAGNESIUM SULFATE 100 %
4 CRYSTALS MISCELLANEOUS AS NEEDED
Status: DISCONTINUED | OUTPATIENT
Start: 2019-01-02 | End: 2019-01-07 | Stop reason: HOSPADM

## 2019-01-02 RX ADMIN — SODIUM CHLORIDE 75 ML/HR: 900 INJECTION, SOLUTION INTRAVENOUS at 20:20

## 2019-01-02 RX ADMIN — PIPERACILLIN SODIUM,TAZOBACTAM SODIUM 3.38 G: 3; .375 INJECTION, POWDER, FOR SOLUTION INTRAVENOUS at 21:36

## 2019-01-02 RX ADMIN — PHENYTOIN SODIUM 100 MG: 50 INJECTION INTRAMUSCULAR; INTRAVENOUS at 09:42

## 2019-01-02 RX ADMIN — ENOXAPARIN SODIUM 40 MG: 40 INJECTION SUBCUTANEOUS at 09:42

## 2019-01-02 RX ADMIN — SODIUM CHLORIDE 150 MG: 900 INJECTION, SOLUTION INTRAVENOUS at 20:53

## 2019-01-02 RX ADMIN — SODIUM CHLORIDE 1500 MG: 900 INJECTION, SOLUTION INTRAVENOUS at 00:52

## 2019-01-02 RX ADMIN — PHENYTOIN SODIUM 100 MG: 50 INJECTION INTRAMUSCULAR; INTRAVENOUS at 21:36

## 2019-01-02 RX ADMIN — Medication 10 ML: at 21:40

## 2019-01-02 RX ADMIN — PIPERACILLIN SODIUM,TAZOBACTAM SODIUM 3.38 G: 3; .375 INJECTION, POWDER, FOR SOLUTION INTRAVENOUS at 13:17

## 2019-01-02 RX ADMIN — SODIUM CHLORIDE 150 MG: 900 INJECTION, SOLUTION INTRAVENOUS at 09:43

## 2019-01-02 RX ADMIN — Medication 10 ML: at 03:37

## 2019-01-02 RX ADMIN — PIPERACILLIN SODIUM,TAZOBACTAM SODIUM 3.38 G: 3; .375 INJECTION, POWDER, FOR SOLUTION INTRAVENOUS at 03:33

## 2019-01-02 RX ADMIN — Medication 10 ML: at 13:17

## 2019-01-02 RX ADMIN — SODIUM CHLORIDE 1500 MG: 900 INJECTION, SOLUTION INTRAVENOUS at 14:22

## 2019-01-02 NOTE — PROGRESS NOTES
Palliative Medicine Family Meeting Participants:  Patient, patient brother/guardian Srinivas Chan, Palliative Medicine (Rito Ashford, Dr. Ayo Nix) Discussion:   
 
Socrates Sheffield \"Royce\" Chrissie Fagan is a 48 y.o. with a PMH of Down Syndrome, SAVITA, refractory seizures. He was admitted on 12/31/2018 from [facility] with AMS. Met with patient and brother Timothy Loco Sanchez/guardian/mPOA ([phone]) to discuss care decisions. 1. Baseline: patient is nonverbal, answers yes to most questions and can follow commands. He has previous admissions 2/18, 9/18, 12/17. Uses wheelchair to ambulate. Healthy appetite, though brother stated he has begun to pocket food in cheek. 2. Social: Patient lives at group home ([facility]). Nayeli Baxter are group home owners. Brother reported patient has good life in group home and very pleased with his care there. He visits patient 3-4 times a week. Patient has 3 siblings: brother Timothy Loco who is main caregiver, and 2 sisters: Ericka Albright and Connie Reyes. Sister in law Marquis oCnnelly is also caregiver/mPOA. Timothy Loco will bring in guardianship paperwork 1/3/18. 3. GOC: Patient does not have AMD, but Timothy Loco has had some conversations with group home owners about end of life. Timothy Loco struggled to discuss this topic but is also open to having the conversation in the presence of patient. 4. Discussed pending barium swallow. There has been concern about patient aspirating and we initiated the conversation around potential future decision making around safe feedings. Outcome/Plan:   
Follow up with family/caregiver after barium swallow to discuss results Patient full code Goal is to get patient back to group home INES Varela Palliative Medicine:  [phone])

## 2019-01-02 NOTE — PROGRESS NOTES
[facility], [facility] Electroencephalogram 
  
 
 
Procedure Date:01/02/19 Procedure ID: [id number] Procedure Type: Routine drowsy Patient Name: Suzanne Sheffield     YOB: 1965 Medical Record No: [id number] INDICATION: Seizure, Altered mental status Medications: 
 
Current Facility-Administered Medications:  
  [START ON 1/3/2019] pantoprazole (PROTONIX) tablet 40 mg, 40 mg, Oral, ACB, Justice Watt MD 
  levETIRAcetam (KEPPRA) 1,500 mg in 0.9% sodium chloride 100 mL IVPB, 1,500 mg, IntraVENous, Q12H, Edison Rosen MD, 1,500 mg at 01/02/19 1422   lacosamide (VIMPAT) 150 mg in 0.9% sodium chloride 100 mL IVPB, 150 mg, IntraVENous, Q12H, Edison Rosen MD, 150 mg at 01/02/19 3715   phenytoin (DILANTIN) injection 100 mg, 100 mg, IntraVENous, Q12H, Edison Rosen MD, 100 mg at 01/02/19 0942 
  0.9% sodium chloride infusion, 75 mL/hr, IntraVENous, CONTINUOUS, Edison Rosen MD, Last Rate: 75 mL/hr at 01/01/19 1943, 75 mL/hr at 01/01/19 1943   levalbuterol (XOPENEX) nebulizer soln 1.25 mg/3 mL, 1.25 mg, Nebulization, Q6H PRN, Edison Rosen MD, 1.25 mg at 01/01/19 0930   [START ON 1/3/2019] levothyroxine (SYNTHROID) injection 37.5 mcg, 37.5 mcg, IntraVENous, Q24H, Edison Rosen MD 
  sodium chloride (NS) flush 5-10 mL, 5-10 mL, IntraVENous, Q8H, Edison Rosen MD, 10 mL at 01/02/19 1317 
  sodium chloride (NS) flush 5-10 mL, 5-10 mL, IntraVENous, PRN, Edison Rosen MD, 10 mL at 01/02/19 3731   acetaminophen (TYLENOL) tablet 650 mg, 650 mg, Oral, Q6H PRN, Edison Rosen MD 
  ondansetron (ZOFRAN) injection 4 mg, 4 mg, IntraVENous, Q4H PRN, Edison Rosen MD 
  enoxaparin (LOVENOX) injection 40 mg, 40 mg, SubCUTAneous, Q24H, Edison Rosen MD, 40 mg at 01/02/19 2923   piperacillin-tazobactam (ZOSYN) 3.375 g in 0.9% sodium chloride (MBP/ADV) 100 mL, 3.375 g, IntraVENous, Q8H, Edison Rosen MD, Last Rate: 25 mL/hr at 01/02/19 1317, 3.375 g at 01/02/19 1317 
  sodium chloride (NS) flush 5-10 mL, 5-10 mL, IntraVENous, PRN, Robert Snider MD 
 
DESCRIPTION OF PROCEDURE: Electrodes were applied in accordance with the international 10-20 system of electrode placement. EEG was reviewed in both bipolar and referential montages DESCRIPTION OF FINDINGS: Background consists of symmetric  6 hz activity. This activity attenuates with eye opening. With photic stimulation a poor occipital driving response is noted. No seizures or were noted IMPRESSION: 
Generalized slowing indicative of a nonspecific encephalopathic pattern Absence of seizures on this study does not exclude epilepsy. Clinical correlation is advised.

## 2019-01-02 NOTE — ROUTINE PROCESS
* No surgery found * 
* No surgery found * Bedside and Verbal shift change report given to [facility] (oncoming nurse) by Danica Beltre RN (offgoing nurse). Report included the following information SBAR, Kardex, MAR and Recent Results. Zone Phone:   [phone] Significant changes during shift:  none Patient Information Roberth Smith 48 y.o. 
12/31/2018 12:43 PM by Inocencia Franco MD. Roberth Smith was admitted from Adult/Group Home 
 
Problem List 
 
Patient Active Problem List  
 Diagnosis Date Noted  Acquired hypothyroidism 04/12/2018  Acute encephalopathy 03/23/2018  Complex partial seizure evolving to generalized seizure ([address] 03/23/2018  Down syndrome 03/20/2018  Pneumonia 02/03/2018  Low vitamin D level 01/11/2018  Blind left eye 01/11/2018  ACP (advance care planning) 11/09/2016  S/P placement of VNS (vagus nerve stimulation) device 04/10/2015  Seizure ([address] 11/22/2013  Partial epilepsy with impairment of consciousness ([address] 01/21/2013  Ataxia 01/21/2013  Memory loss 01/21/2013  Recurrent seizures ([address] 08/27/2012 Past Medical History:  
Diagnosis Date  Anemia  Anxiety  Blindness of left eye  Cerebral palsy ([address]  Dementia  Down syndrome  Endocrine disease   
 thyroid disorder  GERD (gastroesophageal reflux disease)  Ill-defined condition   
 blind in left eye  Ill-defined condition   
 imparied gait - uses walker  Ill-defined condition   
 dermatitis  Ill-defined condition   
 cerebral palsy  Ill-defined condition   
 prone to decubitus ulcers  Ill-defined condition   
 anemia  Neurological disorder  Other ill-defined conditions(799.89) Down syndrome  Psychiatric disorder   
 mental retardation  Psychiatric disorder   
 anxiety disorder  Seizures ([address]  Sleep apnea  Thyroid disease  Unspecified epilepsy without mention of intractable epilepsy Core Measures: CVA: No Not applicable CHF:No Not applicable PNA:Yes Yes Activity Status: OOB to Chair No 
Ambulated this shift No  
Bed Rest No 
 
Supplemental O2: (If Applicable) NC No 
NRB No 
Venti-mask No 
On room air Liters/min LINES AND DRAINS: 
 
PIV Condom catheter DVT prophylaxis: DVT prophylaxis Med- Yes DVT prophylaxis SCD or JOSE E- No  
 
Wounds: (If Applicable) Wounds- No 
 
Location none Patient Safety: 
 
Falls Score Total Score: 4 Safety Level_______ Bed Alarm On? No 
Sitter? No self releasing roll belt Plan for upcoming shift: antibiotics, hydration SLP eval, palliative care consult Discharge Plan: No just admitted Active Consults: 
IP CONSULT TO HOSPITALIST 
IP CONSULT TO PALLIATIVE CARE - PROVIDER 
IP CONSULT TO NEUROLOGY

## 2019-01-02 NOTE — PROGRESS NOTES
Initial Nutrition Assessment: 
 
INTERVENTIONS/RECOMMENDATIONS:  
· Meals/Snacks: General/healthful diet: Pureed diet per SLP 
 
ASSESSMENT:  
Patient medically noted for acute encephalopathy and pneumonia. PMH for Down Syndrome and seizures. Diet advanced to pureed per SLP this morning. Receiving testing at bedside at time of attempted visit. No significant weight loss noted on chart review. Needs 1:1 assistance with meals. Encourage PO intake. Diet Order: Puree 
% Eaten:  No data found. Pertinent Medications: [x]Reviewed []Other: Keppra, Synthroid, Dilantin Pertinent Labs: [x]Reviewed []Other:  
Food Allergies: [x]None []Other Last BM:    []Active     []Hyperactive  []Hypoactive       [] Absent BS Skin:    [x] Intact   [] Incision  [] Breakdown: [] Edema []Other: Anthropometrics:  
Height: 5' 4\" (162.6 cm) Weight: 66.8 kg (147 lb 4.3 oz) IBW (%IBW):   ( ) UBW (%UBW):   (  %) Last Weight Metrics: 
Weight Loss Metrics 1/2/2019 11/20/2018 10/10/2018 10/9/2018 10/4/2018 4/20/2018 4/12/2018 Today's Wt 147 lb 4.3 oz 145 lb 12.8 oz 155 lb - 155 lb 6.4 oz 145 lb 143 lb BMI 25.28 kg/m2 25.83 kg/m2 27.46 kg/m2 - 27.53 kg/m2 25.69 kg/m2 25.33 kg/m2 BMI: Body mass index is 25.28 kg/m². This BMI is indicative of: 
 []Underweight    []Normal    [x]Overweight    [] Obesity   [] Extreme Obesity (BMI>40) Estimated Nutrition Needs (Based on):  
9714 Kcals/day(BMR (1426) x 1. 3AF) , 67 g(-80g (1.0-1.2 g/kg bw)) Protein Carbohydrate: At Least 130 g/day  Fluids: 1850 mL/day (1ml/kcal) Pt expected to meet estimated nutrient needs: [x]Yes []No 
 
NUTRITION DIAGNOSES:  
Problem:  No nutritional diagnosis at this time Etiology: related to Signs/Symptoms: as evidenced by NUTRITION INTERVENTIONS: 
Meals/Snacks: General/healthful diet GOAL:  
PO intake >70% of meals next 4-6 days LEARNING NEEDS (Diet, Food/Nutrient-Drug Interaction):  
 [x] None Identified [] Identified and Education Provided/Documented 
 [] Identified and Pt declined/was not appropriate Cultural, Moravian, OR Ethnic Dietary Needs:  
 [x] None Identified 
 [] Identified and Addressed 
 
 [x] Interdisciplinary Care Plan Reviewed/Documented  
 [x] Discharge Planning: Pureed diet per SLP MONITORING /EVALUATION:  
Food/Nutrient Intake Outcomes: Total energy intake Physical Signs/Symptoms Outcomes: Weight/weight change NUTRITION RISK:  
 [] High              [] Moderate           [x]  Low  []  Minimal/Uncompromised PT SEEN FOR:  
 []  MD Consult: []Calorie Count []Diabetic Diet Education []Diet Education []Electrolyte Management []General Nutrition Management and Supplements []Management of Tube Feeding []TPN Recommendations [x]  RN Referral:  [x]MST score >=2 
   []Enteral/Parenteral Nutrition PTA []Pregnant: Gestational DM or Multigestation 
   []Pressure Ulcer/Wound Care needs 
     
[]  Low BMI 
[]  SANA Jamison Pager [phone] Weekend Pager [phone]

## 2019-01-02 NOTE — CONSULTS
GI Consultation Note Rufus Williamson)    NAME: Purnell Claude : 1965 MRN: [id number]   ATTG: Matthew Pratt MD Prim GI: Jennie Botello MD  PCP: Marian Sanchez MD  Date/Time:  2019 3:01 PM  Subjective:   REASON FOR CONSULT:      Kalina Carrasco is a 48 y.o.  male with hx of Down's Syndrome, refractory seizures, and SAVITA (but will not wear his CPAP), who I was asked to see for evaluation of possible esophageal dysmotility associated with recurrent aspiration pneumonia. He was admitted 18 from his group home with AMS. He was noted to be afebrile, without N/V, D/C, melena, BRBPR, CP, or SOB. In ED, CXR demonstrated heterogeneous retrocardiac opacity, suspicious for left lower lobe pneumonia. A Chest CT described patchy bilateral lower lobe airspace disease may represent atelectasis, pneumonia, or aspiration, a mildly dilated, fluid-filled esophagus, suggestive of  esophageal dysmotility or gastroesophageal reflux, and small pericardial effusion. He is reported as nonverbal at baseline, but nods appropriately to questions and is reported to follow commands here. He is noted to ambulate with walker assistance as OP. He has been hospitalized in past for aspiration pneumonia treated with IV abx and hospitalized for seizures as well in 2018 and 10/2018. Per Palliative Care group eval, he has been in [facility] setting for 5 yrs and is reported to enjoy eating and laughing. His older brother Cinthia Sanchez (and Yaron's wife Yamini) are his guardians since patient's father . They report that he enjoys meals and has very good appetite although eats very slowly. The family has indicated that they are aware that aspiration may be a chronic recurrent issue. They express awareness that FT are used in some individuals deemed unsafe to continue oral intake. He is awaiting BS today.     Past Medical History:   Diagnosis Date    Anemia     Anxiety     Blindness of left eye     Cerebral palsy (HCC)     Dementia     Down syndrome     Endocrine disease     thyroid disorder    GERD (gastroesophageal reflux disease)     Ill-defined condition     blind in left eye    Ill-defined condition     imparied gait - uses walker    Ill-defined condition     dermatitis    Ill-defined condition     cerebral palsy    Ill-defined condition     prone to decubitus ulcers    Ill-defined condition     anemia    Neurological disorder     Other ill-defined conditions(799.89)     Down syndrome    Psychiatric disorder     mental retardation    Psychiatric disorder     anxiety disorder    Seizures ([address]     Sleep apnea     Thyroid disease     Unspecified epilepsy without mention of intractable epilepsy       Past Surgical History:   Procedure Laterality Date    HX OTHER SURGICAL  03/13/2015     VNS  Dr. Charon Lennox. Patsy Hauser  @ [facility]     Social History     Tobacco Use    Smoking status: Never Smoker    Smokeless tobacco: Never Used   Substance Use Topics    Alcohol use: No      Family History   Problem Relation Age of Onset    Hypertension Mother     Cancer Mother     Lupus Mother     Arthritis-osteo Mother     Heart Disease Mother     Heart Disease Father     Diabetes Father     Hypertension Father     Elevated Lipids Father     Diabetes Brother     Elevated Lipids Brother     Hypertension Brother     Mental Retardation Brother     Cancer Maternal Grandmother     Arthritis-osteo Maternal Grandmother     Alcohol abuse Maternal Grandfather     Cancer Maternal Grandfather     Arthritis-osteo Paternal Grandmother     Cancer Paternal Grandfather       Allergies   Allergen Reactions    Morphine Not Reported This Time    Tegretol [Carbamazepine] Other (comments)     \"bad reaction\" \"changes attitude\"      Home Medications:  Prior to Admission Medications   Prescriptions Last Dose Informant Patient Reported? Taking?    HYDROcodone-acetaminophen (HYCET) 0.5-21.7 mg/mL oral solution   Yes No Sig: Take 10 mL by mouth four (4) times daily as needed for Pain. ascorbic acid, vitamin C, (VITAMIN C) 500 mg tablet 2018 at Unknown time  No Yes   Sig: Take 1 Tab by mouth daily. cholecalciferol (VITAMIN D3) 1,000 unit tablet 2018 at Unknown time  No Yes   Sig: Take 1 Tab by mouth daily. ferrous sulfate 325 mg (65 mg iron) tablet 2018 at Unknown time  No Yes   Sig: Take 1 Tab by mouth Daily (before breakfast). fluticasone (FLONASE) 50 mcg/actuation nasal spray 2018 at Unknown time  No Yes   Si Sprays by Both Nostrils route daily. lacosamide (VIMPAT) 100 mg tab tablet 2018 at Unknown time  No Yes   Sig: Take 1.5 Tabs by mouth two (2) times a day. Max Daily Amount: 300 mg.   levETIRAcetam (KEPPRA) 100 mg/mL solution 2018 at Unknown time  No Yes   Sig: 15ml by mouth twice a day. levothyroxine (SYNTHROID) 50 mcg tablet 2018 at Unknown time  No Yes   Sig: Take 1 Tab by mouth Daily (before breakfast). loratadine (CLARITIN) 10 mg tablet 2018 at Unknown time  No Yes   Sig: Take 1 Tab by mouth daily. multivit-folic acid-herbal 226 (WELLESSE PLUS) 400 mcg-200 mg/30 mL liqd oral liquid 2018 at Unknown time  No Yes   Sig: Take 30 mL by mouth daily. phenytoin (DILANTIN-125) suspension 2018 at Unknown time  No Yes   Sig: Take 4 mL by mouth two (2) times a day. sodium chloride (DEEP SEA NASAL) 0.65 % nasal squeeze bottle 2018 at Unknown time  Yes Yes   Si Spray as needed for Congestion.       Facility-Administered Medications: None     Hospital medications:  Current Facility-Administered Medications   Medication Dose Route Frequency    [START ON 1/3/2019] pantoprazole (PROTONIX) tablet 40 mg  40 mg Oral ACB    levETIRAcetam (KEPPRA) 1,500 mg in 0.9% sodium chloride 100 mL IVPB  1,500 mg IntraVENous Q12H    lacosamide (VIMPAT) 150 mg in 0.9% sodium chloride 100 mL IVPB  150 mg IntraVENous Q12H    phenytoin (DILANTIN) injection 100 mg  100 mg IntraVENous Q12H    0.9% sodium chloride infusion  75 mL/hr IntraVENous CONTINUOUS    levalbuterol (XOPENEX) nebulizer soln 1.25 mg/3 mL  1.25 mg Nebulization Q6H PRN    [START ON 1/3/2019] levothyroxine (SYNTHROID) injection 37.5 mcg  37.5 mcg IntraVENous Q24H    sodium chloride (NS) flush 5-10 mL  5-10 mL IntraVENous Q8H    sodium chloride (NS) flush 5-10 mL  5-10 mL IntraVENous PRN    acetaminophen (TYLENOL) tablet 650 mg  650 mg Oral Q6H PRN    ondansetron (ZOFRAN) injection 4 mg  4 mg IntraVENous Q4H PRN    enoxaparin (LOVENOX) injection 40 mg  40 mg SubCUTAneous Q24H    piperacillin-tazobactam (ZOSYN) 3.375 g in 0.9% sodium chloride (MBP/ADV) 100 mL  3.375 g IntraVENous Q8H    sodium chloride (NS) flush 5-10 mL  5-10 mL IntraVENous PRN     REVIEW OF SYSTEMS:     [x]     Unable to obtain accurate ROS due to  [x]    Nonverbal status    Pertinent Positives include :    Objective:   VITALS:    Visit Vitals  /78 (BP 1 Location: Left arm, BP Patient Position: At rest)   Pulse (!) 110   Temp 98.5 °F (36.9 °C)   Resp 18   Ht 5' 4\" (1.626 m)   Wt 66.8 kg (147 lb 4.3 oz)   SpO2 100%   BMI 25.28 kg/m²     Temp (24hrs), Av.3 °F (36.8 °C), Min:98.1 °F (36.7 °C), Max:98.5 °F (36.9 °C)    PHYSICAL EXAM:   General:    Alert, cooperative, no distress, appears stated age. Head:   Normocephalic, without obvious abnormality, atraumatic. Eyes:   Blind in opacified OS;  anicteric sclerae. OD RRL  Nose:  Nares normal. No drainage or sinus tenderness. Throat:    Lips, mucosa, and tongue normal.  No Thrush  Neck:  Supple, symmetrical,  no adenopathy, thyroid: non tender  Back:    Symmetric,  No CVA tenderness. Lungs:   CTA bilaterally. No wheezing/rhonchi/rales. Chest wall:  No tenderness or deformity. No Accessory muscle use. Heart:   Regular rate and rhythm,  no murmur, rub or gallop. Abdomen:   Soft, non-tender. Not distended.   Bowel sounds normal. No masses  Extremities: Atraumatic, No cyanosis. No edema. No clubbing  Skin:     Texture, turgor normal. No rashes/lesions/jaundice  Lymph: Cervical, supraclavicular normal.  Psych:  Unable to assess insight. Not depressed. Not anxious or agitated. Neurologic: EOMs intact. No facial asymmetry. Moves all limbs spontaneously, A/O X 3. LAB DATA REVIEWED:    Recent Results (from the past 48 hour(s))   PHENYTOIN    Collection Time: 01/01/19  7:50 AM   Result Value Ref Range    Phenytoin 8.6 (L) 10.0 - 20.0 ug/mL   TSH 3RD GENERATION    Collection Time: 01/01/19  7:50 AM   Result Value Ref Range    TSH 1.35 0.36 - 6.59 uIU/mL   METABOLIC PANEL, COMPREHENSIVE    Collection Time: 01/02/19 12:55 AM   Result Value Ref Range    Sodium 140 136 - 145 mmol/L    Potassium 3.5 3.5 - 5.1 mmol/L    Chloride 105 97 - 108 mmol/L    CO2 28 21 - 32 mmol/L    Anion gap 7 5 - 15 mmol/L    Glucose 77 65 - 100 mg/dL    BUN 6 6 - 20 MG/DL    Creatinine 0.79 0.70 - 1.30 MG/DL    BUN/Creatinine ratio 8 (L) 12 - 20      GFR est AA >60 >60 ml/min/1.73m2    GFR est non-AA >60 >60 ml/min/1.73m2    Calcium 8.6 8.5 - 10.1 MG/DL    Bilirubin, total 0.3 0.2 - 1.0 MG/DL    ALT (SGPT) 29 12 - 78 U/L    AST (SGOT) 30 15 - 37 U/L    Alk.  phosphatase 163 (H) 45 - 117 U/L    Protein, total 9.0 (H) 6.4 - 8.2 g/dL    Albumin 3.1 (L) 3.5 - 5.0 g/dL    Globulin 5.9 (H) 2.0 - 4.0 g/dL    A-G Ratio 0.5 (L) 1.1 - 2.2     CBC WITH AUTOMATED DIFF    Collection Time: 01/02/19 12:55 AM   Result Value Ref Range    WBC 3.8 (L) 4.1 - 11.1 K/uL    RBC 3.69 (L) 4.10 - 5.70 M/uL    HGB 12.6 12.1 - 17.0 g/dL    HCT 37.2 36.6 - 50.3 %    .8 (H) 80.0 - 99.0 FL    MCH 34.1 (H) 26.0 - 34.0 PG    MCHC 33.9 30.0 - 36.5 g/dL    RDW 12.9 11.5 - 14.5 %    PLATELET 379 339 - 273 K/uL    MPV 9.0 8.9 - 12.9 FL    NRBC 0.0 0  WBC    ABSOLUTE NRBC 0.00 0.00 - 0.01 K/uL    NEUTROPHILS 69 32 - 75 %    LYMPHOCYTES 14 12 - 49 %    MONOCYTES 13 5 - 13 %    EOSINOPHILS 2 0 - 7 %    BASOPHILS 1 0 - 1 % IMMATURE GRANULOCYTES 1 (H) 0.0 - 0.5 %    ABS. NEUTROPHILS 2.7 1.8 - 8.0 K/UL    ABS. LYMPHOCYTES 0.6 (L) 0.8 - 3.5 K/UL    ABS. MONOCYTES 0.5 0.0 - 1.0 K/UL    ABS. EOSINOPHILS 0.1 0.0 - 0.4 K/UL    ABS. BASOPHILS 0.0 0.0 - 0.1 K/UL    ABS. IMM. GRANS. 0.0 0.00 - 0.04 K/UL    DF AUTOMATED     VITAMIN B12 & FOLATE    Collection Time: 01/02/19 12:55 AM   Result Value Ref Range    Vitamin B12 513 193 - 986 pg/mL    Folate 39.2 (H) 5.0 - 21.0 ng/mL     IMAGING RESULTS:   [x]      I have personally reviewed the actual   []    CXR  [x]    CT   CHEST CT w/o contrast 1/2/19  FINDINGS:  THYROID: No nodule. MEDIASTINUM: No mass or lymphadenopathy. AUNDREA: No mass or lymphadenopathy. THORACIC AORTA: No aneurysm. MAIN PULMONARY ARTERY: Normal in caliber. TRACHEA/BRONCHI: Patent. ESOPHAGUS: Mildly dilated and fluid-filled. HEART: Normal in size. Small pericardial effusion. PLEURA: No effusion or pneumothorax. LUNGS: Patchy bilateral bilateral lower lobe airspace disease. INCIDENTALLY IMAGED UPPER ABDOMEN: No focal abnormality. BONES: No destructive bone lesion. IMPRESSION:  Patchy bilateral lower lobe airspace disease may represent atelectasis,  pneumonia, or aspiration. Radiographic follow-up is recommended to assure  complete resolution. Mildly dilated, fluid-filled esophagus, suggestive of  esophageal dysmotility or gastroesophageal reflux. Small pericardial effusion. Recommendations/Plan:      Active Problems:    Seizure ([address] (11/22/2013)      Pneumonia (2/3/2018)      Down syndrome (3/20/2018)      Acute encephalopathy (3/23/2018)      Acquired hypothyroidism (4/12/2018)       ___________________________________________________  RECOMMENDATIONS:    50yo M with recurrent aspiration which may have multifactorial contribution from dysphagia associated with relative macroglossia, seizure disorder, and possible oropharyngeal or esophageal dyskinesia.   The former dyskinetic process does not have medicinal treatment, whereas, the latter may be amenable to metoclopramide. PEG in individuals that cannot take PO due to oropharyngeal or esophageal dyskinesia may be appropriate, but would not eliminate risk from aspiration on his own secretions. Further, multiple individuals note that this patient enjoys eating which would not be allowed if a PEG were placed. Plan:  1) Obtain Barium esophagram with pill today  2) I hesitate to offer PEG as it would be definition make it inappropriate to feed by mouth, if we are to justify risk of placement and its long-term site maintenance.     Discussed Code Status:    [x]    Full Code      []    DNR    ___________________________________________________  Care Plan discussed with:    [x]    Patient   []    Family   [x]    Nursing   []    Attending  Total Time :  50   minutes   ___________________________________________________  GI: Jelly Payne MD

## 2019-01-02 NOTE — CDMP QUERY
There is noted documentation of \"Acute Encephalopathy\"  on this record. Could this be further clarified as: 
 
               
=>Acute Metabolic Encephalopathy in the setting of aspiration PNA, AMS requiring iv ns x 2 liter bolus, iv zithromax, iv rocephin, iv zosyn, CT head  negative for acute changes, neuro consult 
=>Other explanation of clinical findings =>Unable to determine (no explanation for clinical findings) Please clarify and document your clinical opinion in the progress notes and discharge summary including the definitive and/or presumptive diagnosis, (suspected or probable), related to the above clinical findings. Please include clinical findings supporting your diagnosis. Thank You Sandra Levy, [facility] 
   [phone]

## 2019-01-02 NOTE — PROGRESS NOTES
Chief Complaint: altered mental status Chart reviewed EEG reviewed no reported seizures. Patient in bed. Appears improved. Discussed progress with his caregiver. He was unable to do a barium swallow today. Apparently a compulsive chewer and keeps food in his moth when sleeping. Caregiver speculates this to be a cause for his aspiration. He also adds that one of his aspirations occurred after a seizure but this not always the case. Assesment and Plan 1. Altered mental status EEG no seizures B12 513 TSH 1.35 
  
2. Bilateral lower lobe pneumonia Continue zosyn.  
  
3. Down's Syndrome 4. Seizure disorder Continue vimpat dilantin and keppra. In the past year 1.9 free phenytoin equated to 9.1 total 
Will order free phenytoin to confirm therpeutic levels. 5. Oropharyngeal dysphagia GI following 6. Macrocytosis Long term dilantin B12 normal , folate normal . Allergies Morphine and Tegretol [carbamazepine] Medications Current Facility-Administered Medications Medication Dose Route Frequency  [START ON 1/3/2019] pantoprazole (PROTONIX) tablet 40 mg  40 mg Oral ACB  levETIRAcetam (KEPPRA) 1,500 mg in 0.9% sodium chloride 100 mL IVPB  1,500 mg IntraVENous Q12H  
 lacosamide (VIMPAT) 150 mg in 0.9% sodium chloride 100 mL IVPB  150 mg IntraVENous Q12H  phenytoin (DILANTIN) injection 100 mg  100 mg IntraVENous Q12H  
 0.9% sodium chloride infusion  75 mL/hr IntraVENous CONTINUOUS  
 levalbuterol (XOPENEX) nebulizer soln 1.25 mg/3 mL  1.25 mg Nebulization Q6H PRN  
 [START ON 1/3/2019] levothyroxine (SYNTHROID) injection 37.5 mcg  37.5 mcg IntraVENous Q24H  
 sodium chloride (NS) flush 5-10 mL  5-10 mL IntraVENous Q8H  
 sodium chloride (NS) flush 5-10 mL  5-10 mL IntraVENous PRN  
 acetaminophen (TYLENOL) tablet 650 mg  650 mg Oral Q6H PRN  
 ondansetron (ZOFRAN) injection 4 mg  4 mg IntraVENous Q4H PRN  
  enoxaparin (LOVENOX) injection 40 mg  40 mg SubCUTAneous Q24H  piperacillin-tazobactam (ZOSYN) 3.375 g in 0.9% sodium chloride (MBP/ADV) 100 mL  3.375 g IntraVENous Q8H  
 sodium chloride (NS) flush 5-10 mL  5-10 mL IntraVENous PRN Medical History Past Medical History:  
Diagnosis Date  Anemia  Anxiety  Blindness of left eye  Cerebral [address]y ([facility]  Dementia  Down syndrome  Endocrine disease   
 thyroid disorder  GERD (gastroesophageal reflux disease)  Ill-defined condition   
 blind in left eye  Ill-defined condition   
 imparied gait - uses walker  Ill-defined condition   
 dermatitis  Ill-defined condition   
 cerebral palsy  Ill-defined condition   
 prone to decubitus ulcers  Ill-defined condition   
 anemia  Neurological disorder  Other ill-defined conditions(799.89) Down syndrome  Psychiatric disorder   
 mental retardation  Psychiatric disorder   
 anxiety disorder  Seizures ([facility] [address]  Sleep apnea  Thyroid disease  Unspecified epilepsy without mention of intractable epilepsy Review of Systems Can't be obtained. Non verbal 
 
Exam: 
 
Visit Vitals /78 (BP 1 Location: Left arm, BP Patient Position: At rest) Pulse (!) 110 Temp 98.5 °F (36.9 °C) Resp 18 Ht 5' 4\" (1.626 m) Wt 147 lb 4.3 oz (66.8 kg) SpO2 100% BMI 25.28 kg/m²  
  
eneral: Well developed, well nourished. somnolent Head: Microcephalic. Mallampati 4, atraumatic, anicteric sclera Neck Normal ROM, No thyromegally Cardiac: Regular rate and rhythm Ext: No pedal edema Skin: Supple no rash NeurologicExam: 
Mental Status: Alert following simple commands Speech: Non verbal  
Cranial Nerves:  II - XII Intact. Blind left eye Motor:  Moves all extremities. Poor muscle toone. .   
Sensory:   Responds to touch small or large fibers. Gait:  Not evaluated Tremor:   No tremor noted. Cerebellar:  Coordination intact. Imaging CT Results (most recent): 
Results from [facility] Encounter encounter on 12/31/18 CT CHEST WO CONT Narrative INDICATION: Lethargy. Suspected aspiration pneumonia. Previous abnormal chest 
radiograph. COMPARISON: December 31, 2018 chest radiograph CONTRAST: None. TECHNIQUE:  5 mm axial images were obtained through the chest. Coronal and 
sagittal reconstructions were generated. CT dose reduction was achieved through 
use of a standardized protocol tailored for this examination and automatic 
exposure control for dose modulation. The absence of intravenous contrast reduces the sensitivity for evaluation of 
the mediastinum and upper abdominal organs. FINDINGS: 
 
THYROID: No nodule. MEDIASTINUM: No mass or lymphadenopathy. AUNDREA: No mass or lymphadenopathy. THORACIC AORTA: No aneurysm. MAIN PULMONARY ARTERY: Normal in caliber. TRACHEA/BRONCHI: Patent. ESOPHAGUS: Mildly dilated and fluid-filled. HEART: Normal in size. Small pericardial effusion. PLEURA: No effusion or pneumothorax. LUNGS: Patchy bilateral bilateral lower lobe airspace disease. INCIDENTALLY IMAGED UPPER ABDOMEN: No focal abnormality. BONES: No destructive bone lesion. Impression IMPRESSION: 
Patchy bilateral lower lobe airspace disease may represent atelectasis, 
pneumonia, or aspiration. Radiographic follow-up is recommended to assure 
complete resolution. Mildly dilated, fluid-filled esophagus, suggestive of 
esophageal dysmotility or gastroesophageal reflux. Small pericardial effusion. Lab Review Lab Results Component Value Date/Time WBC 3.8 (L) 01/02/2019 12:55 AM  
 HCT 37.2 01/02/2019 12:55 AM  
 HGB 12.6 01/02/2019 12:55 AM  
 PLATELET 922 79/16/2383 12:55 AM  
 
 
Lab Results Component Value Date/Time  Sodium 140 01/02/2019 12:55 AM  
 Potassium 3.5 01/02/2019 12:55 AM  
 Chloride 105 01/02/2019 12:55 AM  
 CO2 28 01/02/2019 12:55 AM  
 Glucose 77 01/02/2019 12:55 AM  
 BUN 6 01/02/2019 12:55 AM  
 Creatinine 0.79 01/02/2019 12:55 AM  
 Calcium 8.6 01/02/2019 12:55 AM  
 
 
Lab Results Component Value Date/Time Vitamin B12 513 01/02/2019 12:55 AM  
 Folate 39.2 (H) 01/02/2019 12:55 AM  
 
 
 
Lab Results Component Value Date/Time  Cholesterol, total 271 (H) 12/10/2015 12:30 PM  
 HDL Cholesterol 64 12/10/2015 12:30 PM  
 LDL, calculated 183 (H) 12/10/2015 12:30 PM  
 VLDL, calculated 24 12/10/2015 12:30 PM  
 Triglyceride 120 12/10/2015 12:30 PM

## 2019-01-02 NOTE — PROGRESS NOTES
Hospitalist Progress Note NAME: Lue Leventhal :  1965 MRN:  [id number] Assessment / Plan: 
Aspiration Pneumonia (sepsis today with Leukopenia and HR >90 today) and Likely Esophageal Dysmotility: CT Chest showed: \"Patchy bilateral lower lobe airspace disease may represent atelectasis, pneumonia, or aspiration. Radiographic follow-up is recommended to assure complete resolution. Mildly dilated, fluid-filled esophagus, suggestive of esophageal dysmotility or gastroesophageal reflux. Small pericardial effusion. \" 
-continue Zosyn 
-patient worked with Speech Therapy and is on Pureed/Thins per their recommendations but was unable to drink barium for barium esophagram on  
-GI consult, evaluation noted 
-start IV PPI Acute encephalopathy POA with altered responsiveness with baseline Down Syndrome 
-B12 and folate wnl 
-continue IV antibiotics and monitor, EEG pending, appreciate Neuro managing patient antiepileptic drugs SAVITA 
-Unable to tolerate CPAP in the past  
-Care giver and brother very optimistic with newer mask, he will be able to tolerate and has plan for another sleep study 
  
Seizure DO 
-Continue antiseizure meds in IV form 
  
Hypothyroidism -TSH wnl at 1.35, continue IV Levothyroxine at this time 
  
Code Status: Full d/w brother Surrogate Decision Maker: brother Sumi Stager is his legal guardian; phone # [phone] 
  
DVT Prophylaxis: Lovenox 
  
Baseline: lives at group home Subjective: Chief Complaint / Reason for Physician Visit: follow-up AMS and PNA Patient seen for follow-up Brother at bedside Patient not able to provide any meaningful history Review of Systems: 
Symptom Y/N Comments  Symptom Y/N Comments Fever/Chills    Chest Pain Poor Appetite    Edema Cough    Abdominal Pain Sputum    Joint Pain SOB/MARK    Pruritis/Rash Nausea/vomit    Tolerating PT/OT Diarrhea    Tolerating Diet Constipation    Other Could NOT obtain due to: Down's Syndrome Objective: VITALS:  
Last 24hrs VS reviewed since prior progress note. Most recent are: 
Patient Vitals for the past 24 hrs: 
 Temp Pulse Resp BP SpO2  
01/02/19 0751 98.2 °F (36.8 °C) 98 18 144/86 100 % 01/02/19 0331 98.3 °F (36.8 °C) 97 18 120/81 100 % 01/01/19 2319 98.2 °F (36.8 °C) 89 18 143/77 100 % 01/01/19 1957 98.3 °F (36.8 °C) 86 18 145/79 100 % 01/01/19 1620 98.1 °F (36.7 °C) 73 16 131/60 100 % 01/01/19 1212 98.1 °F (36.7 °C) 90 16 125/68 99 % Intake/Output Summary (Last 24 hours) at 1/2/2019 1135 Last data filed at 1/2/2019 8596 Gross per 24 hour Intake 900 ml Output 2100 ml Net -1200 ml PHYSICAL EXAM: 
General: WD, WN. Alert, cooperative, no acute distress   
EENT:  EOMI. Anicteric sclerae. MM dry Resp:  Coarse breath sounds at bases, no wheezing or rales. No accessory muscle use CV:  Regular  Rhythm with mild elevation of HR,  No edema GI:  Soft, Non distended, Non tender.  +Bowel sounds Neurologic:  Alert and not able to assess, Patient will only say yes Psych:   No insight. Not anxious nor agitated Skin:  No rashes. No jaundice Reviewed most current lab test results and cultures  YES Reviewed most current radiology test results   YES Review and summation of old records today    NO Reviewed patient's current orders and MAR    YES 
PMH/ reviewed - no change compared to H&P 
________________________________________________________________________ Care Plan discussed with: 
  Comments Patient x Family  x   
RN x Care Manager Consultant Multidiciplinary team rounds were held today with , nursing, pharmacist and clinical coordinator. Patient's plan of care was discussed; medications were reviewed and discharge planning was addressed. ________________________________________________________________________ Total NON critical care TIME:  35   Minutes Total CRITICAL CARE TIME Spent:   Minutes non procedure based Comments >50% of visit spent in counseling and coordination of care x   
________________________________________________________________________ Debbie East MD  
 
Procedures: see electronic medical records for all procedures/Xrays and details which were not copied into this note but were reviewed prior to creation of Plan. LABS: 
I reviewed today's most current labs and imaging studies. Pertinent labs include: 
Recent Labs 01/02/19 0055 12/31/18 
1349 WBC 3.8* 5.6 HGB 12.6 13.4 HCT 37.2 39.5  270 Recent Labs 01/02/19 0055 12/31/18 
1349  141  
K 3.5 4.1  104 CO2 28 31 GLU 77 102* BUN 6 8 CREA 0.79 0.84 CA 8.6 8.9 ALB 3.1* 3.4* TBILI 0.3 0.3 SGOT 30 38* ALT 29 39 Signed: Debbie East MD

## 2019-01-02 NOTE — ACP (ADVANCE CARE PLANNING)
Primary Decision Maker (Health Care Agent): Sergey Meyers  Relationship to patient: Brother  Phone number: [phone]  [] Named in a scanned document   [] Legal Next of Kin  [x] Guardian    Secondary Decision Maker ([address]): Yamini Sanchez  Relationship to patient: Sister in [place]  Phone number: [phone]  [] Named in a scanned document   [] Legal Next of Kin  [x] Guardian    ACP documents you current have include:  [] Advance Directive or Living Will  [] Durable Do Not Resuscitate  [] Physician Orders for Scope of Treatment (POST)  [] Medical Power of   [] Other    Brother Anthony Neville will bring in guardianship documents 1/3/18

## 2019-01-02 NOTE — CONSULTS
Palliative Medicine Consult  Saroj: [phone] ([phone])    Patient Name: Grace Arango  YOB: 1965    Date of Initial Consult: 19  Reason for Consult: Care decisions   Requesting Provider: Anuja Lowry, hospitalist team  Primary Care Physician: Verlinda Boxer, MD     SUMMARY:   Grace Arango is a 48 y.o. with a past history of Down Syndrome, SAVITA, refractory seizures who was admitted on 2018 from [address] [place] [address] with altered mental status. At baseline nonverbal but can walk w/ walker and follow commands. Has had aspiration PNAs in past and is currently being treated w/ IV abx and home seizure medications. Hospitalized 2018 and 10/2018 w/ seizures. Current medical issues leading to Palliative Medicine involvement include: care decisions. Social: Pt has lived in [address] [place] [address] for ~ 5 years. Main caretaker there is owner Barbie Ruggiero. Uses wheelchair. Likes to eat and laugh. Mainly nonverbal, but can say some words and follows commands. Older brother Lynsey Rahman (and Yaron's wife Yamini) are his guardians since father . Has 2 sisters, both named [place]. PALLIATIVE DIAGNOSES:   1. Altered mental status- improved, basically back to baseline per family   2. Mod oral, mild pharyngeal dysphagia   3. Possible aspiration PNA       PLAN:   1. Along w/ Sreekanth Leak LCSW meet w/ pt and his brother/guardian Lynsey Rahman, who is open to discussion. Shares that pt has had PNA  and . Was intubated 10/2018 for airway protection when had status epilepticus. 2. Has not been in cycle of in/out of the hospital, however, and has good function and quality at group home. Has many friends. Enjoys to eat- has very good appetite although eats very slowly. Brother aware that we are concerned about aspiration PNA and that this may be a chronic issue. 3. The group home has talked w/ family some about care decisions incl code status, although brother says that it's hard for him to think about. Open to having conversations in front of patient, even if he does not understand everything. Sirisha Dukes makes medical decisions, as guardian, although caretaker Shazia Rawls more up to date on medical information in the moment. Sirisha Dukes to bring in paperwork. 4. Discuss the importance of resucitation status conversations, evgeny if aspirating. Bring up the fact that feeding tubes are sometimes used if someone cannot eat safely by mouth. We are not talking about this on this admission, but good to start talking about. Pt loves to eat. Does not like things such as CPAP- supposed to wear for SAVITA but will not. 5. Goals clear for recovery as possible from acute events and get back to group home. Brother open to family meeting, evgeny if not swallowing safely. 6. Initial consult note routed to primary continuity provider  7. Communicated plan of care with: Palliative IDT; bedside RN       GOALS OF CARE / TREATMENT PREFERENCES:     GOALS OF CARE:  Patient/Health Care Proxy Stated Goals: Rehabilitation      TREATMENT PREFERENCES:   Code Status: Full Code    Advance Care Planning:  [x] [facility] Interdisciplinary Team has updated the ACP Navigator with [address] and Patient Capacity    Primary Decision Maker ([address]): Chari Pitts  Relationship to patient: brother and guardian  Phone [phone]  [x] Named in a scanned document - as of 1/2/18 we do not have on file, have requested family to bring in  [] Legal Next of Kin  [] Guardian    Secondary Decision Maker ([address]):   Relationship to patient:  Phone number:  [] Named in a scanned document   [] Legal Next of Kin  [] Guardian    Medical Interventions: Full interventions   Other Instructions: Other:    As far as possible, the palliative care team has discussed with patient / health care proxy about goals of care / treatment preferences for patient.            HISTORY:     History obtained from: chart, staff, family CHIEF COMPLAINT: hungry     HPI/SUBJECTIVE:    The patient is:   [x] Verbal and participatory  [] Non-participatory due to:     Pt smiling, NAD, nods \"yes\" to most questions. Calls his brother Siria- his nickname. Clinical Pain Assessment (nonverbal scale for severity on nonverbal patients):   Clinical Pain Assessment  Severity: 0          Duration: for how long has pt been experiencing pain (e.g., 2 days, 1 month, years)  Frequency: how often pain is an issue (e.g., several times per day, once every few days, constant)     FUNCTIONAL ASSESSMENT:     Palliative Performance Scale (PPS):  PPS: 50       PSYCHOSOCIAL/SPIRITUAL SCREENING:     Palliative IDT has assessed this patient for cultural preferences / practices and a referral made as appropriate to needs (Cultural Services, Patient Advocacy, Ethics, etc.)    Any spiritual / Hoahaoism concerns:  [] Yes /  [x] No    Caregiver Burnout:  [] Yes /  [x] No /  [] No Caregiver Present      Anticipatory grief assessment:   [x] Normal  / [] Maladaptive       ESAS Anxiety:      ESAS Depression: Depression: 0        REVIEW OF SYSTEMS:     Positive and pertinent negative findings in ROS are noted above in HPI. The following systems were [] reviewed / [x] unable to be reviewed as noted in HPI  Other findings are noted below. Systems: constitutional, ears/nose/mouth/throat, respiratory, gastrointestinal, genitourinary, musculoskeletal, integumentary, neurologic, psychiatric, endocrine. Positive findings noted below. Modified ESAS Completed by: provider   Fatigue: 5 Drowsiness: 0   Depression: 0 Pain: 0           Dyspnea: 0                    PHYSICAL EXAM:     From RN flowsheet:  Wt Readings from Last 3 Encounters:   01/02/19 147 lb 4.3 oz (66.8 kg)   11/20/18 145 lb 12.8 oz (66.1 kg)   10/10/18 155 lb (70.3 kg)     Blood pressure 137/78, pulse (!) 110, temperature 98.5 °F (36.9 °C), resp.  rate 18, height 5' 4\" (1.626 m), weight 147 lb 4.3 oz (66.8 kg), SpO2 100 %. Pain Scale 1: Visual  Pain Intensity 1: 0                 Last bowel movement, if known:     Constitutional: awake, alert, nods \"yes\" to most questions, smiles when asks questions   Eyes: blind in L eye  ENMT: no nasal discharge, moist mucous membranes  Respiratory: breathing not labored  Gastrointestinal: soft  Musculoskeletal: no deformity, no tenderness to palpation  Skin: warm, dry  Neurologic:moving all extremities  Psychiatric: smiling       HISTORY:     Active Problems:    Seizure ([address] (11/22/2013)      Pneumonia (2/3/2018)      Down syndrome (3/20/2018)      Acute encephalopathy (3/23/2018)      Acquired hypothyroidism (4/12/2018)      Past Medical History:   Diagnosis Date    Anemia     Anxiety     Blindness of left eye     Cerebral palsy ([address]     Dementia     Down syndrome     Endocrine disease     thyroid disorder    GERD (gastroesophageal reflux disease)     Ill-defined condition     blind in left eye    Ill-defined condition     imparied gait - uses walker    Ill-defined condition     dermatitis    Ill-defined condition     cerebral palsy    Ill-defined condition     prone to decubitus ulcers    Ill-defined condition     anemia    Neurological disorder     Other ill-defined conditions(799.89)     Down syndrome    Psychiatric disorder     mental retardation    Psychiatric disorder     anxiety disorder    Seizures ([address]     Sleep apnea     Thyroid disease     Unspecified epilepsy without mention of intractable epilepsy       Past Surgical History:   Procedure Laterality Date    HX OTHER SURGICAL  03/13/2015     VNS  Dr. Farhat Salcedo.  Shirlee Osgood  @ [facility]      Family History   Problem Relation Age of Onset    Hypertension Mother     Cancer Mother     Lupus Mother    [facility] Arthritis-osteo Mother     Heart Disease Mother     Heart Disease Father     Diabetes Father     Hypertension Father     Elevated Lipids Father     Diabetes Brother     Elevated Lipids Brother     Hypertension Brother  Mental Retardation Brother     Cancer Maternal Grandmother     Arthritis-osteo Maternal Grandmother     Alcohol abuse Maternal Grandfather     Cancer Maternal Grandfather     Arthritis-osteo Paternal Grandmother     Cancer Paternal Grandfather       History reviewed, no pertinent family history.   Social History     Tobacco Use    Smoking status: Never Smoker    Smokeless tobacco: Never Used   Substance Use Topics    Alcohol use: No     Allergies   Allergen Reactions    Morphine Not Reported This Time    Tegretol [Carbamazepine] Other (comments)     \"bad reaction\" \"changes attitude\"      Current Facility-Administered Medications   Medication Dose Route Frequency    [START ON 1/3/2019] pantoprazole (PROTONIX) tablet 40 mg  40 mg Oral ACB    levETIRAcetam (KEPPRA) 1,500 mg in 0.9% sodium chloride 100 mL IVPB  1,500 mg IntraVENous Q12H    lacosamide (VIMPAT) 150 mg in 0.9% sodium chloride 100 mL IVPB  150 mg IntraVENous Q12H    phenytoin (DILANTIN) injection 100 mg  100 mg IntraVENous Q12H    0.9% sodium chloride infusion  75 mL/hr IntraVENous CONTINUOUS    levalbuterol (XOPENEX) nebulizer soln 1.25 mg/3 mL  1.25 mg Nebulization Q6H PRN    [START ON 1/3/2019] levothyroxine (SYNTHROID) injection 37.5 mcg  37.5 mcg IntraVENous Q24H    sodium chloride (NS) flush 5-10 mL  5-10 mL IntraVENous Q8H    sodium chloride (NS) flush 5-10 mL  5-10 mL IntraVENous PRN    acetaminophen (TYLENOL) tablet 650 mg  650 mg Oral Q6H PRN    ondansetron (ZOFRAN) injection 4 mg  4 mg IntraVENous Q4H PRN    enoxaparin (LOVENOX) injection 40 mg  40 mg SubCUTAneous Q24H    piperacillin-tazobactam (ZOSYN) 3.375 g in 0.9% sodium chloride (MBP/ADV) 100 mL  3.375 g IntraVENous Q8H    sodium chloride (NS) flush 5-10 mL  5-10 mL IntraVENous PRN          LAB AND IMAGING FINDINGS:     Lab Results   Component Value Date/Time    WBC 3.8 (L) 01/02/2019 12:55 AM    HGB 12.6 01/02/2019 12:55 AM    PLATELET 737 52/64/3808 12:55 AM Lab Results   Component Value Date/Time    Sodium 140 01/02/2019 12:55 AM    Potassium 3.5 01/02/2019 12:55 AM    Chloride 105 01/02/2019 12:55 AM    CO2 28 01/02/2019 12:55 AM    BUN 6 01/02/2019 12:55 AM    Creatinine 0.79 01/02/2019 12:55 AM    Calcium 8.6 01/02/2019 12:55 AM    Magnesium 2.9 (H) 09/30/2018 12:22 PM    Phosphorus 3.9 08/28/2012 04:15 AM      Lab Results   Component Value Date/Time    AST (SGOT) 30 01/02/2019 12:55 AM    Alk. phosphatase 163 (H) 01/02/2019 12:55 AM    Protein, total 9.0 (H) 01/02/2019 12:55 AM    Albumin 3.1 (L) 01/02/2019 12:55 AM    Globulin 5.9 (H) 01/02/2019 12:55 AM     No results found for: INR, PTMR, PTP, PT1, PT2, APTT   No results found for: IRON, FE, TIBC, IBCT, PSAT, FERR   Lab Results   Component Value Date/Time    pH 7.38 12/31/2018 01:40 PM    PCO2 48 (H) 12/31/2018 01:40 PM    PO2 93 12/31/2018 01:40 PM     No components found for: [place]   Lab Results   Component Value Date/Time     (H) 09/30/2018 12:22 PM    CK - MB 3.2 09/30/2018 12:22 PM                Total time: 70 min   Counseling / coordination time, spent as noted above: 50 min   > 50% counseling / coordination?: yes    Prolonged service was provided for  []30 min   []75 min in face to face time in the presence of the patient, spent as noted above. Time Start:   Time End:   Note: this can only be billed with 49289 (initial) or 16682 (follow up). If multiple start / stop times, list each separately.

## 2019-01-02 NOTE — ACP (ADVANCE CARE PLANNING)
Advanced Care Planning    Advance Care Planning:  [x] The [facility] Interdisciplinary Team has updated the ACP Navigator with [address] and Patient Capacity    Primary Decision Maker ([address]): Patric Hunter  Relationship to patient: brother and guardian  Phone [phone]  [x] Named in a scanned document - as of 1/2/19 we do not have on file, have requested family to bring in  [] Legal Next of Kin  [] Guardian

## 2019-01-02 NOTE — PROGRESS NOTES
Problem: Dysphagia (Adult) Goal: *Acute Goals and Plan of Care (Insert Text) Speech pathology goals Initiated 1/2/2019 1. Patient will tolerate puree/thin liquid diet with no overt s/s aspiration within 7 days 2. Patient will participate in [employer] as clinically indicated Speech LAnguage Pathology bedside swallow evaluation Patient: Debbie Lezama (18 y.o. male) Date: 1/2/2019 Primary Diagnosis: Acute encephalopathy Precautions: swallow ASSESSMENT : 
Based on the objective data described below, the patient presents with moderate oral and mild pharyngeal dysphagia that is consistent with SLP evaluation during prior admission. Patient with slow oral prep, suspected premature spillage, and delayed posterior propulsion. Patient with double swallows and suspect related to oral residue. Pharyngeal dysphagia is characterized by delayed swallow initiation. Although no overt s/s aspiration observed, cannot rule out silent aspiration at bedside. However, given CT Chest showed dilated and fluid-filled esophagus, recurrent aspiration pneumonia could be related to esophageal dysphagia and post-prandial aspiration. Consider Barium swallow/esophogram to evaluate for possible esophageal dysphagia. Can also complete MBS if MD desires. Patient will benefit from skilled intervention to address the above impairments. Patients rehabilitation potential is considered to be Guarded Factors which may influence rehabilitation potential include:  
[]            None noted [x]            Mental ability/status [x]            Medical condition []            Home/family situation and support systems 
[]            Safety awareness 
[]            Pain tolerance/management []            Other: PLAN : 
Recommendations and Planned Interventions: 
--Puree/thin liquid diet 
--Straws ok 
--Meds crushed 
--1:1 feeding assistance 
--SLP to follow for diet tolerance and MBS if desired to rule out silent aspiration --Consider Barium swallow/esophogram given CT Chest showing, \"Mildly dilated, fluid-filled esophagus, suggestive of esophageal dysmotility or gastroesophageal reflux. \" which could cause recurrent aspiration pneumonia Frequency/Duration: Patient will be followed by speech-language pathology 2 times a week to address goals. Discharge Recommendations: None SUBJECTIVE:  
Patient nonverbal. No command following. OBJECTIVE:  
 
Past Medical History:  
Diagnosis Date  Anemia  Anxiety  Blindness of left eye  Cerebral palsy ([address]  Dementia  Down syndrome  Endocrine disease   
 thyroid disorder  GERD (gastroesophageal reflux disease)  Ill-defined condition   
 blind in left eye  Ill-defined condition   
 imparied gait - uses walker  Ill-defined condition   
 dermatitis  Ill-defined condition   
 cerebral palsy  Ill-defined condition   
 prone to decubitus ulcers  Ill-defined condition   
 anemia  Neurological disorder  Other ill-defined conditions(799.89) Down syndrome  Psychiatric disorder   
 mental retardation  Psychiatric disorder   
 anxiety disorder  Seizures ([address]  Sleep apnea  Thyroid disease  Unspecified epilepsy without mention of intractable epilepsy Past Surgical History:  
Procedure Laterality Date  HX OTHER SURGICAL  03/13/2015 VNS  Dr. Tyler Paulino. Jaye Barron  @ [id number] [facility] Prior Level of Function/Home Situation:  
  
Diet prior to admission: dysphagia 2/thin when at home per chart review, puree/thin while at hospital 
Current Diet:  NPO Cognitive and Communication Status: 
Neurologic State: Alert Orientation Level: Unable to verbalize Cognition: No command following, Decreased attention/concentration Perception: Appears intact Perseveration: No perseveration noted Safety/Judgement: Not assessed Oral Assessment: 
Oral Assessment Labial: Other (comment)(unable to assess; no command following) P.O. Trials: Patient Position: upright in bed Vocal quality prior to P.O.: Other (comment)(no vocalizations) Consistency Presented: Ice chips; Thin liquid;Puree(puree/thin baseline during last admission) How Presented: Self-fed/presented;SLP-fed/presented;Straw;Spoon; Successive swallows Bolus Acceptance: No impairment Bolus Formation/Control: Impaired Type of Impairment: Delayed Propulsion: Delayed (# of seconds) Oral Residue: None Initiation of Swallow: Delayed (# of seconds) Laryngeal Elevation: Functional 
Aspiration Signs/Symptoms: None Pharyngeal Phase Characteristics: Double swallow; Suspected pharyngeal residue(vs oral residue) Effective Modifications: None Cues for Modifications: None Comments: CT showed distended and fluid filled esophagus Oral Phase Severity: Moderate Pharyngeal Phase Severity : Mild NOMS:  
The NOMS functional outcome measure was used to quantify this patient's level of swallowing impairment. Based on the NOMS, the patient was determined to be at level 3 for swallow function NOMS Swallowing Levels: 
Level 1 (CN): NPO Level 2 (CM): NPO but takes consistency in therapy Level 3 (CL): Takes less than 50% of nutrition p.o. and continues with nonoral feedings; and/or safe with mod cues; and/or max diet restriction Level 4 (CK): Safe swallow but needs mod cues; and/or mod diet restriction; and/or still requires some nonoral feeding/supplements Level 5 (CJ): Safe swallow with min diet restriction; and/or needs min cues Level 6 (CI): Independent with p.o.; rare cues; usually self cues; may need to avoid some foods or needs extra time Level 7 (CH): Independent for all p.o. CORINA (2003). National Outcomes Measurement System (NOMS): Adult Speech-Language Pathology User's Guide. Pain: 
Pain Scale 1: Visual 
Pain Intensity 1: 0 After treatment:  
[]            Patient left in no apparent distress sitting up in chair [x]            Patient left in no apparent distress in bed 
[x]            Call bell left within reach [x]            Nursing notified 
[]            Caregiver present 
[]            Bed alarm activated COMMUNICATION/EDUCATION:  
The patients plan of care including recommendations, planned interventions, and recommended diet changes were discussed with: Registered Nurse. [x]            Posted safety precautions in patient's room. []            Patient/family have participated as able in goal setting and plan of care. []            Patient/family agree to work toward stated goals and plan of care. []            Patient understands intent and goals of therapy, but is neutral about his/her participation. [x]            Patient is unable to participate in goal setting and plan of care. Thank you for this referral. 
Garrett Trevino, SLP Time Calculation: 13 mins

## 2019-01-03 ENCOUNTER — APPOINTMENT (OUTPATIENT)
Dept: GENERAL RADIOLOGY | Age: 54
DRG: 871 | End: 2019-01-03
Attending: INTERNAL MEDICINE
Payer: MEDICARE

## 2019-01-03 LAB
ALBUMIN SERPL-MCNC: 2.8 G/DL (ref 3.5–5)
ALBUMIN/GLOB SERPL: 0.5 {RATIO} (ref 1.1–2.2)
ALP SERPL-CCNC: 140 U/L (ref 45–117)
ALT SERPL-CCNC: 26 U/L (ref 12–78)
ANION GAP SERPL CALC-SCNC: 7 MMOL/L (ref 5–15)
AST SERPL-CCNC: 29 U/L (ref 15–37)
BASOPHILS # BLD: 0 K/UL (ref 0–0.1)
BASOPHILS NFR BLD: 1 % (ref 0–1)
BILIRUB SERPL-MCNC: 0.3 MG/DL (ref 0.2–1)
BUN SERPL-MCNC: 7 MG/DL (ref 6–20)
BUN/CREAT SERPL: 8 (ref 12–20)
CALCIUM SERPL-MCNC: 8.3 MG/DL (ref 8.5–10.1)
CHLORIDE SERPL-SCNC: 108 MMOL/L (ref 97–108)
CO2 SERPL-SCNC: 26 MMOL/L (ref 21–32)
CREAT SERPL-MCNC: 0.85 MG/DL (ref 0.7–1.3)
DIFFERENTIAL METHOD BLD: ABNORMAL
EOSINOPHIL # BLD: 0.1 K/UL (ref 0–0.4)
EOSINOPHIL NFR BLD: 2 % (ref 0–7)
ERYTHROCYTE [DISTWIDTH] IN BLOOD BY AUTOMATED COUNT: 12.8 % (ref 11.5–14.5)
GLOBULIN SER CALC-MCNC: 5.6 G/DL (ref 2–4)
GLUCOSE BLD STRIP.AUTO-MCNC: 83 MG/DL (ref 65–100)
GLUCOSE BLD STRIP.AUTO-MCNC: 92 MG/DL (ref 65–100)
GLUCOSE SERPL-MCNC: 88 MG/DL (ref 65–100)
HCT VFR BLD AUTO: 35.1 % (ref 36.6–50.3)
HGB BLD-MCNC: 11.9 G/DL (ref 12.1–17)
IMM GRANULOCYTES # BLD: 0 K/UL (ref 0–0.04)
IMM GRANULOCYTES NFR BLD AUTO: 1 % (ref 0–0.5)
LACOSAMIDE SERPL-MCNC: 7.1 UG/ML (ref 5–10)
LEVETIRACETAM SERPL-MCNC: 39.2 UG/ML (ref 10–40)
LYMPHOCYTES # BLD: 0.5 K/UL (ref 0.8–3.5)
LYMPHOCYTES NFR BLD: 14 % (ref 12–49)
MAGNESIUM SERPL-MCNC: 1.8 MG/DL (ref 1.6–2.4)
MCH RBC QN AUTO: 34.3 PG (ref 26–34)
MCHC RBC AUTO-ENTMCNC: 33.9 G/DL (ref 30–36.5)
MCV RBC AUTO: 101.2 FL (ref 80–99)
MONOCYTES # BLD: 0.7 K/UL (ref 0–1)
MONOCYTES NFR BLD: 19 % (ref 5–13)
NEUTS SEG # BLD: 2.4 K/UL (ref 1.8–8)
NEUTS SEG NFR BLD: 64 % (ref 32–75)
NRBC # BLD: 0 K/UL (ref 0–0.01)
NRBC BLD-RTO: 0 PER 100 WBC
PHENYTOIN SERPL-MCNC: 10.2 UG/ML (ref 10–20)
PHOSPHATE SERPL-MCNC: 3.3 MG/DL (ref 2.6–4.7)
PLATELET # BLD AUTO: 208 K/UL (ref 150–400)
PMV BLD AUTO: 9.3 FL (ref 8.9–12.9)
POTASSIUM SERPL-SCNC: 3.6 MMOL/L (ref 3.5–5.1)
PROT SERPL-MCNC: 8.4 G/DL (ref 6.4–8.2)
RBC # BLD AUTO: 3.47 M/UL (ref 4.1–5.7)
SERVICE CMNT-IMP: NORMAL
SERVICE CMNT-IMP: NORMAL
SODIUM SERPL-SCNC: 141 MMOL/L (ref 136–145)
WBC # BLD AUTO: 3.7 K/UL (ref 4.1–11.1)

## 2019-01-03 PROCEDURE — 83735 ASSAY OF MAGNESIUM: CPT

## 2019-01-03 PROCEDURE — 74011000250 HC RX REV CODE- 250: Performed by: INTERNAL MEDICINE

## 2019-01-03 PROCEDURE — 74220 X-RAY XM ESOPHAGUS 1CNTRST: CPT

## 2019-01-03 PROCEDURE — 74011250636 HC RX REV CODE- 250/636: Performed by: INTERNAL MEDICINE

## 2019-01-03 PROCEDURE — 84100 ASSAY OF PHOSPHORUS: CPT

## 2019-01-03 PROCEDURE — C9113 INJ PANTOPRAZOLE SODIUM, VIA: HCPCS | Performed by: INTERNAL MEDICINE

## 2019-01-03 PROCEDURE — 74011250637 HC RX REV CODE- 250/637: Performed by: INTERNAL MEDICINE

## 2019-01-03 PROCEDURE — 80185 ASSAY OF PHENYTOIN TOTAL: CPT

## 2019-01-03 PROCEDURE — 36415 COLL VENOUS BLD VENIPUNCTURE: CPT

## 2019-01-03 PROCEDURE — 74011000258 HC RX REV CODE- 258: Performed by: INTERNAL MEDICINE

## 2019-01-03 PROCEDURE — 80053 COMPREHEN METABOLIC PANEL: CPT

## 2019-01-03 PROCEDURE — C9254 INJECTION, LACOSAMIDE: HCPCS | Performed by: INTERNAL MEDICINE

## 2019-01-03 PROCEDURE — 93306 TTE W/DOPPLER COMPLETE: CPT

## 2019-01-03 PROCEDURE — 82962 GLUCOSE BLOOD TEST: CPT

## 2019-01-03 PROCEDURE — 80186 ASSAY OF PHENYTOIN FREE: CPT

## 2019-01-03 PROCEDURE — 65660000000 HC RM CCU STEPDOWN

## 2019-01-03 PROCEDURE — 92526 ORAL FUNCTION THERAPY: CPT

## 2019-01-03 PROCEDURE — 85025 COMPLETE CBC W/AUTO DIFF WBC: CPT

## 2019-01-03 RX ORDER — PANTOPRAZOLE SODIUM 40 MG/1
40 TABLET, DELAYED RELEASE ORAL
Status: DISCONTINUED | OUTPATIENT
Start: 2019-01-04 | End: 2019-01-07 | Stop reason: HOSPADM

## 2019-01-03 RX ORDER — LEVOTHYROXINE SODIUM 50 UG/1
50 TABLET ORAL
Status: DISCONTINUED | OUTPATIENT
Start: 2019-01-04 | End: 2019-01-07 | Stop reason: HOSPADM

## 2019-01-03 RX ORDER — PHENYTOIN 125 MG/5ML
100 SUSPENSION ORAL 2 TIMES DAILY
Status: DISCONTINUED | OUTPATIENT
Start: 2019-01-03 | End: 2019-01-07 | Stop reason: HOSPADM

## 2019-01-03 RX ADMIN — SODIUM CHLORIDE 150 MG: 900 INJECTION, SOLUTION INTRAVENOUS at 09:34

## 2019-01-03 RX ADMIN — SODIUM CHLORIDE 40 MG: 9 INJECTION, SOLUTION INTRAMUSCULAR; INTRAVENOUS; SUBCUTANEOUS at 09:35

## 2019-01-03 RX ADMIN — PIPERACILLIN SODIUM,TAZOBACTAM SODIUM 3.38 G: 3; .375 INJECTION, POWDER, FOR SOLUTION INTRAVENOUS at 23:16

## 2019-01-03 RX ADMIN — PHENYTOIN 100 MG: 125 SUSPENSION ORAL at 23:18

## 2019-01-03 RX ADMIN — SODIUM CHLORIDE 1500 MG: 900 INJECTION, SOLUTION INTRAVENOUS at 13:13

## 2019-01-03 RX ADMIN — SODIUM CHLORIDE 1500 MG: 900 INJECTION, SOLUTION INTRAVENOUS at 01:25

## 2019-01-03 RX ADMIN — LEVETIRACETAM 1500 MG: 100 SOLUTION ORAL at 23:15

## 2019-01-03 RX ADMIN — Medication 10 ML: at 13:13

## 2019-01-03 RX ADMIN — Medication 10 ML: at 23:16

## 2019-01-03 RX ADMIN — Medication 10 ML: at 04:31

## 2019-01-03 RX ADMIN — LEVOTHYROXINE SODIUM 37.5 MCG: 100 INJECTION, POWDER, LYOPHILIZED, FOR SOLUTION INTRAVENOUS at 17:54

## 2019-01-03 RX ADMIN — Medication 10 ML: at 03:13

## 2019-01-03 RX ADMIN — LACOSAMIDE 150 MG: 100 TABLET, FILM COATED ORAL at 23:16

## 2019-01-03 RX ADMIN — PIPERACILLIN SODIUM,TAZOBACTAM SODIUM 3.38 G: 3; .375 INJECTION, POWDER, FOR SOLUTION INTRAVENOUS at 04:31

## 2019-01-03 RX ADMIN — PHENYTOIN SODIUM 100 MG: 50 INJECTION INTRAMUSCULAR; INTRAVENOUS at 09:35

## 2019-01-03 RX ADMIN — ENOXAPARIN SODIUM 40 MG: 40 INJECTION SUBCUTANEOUS at 09:35

## 2019-01-03 RX ADMIN — PIPERACILLIN SODIUM,TAZOBACTAM SODIUM 3.38 G: 3; .375 INJECTION, POWDER, FOR SOLUTION INTRAVENOUS at 12:37

## 2019-01-03 NOTE — PROGRESS NOTES
Hospitalist Progress Note NAME: Sergio Jack :  1965 MRN:  [id number] Assessment / Plan: 
Aspiration Pneumonia (sepsis today with Leukopenia and HR >90 today) and Likely Esophageal Dysmotility: CT Chest showed: \"Patchy bilateral lower lobe airspace disease may represent atelectasis, pneumonia, or aspiration. Radiographic follow-up is recommended to assure complete resolution. Mildly dilated, fluid-filled esophagus, suggestive of esophageal dysmotility or gastroesophageal reflux. Small pericardial effusion. \" 
-continue Zosyn 
-patient worked with Speech Therapy and is on Pureed/Thins per their recommendations but was unable to drink barium for barium esophagram on  but caregiver Codi Browne was here today (1/3) and went down with patient and study results per below 
-1/3 Barium Esophagram: \"The esophagram is limited by decreased mental status and lack of mobility. It was performed in a slightly angled supine position. Esophageal motility is slow, but probably normal for this position. No focal esophageal narrowing. No hiatal hernia or gastroesophageal reflux. IMPRESSION: Probably normal motility for limited study. \" 
-GI consult, evaluation noted 
-start IV PPI changed to PO 
-discontinue IVFs since tolerating PO 
-TTE ordered since pericardial effusion noted on CT Chest and ruled out effusion Acute Metabolic encephalopathy POA with altered responsiveness with baseline Down Syndrome; resolved per Caregivers assessment on 1/3 
-B12 and folate wnl SAVITA 
-Unable to tolerate CPAP in the past  
-Care giver and brother very optimistic with newer mask, he will be able to tolerate and has plan for another sleep study 
  
Seizure DO 
-EEG without seizures,  
-Neuro assistance appreciated 
-antiepileptic drugs switched from IV to Home formulations of PO (Vimpat, Keppra and Dilantin) 
  
Hypothyroidism -TSH wnl at 1.35, IV Levothyroxine changed back to PO on 1/3 
  
Code Status: Full d/w brother Surrogate Decision Maker: brother Eleni Damian is his legal guardian; phone # [phone] 
  
DVT Prophylaxis: Lovenox 
  
Baseline: lives at group home Subjective: Chief Complaint / Reason for Physician Visit: follow-up AMS and PNA Patient seen for follow-up Caregiver, Дмитрий, at bedside Patient not able to provide any meaningful history Review of Systems: 
Symptom Y/N Comments  Symptom Y/N Comments Fever/Chills    Chest Pain Poor Appetite    Edema Cough    Abdominal Pain Sputum    Joint Pain SOB/MARK    Pruritis/Rash Nausea/vomit    Tolerating PT/OT Diarrhea    Tolerating Diet Constipation    Other Could NOT obtain due to: Down's Syndrome Objective: VITALS:  
Last 24hrs VS reviewed since prior progress note. Most recent are: 
Patient Vitals for the past 24 hrs: 
 Temp Pulse Resp BP SpO2  
01/03/19 1153 97.8 °F (36.6 °C) 77 20 146/64 100 % 01/03/19 0720 97.9 °F (36.6 °C) 78 20 116/57 99 % 01/03/19 0315 97.5 °F (36.4 °C) 99 20 126/71 98 % 01/02/19 2308 99.1 °F (37.3 °C) (!) 102 18 129/66 100 % 01/02/19 1954 98.5 °F (36.9 °C) (!) 111 18 140/81  Intake/Output Summary (Last 24 hours) at 1/3/2019 1720 Last data filed at 1/3/2019 6939 Gross per 24 hour Intake 900 ml Output 775 ml Net 125 ml PHYSICAL EXAM: 
General: WD, WN. Alert, cooperative, no acute distress   
EENT:  EOMI. Anicteric sclerae. MMM Resp:  Mild coarse breath sounds at bases, no wheezing or rales. No accessory muscle use CV:  Regular  Rhythm with normal rate,  No edema GI:  Soft, Non distended, Non tender.  +Bowel sounds Neurologic:  Alert and not able to assess, Patient will only say yes Psych:   No insight. Not anxious nor agitated Skin:  No rashes. No jaundice Reviewed most current lab test results and cultures  YES Reviewed most current radiology test results   YES Review and summation of old records today    NO 
 Reviewed patient's current orders and MAR    YES 
PMH/SH reviewed - no change compared to H&P 
________________________________________________________________________ Care Plan discussed with: 
  Comments Patient x Family  x   
RN x Care Manager Consultant Multidiciplinary team rounds were held today with , nursing, pharmacist and clinical coordinator. Patient's plan of care was discussed; medications were reviewed and discharge planning was addressed. ________________________________________________________________________ Total NON critical care TIME:  25   Minutes Total CRITICAL CARE TIME Spent:   Minutes non procedure based Comments >50% of visit spent in counseling and coordination of care    
________________________________________________________________________ Debbie East MD  
 
Procedures: see electronic medical records for all procedures/Xrays and details which were not copied into this note but were reviewed prior to creation of Plan. LABS: 
I reviewed today's most current labs and imaging studies. Pertinent labs include: 
Recent Labs 01/03/19 
5477 01/02/19 
3029 WBC 3.7* 3.8* HGB 11.9* 12.6 HCT 35.1* 37.2  230 Recent Labs 01/03/19 
0895 01/02/19 
5795  140  
K 3.6 3.5  105 CO2 26 28 GLU 88 77 BUN 7 6 CREA 0.85 0.79 CA 8.3* 8.6 MG 1.8  --   
PHOS 3.3  --   
ALB 2.8* 3.1* TBILI 0.3 0.3 SGOT 29 30 ALT 26 29 Signed: Debbie East MD

## 2019-01-03 NOTE — PROGRESS NOTES
Problem: Falls - Risk of 
Goal: *Absence of Falls Document Annie Wallace Fall Risk and appropriate interventions in the flowsheet. Outcome: Progressing Towards Goal 
Fall Risk Interventions: 
Mobility Interventions: Communicate number of staff needed for ambulation/transfer, Patient to call before getting OOB Mentation Interventions: Adequate sleep, hydration, pain control, Bed/chair exit alarm, Door open when patient unattended, More frequent rounding, Reorient patient Medication Interventions: Patient to call before getting OOB, Teach patient to arise slowly, Bed/chair exit alarm Elimination Interventions: Bed/chair exit alarm, Toileting schedule/hourly rounds, Toilet paper/wipes in reach

## 2019-01-03 NOTE — PROGRESS NOTES
Chief Complaint: altered mental status No seizures overnight. Asleep. Non verbal.  
 
Assesment and Plan 1. Altered mental status EEG no seizures B12 513 TSH 1.35 
  
2. Bilateral lower lobe pneumonia Continue zosyn.  
  
3. Down's Syndrome 4. Seizure disorder Continue vimpat dilantin and keppra. Phenytoin/free pheny 9.8/1. 7  Which is well within the therepeutic 5. Oropharyngeal dysphagia GI following 6. Macrocytosis Long term dilantin B12 normal , folate normal . Allergies Morphine and Tegretol [carbamazepine] Medications Current Facility-Administered Medications Medication Dose Route Frequency  pantoprazole (PROTONIX) 40 mg in sodium chloride 0.9% 10 mL injection  40 mg IntraVENous DAILY  glucose chewable tablet 16 g  4 Tab Oral PRN  
 dextrose (D50W) injection syrg 12.5-25 g  12.5-25 g IntraVENous PRN  
 glucagon (GLUCAGEN) injection 1 mg  1 mg IntraMUSCular PRN  
 levETIRAcetam (KEPPRA) 1,500 mg in 0.9% sodium chloride 100 mL IVPB  1,500 mg IntraVENous Q12H  
 lacosamide (VIMPAT) 150 mg in 0.9% sodium chloride 100 mL IVPB  150 mg IntraVENous Q12H  phenytoin (DILANTIN) injection 100 mg  100 mg IntraVENous Q12H  
 0.9% sodium chloride infusion  75 mL/hr IntraVENous CONTINUOUS  
 levalbuterol (XOPENEX) nebulizer soln 1.25 mg/3 mL  1.25 mg Nebulization Q6H PRN  
 levothyroxine (SYNTHROID) injection 37.5 mcg  37.5 mcg IntraVENous Q24H  
 sodium chloride (NS) flush 5-10 mL  5-10 mL IntraVENous Q8H  
 sodium chloride (NS) flush 5-10 mL  5-10 mL IntraVENous PRN  
 acetaminophen (TYLENOL) tablet 650 mg  650 mg Oral Q6H PRN  
 ondansetron (ZOFRAN) injection 4 mg  4 mg IntraVENous Q4H PRN  
 enoxaparin (LOVENOX) injection 40 mg  40 mg SubCUTAneous Q24H  piperacillin-tazobactam (ZOSYN) 3.375 g in 0.9% sodium chloride (MBP/ADV) 100 mL  3.375 g IntraVENous Q8H  
 sodium chloride (NS) flush 5-10 mL  5-10 mL IntraVENous PRN Medical History Past Medical History:  
Diagnosis Date  Anemia  Anxiety  Blindness of left eye  Cerebral palsy ([address]  Dementia  Down syndrome  Endocrine disease   
 thyroid disorder  GERD (gastroesophageal reflux disease)  Ill-defined condition   
 blind in left eye  Ill-defined condition   
 imparied gait - uses walker  Ill-defined condition   
 dermatitis  Ill-defined condition   
 cerebral palsy  Ill-defined condition   
 prone to decubitus ulcers  Ill-defined condition   
 anemia  Neurological disorder  Other ill-defined conditions(799.89) Down syndrome  Psychiatric disorder   
 mental retardation  Psychiatric disorder   
 anxiety disorder  Seizures ([address]  Sleep apnea  Thyroid disease  Unspecified epilepsy without mention of intractable epilepsy Review of Systems Can't be obtained. Non verbal 
 
Exam: 
 
Visit Vitals /64 (BP 1 Location: Left arm, BP Patient Position: At rest) Pulse 77 Temp 97.8 °F (36.6 °C) Resp 20 Ht 5' 4\" (1.626 m) Wt 147 lb 4.3 oz (66.8 kg) SpO2 100% BMI 25.28 kg/m²  
  
eneral: Well developed, well nourished. somnolent Head: Microcephalic. Mallampati 4, atraumatic, anicteric sclera Neck Normal ROM, No thyromegally Cardiac: Regular rate and rhythm Ext: No pedal edema Skin: Supple no rash NeurologicExam: 
Mental Status: Somnolent Speech: Non verbal  
Cranial Nerves:  II - XII Intact. Blind left eye Motor:  Moves all extremities. Poor muscle toone. .   
Sensory:   Responds to touch small or large fibers. Gait:  Not evaluated Tremor:   No tremor noted. Cerebellar:  Coordination intact. Imaging CT Results (most recent): 
Results from [facility] Encounter encounter on 12/31/18 CT CHEST WO CONT Narrative INDICATION: Lethargy. Suspected aspiration pneumonia. Previous abnormal chest 
radiograph. COMPARISON: December 31, 2018 chest radiograph CONTRAST: None. TECHNIQUE:  5 mm axial images were obtained through the chest. Coronal and 
sagittal reconstructions were generated. CT dose reduction was achieved through 
use of a standardized protocol tailored for this examination and automatic 
exposure control for dose modulation. The absence of intravenous contrast reduces the sensitivity for evaluation of 
the mediastinum and upper abdominal organs. FINDINGS: 
 
THYROID: No nodule. MEDIASTINUM: No mass or lymphadenopathy. AUNDREA: No mass or lymphadenopathy. THORACIC AORTA: No aneurysm. MAIN PULMONARY ARTERY: Normal in caliber. TRACHEA/BRONCHI: Patent. ESOPHAGUS: Mildly dilated and fluid-filled. HEART: Normal in size. Small pericardial effusion. PLEURA: No effusion or pneumothorax. LUNGS: Patchy bilateral bilateral lower lobe airspace disease. INCIDENTALLY IMAGED UPPER ABDOMEN: No focal abnormality. BONES: No destructive bone lesion. Impression IMPRESSION: 
Patchy bilateral lower lobe airspace disease may represent atelectasis, 
pneumonia, or aspiration. Radiographic follow-up is recommended to assure 
complete resolution. Mildly dilated, fluid-filled esophagus, suggestive of 
esophageal dysmotility or gastroesophageal reflux. Small pericardial effusion. Lab Review Lab Results Component Value Date/Time WBC 3.7 (L) 01/03/2019 03:13 AM  
 HCT 35.1 (L) 01/03/2019 03:13 AM  
 HGB 11.9 (L) 01/03/2019 03:13 AM  
 PLATELET 071 75/66/3067 03:13 AM  
 
 
Lab Results Component Value Date/Time Sodium 141 01/03/2019 03:13 AM  
 Potassium 3.6 01/03/2019 03:13 AM  
 Chloride 108 01/03/2019 03:13 AM  
 CO2 26 01/03/2019 03:13 AM  
 Glucose 88 01/03/2019 03:13 AM  
 BUN 7 01/03/2019 03:13 AM  
 Creatinine 0.85 01/03/2019 03:13 AM  
 Calcium 8.3 (L) 01/03/2019 03:13 AM  
 
 
Lab Results Component Value Date/Time Vitamin B12 513 01/02/2019 12:55 AM  
 Folate 39.2 (H) 01/02/2019 12:55 AM  
 
 
 
Lab Results Component Value Date/Time Cholesterol, total 271 (H) 12/10/2015 12:30 PM  
 HDL Cholesterol 64 12/10/2015 12:30 PM  
 LDL, calculated 183 (H) 12/10/2015 12:30 PM  
 VLDL, calculated 24 12/10/2015 12:30 PM  
 Triglyceride 120 12/10/2015 12:30 PM

## 2019-01-03 NOTE — PROGRESS NOTES
Problem: Dysphagia (Adult) Goal: *Acute Goals and Plan of Care (Insert Text) Speech pathology goals Initiated 1/2/2019 1. Patient will tolerate puree/thin liquid diet with no overt s/s aspiration within 7 days. MET 2. Patient will participate in MBS as clinically indicated. Discontinue Speech language pathology dysphagia treatment/discharge Patient: Frannie Koenig (43 y.o. male) Date: 1/3/2019 Diagnosis: Acute encephalopathy <principal problem not specified> Precautions:    
 
ASSESSMENT: 
Patient received sitting up in chair. Patient more interactive, smiling at SLP, however patient with no verbalizations. Patient with good tolerance of puree/thin liquid diet. No overt s/s aspiration observed. Progression toward goals: 
[x]           Improving appropriately and progressing toward goals 
[]           Improving slowly and progressing toward goals 
[]           Not making progress toward goals and plan of care will be adjusted PLAN: 
--Continue puree/thin liquid diet 
--Straws ok 
--Meds crushed --Feeding assistance Patient will be discharged from acute skilled speech therapy at this time. Rationale for discharge: 
[x]      Goals Achieved 
[]      [address] 
[]      Patient not participating in therapy 
[]      Other: 
Discharge Recommendations:  None SUBJECTIVE:  
Patient nonverbal. 
 
OBJECTIVE:  
Cognitive and Communication Status: 
Neurologic State: Alert Orientation Level: Unable to verbalize Cognition: Decreased command following Perception: Appears intact Perseveration: No perseveration noted Safety/Judgement: Not assessed Dysphagia Treatment: P.O. Trials: 
Patient Position: upright in chair Vocal quality prior to P.O.: Other (comment)(no verbalizations) Consistency Presented: Thin liquid;Puree How Presented: SLP-fed/presented;Straw;Successive swallows;Spoon Bolus Acceptance: No impairment Bolus Formation/Control: Impaired Type of Impairment: Delayed Propulsion: Delayed (# of seconds) Oral Residue: None Initiation of Swallow: Delayed (# of seconds) Laryngeal Elevation: Functional 
Aspiration Signs/Symptoms: None NOMS=3 
 
 
NOMS Swallowing Levels: 
Level 1 (CN): NPO Level 2 (CM): NPO but takes consistency in therapy Level 3 (CL): Takes less than 50% of nutrition p.o. and continues with nonoral feedings; and/or safe with mod cues; and/or max diet restriction Level 4 (CK): Safe swallow but needs mod cues; and/or mod diet restriction; and/or still requires some nonoral feeding/supplements Level 5 (CJ): Safe swallow with min diet restriction; and/or needs min cues Level 6 (CI): Independent with p.o.; rare cues; usually self cues; may need to avoid some foods or needs extra time Level 7 (CH): Independent for all p.o. NATALIA. (2003). National Outcomes Measurement System (NOMS): Adult Speech-Language Pathology User's Guide. Pain: 
Pain Scale 1: Numeric (0 - 10) Pain Intensity 1: 0 After treatment:  
[x]                Patient left in no apparent distress sitting up in chair 
[]                Patient left in no apparent distress in bed 
[x]                Call bell left within reach [x]                Nursing notified 
[x]                Caregiver present 
[]                Bed alarm activated COMMUNICATION/EDUCATION:  
The patients plan of care including recommendations, planned interventions, and recommended diet changes were discussed with: Registered Nurse. [x]                Posted safety precautions in patient's room. ANDREW Zavala Time Calculation: 10 mins

## 2019-01-03 NOTE — ROUTINE PROCESS
Report given to Luca Denny RN by Orville Longoria RN Zone Phone: [phone] 
  
  
Significant changes during shift: seen by SLP, dietary, palliative, GI, and neurology.  
  
  
  
Patient Information 
  
Sergio Jack 48 y.o. 
12/31/2018 12:43 PM by Mary Jean MD. Sergio Jack was admitted from adult home 
  
Problem List 
  
    
Patient Active Problem List  
  Diagnosis Date Noted  Acquired hypothyroidism 04/12/2018  Acute encephalopathy 03/23/2018  Complex partial seizure evolving to generalized seizure ([address] 03/23/2018  Down syndrome 03/20/2018  Pneumonia 02/03/2018  Low vitamin D level 01/11/2018  Blind left eye 01/11/2018  ACP (advance care planning) 11/09/2016  S/P placement of VNS (vagus nerve stimulation) device 04/10/2015  Seizure ([address] 11/22/2013  Partial epilepsy with impairment of consciousness ([address] 01/21/2013  Ataxia 01/21/2013  Memory loss 01/21/2013  Recurrent seizures ([address] 08/27/2012  
  
    
Past Medical History:  
Diagnosis Date  Anemia    
 Anxiety    
 Blindness of left eye    
 Cerebral palsy (HCC)    
 Dementia    
 Down syndrome    
 Endocrine disease    
  thyroid disorder  GERD (gastroesophageal reflux disease)    
 Ill-defined condition    
  blind in left eye  Ill-defined condition    
  imparied gait - uses walker  Ill-defined condition    
  dermatitis  Ill-defined condition    
  cerebral palsy  Ill-defined condition    
  prone to decubitus ulcers  Ill-defined condition    
  anemia  Neurological disorder    
 Other ill-defined conditions(799.89)    
  Down syndrome  Psychiatric disorder    
  mental retardation  Psychiatric disorder    
  anxiety disorder  Seizures (HCC)    
 Sleep apnea    
 Thyroid disease    
 Unspecified epilepsy without mention of intractable epilepsy    
  
  
  
Core Measures: 
  
  
PNA:probable 
  
  
Activity Status: 
  
OOB to Chair no Ambulated this shift no Bed Rest yes   
  
  
  
DVT prophylaxis: 
  
DVT prophylaxis Med- y 
  
  
Patient Safety: 
  
Falls Score Total Score: 3 Safety Level_______ Bed Alarm On? y 
Sitter? n 
  
Plan for upcoming shift:cont antibiotics   
  
  
Discharge Plan:return to adult home when stable 
  
Active Consults: 
IP CONSULT TO HOSPITALIST 
IP CONSULT TO PALLIATIVE CARE - PROVIDER 
IP CONSULT TO NEUROLOGY

## 2019-01-03 NOTE — PROGRESS NOTES
Problem: Pressure Injury - Risk of 
Goal: *Prevention of pressure injury Document [place] Scale and appropriate interventions in the flowsheet. Outcome: Progressing Towards Goal 
Pressure Injury Interventions: 
Sensory Interventions: Assess changes in LOC, Minimize linen layers, Maintain/enhance activity level, Float heels, Discuss PT/OT consult with provider Moisture Interventions: Maintain skin hydration (lotion/cream), Minimize layers, Moisture barrier Activity Interventions: PT/OT evaluation Mobility Interventions: Float heels, PT/OT evaluation Nutrition Interventions: Document food/fluid/supplement intake Friction and Shear Interventions: Minimize layers, Lift sheet, HOB 30 degrees or less, Feet elevated on foot rest, Apply protective barrier, creams and emollients

## 2019-01-03 NOTE — PROGRESS NOTES
Occupational Therapy Per PT, pt is not arousable at this time and remains asleep.   Will defer and continue to follow to initiate OT Evaluation,

## 2019-01-03 NOTE — ROUTINE PROCESS
Report given to JACOB Garvey by [employer], RN      
  
Zone Phone: [phone] 
  
  
Significant changes during shift: IV infiltrated, removed IV.   
  
  
Patient Information 
  
Harsh [place] 48 y.o. 
12/31/2018 12:43 PM by Edison Kennedy MD. Reymundo Snachez was admitted from adult home 
  
Problem List 
  
       
Patient Active Problem List  
  Diagnosis Date Noted  Acquired hypothyroidism 04/12/2018  Acute encephalopathy 03/23/2018  Complex partial seizure evolving to generalized seizure ([facility] 03/23/2018  Down syndrome 03/20/2018  Pneumonia 02/03/2018  Low vitamin D level 01/11/2018  Blind left eye 01/11/2018  ACP (advance care planning) 11/09/2016  S/P placement of VNS (vagus nerve stimulation) device 04/10/2015  Seizure ([address] 11/22/2013  Partial epilepsy with impairment of consciousness ([address] 01/21/2013  Ataxia 01/21/2013  Memory loss 01/21/2013  Recurrent seizures ([address] 08/27/2012  
  
       
Past Medical History:  
Diagnosis Date  Anemia    
 Anxiety    
 Blindness of left eye    
 Cerebral palsy (HCC)    
 Dementia    
 Down syndrome    
 Endocrine disease    
  thyroid disorder  GERD (gastroesophageal reflux disease)    
 Ill-defined condition    
  blind in left eye  Ill-defined condition    
  imparied gait - uses walker  Ill-defined condition    
  dermatitis  Ill-defined condition    
  cerebral palsy  Ill-defined condition    
  prone to decubitus ulcers  Ill-defined condition    
  anemia  Neurological disorder    
 Other ill-defined conditions(799.89)    
  Down syndrome  Psychiatric disorder    
  mental retardation  Psychiatric disorder    
  anxiety disorder  Seizures (HCC)    
 Sleep apnea    
 Thyroid disease    
 Unspecified epilepsy without mention of intractable epilepsy    
  
  
  
Core Measures: 
  
  
PNA:probable 
  
  
Activity Status: 
  
OOB to [employer]ner[employer] Ambulated this shift no Bed Rest yes   
  
  
   
DVT prophylaxis: 
  
DVT prophylaxis Med- y   
  
Patient Safety: 
  
Falls Score Total Score: 3 Safety Level_______ Bed Alarm On? y Sitter? n 
  
Plan for upcoming shift:cont antibiotics   
  
  
Discharge Plan:return to adult home when stable 
  
Active Consults: 
IP CONSULT TO HOSPITALIST 
IP CONSULT TO PALLIATIVE CARE - PROVIDER 
IP CONSULT TO NEUROLOGY

## 2019-01-03 NOTE — PROGRESS NOTES
GI Progress Note Rufus Teri) NAME:Reymundo Hewitt :1965 JANE:[id number] ATTG: ARCADIO Bullock MD  PCP: Marian Sanchez MD 
Date/Time:  1/3/2019 1:24 PM  
Assessment: · Dysphagiua- likely multifactorial in setting of relative macroglossia in [place], Seizure d/o, and inability to exclude OP or esophageal dyskinesia Plan: · Attempt trial of PO and advance aas tolerated · If not tolerated, would do MBS and a separate Barium esophagram with pill, but these would need to be coordinated so that his caregiver is her to encourage him to complete these studies · No EGD planned Will sign off. Call if further assistance with above plan required Subjective:  
Discussed with RN events overnight. Awake and more alert than yesterday. Unable to do BS yesterday due to patient compliance Complaint Y/N Description Abdominal Pain n Hematemesis n Hematochezia n Melena n   
Constipation n   
Diarrhea n Dyspepsia n Dysphagia n   
Jaundiced n   
Nausea/vomiting n Review of Systems: 
Symptom Y/N Comments  Symptom Y/N Comments Fever/Chills n   Chest Pain n   
Cough n   Headaches n Sputum n   Joint Pain SOB/MARK    Pruritis/Rash n Tolerating Diet y pureed  Other Could NOT obtain due to:   
 
Objective: VITALS:  
Last 24hrs VS reviewed since prior progress note. Most recent are: 
Visit Vitals /64 (BP 1 Location: Left arm, BP Patient Position: At rest) Pulse 77 Temp 97.8 °F (36.6 °C) Resp 20 Ht 5' 4\" (1.626 m) Wt 66.8 kg (147 lb 4.3 oz) SpO2 100% BMI 25.28 kg/m² Intake/Output Summary (Last 24 hours) at 1/3/2019 1324 Last data filed at 1/3/2019 5955 Gross per 24 hour Intake 900 ml Output 1425 ml Net -525 ml PHYSICAL EXAM: 
General: WD, WN. Alert, cooperative, no acute distress   
HEENT: NC, Atraumatic. L eye blind . Anicteric sclerae. Extremities: No c/c/e Neurologic:  CN 2-12 gi, A/O X name. No acute neurological distress Psych:   Unable to assess insight. Not anxious nor agitated. Lab and Radiology Data Reviewed: (see below) Medications Reviewed: (see below) PMH/SH reviewed - no change compared to H&P 
________________________________________________________________________ Total time spent with patient: 15 minutes ________________________________________________________________________ Care Plan discussed with: 
Patient y Family  y- group home caregiver RN y  
           Consultant:    
 
Prashant Dotson MD  
 
Procedures: see electronic medical records for all procedures/Xrays and details which were not copied into this note but were reviewed prior to creation of Plan. LABS: 
Recent Labs 01/03/19 
2456 01/02/19 
2653 WBC 3.7* 3.8* HGB 11.9* 12.6 HCT 35.1* 37.2  230 Recent Labs 01/03/19 
7847 01/02/19 0055 12/31/18 
1349  140 141  
K 3.6 3.5 4.1  105 104 CO2 26 28 31 BUN 7 6 8 CREA 0.85 0.79 0.84 GLU 88 77 102* CA 8.3* 8.6 8.9 MG 1.8  --   --   
PHOS 3.3  --   --   
 
Recent Labs 01/03/19 
4149 01/02/19 
0055 12/31/18 
1349 SGOT 29 30 38* * 163* 171* TP 8.4* 9.0* 10.0* ALB 2.8* 3.1* 3.4*  
GLOB 5.6* 5.9* 6.6* No results for input(s): INR, PTP, APTT in the last 72 hours. No lab exists for component: INREXT No results for input(s): FE, TIBC, PSAT, FERR in the last 72 hours. Lab Results Component Value Date/Time Folate 39.2 (H) 01/02/2019 12:55 AM  
 
Recent Labs 12/31/18 
1340 PH 7.38  
PCO2 48* PO2 93 No results for input(s): CPK, CKMB in the last 72 hours. No lab exists for component: TROPONINI Lab Results Component Value Date/Time  Color YELLOW/STRAW 12/31/2018 02:32 PM  
 Appearance CLEAR 12/31/2018 02:32 PM  
 Specific gravity 1.013 12/31/2018 02:32 PM  
 pH (UA) 7.0 12/31/2018 02:32 PM  
 Protein NEGATIVE  12/31/2018 02:32 PM  
 Glucose NEGATIVE  12/31/2018 02:32 PM  
 Ketone NEGATIVE  12/31/2018 02:32 PM  
 Bilirubin NEGATIVE  12/31/2018 02:32 PM  
 Urobilinogen 0.2 12/31/2018 02:32 PM  
 Nitrites NEGATIVE  12/31/2018 02:32 PM  
 Leukocyte Esterase NEGATIVE  12/31/2018 02:32 PM  
 Epithelial cells FEW 12/31/2018 02:32 PM  
 Bacteria NEGATIVE  12/31/2018 02:32 PM  
 WBC 0-4 12/31/2018 02:32 PM  
 RBC 0-5 12/31/2018 02:32 PM  
 
 
MEDICATIONS: 
Current Facility-Administered Medications Medication Dose Route Frequency  pantoprazole (PROTONIX) 40 mg in sodium chloride 0.9% 10 mL injection  40 mg IntraVENous DAILY  glucose chewable tablet 16 g  4 Tab Oral PRN  
 dextrose (D50W) injection syrg 12.5-25 g  12.5-25 g IntraVENous PRN  
 glucagon (GLUCAGEN) injection 1 mg  1 mg IntraMUSCular PRN  
 levETIRAcetam (KEPPRA) 1,500 mg in 0.9% sodium chloride 100 mL IVPB  1,500 mg IntraVENous Q12H  
 lacosamide (VIMPAT) 150 mg in 0.9% sodium chloride 100 mL IVPB  150 mg IntraVENous Q12H  phenytoin (DILANTIN) injection 100 mg  100 mg IntraVENous Q12H  
 0.9% sodium chloride infusion  75 mL/hr IntraVENous CONTINUOUS  
 levalbuterol (XOPENEX) nebulizer soln 1.25 mg/3 mL  1.25 mg Nebulization Q6H PRN  
 levothyroxine (SYNTHROID) injection 37.5 mcg  37.5 mcg IntraVENous Q24H  
 sodium chloride (NS) flush 5-10 mL  5-10 mL IntraVENous Q8H  
 sodium chloride (NS) flush 5-10 mL  5-10 mL IntraVENous PRN  
 acetaminophen (TYLENOL) tablet 650 mg  650 mg Oral Q6H PRN  
 ondansetron (ZOFRAN) injection 4 mg  4 mg IntraVENous Q4H PRN  
 enoxaparin (LOVENOX) injection 40 mg  40 mg SubCUTAneous Q24H  piperacillin-tazobactam (ZOSYN) 3.375 g in 0.9% sodium chloride (MBP/ADV) 100 mL  3.375 g IntraVENous Q8H  
 sodium chloride (NS) flush 5-10 mL  5-10 mL IntraVENous PRN

## 2019-01-04 LAB
GLUCOSE BLD STRIP.AUTO-MCNC: 81 MG/DL (ref 65–100)
GLUCOSE BLD STRIP.AUTO-MCNC: 81 MG/DL (ref 65–100)
GLUCOSE BLD STRIP.AUTO-MCNC: 97 MG/DL (ref 65–100)
GLUCOSE BLD STRIP.AUTO-MCNC: 99 MG/DL (ref 65–100)
PHENYTOIN FREE SERPL-MCNC: 0.7 UG/ML (ref 1–2)
SERVICE CMNT-IMP: NORMAL

## 2019-01-04 PROCEDURE — 74011250636 HC RX REV CODE- 250/636: Performed by: INTERNAL MEDICINE

## 2019-01-04 PROCEDURE — 97116 GAIT TRAINING THERAPY: CPT

## 2019-01-04 PROCEDURE — 74011000258 HC RX REV CODE- 258: Performed by: INTERNAL MEDICINE

## 2019-01-04 PROCEDURE — 65660000000 HC RM CCU STEPDOWN

## 2019-01-04 PROCEDURE — 97161 PT EVAL LOW COMPLEX 20 MIN: CPT

## 2019-01-04 PROCEDURE — 97165 OT EVAL LOW COMPLEX 30 MIN: CPT | Performed by: OCCUPATIONAL THERAPIST

## 2019-01-04 PROCEDURE — 74011250637 HC RX REV CODE- 250/637: Performed by: INTERNAL MEDICINE

## 2019-01-04 PROCEDURE — 82962 GLUCOSE BLOOD TEST: CPT

## 2019-01-04 PROCEDURE — 77030010547 HC BG URIN W/UMETER -A

## 2019-01-04 PROCEDURE — 97535 SELF CARE MNGMENT TRAINING: CPT | Performed by: OCCUPATIONAL THERAPIST

## 2019-01-04 RX ORDER — AMOXICILLIN AND CLAVULANATE POTASSIUM 875; 125 MG/1; MG/1
1 TABLET, FILM COATED ORAL EVERY 12 HOURS
Status: DISCONTINUED | OUTPATIENT
Start: 2019-01-04 | End: 2019-01-04

## 2019-01-04 RX ORDER — AMOXICILLIN AND CLAVULANATE POTASSIUM 875; 125 MG/1; MG/1
1 TABLET, FILM COATED ORAL EVERY 12 HOURS
Status: DISCONTINUED | OUTPATIENT
Start: 2019-01-04 | End: 2019-01-07 | Stop reason: HOSPADM

## 2019-01-04 RX ADMIN — PHENYTOIN 100 MG: 125 SUSPENSION ORAL at 07:59

## 2019-01-04 RX ADMIN — Medication 10 ML: at 03:07

## 2019-01-04 RX ADMIN — PIPERACILLIN SODIUM,TAZOBACTAM SODIUM 3.38 G: 3; .375 INJECTION, POWDER, FOR SOLUTION INTRAVENOUS at 05:51

## 2019-01-04 RX ADMIN — LEVETIRACETAM 1500 MG: 100 SOLUTION ORAL at 17:20

## 2019-01-04 RX ADMIN — Medication 10 ML: at 22:18

## 2019-01-04 RX ADMIN — PHENYTOIN 100 MG: 125 SUSPENSION ORAL at 17:20

## 2019-01-04 RX ADMIN — AMOXICILLIN AND CLAVULANATE POTASSIUM 1 TABLET: 875; 125 TABLET, FILM COATED ORAL at 22:18

## 2019-01-04 RX ADMIN — ENOXAPARIN SODIUM 40 MG: 40 INJECTION SUBCUTANEOUS at 08:00

## 2019-01-04 RX ADMIN — AMOXICILLIN AND CLAVULANATE POTASSIUM 1 TABLET: 875; 125 TABLET, FILM COATED ORAL at 14:09

## 2019-01-04 RX ADMIN — LEVOTHYROXINE SODIUM 50 MCG: 50 TABLET ORAL at 05:51

## 2019-01-04 RX ADMIN — LACOSAMIDE 150 MG: 100 TABLET, FILM COATED ORAL at 17:20

## 2019-01-04 RX ADMIN — PANTOPRAZOLE SODIUM 40 MG: 40 TABLET, DELAYED RELEASE ORAL at 07:48

## 2019-01-04 RX ADMIN — LACOSAMIDE 150 MG: 100 TABLET, FILM COATED ORAL at 07:59

## 2019-01-04 RX ADMIN — LEVETIRACETAM 1500 MG: 100 SOLUTION ORAL at 07:59

## 2019-01-04 NOTE — PROGRESS NOTES
Palliative Medicine Social Work Dr. Gardenia Huertas and I met with patient and caregiver Clara Hughes from his group home. Patient was sitting up in chair alert and awake. Patient brother/guardian Bradley Sunshine previously game Palliative Medicine permission to discuss patient care with Clara Hughes. 1. Discussed that patient had done well with barium swallow and was tolerating pureed diet. Clara Hughes discussed that he thinks that diet will work well for patient. He reported patient tends to pocket food in his cheek (possibly leading to aspiration if he goes to bed with food in mouth) and new diet may reduce that. 2. Talked about the initial worry about patient not being able to continue to eat, that feeding tube would be an option but not a good one since patient gets so much lelia from eating and the group home life revolves around frequent parties for residents. He believes patient would quickly decline if he had a PEG tube. 3. Discussed that patient has good quality of life at group home and patient still has good living left to do. Discussed \"what ifs\" - if patient starts to have more frequent admissions, begins to aspirate, overall health declines. Clara Hughes indicated Bradley Sunshine is very protective of patient and he advocates for full restorative treatments -- he probably would not be comfortable with DNR unless patient was in extreme pain/failing health. If eating ever becomes risky for patient, discussed that family would want to discuss risk/benefit of comfort feeding versus PEG, as well as DDNR. If/when these conversation come up, Bradley Sunshine will need a lot of support as he stated that he doesn't like talking about these decisions. 4.  Patient is full code with goal to return to group home. Thank you for the opportunity to be involved in the care of Mr. Glee Councilman and his family. Alana Trinidad, [facility] Palliative Medicine  [phone]

## 2019-01-04 NOTE — ROUTINE PROCESS
Report given to Luca Denny RN by Orville Longoria RN      
  
Zone Phone: [phone] 
  
  
Significant changes during shift: seen by neuro, GI, cardiology. PT/  OT attempted to see patient. Pt very drowsy and lethargic. Seen by speech. Echo and barium swallow done.  
  
  
Patient Information 
  
Jayy Xiong 48 y.o. 
12/31/2018 12:43 PM by Edison Rodriguez MD. Reymundo Sanchez was admitted from adult home 
  
Problem List 
  
       
Patient Active Problem List  
  Diagnosis Date Noted  Acquired hypothyroidism 04/12/2018  Acute encephalopathy 03/23/2018  Complex partial seizure evolving to generalized seizure ([address] 03/23/2018  Down syndrome 03/20/2018  Pneumonia 02/03/2018  Low vitamin D level 01/11/2018  Blind left eye 01/11/2018  ACP (advance care planning) 11/09/2016  S/P placement of VNS (vagus nerve stimulation) device 04/10/2015  Seizure ([address] 11/22/2013  Partial epilepsy with impairment of consciousness ([address] 01/21/2013  Ataxia 01/21/2013  Memory loss 01/21/2013  Recurrent seizures ([address] 08/27/2012  
  
       
Past Medical History:  
Diagnosis Date  Anemia    
 Anxiety    
 Blindness of left eye    
 Cerebral palsy (HCC)    
 Dementia    
 Down syndrome    
 Endocrine disease    
  thyroid disorder  GERD (gastroesophageal reflux disease)    
 Ill-defined condition    
  blind in left eye  Ill-defined condition    
  imparied gait - uses walker  Ill-defined condition    
  dermatitis  Ill-defined condition    
  cerebral palsy  Ill-defined condition    
  prone to decubitus ulcers  Ill-defined condition    
  anemia  Neurological disorder    
 Other ill-defined conditions(799.89)    
  Down syndrome  Psychiatric disorder    
  mental retardation  Psychiatric disorder    
  anxiety disorder  Seizures ([address]    
 Sleep apnea    
 Thyroid disease    
 Unspecified epilepsy without mention of intractable epilepsy    
  
  
  
 Core Measures: 
  
  
PNA:probable 
  
  
Activity Status: 
  
OOB to WIB Ambulated this shift no Bed Rest yes   
  
  
  
DVT prophylaxis: 
  
DVT prophylaxis Med- y   
  
Patient Safety: 
  
Falls Score Total Score: 3 Safety Level_______ Bed Alarm On? y Sitter? n 
  
Plan for upcoming shift:advance diet as tolerated 
  
  
  
Discharge Plan:return to adult home when stable 
  
Active Consults: 
IP CONSULT TO HOSPITALIST 
IP CONSULT TO PALLIATIVE CARE - PROVIDER 
IP CONSULT TO NEUROLOGY

## 2019-01-04 NOTE — PROGRESS NOTES
Problem: Falls - Risk of 
Goal: *Absence of Falls Document Will Ulises Fall Risk and appropriate interventions in the flowsheet. Outcome: Progressing Towards Goal 
Fall Risk Interventions: 
Mobility Interventions: PT Consult for mobility concerns, PT Consult for assist device competence Mentation Interventions: Bed/chair exit alarm, Adequate sleep, hydration, pain control, More frequent rounding, Reorient patient, Room close to nurse's station Medication Interventions: Teach patient to arise slowly, Patient to call before getting OOB Elimination Interventions: Call light in reach, Bed/chair exit alarm

## 2019-01-04 NOTE — PROGRESS NOTES
Occupational Therapy EVALUATION/discharge Patient: Joseph Helm (71 y.o. male) Date: 1/4/2019 Primary Diagnosis: Acute encephalopathy Precautions: fall ASSESSMENT:  
Based on the objective data described below, the patient presents with admission from group (lived there for 8 yrs)  home with acute encephalopathy. Pt has history of Down Syndrome, CP, multiple falls, blindness L eye, GERD, dementia, seizures all contributing to pt's baseline level of functioning reported by group home staff--assisted 80% for self care and uses RW ( and gait belt) with direct assistance indoors and at times needs w/c for distances in the community. The owner of the group home was present and provided baseline information. He feels that pt is functioning close to his baseline, and feels pt will do fine at home with support as needed from staff as he acclimates to returning to his usual routine. . 
Further skilled acute occupational therapy is not indicated at this time. Discharge Recommendations: None Further Equipment Recommendations for Discharge: none SUBJECTIVE:  
Patient stated smiles, but no discernable speech during tx session. OBJECTIVE DATA SUMMARY:  
HISTORY:  
Past Medical History:  
Diagnosis Date  Anemia  Anxiety  Blindness of left eye  Cerebral palsy ([address]  Dementia  Down syndrome  Endocrine disease   
 thyroid disorder  GERD (gastroesophageal reflux disease)  Ill-defined condition   
 blind in left eye  Ill-defined condition   
 imparied gait - uses walker  Ill-defined condition   
 dermatitis  Ill-defined condition   
 cerebral palsy  Ill-defined condition   
 prone to decubitus ulcers  Ill-defined condition   
 anemia  Neurological disorder  Other ill-defined conditions(799.89) Down syndrome  Psychiatric disorder   
 mental retardation  Psychiatric disorder   
 anxiety disorder  Seizures ([address]  Sleep apnea  Thyroid disease  Unspecified epilepsy without mention of intractable epilepsy Past Surgical History:  
Procedure Laterality Date  HX OTHER SURGICAL  03/13/2015 VNS  Dr. Yefri Vyas. Va Blackman  @ [facility] Prior Level of Function/Environment/Context: Pt has lived in his current group home for the past 8 years. Staff member present this date and provided PLOF information. Pt has 80% of staff's assistance for adls and needs direct asssitance using gait belt and walker for mobility in the home. Pt uses the w/c in the community if he must walk a long distance. Pt has outings every month that he enjoys. He has a hx of falls and is directly assisted by staff when using the walker/w/c Occupations in which the patient is/was successful, what are the barriers preventing that success: medical condition Performance Patterns (routines, roles, habits, and rituals): enjoys outings at group home. Personal Interests and/or values:  
Expanded or extensive additional review of patient history:  
 
Home Situation Home Environment: Group home One/Two Story Residence: One story Living Alone: No 
Support Systems: Home care staff Patient Expects to be Discharged to[de-identified] Group home Current DME Used/Available at Home: Wheelchair, Onita Bonds, rolling(transport chair) Tub or Shower Type: Tub/Shower combination Hand dominance: Left EXAMINATION OF PERFORMANCE DEFICITS: 
Cognitive/Behavioral Status: 
Neurologic State: Alert Orientation Level: Oriented to person;Disoriented to time;Disoriented to situation;Disoriented to place Cognition: Follows commands(follows most commands, group home present to assist w/ comma) Perseveration: Perseverates during ADLS;Verbal cues provided Safety/Judgement: Decreased awareness of environment;Decreased awareness of need for assistance;Decreased awareness of need for safety;Decreased insight into deficits; Lack of insight into deficits(this is baseline, pt with Down Syndrome, hx CP) Skin: generally intact, has IV site in R UE  (has been leaking-nurse aware) Edema: none observed Hearing: Auditory Auditory Impairment: None Vision/Perceptual:   
Tracking: (not formally assessed, R eye vision only) Acuity: (blind in L eye; vision not formally tested) Range of Motion: Emmette Flank AROM: Generally decreased, functional 
  
  
  
  
  
  
  
Strength: 
BUEs;   
Strength: Generally decreased, functional 
  
  
  
  
 
Coordination: 
Coordination: Generally decreased, functional 
Fine Motor Skills-Upper: Left Impaired;Right Impaired(decreased coordination and dexterity at baseline ) Gross Motor Skills-Upper: Left Impaired;Right Impaired(decreased B shoulder ROM at baseline ) Tone & Sensation: 
Sensation: not formally tested Tone: Normal 
  
  
  
  
  
  
  
Balance: 
Sitting: Intact(worked on sit to stand from edge of chair due to post. lean) Standing: Impaired(tends to lean posteriorly. uses RW for support) Standing - Static: Constant support;Fair(initially needs moderate assistance due to posterior lean.  ) Standing - Dynamic : Fair(needs min/mod A using the RW needs guidance for RW) Functional Mobility and Transfers for ADLs:Bed Mobility: 
Rolling: (pt seated in chair upon arrival) Supine to Sit: Moderate assistance Scooting: (needs assist for scooting forward in chair. ) Transfers: 
Sit to Stand: Minimum assistance; Moderate assistance(encouraged edgeof seat and leaning forward) Stand to Sit: Minimum assistance; Moderate assistance; Additional time;Assist x1(needs A for centering self and safe techniqe) Bathroom Mobility: Minimum assistance; Moderate assistance(assist for maneuvering RW and for safety ) Toilet Transfer : Moderate assistance(using RW needs cues for safety and addtl assistance) ADL Assessment: 
Feeding: Stand-by assistance;Setup Oral Facial Hygiene/Grooming: Moderate assistance Bathing: Maximum assistance Upper Body Dressing: Moderate assistance;Maximum assistance Lower Body Dressing: Maximum assistance Toileting: Maximum assistance (which is his baseline) ADL Intervention and task modifications: Pt performed ambulation to the bathroom using RW and min/mod A . Pt performed toileting transfer, requiring constant assistance for safe technique. Pt stood at the sink for hand washing needing nearly maximal support and assist with the task of washing hands-hand over hand assistance needed. Group home staff report that this is baseline. Cognitive Retraining Safety/Judgement: Decreased awareness of environment;Decreased awareness of need for assistance;Decreased awareness of need for safety;Decreased insight into deficits; Lack of insight into deficits(this is baseline, pt with Down Syndrome, hx CP) Functional Measure: 
Barthel Index: 
 
Bathin Bladder: 0 Bowels: 5(has toileting schedule in group home) Groomin Dressin Feeding: 10 Mobility: 0 Stairs: 0 Toilet Use: 5 Transfer (Bed to Chair and Back): 10 Total: 35 The Barthel ADL Index: Guidelines 1. The index should be used as a record of what a patient does, not as a record of what a patient could do. 2. The main aim is to establish degree of independence from any help, physical or verbal, however minor and for whatever reason. 3. The need for supervision renders the patient not independent. 4. A patient's performance should be established using the best available evidence. Asking the patient, friends/relatives and nurses are the usual sources, but direct observation and common sense are also important. However direct testing is not needed. 5. Usually the patient's performance over the preceding 24-48 hours is important, but occasionally longer periods will be relevant. 6. Middle categories imply that the patient supplies over 50 per cent of the effort. 7. Use of aids to be independent is allowed. Marzella Mis., Barthel, D.W. (9793). Functional evaluation: the Barthel Index. [address] (14)2. THUY Steward, Epifanio Simson. Baudilio, [address] (1999). Measuring the change indisability after inpatient rehabilitation; comparison of the responsiveness of the Barthel Index and Functional Proctor Measure. Journal of Neurology, Neurosurgery, and Psychiatry, 66(4), 934-381. IVONE Love, ÁLVARO Pritchard, & Emeka Lopez M.A. (2004.) Assessment of post-stroke quality of life in cost-effectiveness studies: The usefulness of the Barthel Index and the EuroQoL-5D. [facility], 13, 838-07 Occupational Therapy Evaluation Charge Determination History Examination Decision-Making MEDIUM Complexity : Expanded review of history including physical, cognitive and psychosocial  history  MEDIUM Complexity : 3-5 performance deficits relating to physical, cognitive , or psychosocial skils that result in activity limitations and / or participation restrictions MEDIUM Complexity : Patient may present with comorbidities that affect occupational performnce. Miniml to moderate modification of tasks or assistance (eg, physical or verbal ) with assesment(s) is necessary to enable patient to complete evaluation Based on the above components, the patient evaluation is determined to be of the following complexity level: MEDIUM Pain: 
Pain Scale 1: Numeric (0 - 10) Pain Intensity 1: 0 Activity Tolerance:  
Good, no complaints, no s/o of fatigue After treatment:  
[x]  Patient left in no apparent distress sitting up in chair 
[]  Patient left in no apparent distress in bed 
[x]  Call bell left within reach [x]  Nursing notified 
[]  Caregiver present 
[]  Bed alarm activated COMMUNICATION/EDUCATION:  
Communication/Collaboration: 
[]      Home safety education was provided and the patient/caregiver indicated understanding. [x]      Patient/family have participated as able and agree with findings and recommendations. []      Patient is unable to participate in plan of care at this time. Findings and recommendations were discussed with: Physical Therapist and Registered Nurse Herminia Pedersen OTR/L Time Calculation: 47 mins

## 2019-01-04 NOTE — PROGRESS NOTES
Chief Complaint: altered mental status No seizures overnight. Sitting up brother in room. Did well with barium swallow. They wanted a hospital bed to help avoid aspiration. Advised them to raise the head of the bed. They would like to purchase one through [employer]. Also asked if they should puree his food. Told them to follow the speech pathologists recommendation. Assesment and Plan 1. Altered mental status EEG no seizures B12 513 TSH 1.35 
  
2. Bilateral lower lobe pneumonia On augmentin 3. Down's Syndrome 4. Seizure disorder Continue vimpat dilantin and keppra. Phenytoin/free pheny 9.8/1. 7  Which is well within the therepeutic 5. Oropharyngeal dysphagia Barium swallow should probable normal mobitlity. Signing  Off. Allergies Morphine and Tegretol [carbamazepine] Medications Current Facility-Administered Medications Medication Dose Route Frequency  amoxicillin-clavulanate (AUGMENTIN) 875-125 mg per tablet 1 Tab  1 Tab Oral Q12H  levothyroxine (SYNTHROID) tablet 50 mcg  50 mcg Oral 6am  
 pantoprazole (PROTONIX) tablet 40 mg  40 mg Oral ACB  lacosamide (VIMPAT) tablet 150 mg  150 mg Oral BID  levETIRAcetam (KEPPRA) oral solution 1,500 mg  1,500 mg Oral BID  phenytoin (DILANTIN) 100 mg/4 mL oral suspension 100 mg  100 mg Oral BID  
 glucose chewable tablet 16 g  4 Tab Oral PRN  
 dextrose (D50W) injection syrg 12.5-25 g  12.5-25 g IntraVENous PRN  
 glucagon (GLUCAGEN) injection 1 mg  1 mg IntraMUSCular PRN  
 levalbuterol (XOPENEX) nebulizer soln 1.25 mg/3 mL  1.25 mg Nebulization Q6H PRN  
 sodium chloride (NS) flush 5-10 mL  5-10 mL IntraVENous Q8H  
 sodium chloride (NS) flush 5-10 mL  5-10 mL IntraVENous PRN  
 acetaminophen (TYLENOL) tablet 650 mg  650 mg Oral Q6H PRN  
 ondansetron (ZOFRAN) injection 4 mg  4 mg IntraVENous Q4H PRN  
 enoxaparin (LOVENOX) injection 40 mg  40 mg SubCUTAneous Q24H  sodium chloride (NS) flush 5-10 mL  5-10 mL IntraVENous PRN Medical History Past Medical History:  
Diagnosis Date  Anemia  Anxiety  Blindness of left eye  Cerebral palsy ([address]  Dementia  Down syndrome  Endocrine disease   
 thyroid disorder  GERD (gastroesophageal reflux disease)  Ill-defined condition   
 blind in left eye  Ill-defined condition   
 imparied gait - uses walker  Ill-defined condition   
 dermatitis  Ill-defined condition   
 cerebral palsy  Ill-defined condition   
 prone to decubitus ulcers  Ill-defined condition   
 anemia  Neurological disorder  Other ill-defined conditions(799.89) Down syndrome  Psychiatric disorder   
 mental retardation  Psychiatric disorder   
 anxiety disorder  Seizures ([address]  Sleep apnea  Thyroid disease  Unspecified epilepsy without mention of intractable epilepsy Review of Systems Can't be obtained. Non verbal 
 
Exam: 
 
Visit Vitals /87 (BP 1 Location: Left arm, BP Patient Position: Sitting) Pulse 89 Temp 98 °F (36.7 °C) Resp 18 Ht 5' 4\" (1.626 m) Wt 147 lb 4.3 oz (66.8 kg) SpO2 100% BMI 25.28 kg/m²  
  
eneral: Well developed, well nourished. somnolent Head: Microcephalic. Mallampati 4, atraumatic, anicteric sclera Neck Normal ROM, No thyromegally Cardiac: Regular rate and rhythm Ext: No pedal edema Skin: Supple no rash NeurologicExam: 
Mental Status: Somnolent Speech: Non verbal  
Cranial Nerves:  II - XII Intact. Blind left eye Motor:  Moves all extremities. Poor muscle toone. .   
Sensory:   Responds to touch small or large fibers. Gait:  Not evaluated Tremor:   No tremor noted. Cerebellar:  Coordination intact. Imaging CT Results (most recent): 
Results from [facility] [place] Encounter encounter on 12/31/18 CT CHEST WO CONT Narrative INDICATION: Lethargy. Suspected aspiration pneumonia.  Previous abnormal chest 
 radiograph. COMPARISON: December 31, 2018 chest radiograph CONTRAST: None. TECHNIQUE:  5 mm axial images were obtained through the chest. Coronal and 
sagittal reconstructions were generated. CT dose reduction was achieved through 
use of a standardized protocol tailored for this examination and automatic 
exposure control for dose modulation. The absence of intravenous contrast reduces the sensitivity for evaluation of 
the mediastinum and upper abdominal organs. FINDINGS: 
 
THYROID: No nodule. MEDIASTINUM: No mass or lymphadenopathy. AUNDREA: No mass or lymphadenopathy. THORACIC AORTA: No aneurysm. MAIN PULMONARY ARTERY: Normal in caliber. TRACHEA/BRONCHI: Patent. ESOPHAGUS: Mildly dilated and fluid-filled. HEART: Normal in size. Small pericardial effusion. PLEURA: No effusion or pneumothorax. LUNGS: Patchy bilateral bilateral lower lobe airspace disease. INCIDENTALLY IMAGED UPPER ABDOMEN: No focal abnormality. BONES: No destructive bone lesion. Impression IMPRESSION: 
Patchy bilateral lower lobe airspace disease may represent atelectasis, 
pneumonia, or aspiration. Radiographic follow-up is recommended to assure 
complete resolution. Mildly dilated, fluid-filled esophagus, suggestive of 
esophageal dysmotility or gastroesophageal reflux. Small pericardial effusion. Lab Review Lab Results Component Value Date/Time WBC 3.7 (L) 01/03/2019 03:13 AM  
 HCT 35.1 (L) 01/03/2019 03:13 AM  
 HGB 11.9 (L) 01/03/2019 03:13 AM  
 PLATELET 782 53/18/6240 03:13 AM  
 
 
Lab Results Component Value Date/Time Sodium 141 01/03/2019 03:13 AM  
 Potassium 3.6 01/03/2019 03:13 AM  
 Chloride 108 01/03/2019 03:13 AM  
 CO2 26 01/03/2019 03:13 AM  
 Glucose 88 01/03/2019 03:13 AM  
 BUN 7 01/03/2019 03:13 AM  
 Creatinine 0.85 01/03/2019 03:13 AM  
 Calcium 8.3 (L) 01/03/2019 03:13 AM  
 
 
Lab Results Component Value Date/Time  Vitamin B12 513 01/02/2019 12:55 AM  
 Folate 39.2 (H) 01/02/2019 12:55 AM  
 
 
 
Lab Results Component Value Date/Time  Cholesterol, total 271 (H) 12/10/2015 12:30 PM  
 HDL Cholesterol 64 12/10/2015 12:30 PM  
 LDL, calculated 183 (H) 12/10/2015 12:30 PM  
 VLDL, calculated 24 12/10/2015 12:30 PM  
 Triglyceride 120 12/10/2015 12:30 PM

## 2019-01-04 NOTE — PROGRESS NOTES
Palliative Medicine Consult Saroj: [phone] ([phone]) Patient Name: Sergio Jack YOB: 1965 Date of Initial Consult: 19 Reason for Consult: Care decisions Requesting Provider: Harriet Abdalla hospitalist team 
Primary Care Physician: Rhoda Antonio MD 
 
 SUMMARY:  
Sergio Jack is a 48 y.o. with a past history of Down Syndrome, SAVITA, refractory seizures who was admitted on 2018 from [address] [place] [address] with altered mental status. At baseline only says a few words but can walk w/ walker and follow commands. Has had aspiration PNAs in past and is currently being treated w/ IV abx and home seizure medications. Hospitalized 2018 and 10/2018 w/ seizures. Current medical issues leading to Palliative Medicine involvement include: care decisions. Social: Pt has lived in [address] [place] [address] for ~ 5 years. Main caretaker there is owner Codi Browne. Uses wheelchair, but can walk w/ cane short distance. Likes to eat and laugh. Mainly nonverbal, but can say some words and follows commands. Older brother Brittany Monreal (and Yaron's wife Yamini) are his guardians since father . Has 2 sisters, both named [place]. PALLIATIVE DIAGNOSES:  
1. Altered mental status- improved, basically back to baseline per family 2. Mod oral, mild pharyngeal dysphagia 3. Possible aspiration PNA PLAN:  
1. Along w/ Isaiah Williamson LCSW meet w/ pt who did okay w/ his barium esophogram and is tolerating pureed diet well. Mental status back to baseline. Abbi Mackay is also there- knows pt very well after 8 years and obviously cares about his well being. Speak to him freely, as guardian/brother Brittany Monreal told us that we could earlier this admission and that Codi Browne is more up to date about medical conditions than he is. 2. Codi Browne has already thought about ways to decr chance of aspiration incl getting a hospital bed w/ incline and keeping on pureed diet for a while. At the group home they celebrate birthdays and lots of other things- a lot of enjoyment is through group meals. 3. We talk about \"what if\" aspiration continues and at some point medical team does not feel it is safe to eat by mouth. Nikole Coker had a PEG tube himself for a while, and shares that he totally understood why he had one- and it was still hard. For pt, he thinks that because he would not understand it- he would decline quickly if not able to eat by mouth. 4. We also talk about code status discussions. He is very aware that these conversations are important, and thinks that Julio would keep patient full code unless really suffering. When we met [place], he admitted he was very uncomfortable w/ end of life discussions. Encourage ongoing conversations, maybe w/ PCP. 5. Discuss that sometimes people opt for comfort feeds and accept aspiration risk. If this ever occurs, would definitely need a code status discussion. Think that caretaker Nikole Coker would help brother Julio navigate care decisions if things worsen. 6. Goals clear, pt recovering well. Signing off a this time, but please reconsult with specific questions/concerns . Thank you for allowing us to assist in Royce's care. 7.  
 
Communicated plan of care with: Palliative IDT 
 GOALS OF CARE / TREATMENT PREFERENCES:  
 
GOALS OF CARE: 
Patient/Health Care Proxy Stated Goals: Rehabilitation TREATMENT PREFERENCES:  
Code Status: Full Code Advance Care Planning: 
[x] [facility] Interdisciplinary Team has updated the ACP Navigator with [address] and Patient Capacity Primary Decision Maker (Health Care Agent): Juliette Warren Relationship to patient: brother and guardian Phone [phone] [x] Named in a scanned document - as of 1/2/18 we do not have on file, have requested family to bring in 
[] Legal Next of Kin 
[] Guardian Secondary Decision Maker (First Alternate Health Care Agent):  
Relationship to patient: Phone number: 
[] Named in a scanned document  
[] Legal Next of Kin 
[] Guardian Medical Interventions: Full interventions Other Instructions: Other: As far as possible, the palliative care team has discussed with patient / health care proxy about goals of care / treatment preferences for patient. HISTORY:  
 
 
CHIEF COMPLAINT: denies sx HPI/SUBJECTIVE: The patient is:  
[x] Verbal and participatory [] Non-participatory due to: Pt smiling, just worked w/ OT- walked to the bathroom. Clinical Pain Assessment (nonverbal scale for severity on nonverbal patients):  
Clinical Pain Assessment Severity: 0 Duration: for how long has pt been experiencing pain (e.g., 2 days, 1 month, years) Frequency: how often pain is an issue (e.g., several times per day, once every few days, constant) FUNCTIONAL ASSESSMENT:  
 
Palliative Performance Scale (PPS): PPS: 50 PSYCHOSOCIAL/SPIRITUAL SCREENING:  
 
Palliative IDT has assessed this patient for cultural preferences / practices and a referral made as appropriate to needs (Cultural Services, Patient Advocacy, Ethics, etc.) Any spiritual / Confucianism concerns: 
[] Yes /  [x] No 
 
Caregiver Burnout: 
[] Yes /  [x] No /  [] No Caregiver Present Anticipatory grief assessment:  
[x] Normal  / [] Maladaptive ESAS Anxiety: ESAS Depression: Depression: 0 REVIEW OF SYSTEMS:  
 
Positive and pertinent negative findings in ROS are noted above in HPI. The following systems were [] reviewed / [x] unable to be reviewed as noted in HPI Other findings are noted below. Systems: constitutional, ears/nose/mouth/throat, respiratory, gastrointestinal, genitourinary, musculoskeletal, integumentary, neurologic, psychiatric, endocrine. Positive findings noted below. Modified ESAS Completed by: provider Fatigue: 5 Drowsiness: 0 Depression: 0 Pain: 0 Dyspnea: 0  PHYSICAL EXAM:  
 
 From RN flowsheet: 
Wt Readings from Last 3 Encounters:  
01/02/19 147 lb 4.3 oz (66.8 kg)  
11/20/18 145 lb 12.8 oz (66.1 kg) 10/10/18 155 lb (70.3 kg) Blood pressure 119/87, pulse 89, temperature 98 °F (36.7 °C), resp. rate 18, height 5' 4\" (1.626 m), weight 147 lb 4.3 oz (66.8 kg), SpO2 100 %. Pain Scale 1: Numeric (0 - 10) Pain Intensity 1: 0 Last bowel movement, if known:  
 
Constitutional: awake, smiles, fatigued Eyes: blind in L eye ENMT: no nasal discharge, moist mucous membranes Respiratory: breathing not labored Musculoskeletal: no deformit Skin: warm, dry Neurologic:moving all extremities Psychiatric: smiling HISTORY:  
 
Active Problems: 
  Seizure ([address] (11/22/2013) Pneumonia (2/3/2018) Down syndrome (3/20/2018) Acute encephalopathy (3/23/2018) Acquired hypothyroidism (4/12/2018) Past Medical History:  
Diagnosis Date  Anemia  Anxiety  Blindness of left eye  Cerebral palsy ([address]  Dementia  Down syndrome  Endocrine disease   
 thyroid disorder  GERD (gastroesophageal reflux disease)  Ill-defined condition   
 blind in left eye  Ill-defined condition   
 imparied gait - uses walker  Ill-defined condition   
 dermatitis  Ill-defined condition   
 cerebral palsy  Ill-defined condition   
 prone to decubitus ulcers  Ill-defined condition   
 anemia  Neurological disorder  Other ill-defined conditions(799.89) Down syndrome  Psychiatric disorder   
 mental retardation  Psychiatric disorder   
 anxiety disorder  Seizures ([address]  Sleep apnea  Thyroid disease  Unspecified epilepsy without mention of intractable epilepsy Past Surgical History:  
Procedure Laterality Date  HX OTHER SURGICAL  03/13/2015 VNS  Dr. Lulu Briscoe. Christelle Mcknight  @ [id number] Family History Problem Relation Age of Onset  Hypertension Mother  Cancer Mother  Lupus Mother Faustino Heaton Arthritis-osteo Mother  Heart Disease Mother  Heart Disease Father  Diabetes Father  Hypertension Father  Elevated Lipids Father  Diabetes Brother  Elevated Lipids Brother  Hypertension Brother  Mental Retardation Brother  Cancer Maternal Grandmother  Arthritis-osteo Maternal Grandmother  Alcohol abuse Maternal Grandfather  Cancer Maternal Grandfather  Arthritis-osteo Paternal Grandmother  Cancer Paternal Grandfather History reviewed, no pertinent family history. Social History Tobacco Use  Smoking status: Never Smoker  Smokeless tobacco: Never Used Substance Use Topics  Alcohol use: No  
 
Allergies Allergen Reactions  Morphine Not Reported This Time  Tegretol [Carbamazepine] Other (comments) \"bad reaction\" \"changes attitude\" Current Facility-Administered Medications Medication Dose Route Frequency  amoxicillin-clavulanate (AUGMENTIN) 875-125 mg per tablet 1 Tab  1 Tab Oral Q12H  levothyroxine (SYNTHROID) tablet 50 mcg  50 mcg Oral 6am  
 pantoprazole (PROTONIX) tablet 40 mg  40 mg Oral ACB  lacosamide (VIMPAT) tablet 150 mg  150 mg Oral BID  levETIRAcetam (KEPPRA) oral solution 1,500 mg  1,500 mg Oral BID  phenytoin (DILANTIN) 100 mg/4 mL oral suspension 100 mg  100 mg Oral BID  
 glucose chewable tablet 16 g  4 Tab Oral PRN  
 dextrose (D50W) injection syrg 12.5-25 g  12.5-25 g IntraVENous PRN  
 glucagon (GLUCAGEN) injection 1 mg  1 mg IntraMUSCular PRN  
 levalbuterol (XOPENEX) nebulizer soln 1.25 mg/3 mL  1.25 mg Nebulization Q6H PRN  
 sodium chloride (NS) flush 5-10 mL  5-10 mL IntraVENous Q8H  
 sodium chloride (NS) flush 5-10 mL  5-10 mL IntraVENous PRN  
 acetaminophen (TYLENOL) tablet 650 mg  650 mg Oral Q6H PRN  
 ondansetron (ZOFRAN) injection 4 mg  4 mg IntraVENous Q4H PRN  
 enoxaparin (LOVENOX) injection 40 mg  40 mg SubCUTAneous Q24H  sodium chloride (NS) flush 5-10 mL  5-10 mL IntraVENous PRN  
 
 
 
 LAB AND IMAGING FINDINGS:  
 
Lab Results Component Value Date/Time WBC 3.7 (L) 01/03/2019 03:13 AM  
 HGB 11.9 (L) 01/03/2019 03:13 AM  
 PLATELET 179 53/86/1382 03:13 AM  
 
Lab Results Component Value Date/Time Sodium 141 01/03/2019 03:13 AM  
 Potassium 3.6 01/03/2019 03:13 AM  
 Chloride 108 01/03/2019 03:13 AM  
 CO2 26 01/03/2019 03:13 AM  
 BUN 7 01/03/2019 03:13 AM  
 Creatinine 0.85 01/03/2019 03:13 AM  
 Calcium 8.3 (L) 01/03/2019 03:13 AM  
 Magnesium 1.8 01/03/2019 03:13 AM  
 Phosphorus 3.3 01/03/2019 03:13 AM  
  
Lab Results Component Value Date/Time AST (SGOT) 29 01/03/2019 03:13 AM  
 Alk. phosphatase 140 (H) 01/03/2019 03:13 AM  
 Protein, total 8.4 (H) 01/03/2019 03:13 AM  
 Albumin 2.8 (L) 01/03/2019 03:13 AM  
 Globulin 5.6 (H) 01/03/2019 03:13 AM  
 
No results found for: INR, PTMR, PTP, PT1, PT2, APTT No results found for: IRON, FE, TIBC, IBCT, PSAT, FERR Lab Results Component Value Date/Time pH 7.38 12/31/2018 01:40 PM  
 PCO2 48 (H) 12/31/2018 01:40 PM  
 PO2 93 12/31/2018 01:40 PM  
 
No components found for: [place] Lab Results Component Value Date/Time  (H) 09/30/2018 12:22 PM  
 CK - MB 3.2 09/30/2018 12:22 PM  
  
 
 
   
 
Total time:35 min Counseling / coordination time, spent as noted above: 30 min  
> 50% counseling / coordination?: yes Prolonged service was provided for  []30 min   []75 min in face to face time in the presence of the patient, spent as noted above. Time Start:  
Time End:  
Note: this can only be billed with 26749 (initial) or 69414 (follow up). If multiple start / stop times, list each separately.

## 2019-01-04 NOTE — PROGRESS NOTES
Problem: Mobility Impaired (Adult and Pediatric) Goal: *Acute Goals and Plan of Care (Insert Text) Physical Therapy Goals Initiated 1/4/2019 1. Patient will move from supine to sit and sit to supine  in bed with supervision/set-up within 7 day(s). 2.  Patient will transfer from bed to chair and chair to bed with supervision/set-up using the least restrictive device within 7 day(s). 3.  Patient will perform sit to stand with supervision/set-up within 7 day(s). 4.  Patient will ambulate with minimal assistance/contact guard assist for 100 feet with the least restrictive device within 7 day(s). physical Therapy EVALUATION Patient: Stella Chapa (29 y.o. male) Date: 1/4/2019 Primary Diagnosis: Acute encephalopathy Precautions: Fall ASSESSMENT : 
Based on the objective data described below, the patient presents with decreased balance and independence with functional mobility following admission for encephalopathy, though suspect that he is near his baseline. Prior to admission this patient was living in a group home and (per his brother Suzan Minaya) ambulatory only with assist from a gait belt, RW, and staff assistance following falls. During evaluation, the patient presented with posterior LOBs in stance and mod-minimal assist overall to gait train x25' with a RW. He returned to a bedside chair in NAD with all known needs met. Based on his brother's description, the patient appears to be either at baseline or mildly below. Recommend return to his group home with HHPT vs no services. Patient will benefit from skilled intervention to address the above impairments. Patients rehabilitation potential is considered to be Good Factors which may influence rehabilitation potential include:  
[]         None noted 
[x]         Mental ability/status []         Medical condition 
[]         Home/family situation and support systems 
[]         Safety awareness []         Pain tolerance/management 
[]         Other: PLAN : 
Recommendations and Planned Interventions: 
[x]           Bed Mobility Training             [x]    Neuromuscular Re-Education 
[x]           Transfer Training                   []    Orthotic/Prosthetic Training 
[x]           Gait Training                         []    Modalities [x]           Therapeutic Exercises           []    Edema Management/Control 
[x]           Therapeutic Activities            []    Patient and Family Training/Education 
[]           Other (comment): Frequency/Duration: Patient will be followed by physical therapy  3 times a week to address goals. Discharge Recommendations: Home Health vs None Further Equipment Recommendations for Discharge: None SUBJECTIVE:  
Patient stated Yeah. OBJECTIVE DATA SUMMARY:  
HISTORY:   
Past Medical History:  
Diagnosis Date  Anemia  Anxiety  Blindness of left eye  Cerebral palsy ([address]  Dementia  Down syndrome  Endocrine disease   
 thyroid disorder  GERD (gastroesophageal reflux disease)  Ill-defined condition   
 blind in left eye  Ill-defined condition   
 imparied gait - uses walker  Ill-defined condition   
 dermatitis  Ill-defined condition   
 cerebral palsy  Ill-defined condition   
 prone to decubitus ulcers  Ill-defined condition   
 anemia  Neurological disorder  Other ill-defined conditions(799.89) Down syndrome  Psychiatric disorder   
 mental retardation  Psychiatric disorder   
 anxiety disorder  Seizures ([address]  Sleep apnea  Thyroid disease  Unspecified epilepsy without mention of intractable epilepsy Past Surgical History:  
Procedure Laterality Date  HX OTHER SURGICAL  03/13/2015 VNS  Dr. More Lozada. [facility]  @ [facility] Prior Level of Function/Home Situation: ambulatory with assist using RW and gait belt, primarily w/c for community mobility d/t fall risk Personal factors and/or comorbidities impacting plan of care:  
 
Home Situation Home Environment: Group home One/Two Story Residence: One story Living Alone: No 
Support Systems: Home care staff Patient Expects to be Discharged to[de-identified] Group home Current DME Used/Available at Home: Wheelchair, Karlie Handy, rolling(transport chair) EXAMINATION/PRESENTATION/DECISION MAKING: Critical Behavior: 
Neurologic State: Confused, Eyes open spontaneously Orientation Level: Oriented to person, Disoriented to time, Disoriented to situation, Disoriented to place Cognition: Follows commands Safety/Judgement: Not assessed Hearing: 
 Skin:   
Edema:  
Range Of Motion: 
AROM: Generally decreased, functional 
  
  
  
  
  
  
  
Strength:   
Strength: Generally decreased, functional 
  
  
  
  
  
  
Tone & Sensation:  
Tone: Normal 
  
  
  
  
  
  
  
  
   
Coordination: 
Coordination: Generally decreased, functional 
 
Functional Mobility: 
Bed Mobility: 
  
Supine to Sit: Moderate assistance Scooting: Minimum assistance Transfers: 
Sit to Stand: Moderate assistance; Additional time Balance:  
Sitting: Intact Standing: Impaired Standing - Static: Poor Standing - Dynamic : PoorAmbulation/Gait Training:Distance (ft): 25 Feet (ft) Assistive Device: Gait belt;Walker, rolling Ambulation - Level of Assistance: Moderate assistance;Minimal assistance Gait Description (WDL): Exceptions to [facility] Gait Abnormalities: Decreased step clearance Base of Support: Widened Speed/Elizabet: Slow;Shuffled Step Length: Left shortened;Right shortened Physical Therapy Evaluation Charge Determination History Examination Presentation Decision-Making MEDIUM  Complexity : 1-2 comorbidities / personal factors will impact the outcome/ POC  LOW Complexity : 1-2 Standardized tests and measures addressing body structure, function, activity limitation and / or participation in recreation  LOW Complexity : Stable, uncomplicated  LOW Complexity : FOTO score of  Based on the above components, the patient evaluation is determined to be of the following complexity level: LOW Pain: 
Pain Scale 1: Numeric (0 - 10) Pain Intensity 1: 0 Activity Tolerance:  
 
Please refer to the flowsheet for vital signs taken during this treatment. After treatment:  
[x]         Patient left in no apparent distress sitting up in chair 
[]         Patient left in no apparent distress in bed 
[x]         Call bell left within reach [x]         Nursing notified 
[]         Caregiver present [x]         Bed alarm activated COMMUNICATION/EDUCATION:  
The patients plan of care was discussed with: Registered Nurse. [x]         Fall prevention education was provided and the patient/caregiver indicated understanding. [x]         Patient/family have participated as able in goal setting and plan of care. [x]         Patient/family agree to work toward stated goals and plan of care. []         Patient understands intent and goals of therapy, but is neutral about his/her participation. []         Patient is unable to participate in goal setting and plan of care. Thank you for this referral. 
Mati Daigle, PT, DPT Time Calculation: 25 mins

## 2019-01-04 NOTE — PROGRESS NOTES
Problem: Pressure Injury - Risk of 
Goal: *Prevention of pressure injury Document Salinas Scale and appropriate interventions in the flowsheet. Outcome: Progressing Towards Goal 
Pressure Injury Interventions: 
Sensory Interventions: Float heels, Minimize linen layers Moisture Interventions: Minimize layers, Moisture barrier, Absorbent underpads, Limit adult briefs Activity Interventions: PT/OT evaluation Mobility Interventions: Float heels, PT/OT evaluation Nutrition Interventions: Document food/fluid/supplement intake Friction and Shear Interventions: Minimize layers, Lift sheet

## 2019-01-04 NOTE — ROUTINE PROCESS
Report given to JACOB Gonzales by JACOB Denny      
  
[facility]: [id number] 
  
  
Significant changes during shift:  Dropped in blood sugar, gave apple juice and applesauce to pt.  
  
Patient Information 
  
Mendoza Sanchez 48 y.o. 
12/31/2018 12:43 PM by Mushtaq Martyn Schlatter, MD. Reymundo Sanchez was admitted from adult home 
  
Problem List 
  
       
Patient Active Problem List  
  Diagnosis Date Noted  Acquired hypothyroidism 04/12/2018  Acute encephalopathy 03/23/2018  Complex partial seizure evolving to generalized seizure ([facility] 03/23/2018  Down syndrome 03/20/2018  Pneumonia 02/03/2018  Low vitamin D level 01/11/2018  Blind left eye 01/11/2018  ACP (advance care planning) 11/09/2016  S/P placement of VNS (vagus nerve stimulation) device 04/10/2015  Seizure ([facility] [address] 11/22/2013  Partial epilepsy with impairment of consciousness ([facility] [facility] 01/21/2013  Ataxia 01/21/2013  Memory loss 01/21/2013  Recurrent seizures ([facility] [address] 08/27/2012  
  
       
Past Medical History:  
Diagnosis Date  Anemia    
 Anxiety    
 Blindness of left eye    
 Cerebral palsy (HCC)    
 Dementia    
 Down syndrome    
 Endocrine disease    
  thyroid disorder  GERD (gastroesophageal reflux disease)    
 Ill-defined condition    
  blind in left eye  Ill-defined condition    
  imparied gait - uses walker  Ill-defined condition    
  dermatitis  Ill-defined condition    
  cerebral palsy  Ill-defined condition    
  prone to decubitus ulcers  Ill-defined condition    
  anemia  Neurological disorder    
 Other ill-defined conditions(799.89)    
  Down syndrome  Psychiatric disorder    
  mental retardation  Psychiatric disorder    
  anxiety disorder  Seizures (HCC)    
 Sleep apnea    
 Thyroid disease    
 Unspecified epilepsy without mention of intractable epilepsy    
  
  
  
Core Measures: 
  
  
PNA:probable 
  
  
Activity Status: 
  
OOB to [employer] Ambulated this shift no Bed Rest yes   
  
  
  
DVT prophylaxis: 
  
DVT prophylaxis Med- y   
  
Patient Safety: 
  
Falls Score Total Score: 3 Safety Level_______ Bed Alarm On? y Sitter? n 
  
Plan for upcoming shift:advance diet as tolerated 
  
  
  
Discharge Plan:return to adult home when stable 
  
Active Consults: 
IP CONSULT TO HOSPITALIST 
IP CONSULT TO PALLIATIVE CARE - PROVIDER 
IP CONSULT TO NEUROLOGY

## 2019-01-04 NOTE — PROGRESS NOTES
Physical Therapy - Late Entry Attempted to see pt in AM, approx 1000 and pt was not arousable. Pt and PT tech tired shaking pt, calling his name, moving all limbs, stroking pts head etc and pt never opened his eyes. PT deferred. Discussed with staff at rounds. Will see pt tomorrow.

## 2019-01-04 NOTE — PROGRESS NOTES
Hospitalist Progress Note NAME: Mode Baez :  1965 MRN:  [id number] Assessment / Plan: 
Aspiration Pneumonia (sepsis on 1/3 with Leukopenia and HR >90 today) and Esophageal Dysmotility, ?ruled out: CT Chest showed: \"Patchy bilateral lower lobe airspace disease may represent atelectasis, pneumonia, or aspiration. Radiographic follow-up is recommended to assure complete resolution. Mildly dilated, fluid-filled esophagus, suggestive of esophageal dysmotility or gastroesophageal reflux. Small pericardial effusion. \" 
-since patient clinically improving (no labs from 1/3) will transition Zosyn to Augmentin; Discussed with RN about trying to get labs again today 
-patient worked with Speech Therapy and is on Pureed/Thins per their recommendations but was unable to drink barium for barium esophagram on  but caregiver Brennen Julio was here today (1/3) and went down with patient and study results per below 
-1/3 Barium Esophagram: \"The esophagram is limited by decreased mental status and lack of mobility. It was performed in a slightly angled supine position. Esophageal motility is slow, but probably normal for this position. No focal esophageal narrowing. No hiatal hernia or gastroesophageal reflux. IMPRESSION: Probably normal motility for limited study. \" 
-GI consult, evaluation noted 
-continue PPI 
-TTE ordered since pericardial effusion noted on CT Chest and ruled out effusion and ruled out pericardia effusion (TTE: \"SUMMARY: Left ventricle: Systolic function was normal. Ejection fraction was estimated in the range of 55 % to 60 %. There were no regional wall motion abnormalities. \") Acute Metabolic encephalopathy POA with altered responsiveness with baseline Down Syndrome; resolved per Caregivers assessment on 1/3 
-B12 and folate wnl SAVITA 
-Unable to tolerate CPAP in the past  
-Care giver and brother very optimistic with newer mask, he will be able to tolerate and has plan for another sleep study 
  
Seizure DO 
-EEG without seizures,  
-Neuro assistance appreciated 
-continue home Vimpat, Keppra and Dilantin 
  
Hypothyroidism -TSH wnl at 1.35, continue home dose of Levothyroxine 
  
Code Status: Full d/w brother Surrogate Decision Maker: brother Kike Diop is his legal guardian; phone # [phone] 
  
DVT Prophylaxis: Lovenox 
  
Baseline: lives at group home; possible discharge tomorrow pending clinical course Subjective: Chief Complaint / Reason for Physician Visit: follow-up AMS and PNA Patient seen for follow-up Case discussed with RN Review of Systems: 
Symptom Y/N Comments  Symptom Y/N Comments Fever/Chills    Chest Pain Poor Appetite    Edema Cough    Abdominal Pain Sputum    Joint Pain SOB/MARK    Pruritis/Rash Nausea/vomit    Tolerating PT/OT Diarrhea    Tolerating Diet Constipation    Other Could NOT obtain due to: Down's Syndrome Objective: VITALS:  
Last 24hrs VS reviewed since prior progress note. Most recent are: 
Patient Vitals for the past 24 hrs: 
 Temp Pulse Resp BP SpO2  
01/04/19 0733 97.9 °F (36.6 °C) 80 18 130/83 100 % 01/04/19 0308 97.2 °F (36.2 °C) 78 18 137/67 100 % 01/03/19 2326 98.1 °F (36.7 °C) 85 18 140/83 100 % 01/03/19 1951    151/80   
01/03/19 1950 97.5 °F (36.4 °C) 93 22 (!) 134/98 100 % 01/03/19 1153 97.8 °F (36.6 °C) 77 20 146/64 100 % Intake/Output Summary (Last 24 hours) at 1/4/2019 5690 Last data filed at 1/3/2019 2221 Gross per 24 hour Intake  Output 1850 ml Net -1850 ml PHYSICAL EXAM: 
General: WD, WN. Alert, cooperative, no acute distress   
EENT:  EOMI. Anicteric sclerae. MMM Resp:  Mild coarse breath sounds at bases, no wheezing or rales. No accessory muscle use CV:  Regular  Rhythm with normal rate,  No edema GI:  Soft, Non distended, Non tender.  +Bowel sounds Neurologic:  Alert and not able to assess, Patient will only say yes Psych:   No insight. Not anxious nor agitated Skin:  No rashes. No jaundice Reviewed most current lab test results and cultures  YES Reviewed most current radiology test results   YES Review and summation of old records today    NO Reviewed patient's current orders and MAR    YES 
PMH/SH reviewed - no change compared to H&P 
________________________________________________________________________ Care Plan discussed with: 
  Comments Patient x Family  x Message left for Rhoda Vicente RN x Care Manager Consultant Multidiciplinary team rounds were held today with , nursing, pharmacist and clinical coordinator. Patient's plan of care was discussed; medications were reviewed and discharge planning was addressed. ________________________________________________________________________ Total NON critical care TIME:  25   Minutes Total CRITICAL CARE TIME Spent:   Minutes non procedure based Comments >50% of visit spent in counseling and coordination of care    
________________________________________________________________________ Dianah Simmonds, MD  
 
Procedures: see electronic medical records for all procedures/Xrays and details which were not copied into this note but were reviewed prior to creation of Plan. LABS: 
I reviewed today's most current labs and imaging studies. Pertinent labs include: 
Recent Labs 01/03/19 
0599 01/02/19 
1238 WBC 3.7* 3.8* HGB 11.9* 12.6 HCT 35.1* 37.2  230 Recent Labs 01/03/19 
2668 01/02/19 
0570  140  
K 3.6 3.5  105 CO2 26 28 GLU 88 77 BUN 7 6 CREA 0.85 0.79 CA 8.3* 8.6 MG 1.8  --   
PHOS 3.3  --   
ALB 2.8* 3.1* TBILI 0.3 0.3 SGOT 29 30 ALT 26 29 Signed: Dianah Simmonds, MD

## 2019-01-05 LAB
ALBUMIN SERPL-MCNC: 3 G/DL (ref 3.5–5)
ALBUMIN/GLOB SERPL: 0.5 {RATIO} (ref 1.1–2.2)
ALP SERPL-CCNC: 140 U/L (ref 45–117)
ALT SERPL-CCNC: 36 U/L (ref 12–78)
ANION GAP SERPL CALC-SCNC: 9 MMOL/L (ref 5–15)
AST SERPL-CCNC: 32 U/L (ref 15–37)
BASOPHILS # BLD: 0 K/UL (ref 0–0.1)
BASOPHILS NFR BLD: 0 % (ref 0–1)
BILIRUB SERPL-MCNC: 0.2 MG/DL (ref 0.2–1)
BUN SERPL-MCNC: 8 MG/DL (ref 6–20)
BUN/CREAT SERPL: 12 (ref 12–20)
CALCIUM SERPL-MCNC: 8.4 MG/DL (ref 8.5–10.1)
CHLORIDE SERPL-SCNC: 105 MMOL/L (ref 97–108)
CO2 SERPL-SCNC: 26 MMOL/L (ref 21–32)
CREAT SERPL-MCNC: 0.67 MG/DL (ref 0.7–1.3)
DIFFERENTIAL METHOD BLD: ABNORMAL
EOSINOPHIL # BLD: 0.1 K/UL (ref 0–0.4)
EOSINOPHIL NFR BLD: 1 % (ref 0–7)
ERYTHROCYTE [DISTWIDTH] IN BLOOD BY AUTOMATED COUNT: 13 % (ref 11.5–14.5)
GLOBULIN SER CALC-MCNC: 5.8 G/DL (ref 2–4)
GLUCOSE SERPL-MCNC: 119 MG/DL (ref 65–100)
HCT VFR BLD AUTO: 36.7 % (ref 36.6–50.3)
HGB BLD-MCNC: 12.5 G/DL (ref 12.1–17)
IMM GRANULOCYTES # BLD: 0 K/UL (ref 0–0.04)
IMM GRANULOCYTES NFR BLD AUTO: 0 % (ref 0–0.5)
LYMPHOCYTES # BLD: 0.3 K/UL (ref 0.8–3.5)
LYMPHOCYTES NFR BLD: 4 % (ref 12–49)
MAGNESIUM SERPL-MCNC: 1.6 MG/DL (ref 1.6–2.4)
MCH RBC QN AUTO: 34.2 PG (ref 26–34)
MCHC RBC AUTO-ENTMCNC: 34.1 G/DL (ref 30–36.5)
MCV RBC AUTO: 100.3 FL (ref 80–99)
MONOCYTES # BLD: 0.5 K/UL (ref 0–1)
MONOCYTES NFR BLD: 8 % (ref 5–13)
NEUTS SEG # BLD: 5.9 K/UL (ref 1.8–8)
NEUTS SEG NFR BLD: 87 % (ref 32–75)
NRBC # BLD: 0 K/UL (ref 0–0.01)
NRBC BLD-RTO: 0 PER 100 WBC
PHOSPHATE SERPL-MCNC: 3.3 MG/DL (ref 2.6–4.7)
PLATELET # BLD AUTO: 211 K/UL (ref 150–400)
PMV BLD AUTO: 9.1 FL (ref 8.9–12.9)
POTASSIUM SERPL-SCNC: 3.6 MMOL/L (ref 3.5–5.1)
PROT SERPL-MCNC: 8.8 G/DL (ref 6.4–8.2)
RBC # BLD AUTO: 3.66 M/UL (ref 4.1–5.7)
RBC MORPH BLD: ABNORMAL
SODIUM SERPL-SCNC: 140 MMOL/L (ref 136–145)
WBC # BLD AUTO: 6.8 K/UL (ref 4.1–11.1)

## 2019-01-05 PROCEDURE — 74011250636 HC RX REV CODE- 250/636: Performed by: INTERNAL MEDICINE

## 2019-01-05 PROCEDURE — 83735 ASSAY OF MAGNESIUM: CPT

## 2019-01-05 PROCEDURE — 80053 COMPREHEN METABOLIC PANEL: CPT

## 2019-01-05 PROCEDURE — 74011250637 HC RX REV CODE- 250/637: Performed by: INTERNAL MEDICINE

## 2019-01-05 PROCEDURE — 36415 COLL VENOUS BLD VENIPUNCTURE: CPT

## 2019-01-05 PROCEDURE — 84100 ASSAY OF PHOSPHORUS: CPT

## 2019-01-05 PROCEDURE — 65660000000 HC RM CCU STEPDOWN

## 2019-01-05 PROCEDURE — 85025 COMPLETE CBC W/AUTO DIFF WBC: CPT

## 2019-01-05 RX ADMIN — AMOXICILLIN AND CLAVULANATE POTASSIUM 1 TABLET: 875; 125 TABLET, FILM COATED ORAL at 08:08

## 2019-01-05 RX ADMIN — PHENYTOIN 100 MG: 125 SUSPENSION ORAL at 08:07

## 2019-01-05 RX ADMIN — LACOSAMIDE 150 MG: 100 TABLET, FILM COATED ORAL at 08:08

## 2019-01-05 RX ADMIN — Medication 10 ML: at 06:47

## 2019-01-05 RX ADMIN — PHENYTOIN 100 MG: 125 SUSPENSION ORAL at 17:43

## 2019-01-05 RX ADMIN — LEVETIRACETAM 1500 MG: 100 SOLUTION ORAL at 08:07

## 2019-01-05 RX ADMIN — LACOSAMIDE 150 MG: 100 TABLET, FILM COATED ORAL at 17:43

## 2019-01-05 RX ADMIN — ONDANSETRON 4 MG: 2 INJECTION INTRAMUSCULAR; INTRAVENOUS at 19:25

## 2019-01-05 RX ADMIN — LEVOTHYROXINE SODIUM 50 MCG: 50 TABLET ORAL at 06:47

## 2019-01-05 RX ADMIN — AMOXICILLIN AND CLAVULANATE POTASSIUM 1 TABLET: 875; 125 TABLET, FILM COATED ORAL at 22:09

## 2019-01-05 RX ADMIN — Medication 10 ML: at 17:44

## 2019-01-05 RX ADMIN — Medication 10 ML: at 19:25

## 2019-01-05 RX ADMIN — PANTOPRAZOLE SODIUM 40 MG: 40 TABLET, DELAYED RELEASE ORAL at 08:08

## 2019-01-05 RX ADMIN — LEVETIRACETAM 1500 MG: 100 SOLUTION ORAL at 17:43

## 2019-01-05 NOTE — PROGRESS NOTES
Patient agitated and combative when attempting arterial stick for labs and Lovenox administration. Notified MD. Cherry Walters to hold Lovenox injection and reattempt lab draw when family/visitors arrive.

## 2019-01-05 NOTE — PROGRESS NOTES
Bedside and Verbal shift change report given to [address] (oncoming nurse) by JT Astudillo RN (offgoing nurse). Report given with SBAR, Kardex, Intake/Output, MAR and Recent Results.

## 2019-01-05 NOTE — PROGRESS NOTES
Hospitalist Progress Note NAME: Lue Leventhal :  1965 MRN:  [id number] Assessment / Plan: 
Aspiration Pneumonia (sepsis on 1/3 with Leukopenia and HR >90 today) and Esophageal Dysmotility, ?ruled out: CT Chest showed: \"Patchy bilateral lower lobe airspace disease may represent atelectasis, pneumonia, or aspiration. Radiographic follow-up is recommended to assure complete resolution. Mildly dilated, fluid-filled esophagus, suggestive of esophageal dysmotility or gastroesophageal reflux. Small pericardial effusion. \" 
-since patient clinically improving I transitioned him from Zosyn to Augmentin on  -patient worked with Speech Therapy and is on Pureed/Thins per their recommendations 
-1/3 Barium Esophagram: \"IMPRESSION: Probably normal motility for limited study. \" 
-GI consult, evaluation noted -Given Barium esophagram results I have ordered a MBS; will happen on  
-continue PPI 
-TTE ordered since pericardial effusion noted on CT Chest and ruled out effusion and ruled out pericardia effusion (TTE: \"SUMMARY: Left ventricle: Systolic function was normal. Ejection fraction was estimated in the range of 55 % to 60 %. There were no regional wall motion abnormalities. \") 
-Family/Caretaker just got here (5:50 pm) thus labs ordered Acute Metabolic encephalopathy POA with altered responsiveness with baseline Down Syndrome; resolved per Caregivers assessment on 1/3 
-B12 and folate wnl SAVITA 
-Unable to tolerate CPAP in the past  
-Care giver and brother very optimistic with newer mask, he will be able to tolerate and has plan for another sleep study 
  
Seizure DO 
-EEG without seizures,  
-Neuro assistance appreciated 
-continue home Vimpat, Keppra and Dilantin 
  
Hypothyroidism -TSH wnl at 1.35, continue home dose of Levothyroxine 
  
Code Status: Full per Brother Surrogate Decision Maker: brother Sumi Stager is his legal guardian; phone # [phone] 
  
DVT Prophylaxis: Lovenox 
  
 Baseline: lives at group home; possible discharge tomorrow pending clinical course Subjective: Chief Complaint / Reason for Physician Visit: follow-up AMS and PNA Patient seen for follow-up Case discussed with RN Unable to get labs as patient resistant Brother, Ruben Klein, called and updated Review of Systems: 
Symptom Y/N Comments  Symptom Y/N Comments Fever/Chills    Chest Pain Poor Appetite    Edema Cough    Abdominal Pain Sputum    Joint Pain SOB/MARK    Pruritis/Rash Nausea/vomit    Tolerating PT/OT Diarrhea    Tolerating Diet Constipation    Other Could NOT obtain due to: Down's Syndrome Objective: VITALS:  
Last 24hrs VS reviewed since prior progress note. Most recent are: 
Patient Vitals for the past 24 hrs: 
 Temp Pulse Resp BP SpO2  
01/05/19 1147 98.3 °F (36.8 °C) 84 18 130/67 100 % 01/05/19 0729 97.7 °F (36.5 °C) 79 16 126/67 98 % 01/05/19 0318 97.3 °F (36.3 °C) 84 18 125/69 100 % 01/04/19 2357 98.6 °F (37 °C) 91 18 128/75 98 % 01/04/19 2014 97 °F (36.1 °C) 90 15 124/40 93 % 01/04/19 1524 98.6 °F (37 °C) 87 14 111/68 100 % 01/04/19 1208 98 °F (36.7 °C) 89 18 119/87 100 % Intake/Output Summary (Last 24 hours) at 1/5/2019 1157 Last data filed at 1/5/2019 1045 Gross per 24 hour Intake 300 ml Output 2000 ml Net -1700 ml PHYSICAL EXAM: 
General: WD, WN. Alert, cooperative, no acute distress   
EENT:  EOMI. Anicteric sclerae. MMM Resp:  Mild coarse breath sounds at bases, no wheezing or rales. No accessory muscle use CV:  Regular  Rhythm with normal rate,  No edema GI:  Soft, Non distended, Non tender.  +Bowel sounds Neurologic:  Alert and not able to assess, Patient will only say yes Psych:   No insight. Not anxious nor agitated Skin:  No rashes. No jaundice Reviewed most current lab test results and cultures  YES Reviewed most current radiology test results   YES 
 Review and summation of old records today    NO Reviewed patient's current orders and MAR    YES 
PMH/SH reviewed - no change compared to H&P 
________________________________________________________________________ Care Plan discussed with: 
  Comments Patient x Family  x Margarita Tadeo RN x Care Manager Consultant Multidiciplinary team rounds were held today with , nursing, pharmacist and clinical coordinator. Patient's plan of care was discussed; medications were reviewed and discharge planning was addressed. ________________________________________________________________________ Total NON critical care TIME:  30   Minutes Total CRITICAL CARE TIME Spent:   Minutes non procedure based Comments >50% of visit spent in counseling and coordination of care    
________________________________________________________________________ Ilya Beltre MD  
 
Procedures: see electronic medical records for all procedures/Xrays and details which were not copied into this note but were reviewed prior to creation of Plan. LABS: 
I reviewed today's most current labs and imaging studies. Pertinent labs include: 
Recent Labs 01/03/19 
6035 WBC 3.7* HGB 11.9*  
HCT 35.1*  
 Recent Labs 01/03/19 
7194   
K 3.6  CO2 26 GLU 88 BUN 7  
CREA 0.85 CA 8.3*  
MG 1.8 PHOS 3.3 ALB 2.8* TBILI 0.3 SGOT 29 ALT 26  
 
 
Signed: Ilya Beltre MD

## 2019-01-06 LAB
BACTERIA SPEC CULT: NORMAL
SERVICE CMNT-IMP: NORMAL

## 2019-01-06 PROCEDURE — 74011250637 HC RX REV CODE- 250/637: Performed by: INTERNAL MEDICINE

## 2019-01-06 PROCEDURE — 65660000000 HC RM CCU STEPDOWN

## 2019-01-06 PROCEDURE — 74011250636 HC RX REV CODE- 250/636: Performed by: INTERNAL MEDICINE

## 2019-01-06 RX ORDER — LANOLIN ALCOHOL/MO/W.PET/CERES
400 CREAM (GRAM) TOPICAL 2 TIMES DAILY
Status: DISCONTINUED | OUTPATIENT
Start: 2019-01-06 | End: 2019-01-07 | Stop reason: HOSPADM

## 2019-01-06 RX ADMIN — LACOSAMIDE 150 MG: 100 TABLET, FILM COATED ORAL at 08:54

## 2019-01-06 RX ADMIN — PHENYTOIN 100 MG: 125 SUSPENSION ORAL at 17:17

## 2019-01-06 RX ADMIN — Medication 400 MG: at 08:54

## 2019-01-06 RX ADMIN — AMOXICILLIN AND CLAVULANATE POTASSIUM 1 TABLET: 875; 125 TABLET, FILM COATED ORAL at 20:46

## 2019-01-06 RX ADMIN — PHENYTOIN 100 MG: 125 SUSPENSION ORAL at 08:54

## 2019-01-06 RX ADMIN — Medication 10 ML: at 05:50

## 2019-01-06 RX ADMIN — LEVETIRACETAM 1500 MG: 100 SOLUTION ORAL at 17:17

## 2019-01-06 RX ADMIN — LACOSAMIDE 150 MG: 100 TABLET, FILM COATED ORAL at 17:17

## 2019-01-06 RX ADMIN — LEVOTHYROXINE SODIUM 50 MCG: 50 TABLET ORAL at 05:49

## 2019-01-06 RX ADMIN — AMOXICILLIN AND CLAVULANATE POTASSIUM 1 TABLET: 875; 125 TABLET, FILM COATED ORAL at 08:54

## 2019-01-06 RX ADMIN — ENOXAPARIN SODIUM 40 MG: 40 INJECTION SUBCUTANEOUS at 08:54

## 2019-01-06 RX ADMIN — Medication 400 MG: at 17:17

## 2019-01-06 RX ADMIN — LEVETIRACETAM 1500 MG: 100 SOLUTION ORAL at 08:54

## 2019-01-06 RX ADMIN — PANTOPRAZOLE SODIUM 40 MG: 40 TABLET, DELAYED RELEASE ORAL at 08:12

## 2019-01-06 NOTE — PROGRESS NOTES
Hospitalist Progress Note NAME: Gearold Phoenix :  1965 MRN:  [id number] Assessment / Plan: 
Aspiration Pneumonia (sepsis on 1/3 with Leukopenia and HR >90 today) and Esophageal Dysmotility, ?ruled out: CT Chest showed: \"Patchy bilateral lower lobe airspace disease may represent atelectasis, pneumonia, or aspiration. Radiographic follow-up is recommended to assure complete resolution. Mildly dilated, fluid-filled esophagus, suggestive of esophageal dysmotility or gastroesophageal reflux. Small pericardial effusion. \" 
-since patient clinically improving I transitioned him from Zosyn to Augmentin on  -patient worked with Speech Therapy and is on Pureed/Thins per their recommendations 
-1/3 Barium Esophagram: \"IMPRESSION: Probably normal motility for limited study. \" 
-GI consult, evaluation noted -Given Barium esophagram results I have ordered a MBS; will happen on  
-continue PPI 
-TTE ordered since pericardial effusion noted on CT Chest and ruled out effusion and ruled out pericardia effusion (TTE: \"SUMMARY: Left ventricle: Systolic function was normal. Ejection fraction was estimated in the range of 55 % to 60 %. There were no regional wall motion abnormalities. \") 
-Leukopenia resolved per evening labs on  Acute Metabolic encephalopathy POA with altered responsiveness with baseline Down Syndrome; resolved per Caregivers assessment on 1/3 
-B12 and folate wnl SAVITA 
-Unable to tolerate CPAP in the past  
-Care giver and brother very optimistic with newer mask, he will be able to tolerate and has plan for another sleep study 
  
Seizure DO 
-EEG without seizures,  
-Neuro assistance appreciated 
-continue home Vimpat, Keppra and Dilantin 
  
Hypothyroidism -TSH wnl at 1.35, continue home dose of Levothyroxine 
  
Low Magnesium: PO magnesium started BID since magnesium 1.6 on evening of  Code Status: Full per Brother Surrogate Decision Maker: brother Crystal Saucedo is his legal guardian; phone # [phone] 
  
DVT Prophylaxis: Lovenox 
  
Baseline: lives at group home; possible discharge tomorrow pending clinical course Subjective: Chief Complaint / Reason for Physician Visit: follow-up AMS and PNA Patient seen for follow-up Case discussed with RN Review of Systems: 
Symptom Y/N Comments  Symptom Y/N Comments Fever/Chills    Chest Pain Poor Appetite    Edema Cough    Abdominal Pain Sputum    Joint Pain SOB/MARK    Pruritis/Rash Nausea/vomit    Tolerating PT/OT Diarrhea    Tolerating Diet Constipation    Other Could NOT obtain due to: Down's Syndrome Objective: VITALS:  
Last 24hrs VS reviewed since prior progress note. Most recent are: 
Patient Vitals for the past 24 hrs: 
 Temp Pulse Resp BP SpO2  
01/06/19 0732 98 °F (36.7 °C) 82 18 114/72 100 % 01/06/19 0323 98.2 °F (36.8 °C) 92 20 112/61 100 % 01/05/19 2324 97.2 °F (36.2 °C) 92 18 117/66 99 % 01/05/19 1944 98 °F (36.7 °C)      
01/05/19 1941 100.2 °F (37.9 °C) (!) 105 18 107/76 100 % 01/05/19 1515 98.3 °F (36.8 °C) 86 18 132/67 97 % 01/05/19 1147 98.3 °F (36.8 °C) 84 18 130/67 100 % Intake/Output Summary (Last 24 hours) at 1/6/2019 1022 Last data filed at 1/6/2019 8872 Gross per 24 hour Intake 300 ml Output 1100 ml Net -800 ml PHYSICAL EXAM: 
General: WD, WN. Alert, cooperative, no acute distress   
EENT:  EOMI. Anicteric sclerae. MMM Resp:  Mild coarse breath sounds at bases, no wheezing or rales. No accessory muscle use CV:  Regular  Rhythm with normal rate,  No edema GI:  Soft, Non distended, Non tender.  +Bowel sounds Neurologic:  Alert and not able to assess, Patient will only say yes Psych:   No insight. Not anxious nor agitated Skin:  No rashes. No jaundice Reviewed most current lab test results and cultures  YES Reviewed most current radiology test results   YES 
 Review and summation of old records today    NO Reviewed patient's current orders and MAR    YES 
PMH/SH reviewed - no change compared to H&P 
________________________________________________________________________ Care Plan discussed with: 
  Comments Patient x Family   Family last updated on 1/5 RN x Care Manager Consultant Multidiciplinary team rounds were held today with , nursing, pharmacist and clinical coordinator. Patient's plan of care was discussed; medications were reviewed and discharge planning was addressed. ________________________________________________________________________ Total NON critical care TIME:  20   Minutes Total CRITICAL CARE TIME Spent:   Minutes non procedure based Comments >50% of visit spent in counseling and coordination of care    
________________________________________________________________________ Alex Hoskins MD  
 
Procedures: see electronic medical records for all procedures/Xrays and details which were not copied into this note but were reviewed prior to creation of Plan. LABS: 
I reviewed today's most current labs and imaging studies. Pertinent labs include: 
Recent Labs 01/05/19 
1806 WBC 6.8 HGB 12.5 HCT 36.7  Recent Labs 01/05/19 
1806   
K 3.6  CO2 26 * BUN 8  
CREA 0.67* CA 8.4* MG 1.6 PHOS 3.3 ALB 3.0* TBILI 0.2 SGOT 32 ALT 36  
 
 
Signed: Alex Hoskins MD

## 2019-01-06 NOTE — ROUTINE PROCESS
Bedside and Verbal shift change report given to [address] (oncoming nurse) by [employer] (offgoing nurse).  Report included the following information SBAR, Intake/Output and Cardiac Rhythm SR.

## 2019-01-06 NOTE — ROUTINE PROCESS
.. 
* No surgery found * 
* No surgery found * Bedside and Verbal shift change report given to [address] [place] [address],[address] (oncoming nurse) by Chalo Couch (offgoing nurse). Report included the following information SBAR, Kardex, MAR, Recent Results and Cardiac Rhythm SR. Zone Phone:   [phone] Significant changes during shift:  Nauseated vomitted 100cc during dinner given zofran x 1, had a large soft brown stool tonight Patient Information Aneta Reasons 48 y.o. 
12/31/2018 12:43 PM by Topher Ashby MD. Aneta Reasons was admitted from Adult/Group Home 
 
Problem List 
 
Patient Active Problem List  
 Diagnosis Date Noted  Acquired hypothyroidism 04/12/2018  Acute encephalopathy 03/23/2018  Complex partial seizure evolving to generalized seizure ([address] 03/23/2018  Down syndrome 03/20/2018  Pneumonia 02/03/2018  Low vitamin D level 01/11/2018  Blind left eye 01/11/2018  ACP (advance care planning) 11/09/2016  S/P placement of VNS (vagus nerve stimulation) device 04/10/2015  Seizure ([address] 11/22/2013  Partial epilepsy with impairment of consciousness ([address] 01/21/2013  Ataxia 01/21/2013  Memory loss 01/21/2013  Recurrent seizures ([address] 08/27/2012 Past Medical History:  
Diagnosis Date  Anemia  Anxiety  Blindness of left eye  Cerebral palsy ([address]  Dementia  Down syndrome  Endocrine disease   
 thyroid disorder  GERD (gastroesophageal reflux disease)  Ill-defined condition   
 blind in left eye  Ill-defined condition   
 imparied gait - uses walker  Ill-defined condition   
 dermatitis  Ill-defined condition   
 cerebral palsy  Ill-defined condition   
 prone to decubitus ulcers  Ill-defined condition   
 anemia  Neurological disorder  Other ill-defined conditions(799.89) Down syndrome  Psychiatric disorder   
 mental retardation  Psychiatric disorder   
 anxiety disorder  Seizures ([address]  Sleep apnea  Thyroid disease  Unspecified epilepsy without mention of intractable epilepsy Activity Status: OOB to Chair Yes Ambulated this shift No  
Bed Rest Yes Supplemental O2: (If Applicable) NC No 
NRB No 
Venti-mask No 
On 0 Liters/min LINES AND DRAINS:piv 20g rac DVT prophylaxis: 
 
  Patient Safety: 
 
Falls Score Total Score: 4 Safety Level_______ Bed Alarm On? Yes Sitter? No 
 
Plan for upcoming shift: modified swallow study in am 
 
 
 
Discharge Plan: Yes tbd Active Consults: 
IP CONSULT TO NEUROLOGY 
IP CONSULT TO GASTROENTEROLOGY

## 2019-01-06 NOTE — ROUTINE PROCESS
Bedside and Verbal shift change report given to Vania Zhu (oncoming nurse) by Austyn Reagan RN (offgoing nurse). Report included the following information SBAR, Kardex, MAR, Recent Results and Cardiac Rhythm SR. 
  
Zone Phone:   [phone] 
  
  
Significant changes during shift:  none 
  
  
  
Patient Information 
  
Era Loss 48 y.o. 
12/31/2018 12:43 PM by Hazel Abbasi MD. Era Loss was admitted from Adult/Group Home 
  
Problem List 
  
    
Patient Active Problem List  
  Diagnosis Date Noted  Acquired hypothyroidism 04/12/2018  Acute encephalopathy 03/23/2018  Complex partial seizure evolving to generalized seizure ([address] 03/23/2018  Down syndrome 03/20/2018  Pneumonia 02/03/2018  Low vitamin D level 01/11/2018  Blind left eye 01/11/2018  ACP (advance care planning) 11/09/2016  S/P placement of VNS (vagus nerve stimulation) device 04/10/2015  Seizure ([address] 11/22/2013  Partial epilepsy with impairment of consciousness ([address] 01/21/2013  Ataxia 01/21/2013  Memory loss 01/21/2013  Recurrent seizures ([address] 08/27/2012  
  
    
Past Medical History:  
Diagnosis Date  Anemia    
 Anxiety    
 Blindness of left eye    
 Cerebral palsy (HCC)    
 Dementia    
 Down syndrome    
 Endocrine disease    
  thyroid disorder  GERD (gastroesophageal reflux disease)    
 Ill-defined condition    
  blind in left eye  Ill-defined condition    
  imparied gait - uses walker  Ill-defined condition    
  dermatitis  Ill-defined condition    
  cerebral palsy  Ill-defined condition    
  prone to decubitus ulcers  Ill-defined condition    
  anemia  Neurological disorder    
 Other ill-defined conditions(799.89)    
  Down syndrome  Psychiatric disorder    
  mental retardation  Psychiatric disorder    
  anxiety disorder  Seizures ([address]    
 Sleep apnea    
 Thyroid disease    
 Unspecified epilepsy without mention of intractable epilepsy    
   
  
  
   
Activity Status: 
  
OOB to Chair Yes Ambulated this shift No  
Bed Rest Yes 
  
Supplemental O2: (If Applicable) 
  
NC No 
NRB No 
Venti-mask No 
On 0 Liters/min 
  
  
LINES AND DRAINS:piv 20g rac 
  
   
  
  
  
DVT prophylaxis: 
  
  Patient Safety: 
  
Falls Score Total Score: 4 Safety Level_______ Bed Alarm On? Yes Sitter? No 
  
Plan for upcoming shift: modified swallow study in am 
  
  
  
Discharge Plan: Yes tbd 
  
Active Consults: 
IP CONSULT TO NEUROLOGY 
IP CONSULT TO GASTROENTEROLOGY

## 2019-01-07 ENCOUNTER — APPOINTMENT (OUTPATIENT)
Dept: GENERAL RADIOLOGY | Age: 54
DRG: 871 | End: 2019-01-07
Attending: INTERNAL MEDICINE
Payer: MEDICARE

## 2019-01-07 ENCOUNTER — HOME HEALTH ADMISSION (OUTPATIENT)
Dept: HOME HEALTH SERVICES | Facility: HOME HEALTH | Age: 54
End: 2019-01-07

## 2019-01-07 VITALS
BODY MASS INDEX: 25.59 KG/M2 | WEIGHT: 149.91 LBS | HEART RATE: 73 BPM | OXYGEN SATURATION: 100 % | SYSTOLIC BLOOD PRESSURE: 120 MMHG | DIASTOLIC BLOOD PRESSURE: 72 MMHG | HEIGHT: 64 IN | TEMPERATURE: 98.1 F | RESPIRATION RATE: 20 BRPM

## 2019-01-07 PROCEDURE — 74011250636 HC RX REV CODE- 250/636: Performed by: INTERNAL MEDICINE

## 2019-01-07 PROCEDURE — 74011250637 HC RX REV CODE- 250/637: Performed by: INTERNAL MEDICINE

## 2019-01-07 PROCEDURE — 74230 X-RAY XM SWLNG FUNCJ C+: CPT

## 2019-01-07 PROCEDURE — 92611 MOTION FLUOROSCOPY/SWALLOW: CPT

## 2019-01-07 RX ORDER — PANTOPRAZOLE SODIUM 40 MG/1
40 TABLET, DELAYED RELEASE ORAL
Qty: 30 TAB | Refills: 2 | Status: SHIPPED | OUTPATIENT
Start: 2019-01-08 | End: 2019-05-24 | Stop reason: SDUPTHER

## 2019-01-07 RX ORDER — ACETAMINOPHEN 325 MG/1
650 TABLET ORAL
Qty: 60 TAB | Refills: 0 | Status: SHIPPED
Start: 2019-01-07

## 2019-01-07 RX ORDER — AMOXICILLIN AND CLAVULANATE POTASSIUM 875; 125 MG/1; MG/1
1 TABLET, FILM COATED ORAL EVERY 12 HOURS
Qty: 4 TAB | Refills: 0 | Status: SHIPPED | OUTPATIENT
Start: 2019-01-07 | End: 2019-01-09

## 2019-01-07 RX ORDER — ONDANSETRON 4 MG/1
4 TABLET, ORALLY DISINTEGRATING ORAL
Status: DISCONTINUED | OUTPATIENT
Start: 2019-01-07 | End: 2019-01-07 | Stop reason: HOSPADM

## 2019-01-07 RX ADMIN — AMOXICILLIN AND CLAVULANATE POTASSIUM 1 TABLET: 875; 125 TABLET, FILM COATED ORAL at 09:18

## 2019-01-07 RX ADMIN — LEVETIRACETAM 1500 MG: 100 SOLUTION ORAL at 09:18

## 2019-01-07 RX ADMIN — LACOSAMIDE 150 MG: 100 TABLET, FILM COATED ORAL at 09:18

## 2019-01-07 RX ADMIN — LEVOTHYROXINE SODIUM 50 MCG: 50 TABLET ORAL at 06:40

## 2019-01-07 RX ADMIN — ONDANSETRON 4 MG: 4 TABLET, ORALLY DISINTEGRATING ORAL at 09:37

## 2019-01-07 RX ADMIN — PHENYTOIN 100 MG: 125 SUSPENSION ORAL at 09:18

## 2019-01-07 RX ADMIN — Medication 400 MG: at 09:18

## 2019-01-07 RX ADMIN — ENOXAPARIN SODIUM 40 MG: 40 INJECTION SUBCUTANEOUS at 09:18

## 2019-01-07 RX ADMIN — PANTOPRAZOLE SODIUM 40 MG: 40 TABLET, DELAYED RELEASE ORAL at 08:34

## 2019-01-07 NOTE — PROGRESS NOTES
PCP IVAN appt scheduled with Dr. Salvador Brown on 1/15/2019 at 12:00pm. Appt added to AVS. Janel Turk CM Specialist

## 2019-01-07 NOTE — PROGRESS NOTES
Patient is being discharged back to the group home today. Per [place] her  Benjie Amador is here and she will let him know to transport him. Spoke with patient's brother and he is aware patient will be discharging today. Attempted to call the brother's wife as he had asked however no answer. [facility] will follow patient for hospital to home visit.

## 2019-01-07 NOTE — ROUTINE PROCESS
Bedside and Verbal shift change report given to [address] [facility] (oncoming nurse) by [address] (offgoing nurse). Report included the following information SBAR, Kardex, MAR, Recent Results and Cardiac Rhythm SR. 
  
Zone Phone:   [phone]  
  
Significant changes during shift:   large BM x 1  
  
  
Patient Information 
  
Stella Chapa 48 y.o. 
12/31/2018 12:43 PM by Cher Claire MD. Stella Chapa was admitted from Adult/Group Home 
  
Problem List 
  
    
Patient Active Problem List  
  Diagnosis Date Noted  Acquired hypothyroidism 04/12/2018  Acute encephalopathy 03/23/2018  Complex partial seizure evolving to generalized seizure ([address] 03/23/2018  Down syndrome 03/20/2018  Pneumonia 02/03/2018  Low vitamin D level 01/11/2018  Blind left eye 01/11/2018  ACP (advance care planning) 11/09/2016  S/P placement of VNS (vagus nerve stimulation) device 04/10/2015  Seizure ([address] 11/22/2013  Partial epilepsy with impairment of consciousness ([address] 01/21/2013  Ataxia 01/21/2013  Memory loss 01/21/2013  Recurrent seizures ([address] 08/27/2012  
  
    
Past Medical History:  
Diagnosis Date  Anemia    
 Anxiety    
 Blindness of left eye    
 Cerebral palsy (HCC)    
 Dementia    
 Down syndrome    
 Endocrine disease    
  thyroid disorder  GERD (gastroesophageal reflux disease)    
 Ill-defined condition    
  blind in left eye  Ill-defined condition    
  imparied gait - uses walker  Ill-defined condition    
  dermatitis  Ill-defined condition    
  cerebral palsy  Ill-defined condition    
  prone to decubitus ulcers  Ill-defined condition    
  anemia  Neurological disorder    
 Other ill-defined conditions(799.89)    
  Down syndrome  Psychiatric disorder    
  mental retardation  Psychiatric disorder    
  anxiety disorder  Seizures ([address]    
 Sleep apnea    
 Thyroid disease    
 Unspecified epilepsy without mention of intractable epilepsy    
   
  
  
   
Activity Status: 
  
OOB to Chair Yes Ambulated this shift No  
Bed Rest Yes 
  
Supplemental O2: (If Applicable) 
  
NC No 
NRB No 
Venti-mask No 
On 0 Liters/min 
  
  
LINES AND DRAINS:piv 20g rac 
  
   
  
  
  
DVT prophylaxis: 
  
  Patient Safety: 
  
Falls Score Total Score: 4 Safety Level_______ Bed Alarm On? Yes Sitter?  No 
  
Plan for upcoming shift: modified swallow study in am 
  
  
  
Discharge Plan: Yes tbd 
  
Active Consults: 
IP CONSULT TO NEUROLOGY 
IP CONSULT TO GASTROENTEROLOGY

## 2019-01-07 NOTE — PROGRESS NOTES
Problem: Dysphagia (Adult) Goal: *Acute Goals and Plan of Care (Insert Text) Speech pathology goals Initiated 1/2/2019 1. Patient will tolerate puree/thin liquid diet with no overt s/s aspiration within 7 days. MET 2. Patient will participate in MBS as clinically indicated. Discontinue. Goal met 1/7. Speech Pathology Modified barium swallow Study/discharge Patient: Camilo Russell (28 y.o. male) Date: 1/7/2019 Primary Diagnosis: Acute encephalopathy Precautions:     
 
ASSESSMENT : 
Based on the objective data described below, the patient presents with mild oropharyngeal dysphagia. Orally he had piecemeal deglutition with the cracker but mastication was timely. Mildly reduced laryngeal elevation with min pyriform sinus with liquids and purees. No laryngeal penetration or aspiration. He had min backflow of barium into the pyriform sinus after the swallow. Concern that he may be backflowing or refluxing from the esophagus into the pharynx with potential aspiration (LPR). Further skilled acute therapy provided by a speech-language pathologist is not indicated at this time. PLAN : 
Recommendations and Planned Interventions: 
Purees and thins Can have solids if staff checks for pocketing. One to two solid items  could be used at each meal with the bulk of his meal being pureed if he takes too long to masticate solids. Discharge Recommendations: None SUBJECTIVE:  
Patient verbalized but not intelligible. OBJECTIVE:  
 
Past Medical History:  
Diagnosis Date  Anemia  Anxiety  Blindness of left eye  Cerebral palsy ([address]  Dementia  Down syndrome  Endocrine disease   
 thyroid disorder  GERD (gastroesophageal reflux disease)  Ill-defined condition   
 blind in left eye  Ill-defined condition   
 imparied gait - uses walker  Ill-defined condition   
 dermatitis  Ill-defined condition   
 cerebral palsy  Ill-defined condition prone to decubitus ulcers  Ill-defined condition   
 anemia  Neurological disorder  Other ill-defined conditions(799.89) Down syndrome  Psychiatric disorder   
 mental retardation  Psychiatric disorder   
 anxiety disorder  Seizures ([address]  Sleep apnea  Thyroid disease  Unspecified epilepsy without mention of intractable epilepsy Past Surgical History:  
Procedure Laterality Date  HX OTHER SURGICAL  03/13/2015 VNS  Dr. Arin Mahmood. [facility]  @ [facility] Prior Level of Function/Home Situation: purees and thins via straw; pocketed solids and had extremely lengthy mastication time with solids Home Situation Home Environment: Group home One/Two Story Residence: One story Living Alone: No 
Support Systems: Home care staff Patient Expects to be Discharged to[de-identified] Group home Current DME Used/Available at Home: Wheelchair, Yomaira Ophir, rolling(transport chair) Tub or Shower Type: Tub/Shower combination Diet prior to admission:  
Current Diet:    
Radiologist: Dr. Dennis Epps Film Views: Lateral;Fluoro Patient Position: upright in transmotion chair Trial 1:  
Consistency Presented: Thin liquid;Puree; Solid How Presented: Self-fed/presented;Straw;Successive swallows;SLP-fed/presented;Spoon Bolus Acceptance: No impairment Bolus Formation/Control: Impaired: Piecemeal  
Propulsion: No impairment Oral Residue: None Initiation of Swallow: Triggered at base of tongue;Triggered at vallecula Timing: Pooling 1-5 sec; No impairment Penetration: None Aspiration/Timing: No evidence of aspiration Pharyngeal Clearance: Pyriform residue ; Less than 10% Decreased Tongue Base Retraction?: No 
Laryngeal Elevation: Reduced excursion with laryngeal vestibule gap Aspiration/Penetration Score: 1 (No penetration or aspiration-Contrast does not enter the airway) Pharyngeal Symmetry: Not assessed Pharyngeal-Esophageal Segment: Suspected esophageal dysphagia; Other (comment)(min backflow once from the esophagus to the pyriform sinus) Pharyngeal Dysfunction: Decreased elevation/closure Oral Phase Severity: Mild Pharyngeal Phase Severity: Mild NOMS:  
The NOMS functional outcome measure was used to quantify this patient's level of swallowing impairment. Based on the NOMS, the patient was determined to be at level 5 for swallow function NOMS Swallowing Levels: 
Level 1 (CN): NPO Level 2 (CM): NPO but takes consistency in therapy Level 3 (CL): Takes less than 50% of nutrition p.o. and continues with nonoral feedings; and/or safe with mod cues; and/or max diet restriction Level 4 (CK): Safe swallow but needs mod cues; and/or mod diet restriction; and/or still requires some nonoral feeding/supplements Level 5 (CJ): Safe swallow with min diet restriction; and/or needs min cues Level 6 (CI): Independent with p.o.; rare cues; usually self cues; may need to avoid some foods or needs extra time Level 7 (CH): Independent for all p.o. NATALIA. (2003). National Outcomes Measurement System (NOMS): Adult Speech-Language Pathology User's Guide. COMMUNICATION/EDUCATION:  
Pt cannot be educated re: swallowing. His caregiver, Muna Juarez was educated that he may do best with purees and thins. He pockets solids and also takes a very long time to masticate solids. The patients plan of care including findings from [facility], recommendations, and recommended diet changes were discussed with: Registered Nurse. 
[]   Posted safety precautions in patient's room. [x]   Patient/family have participated as able and agree with findings and recommendations. []   Patient is unable to participate in plan of care at this time. Thank you for this referral. 
Felix Carrillo, SLP Time Calculation: 20 mins

## 2019-01-07 NOTE — DISCHARGE INSTRUCTIONS
HOSPITALIST DISCHARGE INSTRUCTIONS    NAME: Harsh Canada   :  1965   MRN:  [id number]     Date/Time:  2019 2:51 PM    ADMIT DATE: 2018   DISCHARGE DATE: 2019     Attending Physician: Pk Morillo MD    DISCHARGE DIAGNOSIS:  Aspiration Pneumonia  Sepsis (Leukopenia and elevated heart rate, overall  Mild), resolved  Oropharyngeal Dysphagia without Aspiration on Modified Barium Swallow  Seizure Disorder  Hypothyroidism  Downs Syndrome    Medications: Per above medication reconciliation. Pain Management: per above medications    Recommended diet: Pureed Diet with thin liquids. Please allow adequate time for swallow. Alternate sips and bites. If Mr Severo Castaneda has a grimaced look on his face while eating, allow 5 minutes before encouraging another bite/sip. Please check oral cavity for residual food after Mr. Severo Castaneda has finished eating. Recommended activity: Activity as tolerated    Wound care: None    Indwelling devices:  None    Supplemental Oxygen: None    Required Lab work: None    Glucose management:  None    Code status: Full        Outside physician follow up: Follow-up Information     Follow up With Specialties Details Why Contact Info    Bipin Epstein MD [facility] Per Routine  [place] [address]  [phone]                 Skilled nursing facility/ SNF MD responsible for above on discharge. Information obtained by :  I understand that if any problems occur once I am at home I am to contact my physician. I understand and acknowledge receipt of the instructions indicated above.                                                                                                                                            Physician's or R.N.'s Signature                                                                  Date/Time Patient or Repres

## 2019-01-07 NOTE — PROGRESS NOTES
Hospitalist Progress Note NAME: Stella Chapa :  1965 MRN:  [id number] Assessment / Plan: 
Aspiration Pneumonia (sepsis on 1/3 with Leukopenia and HR >90 today) and Esophageal Dysmotility, ?ruled out: CT Chest showed: \"Patchy bilateral lower lobe airspace disease may represent atelectasis, pneumonia, or aspiration. Radiographic follow-up is recommended to assure complete resolution. Mildly dilated, fluid-filled esophagus, suggestive of esophageal dysmotility or gastroesophageal reflux. Small pericardial effusion. \" 
-since patient clinically improving I transitioned him from Zosyn to Augmentin on  -patient worked with Speech Therapy and is on Pureed/Thins per their recommendations 
-1/3 Barium Esophagram: \"IMPRESSION: Probably normal motility for limited study. \" 
-GI consult, evaluation noted -Given Barium esophagram results I have ordered a MBS; will happen today, once time is known will need to contact family so that patient can have family support at his side to ensure completion of the test 
-continue PPI 
-TTE ordered since pericardial effusion noted on CT Chest and ruled out effusion and ruled out pericardia effusion (TTE: \"SUMMARY: Left ventricle: Systolic function was normal. Ejection fraction was estimated in the range of 55 % to 60 %. There were no regional wall motion abnormalities. \") 
-Leukopenia resolved per evening labs on  Acute Metabolic encephalopathy POA with altered responsiveness with baseline Down Syndrome; resolved per Caregivers assessment on 1/3 
-B12 and folate wnl SAVITA 
-Unable to tolerate CPAP in the past  
-Caregiver and brother very optimistic with newer mask, he will be able to tolerate and has plan for another sleep study 
  
Seizure DO 
-EEG without seizures,  
-Neuro assistance appreciated 
-continue home Vimpat, Keppra and Dilantin 
  
Hypothyroidism -TSH wnl at 1.35, continue home dose of Levothyroxine 
  
 Low Magnesium: PO magnesium started BID since magnesium 1.6 on evening of 1/5 Code Status: Full per Brother Surrogate Decision Maker: brother Aniceto Higgins is his legal guardian; phone # [phone] 
  
DVT Prophylaxis: Lovenox 
  
Baseline: lives at group home; possible discharge tomorrow pending clinical course Subjective: Chief Complaint / Reason for Physician Visit: follow-up AMS and PNA Patient seen for follow-up Case discussed with RN Review of Systems: 
Symptom Y/N Comments  Symptom Y/N Comments Fever/Chills    Chest Pain Poor Appetite    Edema Cough    Abdominal Pain Sputum    Joint Pain SOB/MARK    Pruritis/Rash Nausea/vomit    Tolerating PT/OT Diarrhea    Tolerating Diet Constipation    Other Could NOT obtain due to: Down's Syndrome Objective: VITALS:  
Last 24hrs VS reviewed since prior progress note. Most recent are: 
Patient Vitals for the past 24 hrs: 
 Temp Pulse Resp BP SpO2  
01/07/19 0751 98.1 °F (36.7 °C) 79 20 130/72 99 % 01/07/19 0310 97.5 °F (36.4 °C) 60 20 131/64 95 % 01/06/19 2316 97.3 °F (36.3 °C) 71 20 133/72 100 % 01/06/19 1952 97.4 °F (36.3 °C) 72 20 139/75 100 % 01/06/19 1517 97.8 °F (36.6 °C) 80 18 110/79 100 % 01/06/19 1228 98.4 °F (36.9 °C) 82 18 118/72 100 % Intake/Output Summary (Last 24 hours) at 1/7/2019 0759 Last data filed at 1/6/2019 1902 Gross per 24 hour Intake 240 ml Output  Net 240 ml PHYSICAL EXAM: 
General: WD, WN. Alert, cooperative, no acute distress   
EENT:  EOMI. Anicteric sclerae. MMM Resp:  Mild coarse breath sounds at bases, no wheezing or rales. No accessory muscle use CV:  Regular  Rhythm with normal rate,  No edema GI:  Soft, Non distended, Non tender.  +Bowel sounds Neurologic:  Alert and not able to assess, Patient will only say yes Psych:   No insight. Not anxious nor agitated Skin:  No rashes. No jaundice Reviewed most current lab test results and cultures  YES Reviewed most current radiology test results   YES Review and summation of old records today    NO Reviewed patient's current orders and MAR    YES 
PMH/SH reviewed - no change compared to H&P 
________________________________________________________________________ Care Plan discussed with: 
  Comments Patient x Family   Will discuss with family after MBS results available RN x Care Manager Consultant  x Speech Therapy Multidiciplinary team rounds were held today with , nursing, pharmacist and clinical coordinator. Patient's plan of care was discussed; medications were reviewed and discharge planning was addressed. ________________________________________________________________________ Total NON critical care TIME:  20   Minutes Total CRITICAL CARE TIME Spent:   Minutes non procedure based Comments >50% of visit spent in counseling and coordination of care    
________________________________________________________________________ Alex Hoskins MD  
 
Procedures: see electronic medical records for all procedures/Xrays and details which were not copied into this note but were reviewed prior to creation of Plan. LABS: 
I reviewed today's most current labs and imaging studies. Pertinent labs include: 
Recent Labs 01/05/19 
1806 WBC 6.8 HGB 12.5 HCT 36.7  Recent Labs 01/05/19 
1806   
K 3.6  CO2 26 * BUN 8  
CREA 0.67* CA 8.4* MG 1.6 PHOS 3.3 ALB 3.0* TBILI 0.2 SGOT 32 ALT 36  
 
 
Signed: Alex Hoskins MD

## 2019-01-07 NOTE — PROGRESS NOTES
Speech path Pt will have an MBS to determine the safest diet for him and to better determine if he is aspirating and if that is contributing to his pneumonia.   
It is scheduled for 1300 today and his caregiver, Wilber Aguila will be here to ANDREW Obrien

## 2019-01-07 NOTE — PROGRESS NOTES
Spoke with Mendoza Mack with [facility] and when patient is medically stable for discharge they will transport him home.

## 2019-01-07 NOTE — DISCHARGE SUMMARY
Hospitalist Discharge Summary     Patient ID:  Tom Hoover  [id number]  10 y.o.  1965    PCP on record: Harry Adames MD    Admit date: 12/31/2018  Discharge date and time: 1/7/2019      DISCHARGE DIAGNOSIS:  Aspiration Pneumonia  Sepsis (Leukopenia and elevated heart rate), resolved  Oropharyngeal Dysphagia without Aspiration on Modified Barium Swallow  Seizure Disorder  Hypothyroidism  Downs Syndrome      CONSULTATIONS:  IP CONSULT TO NEUROLOGY  IP CONSULT TO GASTROENTEROLOGY    See HPI from H&P of Miryam Weinstein MD:    ______________________________________________________________________  DISCHARGE SUMMARY/HOSPITAL COURSE:  for full details see H&P, daily progress notes, labs, consult notes. Hospital course via problem below:  Aspiration Pneumonia (sepsis on 1/3 with Leukopenia and HR >90 today)  Esophageal Dysmotility-ruled out: CT Chest showed: \"Patchy bilateral lower lobe airspace disease may represent atelectasis, pneumonia, or aspiration. Radiographic follow-up is recommended to assure complete resolution. Mildly dilated, fluid-filled esophagus, suggestive of esophageal dysmotility or gastroesophageal reflux. Small pericardial effusion. \"  -since patient clinically improving I transitioned him from Zosyn to Augmentin on 1/4 -patient worked with Speech Therapy and is on Pureed/Thins per their recommendations; he is less likely to food hold pureed per my discussion with caregiver Jean Spatz  -1/3 Barium Esophagram: \"IMPRESSION: Probably normal motility for limited study. \"  -GI consult, evaluation noted  -Given Barium esophagram results I ordered a MBS-->showed \"IMPRESSION: Normal modified barium swallow. \"  -continue PPI  -TTE ordered since pericardial effusion noted on CT Chest and ruled out effusion and ruled out pericardial effusion (TTE: \"SUMMARY: Left ventricle: Systolic function was normal. Ejection fraction was estimated in the range of 55 % to 60 %.  There were no regional wall motion abnormalities. \")  -Leukopenia resolved per evening labs on 1/5  -please see initial speech therapy note for findings consistent with Oropharyngeal Dysmotility  -On discharge following recommendations given: \"Recommended diet: Pureed Diet with thin liquids. Please allow adequate time for swallow. Alternate sips and bites. If Mr Joaquina Sultana has a grimaced look on his face while eating, allow 5 minutes before encouraging another bite/sip. Please check oral cavity for residual food after Mr. Joaquina Sultana has finished eating. \"     Acute Metabolic encephalopathy POA with altered responsiveness with baseline Down Syndrome; resolved per Caregivers assessment on 1/3  -B12 and folate wnl     SAVITA  -Unable to tolerate CPAP in the past   -Care giver and brother very optimistic with newer mask, he will be able to tolerate and has plan for another sleep study     Seizure DO  -EEG without seizures,   -Neuro assistance appreciated  -continue home Vimpat, Keppra and Dilantin     Hypothyroidism  -TSH wnl at 1.35, continue home dose of Levothyroxine     Low Magnesium: received PO supplementation this am     Code Status: Full per Brother  Surrogate Decision Maker: brother Kike Diop is his legal guardian; phone # [phone]        _______________________________________________________________________  Patient seen and examined by me on discharge day. Pertinent Findings:  Gen:    Not in distress  Chest: Clear lungs on anterior and lateral assessment  CVS:   Regular rhythm. No edema  Abd:  Soft, not distended, not tender  Neuro:  Alert, only answers yes  _______________________________________________________________________  DISCHARGE MEDICATIONS:   Current Discharge Medication List      START taking these medications    Details   acetaminophen (TYLENOL) 325 mg tablet Take 2 Tabs by mouth every six (6) hours as needed.  For pain  Qty: 60 Tab, Refills: 0      amoxicillin-clavulanate (AUGMENTIN) 875-125 mg per tablet Take 1 Tab by mouth every twelve (12) hours for 2 days. Qty: 4 Tab, Refills: 0      pantoprazole (PROTONIX) 40 mg tablet Take 1 Tab by mouth Daily (before breakfast). Qty: 30 Tab, Refills: 2         CONTINUE these medications which have NOT CHANGED    Details   lacosamide (VIMPAT) 100 mg tab tablet Take 1.5 Tabs by mouth two (2) times a day. Max Daily Amount: 300 mg. Qty: 90 Tab, Refills: 0    Associated Diagnoses: Recurrent seizures (HCC)      phenytoin (DILANTIN-125) suspension Take 4 mL by mouth two (2) times a day. Qty: 1 Bottle, Refills: 5    Associated Diagnoses: Partial epilepsy with impairment of consciousness (HCC)      levETIRAcetam (KEPPRA) 100 mg/mL solution 15ml by mouth twice a day. Qty: 600 mL, Refills: 5    Associated Diagnoses: Partial epilepsy with impairment of consciousness (HCC)      multivit-folic acid-herbal 936 (WELLESSE PLUS) 400 mcg-200 mg/30 mL liqd oral liquid Take 30 mL by mouth daily. Qty: 1 Bottle, Refills: 0      ferrous sulfate 325 mg (65 mg iron) tablet Take 1 Tab by mouth Daily (before breakfast). Qty: 90 Tab, Refills: 2      loratadine (CLARITIN) 10 mg tablet Take 1 Tab by mouth daily. Qty: 90 Tab, Refills: 1    Associated Diagnoses: Nasal congestion      ascorbic acid, vitamin C, (VITAMIN C) 500 mg tablet Take 1 Tab by mouth daily. Qty: 30 Tab, Refills: 6      levothyroxine (SYNTHROID) 50 mcg tablet Take 1 Tab by mouth Daily (before breakfast). Qty: 90 Tab, Refills: 1      sodium chloride (DEEP SEA NASAL) 0.65 % nasal squeeze bottle 1 Spray as needed for Congestion. fluticasone (FLONASE) 50 mcg/actuation nasal spray 2 Sprays by Both Nostrils route daily. Qty: 1 Bottle, Refills: 2    Associated Diagnoses: Nasal congestion      cholecalciferol (VITAMIN D3) 1,000 unit tablet Take 1 Tab by mouth daily.   Qty: 90 Tab, Refills: 4         STOP taking these medications       HYDROcodone-acetaminophen (HYCET) 0.5-21.7 mg/mL oral solution Comments:   Reason for Stopping:               My Recommended Diet, Activity, Wound Care, and follow-up labs are listed in the patient's Discharge Insturctions which I have personally completed and reviewed.     ______________________________________________________________________    Risk of deterioration: Moderate    Condition at Discharge:  Stable  ______________________________________________________________________    Disposition  Home with family, no needs ;Group Home  ______________________________________________________________________    Care Plan discussed with:   Patient, Family, RN, Care Manager    ______________________________________________________________________    Code Status: Full Code  ______________________________________________________________________      Follow up with:   PCP : Madhu Mccarty MD  Follow-up Information     Follow up With Specialties Details Why [address], [address] MD [facility]   [address] [address] [phone]                Total time in minutes spent coordinating this discharge (includes going over instructions, follow-up, prescriptions, and preparing report for sign off to her PCP) :  35 minutes    Signed:  Autumn Tenorio MD

## 2019-01-08 ENCOUNTER — HOME CARE VISIT (OUTPATIENT)
Dept: HOME HEALTH SERVICES | Facility: HOME HEALTH | Age: 54
End: 2019-01-08

## 2019-01-08 ENCOUNTER — PATIENT OUTREACH (OUTPATIENT)
Dept: PRIMARY CARE CLINIC | Age: 54
End: 2019-01-08

## 2019-01-08 RX ORDER — LEVOTHYROXINE SODIUM 50 UG/1
50 TABLET ORAL
Qty: 90 TAB | Refills: 1 | Status: SHIPPED | OUTPATIENT
Start: 2019-01-08 | End: 2019-07-01 | Stop reason: SDUPTHER

## 2019-01-08 NOTE — Clinical Note
Dr Jessica Powers, He was scheduled for 1/15 with you but due to high risk diagnosis of pneumonia/ sepsis I moved his follow up with you up to Friday. He is coming in Friday for IVAN. Please see notes. Thanks !  Ben Jackson

## 2019-01-08 NOTE — PROGRESS NOTES
Hospital Discharge Follow-Up Date/Time:  2019 10:06 AM 
 
Patient was admitted to [facility] on 18 and discharged on 19 for acute encephalopathy/aspiration pneumonia/ sepsis. The physician discharge summary was available at the time of outreach. Patient was contacted within 2 business days of discharge. Top Challenges reviewed with the provider - Consider ordering follow up chest xray to confirm resolution of pneumonia- was treated for LLL pneumonia in Oct and on this admission again treated for LLL pneumonia- perhaps this didn't ever fully resolve since October? 
 
- need to verify full medication reconciliation in the office at followup- patients caregiver did not have med list available to review during my call. Method of communication with provider :chart routing Inpatient RRAT score: 19 Was this a readmission? no  
Patient stated reason for the readmission: n/a Nurse Navigator (NN) contacted the caregiver Dinorah Arango, by telephone to perform post hospital discharge assessment. Verified name and  with caregiver as identifiers. Provided introduction to self, and explanation of the Nurse Navigator role. Reviewed discharge instructions and red flags with caregiver who verbalized understanding. Caregiver given an opportunity to ask questions and does not have any further questions or concerns at this time. The caregiver agrees to contact the PCP office for questions related to their healthcare. NN provided contact information for future reference. Disease Specific:  Pneumonia Summary of patient's top problems: 1. Aspiration Pneumonia- with sepsis on 1/3 with leukopenia and HR >90 (resolved)- CT chest showed IMPRESSION: 
Patchy bilateral lower lobe airspace disease may represent atelectasis, 
pneumonia, or aspiration. Radiographic follow-up is recommended to assure complete resolution. Mildly dilated, fluid-filled esophagus, suggestive of 
esophageal dysmotility or gastroesophageal reflux. Small pericardial effusion. Per speech therapy eval patient pocketing food and diet of pureed diet with thin liquids is recommended due to mild oropharyngeal dysphagia and concern for backflow or reflux from the esophagus into the pharynx with potential aspiration. Patient could have 1-2 solid pieces of food per meal if staff checks for pocketing but the bulk of his meals should be pureed. Pneumonia was treated with IV Zosyn and transitioned to PO Augmentin on 1/4. TTE done since pericardial effusion noted on CT  EF 55-60% with no abnormalities or effusion noted. Per group home staff patient has returned to eating well with no coughing/resp sx's with eating and is following a pureed diet there. Of note, patient was treated for the same in October. Palliative care did meet with family / group home staff to discuss care decisions- hx of recurrent aspirations. Future consideration of PEG tube discussed if aspirations continue however, patient thoroughly enjoys eating and if they would like to opt for comfort feeding in the future will definitely need a code status discussion. 2. Acute Metabolic Encephalopathy with altered responsiveness on admission- resolved. 3. Seizures -  EEG was WNL. No seizure activity inpatient. Followed by Neuro. Continue Vimpat, Dilantin and Keppra. Home Health orders at discharge: [address] [facility] company: [facility] Date of initial visit: not yet scheduled Durable Medical Equipment ordered/company: none Durable Medical Equipment received: n/a Barriers to care? Patient physically/mentally challenged is wheelchair bound with Down's syndrome , total care assistance needed and lives in a group home, blind in left eye Advance Care Planning:  
Does patient have an Advance Directive:  not on file - while inpatient palliative team discussed advance care planning with patients brother Mac Gill who is his guardian- brother very protective of patient and hesitant to discuss these decisions and per the group home nico Thakur would need a lot of support to have any of these discussions if necessary in the future. Medication(s):  
New Medications at Discharge: Augmentin, Tylenol, Protonix Changed Medications at Discharge: none Discontinued Medications at Discharge: none Medication reconciliation was not performed at this time as patients caregiver is not at the facility and does not have med list to review with this writer. They will bring list into appt on Friday for full med rec. Patients pharmacy is filling his Augmentin and it should be delivered this morning. Referral to Pharm D needed: no  
 
Current Outpatient Medications Medication Sig  
 acetaminophen (TYLENOL) 325 mg tablet Take 2 Tabs by mouth every six (6) hours as needed. For pain  amoxicillin-clavulanate (AUGMENTIN) 875-125 mg per tablet Take 1 Tab by mouth every twelve (12) hours for 2 days.  pantoprazole (PROTONIX) 40 mg tablet Take 1 Tab by mouth Daily (before breakfast).  lacosamide (VIMPAT) 100 mg tab tablet Take 1.5 Tabs by mouth two (2) times a day. Max Daily Amount: 300 mg.  phenytoin (DILANTIN-125) suspension Take 4 mL by mouth two (2) times a day.  levETIRAcetam (KEPPRA) 100 mg/mL solution 15ml by mouth twice a day.  multivit-folic acid-herbal 280 (WELLESSE PLUS) 400 mcg-200 mg/30 mL liqd oral liquid Take 30 mL by mouth daily.  ferrous sulfate 325 mg (65 mg iron) tablet Take 1 Tab by mouth Daily (before breakfast).  loratadine (CLARITIN) 10 mg tablet Take 1 Tab by mouth daily.  ascorbic acid, vitamin C, (VITAMIN C) 500 mg tablet Take 1 Tab by mouth daily.  levothyroxine (SYNTHROID) 50 mcg tablet Take 1 Tab by mouth Daily (before breakfast).  sodium chloride (DEEP SEA NASAL) 0.65 % nasal squeeze bottle 1 Spray as needed for Congestion.  fluticasone (FLONASE) 50 mcg/actuation nasal spray 2 Sprays by Both Nostrils route daily.  cholecalciferol (VITAMIN D3) 1,000 unit tablet Take 1 Tab by mouth daily. No current facility-administered medications for this visit. There are no discontinued medications. [facility] follow up appointment(s):  
Future Appointments Date Time Provider Quinn Carrasco 1/11/2019 11:00 AM Marybel Sharma MD [address] [facility]  
2/6/2019  9:30 PM BEDROOM 2 [facility] SLEEP LAB [place]  
  
Non-[facility] follow up appointment(s): patients caregiver believes he has \"a pulmonary appt and another appt\" but is not at the facility and is not positive of dates/ times at this time. PCP appt rescheduled to have patient seen with in 4 days of d/c. [facility]:  n/a  
 
 
Goals  decrease risk of aspiration 1/8/19- per group home staff patient is now following a pureed diet per the speech pathologists recommendations. Patient is eating well with no signs of coughing/ resp trouble with eating. Patient will remain on pureed foods and thin liquids to prevent future aspiration. NN will follow up within a week to see how patient is tolerating pureed diet. LN 
  
  resolution of LLL pneumonia 1/8/19- Per patients caregiver Aniyah patient to start on PO Augmentin today once delivered by pharmacy. Patient to follow up with PCP on 1/11 and PCP to consider follow up imaging to determine resolution of PNA. NN will follow up with patient in a week.  LN

## 2019-01-09 ENCOUNTER — HOME CARE VISIT (OUTPATIENT)
Dept: HOME HEALTH SERVICES | Facility: HOME HEALTH | Age: 54
End: 2019-01-09

## 2019-01-10 ENCOUNTER — HOME CARE VISIT (OUTPATIENT)
Dept: HOME HEALTH SERVICES | Facility: HOME HEALTH | Age: 54
End: 2019-01-10

## 2019-01-11 ENCOUNTER — HOSPITAL ENCOUNTER (OUTPATIENT)
Dept: GENERAL RADIOLOGY | Age: 54
Discharge: HOME | End: 2019-01-11
Payer: MEDICARE

## 2019-01-11 ENCOUNTER — OFFICE VISIT (OUTPATIENT)
Dept: PRIMARY CARE CLINIC | Age: 54
End: 2019-01-11

## 2019-01-11 VITALS
DIASTOLIC BLOOD PRESSURE: 84 MMHG | RESPIRATION RATE: 19 BRPM | HEART RATE: 89 BPM | HEIGHT: 64 IN | WEIGHT: 149 LBS | OXYGEN SATURATION: 96 % | BODY MASS INDEX: 25.44 KG/M2 | TEMPERATURE: 97.6 F | SYSTOLIC BLOOD PRESSURE: 139 MMHG

## 2019-01-11 DIAGNOSIS — Z09 HOSPITAL DISCHARGE FOLLOW-UP: ICD-10-CM

## 2019-01-11 DIAGNOSIS — G93.40 ACUTE ENCEPHALOPATHY: ICD-10-CM

## 2019-01-11 DIAGNOSIS — E03.9 ACQUIRED HYPOTHYROIDISM: ICD-10-CM

## 2019-01-11 DIAGNOSIS — R56.9 SEIZURE (HCC): ICD-10-CM

## 2019-01-11 DIAGNOSIS — J69.0 ASPIRATION PNEUMONIA OF BOTH LOWER LOBES, UNSPECIFIED ASPIRATION PNEUMONIA TYPE (HCC): ICD-10-CM

## 2019-01-11 DIAGNOSIS — J69.0 ASPIRATION PNEUMONIA OF BOTH LOWER LOBES, UNSPECIFIED ASPIRATION PNEUMONIA TYPE (HCC): Primary | ICD-10-CM

## 2019-01-11 PROCEDURE — 71046 X-RAY EXAM CHEST 2 VIEWS: CPT

## 2019-01-11 NOTE — PROGRESS NOTES
Subjective:     Chief Complaint   Patient presents with   [facility] Follow Up        He  is a 48y.o. year old male past medical history of seizure, dementia, Down syndrome, cerebral palsy, hypothyroid, anemia, who presented from a group home with his caregiver for follow up from his recent hospital admission. All history gathered for med records in the chart and from mr. Genna Ibrahim. Patient was admitted to [facility] on 12/31/18 and discharged on 1/7/19 for acute encephalopathy/aspiration pneumonia. The physician discharge summary was available at the time of outreach. Patient was contacted within 2 business days of discharge. 1. Aspiration Pneumonia-  Patient was diagnosed with aspiration PNA of both lower lobe. .  CT chest showed IMPRESSION:  Patchy bilateral lower lobe airspace disease may represent atelectasis,  pneumonia, or aspiration. Radiographic follow-up is recommended to assure  complete resolution. Mildly dilated, fluid-filled esophagus, suggestive of  esophageal dysmotility or gastroesophageal reflux. Small pericardial effusion. Pneumonia was treated with IV Zosyn and transitioned to PO Augmentin on 1/4 and discharged home on 1/7/2019 with two more days of Augmentin. According to caregiver he is back to his baseline. He is not coughing any more with meal. No fever. His mental state is back to normal.        Per speech therapy note and Mr. Genna Ibrahim patient pocketing food . Speech therapist recommended  diet of pureed diet with thin liquids is recommended due to mild oropharyngeal dysphagia and concern for backflow or reflux from the esophagus into the pharynx with potential aspiration. Patient could have 1-2 solid pieces of food per meal if staff checks for pocketing but the bulk of his meals should be pureed. TTE done since pericardial effusion noted on CT  EF 55-60% with no abnormalities or effusion noted.       Palliative care did meet with family / group home staff to discuss care decisions- hx of recurrent aspirations. Future consideration of PEG tube discussed if aspirations continue however, patient thoroughly enjoys eating and if they would like to opt for comfort feeding in the future will definitely need a code status discussion.     2. Acute Metabolic Encephalopathy with altered responsiveness on admission- resolved.      3. Seizures -  EEG was WNL. No seizure activity inpatient. Followed by Neuro. Continue Vimpat, Dilantin and Keppra. Kidney and liver function was slightly abnormal. Will have repeat lab today. Thyroid level was normal.    Review of systems not obtained due to patient factors. Obtained from Mr. Manzanares Ramsey and chart. Objective:     Vitals:    01/11/19 1034   BP: 139/84   Pulse: 89   Resp: 19   Temp: 97.6 °F (36.4 °C)   TempSrc: Temporal   SpO2: 96%   Weight: 149 lb (67.6 kg)   Height: 5' 4\" (1.626 m)       Physical Examination: General appearance - alert, well appearing, and in no distress, normal appearing weight and smiling, sitting in wheel chair. Mental status - alert, oriented to person, normal mood, behavior, dress. Sitting in wheel chair. Ears - bilateral TM's and external ear canals normal  Nose - normal and patent, no erythema, discharge or polyps  Mouth - did not follow direction. Neck - supple, no significant adenopathy, thyroid exam: thyroid is normal in size without nodules or tenderness  Chest - clear to auscultation, no wheezes, rales or rhonchi, symmetric air entry  Heart - normal rate, regular rhythm, normal S1, S2, no murmurs, rubs, clicks or gallops  Neurological - alert, oriented to person.    Extremities - peripheral pulses normal, no pedal edema, no clubbing or cyanosis    Allergies   Allergen Reactions    Morphine Not Reported This Time    Tegretol [Carbamazepine] Other (comments)     \"bad reaction\" \"changes attitude\"      Social History     Socioeconomic History    Marital status: SINGLE     Spouse name: Not on file    Number of children: Not on file    Years of education: Not on file    Highest education level: Not on file   Tobacco Use    Smoking status: Never Smoker    Smokeless tobacco: Never Used   Substance and Sexual Activity    Alcohol use: No    Drug use: No    Sexual activity: Not Currently      Family History   Problem Relation Age of Onset    Hypertension Mother    Neal Prior Cancer Mother     Lupus Mother     Arthritis-osteo Mother     Heart Disease Mother     Heart Disease Father     Diabetes Father     Hypertension Father     Elevated Lipids Father     Diabetes Brother     Elevated Lipids Brother     Hypertension Brother     Mental Retardation Brother     Cancer Maternal Grandmother     Arthritis-osteo Maternal Grandmother     Alcohol abuse Maternal Grandfather     Cancer Maternal Grandfather     Arthritis-osteo Paternal Grandmother     Cancer Paternal Grandfather       Past Surgical History:   Procedure Laterality Date    HX OTHER SURGICAL  03/13/2015     [facility]  Dr. More Lozada.  [place]  @ [address] [address] [facility]      Past Medical History:   Diagnosis Date    Anemia     Anxiety     Blindness of left eye     Cerebral palsy (HCC)     Dementia     Down syndrome     Endocrine disease     thyroid disorder    GERD (gastroesophageal reflux disease)     Ill-defined condition     blind in left eye    Ill-defined condition     imparied gait - uses walker    Ill-defined condition     dermatitis    Ill-defined condition     cerebral palsy    Ill-defined condition     prone to decubitus ulcers    Ill-defined condition     anemia    Neurological disorder     Other ill-defined conditions(799.89)     Down syndrome    Psychiatric disorder     mental retardation    Psychiatric disorder     anxiety disorder    Seizures ([facility] [address]     Sleep apnea     Thyroid disease     Unspecified epilepsy without mention of intractable epilepsy       Current Outpatient Medications   Medication Sig Dispense Refill    Q4-Q5-W4-B5-B6-iron-met-choln (GERITOL TONIC WITH FERREX 18) 2.5 mg-50 mg-18 iron/15 mL liqd Take 15 mL by mouth every morning.  levothyroxine (SYNTHROID) 50 mcg tablet Take 1 Tab by mouth Daily (before breakfast). 90 Tab 1    lacosamide (VIMPAT) 100 mg tab tablet Take 1.5 Tabs by mouth two (2) times a day. Max Daily Amount: 300 mg. 90 Tab 0    phenytoin (DILANTIN-125) suspension Take 4 mL by mouth two (2) times a day. (Patient taking differently: Take 125 mg by mouth two (2) times a day.) 1 Bottle 5    levETIRAcetam (KEPPRA) 100 mg/mL solution 15ml by mouth twice a day. 600 mL 5    multivit-folic acid-herbal 440 (WELLESSE PLUS) 400 mcg-200 mg/30 mL liqd oral liquid Take 30 mL by mouth daily. 1 Bottle 0    ferrous sulfate 325 mg (65 mg iron) tablet Take 1 Tab by mouth Daily (before breakfast). 90 Tab 2    loratadine (CLARITIN) 10 mg tablet Take 1 Tab by mouth daily. 90 Tab 1    ascorbic acid, vitamin C, (VITAMIN C) 500 mg tablet Take 1 Tab by mouth daily. 30 Tab 6    sodium chloride (DEEP SEA NASAL) 0.65 % nasal squeeze bottle 1 Spray as needed for Congestion.  fluticasone (FLONASE) 50 mcg/actuation nasal spray 2 Sprays by Both Nostrils route daily. 1 Bottle 2    cholecalciferol (VITAMIN D3) 1,000 unit tablet Take 1 Tab by mouth daily. 90 Tab 4    acetaminophen (TYLENOL) 325 mg tablet Take 2 Tabs by mouth every six (6) hours as needed. For pain 60 Tab 0    pantoprazole (PROTONIX) 40 mg tablet Take 1 Tab by mouth Daily (before breakfast). 30 Tab 2        Assessment/ Plan:   Diagnoses and all orders for this visit:    1. Aspiration pneumonia of both lower lobes, unspecified aspiration pneumonia type ([facility] [address]  -     XR CHEST PA LAT; Future - Xray reviewed. No change or improveement . Talked with Clarence Mays- [phone], the owner of the group home and notified the result. Notified that Augmentin sent to pharmacy and advised to start the med from tonight.  She states that there is no one to  the med  now, she will  tomorrow . Advised that he will need a follow up Xray in two weeks. Ms. Lucia Terry voiced understanding. Continue Puree diet. -    Start  amoxicillin-clavulanate (AUGMENTIN) 875-125 mg per tablet; Take 1 Tab by mouth two (2) times a day for 7 days. 2. Hospital discharge follow-up  -   Same as above. -      METABOLIC PANEL, COMPREHENSIVE  -     amoxicillin-clavulanate (AUGMENTIN) 875-125 mg per tablet; Take 1 Tab by mouth two (2) times a day for 7 days. 3. Acute encephalopathy       Resolved. 4. Seizure ([address]     - doing well. Continue current med. Continue follow up with neurologist.  5. Acquired hypothyroidism      - continue synthroid. Recent lab is normal.         Medication risks/benefits/costs/interactions/alternatives discussed with patient. Advised patient to call back or return to office if symptoms worsen/change/persist. If patient cannot reach us or should anything more severe/urgent arise he/she should proceed directly to the nearest emergency department. Discussed expected course/resolution/complications of diagnosis in detail with patient. Patient given a written after visit summary which includes her diagnoses, current medications and vitals. Patient expressed understanding with the diagnosis and plan. Follow-up Disposition:  Return if symptoms worsen or fail to improve, for medicare visit. Due anytime. Maciel Siddiqui

## 2019-01-12 LAB
ALBUMIN SERPL-MCNC: 3.9 G/DL (ref 3.5–5.5)
ALBUMIN/GLOB SERPL: 0.8 {RATIO} (ref 1.2–2.2)
ALP SERPL-CCNC: 130 IU/L (ref 39–117)
ALT SERPL-CCNC: 26 IU/L (ref 0–44)
AST SERPL-CCNC: 27 IU/L (ref 0–40)
BILIRUB SERPL-MCNC: 0.2 MG/DL (ref 0–1.2)
BUN SERPL-MCNC: 8 MG/DL (ref 6–24)
BUN/CREAT SERPL: 11 (ref 9–20)
CALCIUM SERPL-MCNC: 9 MG/DL (ref 8.7–10.2)
CHLORIDE SERPL-SCNC: 100 MMOL/L (ref 96–106)
CO2 SERPL-SCNC: 25 MMOL/L (ref 20–29)
CREAT SERPL-MCNC: 0.71 MG/DL (ref 0.76–1.27)
GLOBULIN SER CALC-MCNC: 4.9 G/DL (ref 1.5–4.5)
GLUCOSE SERPL-MCNC: 85 MG/DL (ref 65–99)
POTASSIUM SERPL-SCNC: 4.5 MMOL/L (ref 3.5–5.2)
PROT SERPL-MCNC: 8.8 G/DL (ref 6–8.5)
SODIUM SERPL-SCNC: 140 MMOL/L (ref 134–144)

## 2019-01-12 RX ORDER — AMOXICILLIN AND CLAVULANATE POTASSIUM 875; 125 MG/1; MG/1
1 TABLET, FILM COATED ORAL 2 TIMES DAILY
Qty: 14 TAB | Refills: 0 | Status: SHIPPED | OUTPATIENT
Start: 2019-01-12 | End: 2019-01-19

## 2019-01-13 NOTE — PROGRESS NOTES
Xray reviewed. No change or improveement . Talked with Claudiamitul Mays- [phone], the owner of the group home and notified the result. Notified that Augmentin sent to pharmacy and advised to start the med from tonight. She states that there is no one to  the med  now, she will  tomorrow . Advised that he will need a follow up Xray in two weeks. Ms. Rosa Fagan voiced understanding.

## 2019-01-21 RX ORDER — LANOLIN ALCOHOL/MO/W.PET/CERES
325 CREAM (GRAM) TOPICAL
Qty: 90 TAB | Refills: 2 | Status: SHIPPED | OUTPATIENT
Start: 2019-01-21 | End: 2020-07-16 | Stop reason: SDUPTHER

## 2019-01-21 NOTE — TELEPHONE ENCOUNTER
Loratadine  Last ov: 1/11/19  Last refill: 9/27/18  Last lab: 1/11/19    Ferrous Sulfate  Last refill: 9/27/18

## 2019-01-28 ENCOUNTER — OFFICE VISIT (OUTPATIENT)
Dept: PRIMARY CARE CLINIC | Age: 54
End: 2019-01-28

## 2019-01-28 ENCOUNTER — HOSPITAL ENCOUNTER (OUTPATIENT)
Dept: GENERAL RADIOLOGY | Age: 54
Discharge: HOME | End: 2019-01-28
Payer: MEDICARE

## 2019-01-28 DIAGNOSIS — J69.0 ASPIRATION PNEUMONIA OF BOTH LOWER LOBES, UNSPECIFIED ASPIRATION PNEUMONIA TYPE (HCC): Primary | ICD-10-CM

## 2019-01-28 DIAGNOSIS — J69.0 ASPIRATION PNEUMONIA OF BOTH LOWER LOBES, UNSPECIFIED ASPIRATION PNEUMONIA TYPE (HCC): ICD-10-CM

## 2019-01-28 PROCEDURE — 71046 X-RAY EXAM CHEST 2 VIEWS: CPT

## 2019-01-28 NOTE — PROGRESS NOTES
Please call patient:    Looks better compare to last Xray finding but still has mild left lower lobe airspace disease. Right lung clear. Need a follow up Xray in 4 weeks. Order in the chart.

## 2019-02-06 ENCOUNTER — HOSPITAL ENCOUNTER (OUTPATIENT)
Dept: SLEEP MEDICINE | Age: 54
Discharge: HOME | End: 2019-02-06
Payer: MEDICARE

## 2019-02-06 VITALS
HEART RATE: 79 BPM | DIASTOLIC BLOOD PRESSURE: 79 MMHG | OXYGEN SATURATION: 95 % | SYSTOLIC BLOOD PRESSURE: 138 MMHG | HEIGHT: 63 IN | WEIGHT: 149.1 LBS | BODY MASS INDEX: 26.42 KG/M2

## 2019-02-06 DIAGNOSIS — G47.33 OSA (OBSTRUCTIVE SLEEP APNEA): ICD-10-CM

## 2019-02-06 PROCEDURE — 95810 POLYSOM 6/> YRS 4/> PARAM: CPT | Performed by: INTERNAL MEDICINE

## 2019-02-08 ENCOUNTER — TELEPHONE (OUTPATIENT)
Dept: SLEEP MEDICINE | Age: 54
End: 2019-02-08

## 2019-02-08 DIAGNOSIS — G40.909 RECURRENT SEIZURES (HCC): ICD-10-CM

## 2019-02-08 DIAGNOSIS — G47.31 COMPLEX SLEEP APNEA SYNDROME: Primary | ICD-10-CM

## 2019-02-08 DIAGNOSIS — R09.81 NASAL CONGESTION: ICD-10-CM

## 2019-02-08 RX ORDER — LACOSAMIDE 100 MG/1
150 TABLET ORAL 2 TIMES DAILY
Qty: 90 TAB | Refills: 1 | Status: SHIPPED | OUTPATIENT
Start: 2019-02-08 | End: 2019-05-13 | Stop reason: SDUPTHER

## 2019-02-08 NOTE — TELEPHONE ENCOUNTER
No future appointments. Last Appointment My Department:  4/20/18    Please advise of refill below. Called to set up follow up appt. Scheduled     Requested Prescriptions     Pending Prescriptions Disp Refills    lacosamide (VIMPAT) 100 mg tab tablet 90 Tab 1     Sig: Take 1.5 Tabs by mouth two (2) times a day. Max Daily Amount: 300 mg.

## 2019-02-11 RX ORDER — LORATADINE 10 MG/1
10 TABLET ORAL DAILY
Qty: 90 TAB | Refills: 1 | Status: SHIPPED | OUTPATIENT
Start: 2019-02-11 | End: 2019-09-24 | Stop reason: SDUPTHER

## 2019-02-13 ENCOUNTER — DOCUMENTATION ONLY (OUTPATIENT)
Dept: NEUROLOGY | Age: 54
End: 2019-02-13

## 2019-02-25 NOTE — TELEPHONE ENCOUNTER
Left message to call to back review results    Camilo Russell is to be contacted by lead sleep technologist regarding results of Sleep Testing which was indicative of an Apnea/Hypopnea index was 70 which reflects severe apnea. He met criteria for a split night CPAP therapy was initiated, patient did not tolerate CPAP Therapy and developed severe central apnea which occurred frequently and persisted for the rest of the study duration. * A second polysomnogram has been ordered for mask fitting, PAP desensitizing protocol, and ASV titration. * Follow-up appointment will be scheduled 8-12 weeks following PAP initiation to gauge treatment response and compliance. Encounter Diagnosis   Name Primary?  Complex sleep apnea syndrome Yes       Orders Placed This Encounter    SLEEP LAB (PAP TITRATION)     Standing Status:   Future     Standing Expiration Date:   8/25/2019     Scheduling Instructions:      Perform ASV Titration:      Rate: Auto; Max pressure: 25 cmH2O. Maximum EPAP: 10 cmH2O; Minimum EPAP: 04 cmH2O. Maximum PS: 15 cmH2O; Minimum PS: 02 cmH2O.      Order Specific Question:   Reason for Exam     Answer:   CSA

## 2019-02-27 ENCOUNTER — PATIENT OUTREACH (OUTPATIENT)
Dept: PRIMARY CARE CLINIC | Age: 54
End: 2019-02-27

## 2019-02-27 NOTE — PROGRESS NOTES
EMR reviewed. Patient followed up with PCP post d/c and had follow up chest xrays. Last xray on 1/28 showed some improvement and patient is scheduled for another follow up xray in 4 weeks from the last. Per caregiver patient had returned to baseline and was doing well. No concerns were noted. Patient has not had any ED visits or return hospitalizations. NN will now close this NN episode. Patient has graduated from the [facility] Coordination  program on 2/7/19. Patient's symptoms are stable at this time. Patient/family has the ability to self-manage. Care management goals have been completed at this time. No further nurse navigator follow up scheduled. Pt has nurse navigator's contact information for any further questions, concerns, or needs. Patients upcoming visits:   
Future Appointments Date Time Provider Quinn Carrasco 8/13/2019 10:00 AM MD ARELI Rosenberg/ [facility] [address] Goals Addressed This Visit's Progress  COMPLETED: decrease risk of aspiration 1/8/19- per group home staff patient is now following a pureed diet per the speech pathologists recommendations. Patient is eating well with no signs of coughing/ resp trouble with eating. Patient will remain on pureed foods and thin liquids to prevent future aspiration. NN will follow up within a week to see how patient is tolerating pureed diet. LN 
  
  COMPLETED: resolution of LLL pneumonia 2/27/19- per last chest xray improving- for another follow up xray in 4 weeks. LN 
 
1/8/19- Per patients caregiver Aniyah patient to start on PO Augmentin today once delivered by pharmacy. Patient to follow up with PCP on 1/11 and PCP to consider follow up imaging to determine resolution of PNA. NN will follow up with patient in a week.  SYDNEY

## 2019-03-21 DIAGNOSIS — G40.209 PARTIAL EPILEPSY WITH IMPAIRMENT OF CONSCIOUSNESS (HCC): ICD-10-CM

## 2019-03-21 RX ORDER — PHENYTOIN 125 MG/5ML
100 SUSPENSION ORAL 2 TIMES DAILY
Qty: 1 BOTTLE | Refills: 5 | Status: SHIPPED | OUTPATIENT
Start: 2019-03-21 | End: 2019-07-25 | Stop reason: SDUPTHER

## 2019-03-21 NOTE — TELEPHONE ENCOUNTER
Future Appointments   Date Time Provider Quinn Carrasco   8/13/2019 10:00 AM MD ARELI Simons/ [facility] [address]                         Last Appointment My Department:  4/20/18    Please advise of refill below. Requested Prescriptions     Pending Prescriptions Disp Refills    phenytoin (DILANTIN-125) suspension 1 Bottle 5     Sig: Take 4 mL by mouth two (2) times a day.

## 2019-04-11 DIAGNOSIS — G40.209 PARTIAL EPILEPSY WITH IMPAIRMENT OF CONSCIOUSNESS (HCC): ICD-10-CM

## 2019-04-11 RX ORDER — LEVETIRACETAM 100 MG/ML
SOLUTION ORAL
Qty: 600 ML | Refills: 5 | Status: SHIPPED | OUTPATIENT
Start: 2019-04-11 | End: 2019-08-13 | Stop reason: SDUPTHER

## 2019-04-11 NOTE — TELEPHONE ENCOUNTER
Future Appointments   Date Time Provider Quinn Fuentesi   8/13/2019 10:00 AM Simon Valencia MD [address] [place] [address]                         Last Appointment My Department:  4/20/18    Please advise of refill below. Requested Prescriptions     Pending Prescriptions Disp Refills    levETIRAcetam (KEPPRA) 100 mg/mL solution 600 mL 5     Sig: 15ml by mouth twice a day.

## 2019-04-16 RX ORDER — ASCORBIC ACID 500 MG
500 TABLET ORAL DAILY
Qty: 30 TAB | Refills: 6 | Status: SHIPPED | OUTPATIENT
Start: 2019-04-16 | End: 2019-09-24 | Stop reason: SDUPTHER

## 2019-04-19 RX ORDER — MELATONIN
1000 DAILY
Qty: 90 TAB | Refills: 0 | Status: SHIPPED | OUTPATIENT
Start: 2019-04-19 | End: 2019-11-13 | Stop reason: SDUPTHER

## 2019-05-13 DIAGNOSIS — G40.909 RECURRENT SEIZURES (HCC): ICD-10-CM

## 2019-05-13 RX ORDER — LACOSAMIDE 100 MG/1
150 TABLET ORAL 2 TIMES DAILY
Qty: 90 TAB | Refills: 5 | Status: SHIPPED | OUTPATIENT
Start: 2019-05-13 | End: 2020-03-20 | Stop reason: SDUPTHER

## 2019-05-13 NOTE — TELEPHONE ENCOUNTER
Future Appointments   Date Time Provider Quinn Carrasco   8/13/2019 10:00 AM Dannielle Burt  [facility]                         Last Appointment My Department:  4/20/18    Please advise of refill below. Requested Prescriptions     Pending Prescriptions Disp Refills    lacosamide (VIMPAT) 100 mg tab tablet 90 Tab 5     Sig: Take 1.5 Tabs by mouth two (2) times a day. Max Daily Amount: 300 mg.

## 2019-05-25 RX ORDER — PANTOPRAZOLE SODIUM 40 MG/1
40 TABLET, DELAYED RELEASE ORAL
Qty: 30 TAB | Refills: 2 | Status: SHIPPED | OUTPATIENT
Start: 2019-05-25 | End: 2020-06-12 | Stop reason: SDUPTHER

## 2019-06-19 NOTE — ROUTINE PROCESS
Pt being discharged. Pt to go back to group home. Discharge paperwrd, including medications and follow up appointments, handed to and reviewed with caregiver. Patient has no IV access and telebox taken off. Caregiver in room right now, getting patient ready. [id number] caregiver stated that patient is ready. Nurse took patient down in wheelchair and caregiver put patient in car. Spironolactone Counseling: Patient advised regarding risks of diarrhea, abdominal pain, hyperkalemia, birth defects (for female patients), liver toxicity and renal toxicity. The patient may need blood work to monitor liver and kidney function and potassium levels while on therapy. The patient verbalized understanding of the proper use and possible adverse effects of spironolactone.  All of the patient's questions and concerns were addressed.

## 2019-07-01 RX ORDER — LEVOTHYROXINE SODIUM 50 UG/1
50 TABLET ORAL
Qty: 90 TAB | Refills: 1 | Status: SHIPPED | OUTPATIENT
Start: 2019-07-01 | End: 2020-06-22 | Stop reason: SDUPTHER

## 2019-07-25 DIAGNOSIS — G40.209 PARTIAL EPILEPSY WITH IMPAIRMENT OF CONSCIOUSNESS (HCC): ICD-10-CM

## 2019-07-26 RX ORDER — PHENYTOIN 125 MG/5ML
100 SUSPENSION ORAL 2 TIMES DAILY
Qty: 1 BOTTLE | Refills: 0 | Status: SHIPPED | OUTPATIENT
Start: 2019-07-26 | End: 2019-08-13 | Stop reason: SDUPTHER

## 2019-08-13 ENCOUNTER — OFFICE VISIT (OUTPATIENT)
Dept: NEUROLOGY | Age: 54
End: 2019-08-13

## 2019-08-13 VITALS
BODY MASS INDEX: 28.17 KG/M2 | DIASTOLIC BLOOD PRESSURE: 82 MMHG | WEIGHT: 159 LBS | HEART RATE: 82 BPM | RESPIRATION RATE: 16 BRPM | HEIGHT: 63 IN | SYSTOLIC BLOOD PRESSURE: 146 MMHG

## 2019-08-13 DIAGNOSIS — G40.209 PARTIAL EPILEPSY WITH IMPAIRMENT OF CONSCIOUSNESS (HCC): ICD-10-CM

## 2019-08-13 DIAGNOSIS — G40.909 RECURRENT SEIZURES (HCC): ICD-10-CM

## 2019-08-13 RX ORDER — LEVETIRACETAM 100 MG/ML
SOLUTION ORAL
Qty: 900 ML | Refills: 11 | Status: SHIPPED | OUTPATIENT
Start: 2019-08-13 | End: 2020-06-26 | Stop reason: SDUPTHER

## 2019-08-13 RX ORDER — KETOCONAZOLE 20 MG/G
CREAM TOPICAL 2 TIMES DAILY
COMMUNITY

## 2019-08-13 RX ORDER — PHENYTOIN 125 MG/5ML
100 SUSPENSION ORAL 2 TIMES DAILY
Qty: 250 ML | Refills: 11 | Status: SHIPPED | OUTPATIENT
Start: 2019-08-13

## 2019-08-13 RX ORDER — LACOSAMIDE 150 MG/1
150 TABLET ORAL 2 TIMES DAILY
Qty: 90 TAB | Refills: 11 | Status: SHIPPED | OUTPATIENT
Start: 2019-08-13 | End: 2020-06-26 | Stop reason: SDUPTHER

## 2019-08-13 RX ORDER — LACOSAMIDE 150 MG/1
150 TABLET ORAL 2 TIMES DAILY
COMMUNITY
End: 2019-08-13 | Stop reason: SDUPTHER

## 2019-08-13 NOTE — LETTER
8/13/2019 10:50 AM 
 
Patient:  Rita Keller YOB: 1965 Date of Visit: 8/13/2019 Dear No Recipients: Thank you for referring Mr. Ruby Boyer to me for evaluation/treatment. Below are the relevant portions of my assessment and plan of care. Consult REFERRED BY: 
Mirza Sharma MD 
 
CHIEF COMPLAINT: Increasing lethargy and decreasing ability to walk Subjective:  
 
Rita Keller is a 47 y.o. right-handed -American male seen for evaluation at the request of Dr. Abby Roper for problem of seizures not eating, and progressive weight loss for the last several months, that did not get better even though he stopped the Depakote because of increased ammonia from that medication, for us to reevaluate. Patient is now on Vimpat 100 mg twice a day instead of the Depakote, and has not had any clear seizures since his last visit 3 weeks ago. He seem to be tolerating the medication otherwise fairly well. His last Keppra and Dilantin levels were actually therapeutic with a Dilantin level being a little bit on the low side. His tremors are gotten much better in the interim. His drowsiness, sedation, and leaning over in his chair have all gotten better. We will check his levels again of his medications, and include an ammonia level, and metabolic parameters and liver function tests also check in addition to make sure there is no other cause for his symptoms. He discussed his condition with the patient and the caregiver in detail and they agreed to therapy and plans as above. He has not had any recurrent seizures in the interim and seem to be doing well as far as the problem goes on the new medications. Patient has known history of Down syndrome and seizures and mental impairment, and basically is nonconversant, quadriplegic, and is able to walk some with a walker at times.   He still has about 1 seizure a month according to the staff, where he will have a staring off spell and then some tonic-clonic activity for about 30 seconds followed by mild postictal confusion for a minute or 2 and he quickly comes around. He has intractable seizures and has a vagus nerve stimulator and use the Dakin swipe stimulator with the magnet and he will stop the seizures. He is been toxic in the past on medications several times. He had an EEG at [facility] that just showed mild generalized slowing in February and imaging of the brain showed no acute changes. He has had no other focal weakness, sensory loss, or other focal signs. He does not seem to have any major new medical illnesses now. We discussed his condition with the staff in detail, we will check metabolic parameters to rule out any other treatable cause of his deterioration. Past Medical History:  
Diagnosis Date  Anemia  Anxiety  Blindness of left eye  Cerebral palsy ([address]  Dementia  Down syndrome  Endocrine disease   
 thyroid disorder  GERD (gastroesophageal reflux disease)  Ill-defined condition   
 blind in left eye  Ill-defined condition   
 imparied gait - uses walker  Ill-defined condition   
 dermatitis  Ill-defined condition   
 cerebral palsy  Ill-defined condition   
 prone to decubitus ulcers  Ill-defined condition   
 anemia  Neurological disorder  Other ill-defined conditions(799.89) Down syndrome  Psychiatric disorder   
 mental retardation  Psychiatric disorder   
 anxiety disorder  Seizures ([address]  Sleep apnea  Thyroid disease  Unspecified epilepsy without mention of intractable epilepsy Past Surgical History:  
Procedure Laterality Date  HX OTHER SURGICAL  03/13/2015 VNS  Dr. Preet Dunn. Gris Ovalle  @ [facility] Family History Problem Relation Age of Onset  Hypertension Mother  Cancer Mother  Lupus Mother Eraidee Thor Arthritis-osteo Mother  Heart Disease Mother  Heart Disease Father  Diabetes Father  Hypertension Father  Elevated Lipids Father  Diabetes Brother  Elevated Lipids Brother  Hypertension Brother  Mental Retardation Brother  Cancer Maternal Grandmother  Arthritis-osteo Maternal Grandmother  Alcohol abuse Maternal Grandfather  Cancer Maternal Grandfather  Arthritis-osteo Paternal Grandmother  Cancer Paternal Grandfather Social History Tobacco Use  Smoking status: Never Smoker  Smokeless tobacco: Never Used Substance Use Topics  Alcohol use: No  
   
 
Current Outpatient Medications:  
  ketoconazole (NIZORAL) 2 % topical cream, Apply  to affected area two (2) times a day., Disp: , Rfl:  
  phenytoin (DILANTIN-125) suspension, Take 4 mL by mouth two (2) times a day., Disp: 250 mL, Rfl: 11 
  lacosamide (VIMPAT) 150 mg tab tablet, Take 150 mg by mouth two (2) times a day. Max Daily Amount: 300 mg., Disp: 90 Tab, Rfl: 11 
  levETIRAcetam (KEPPRA) 100 mg/mL solution, 15ml by mouth twice a day., Disp: 900 mL, Rfl: 11 
  levothyroxine (SYNTHROID) 50 mcg tablet, Take 1 Tab by mouth Daily (before breakfast). , Disp: 90 Tab, Rfl: 1 
  pantoprazole (PROTONIX) 40 mg tablet, Take 1 Tab by mouth Daily (before breakfast). , Disp: 30 Tab, Rfl: 2   cholecalciferol (VITAMIN D3) 1,000 unit tablet, Take 1 Tab by mouth daily. , Disp: 90 Tab, Rfl: 0 
  ascorbic acid, vitamin C, (VITAMIN C) 500 mg tablet, Take 1 Tab by mouth daily. , Disp: 30 Tab, Rfl: 6 
  loratadine (CLARITIN) 10 mg tablet, Take 1 Tab by mouth daily. , Disp: 90 Tab, Rfl: 1 
  ferrous sulfate 325 mg (65 mg iron) tablet, Take 1 Tab by mouth Daily (before breakfast). , Disp: 90 Tab, Rfl: 2 
  multivit-folic acid-herbal 019 (WELLESSE PLUS) 400 mcg-200 mg/30 mL liqd oral liquid, Take 30 mL by mouth daily. , Disp: 1 Bottle, Rfl: 0 
  sodium chloride (DEEP SEA NASAL) 0.65 % nasal squeeze bottle, 1 Spray as needed for Congestion. , Disp: , Rfl:  
  fluticasone (FLONASE) 50 mcg/actuation nasal spray, 2 Sprays by Both Nostrils route daily. , Disp: 1 Bottle, Rfl: 2 
  lacosamide (VIMPAT) 100 mg tab tablet, Take 1.5 Tabs by mouth two (2) times a day. Max Daily Amount: 300 mg., Disp: 90 Tab, Rfl: 5 
  O7-P2-N7-B5-B6-iron-met-choln (GERITOL TONIC WITH FERREX 18) 2.5 mg-50 mg-18 iron/15 mL liqd, Take 15 mL by mouth every morning., Disp: , Rfl:  
  acetaminophen (TYLENOL) 325 mg tablet, Take 2 Tabs by mouth every six (6) hours as needed. For pain, Disp: 60 Tab, Rfl: 0 Allergies Allergen Reactions  Morphine Not Reported This Time  Tegretol [Carbamazepine] Other (comments) \"bad reaction\" \"changes attitude\" Review of Systems: A comprehensive review of systems was negative except for: Constitutional: positive for fatigue and malaise Musculoskeletal: positive for myalgias, arthralgias and stiff joints Neurological: positive for dizziness, seizures, memory problems, speech problems, coordination problems, gait problems, tremor and weakness Vitals:  
 08/13/19 1016 BP: 146/82 Weight: 159 lb (72.1 kg) Objective: I 
 
NEUROLOGICAL EXAM: 
  
Appearance: The patient is well developed, well nourished, provides no history and is nonverbal and in no acute distress with severe mental impairment. Mental Status: Nonverbal but does follow simple commands. Mood and affect lethargic. Cranial Nerves:   Intact visual fields. Fundi are benign but poorly seen on the right. Right pupil reacts, left pupil is scarred over, EOM's full, no nystagmus, no ptosis. Facial sensation is normal. Corneal reflexes are intact. Facial movement is symmetric. Hearing is normal bilaterally. Palate is midline with normal sternocleidomastoid and trapezius muscles are normal. Tongue is midline Neck without meningismus or bruits Temporal arteries not tender or enlarged. Motor:  3/5 strength in upper and lower proximal and distal muscles. Normal bulk and tone. No fasciculations. Reflexes:   Deep tendon reflexes 1+/4 and symmetrical. Patient has no clonus or Babinski signs present Sensory:   Normal to touch, pinprick and temperature and vibration unreliable as is DSS Gait:  Not testable gait. Tremor: Moderate bilateral intention tremor noted. Cerebellar: Moderate abnormal Romberg and tandem cerebellar signs present. Neurovascular:  Normal heart sounds and regular rhythm, peripheral pulses decreased, and no carotid bruits.  
  
 
 
 
Assessment: ICD-10-CM ICD-9-CM 1. Partial epilepsy with impairment of consciousness ([facility]) G40.209 345.40 phenytoin (DILANTIN-125) suspension  
   lacosamide (VIMPAT) 150 mg tab tablet  
   levETIRAcetam (KEPPRA) 100 mg/mL solution PHENYTOIN, TOTAL & FREE  
   LEVETIRACETAM (KEPPRA) LACOSAMIDE AMMONIA 2. Recurrent seizures ([facility]) G40.909 345.80 phenytoin (DILANTIN-125) suspension  
   lacosamide (VIMPAT) 150 mg tab tablet  
   levETIRAcetam (KEPPRA) 100 mg/mL solution PHENYTOIN, TOTAL & FREE  
   LEVETIRACETAM (KEPPRA) LACOSAMIDE AMMONIA Active Problems: * No active hospital problems. * 
 
 
Plan:  
 
Patient better now that his Depakote has been discontinued, probably reflecting the fact that his ammonia levels probably down to normal.  We will recheck that his metabolic parameters to make sure there is no other cause for his not eating and weight loss. Patient doing well without seizures we will continue his current dose of medication. Overall he is better, more awake, but still not eating. Recent imaging of the brain has shown no new structural lesion, and EEGs showed no seizure just mild generalized slowing. These were all done in February at [facility]. Patient will continue his current medications until we get his levels back to decide him with the treatment should be Discussed with the staff in detail, reviewed all [facility]'s records, reviewed the CT scan at [facility] reviewed EEG, all on the PACS system, and I agree with reports and findings as described above. Follow-up in 6 month's time as a scheduled visit or earlier if needed. If we can find a cause of his anorexia he is to see his PCP for medical evaluation. We will check my chart for results of this test, or give us a call for further interpretation of his lab results. Signed By: Funmi Newby MD   
 August 13, 2019 CC: Frank Ramsay MD 
FAX: [phone] This note will not be viewable in [address] Consult REFERRED BY: 
Mirza Sharma MD 
 
CHIEF COMPLAINT: Increasing lethargy and decreasing ability to walk Subjective:  
 
Matilde Soni is a 47 y.o. right-handed -American male seen for evaluation at the request of Dr. Karlee Hyde for problem of seizures which seem to be under good control over the last year with the patient with stable on Vimpat 150 mg twice a day, Dilantin 100 mg twice a day, Keppra 1500 mg twice a day taking liquid form levels being therapeutic 1 year ago with Dilantin 10.2, Vimpat being 7.1, Keppra being 39. Patient taken off Depakote because of elevated ammonia level and is doing much better now and his last ammonia level was much improved. We will recheck that today also. His tremors are gotten much better in the interim. His drowsiness, sedation, and leaning over in his chair have all gotten better. He discussed his condition with the patient and the caregiver in detail and they agreed to therapy and plans as above. He has not had any recurrent seizures in the interim and seem to be doing well as far as the problem goes on the new medications. 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   He still has occasional seizure according to the staff, where he will have a staring off spell and then some tonic-clonic activity for about 30 seconds followed by mild postictal confusion for a minute or 2 and he quickly comes around. He has intractable seizures and has a vagus nerve stimulator and use the Dakin swipe stimulator with the magnet and he will stop the seizures. He is been toxic in the past on medications several times. He had an EEG at [facility] that just showed mild generalized slowing in February and imaging of the brain showed no acute changes. He has had no other focal weakness, sensory loss, or other focal signs. He does not seem to have any major new medical illnesses now. We discussed his condition with the staff in detail, we will check metabolic parameters to rule out any other treatable cause of his deterioration. Past Medical History:  
Diagnosis Date  Anemia  Anxiety  Blindness of left eye  Cerebral palsy ([address]  Dementia  Down syndrome  Endocrine disease   
 thyroid disorder  GERD (gastroesophageal reflux disease)  Ill-defined condition   
 blind in left eye  Ill-defined condition   
 imparied gait - uses walker  Ill-defined condition   
 dermatitis  Ill-defined condition   
 cerebral palsy  Ill-defined condition   
 prone to decubitus ulcers  Ill-defined condition   
 anemia  Neurological disorder  Other ill-defined conditions(799.89) Down syndrome  Psychiatric disorder   
 mental retardation  Psychiatric disorder   
 anxiety disorder  Seizures ([address]  Sleep apnea  Thyroid disease  Unspecified epilepsy without mention of intractable epilepsy Past Surgical History:  
Procedure Laterality Date  HX OTHER SURGICAL  03/13/2015 VNS  Dr. Jose Alex. Juhi Lipscomb  @ [facility] Family History Problem Relation Age of Onset  Hypertension Mother  Cancer Mother  Lupus Mother [facility] Arthritis-osteo Mother  Heart Disease Mother  Heart Disease Father  Diabetes Father  Hypertension Father  Elevated Lipids Father  Diabetes Brother  Elevated Lipids Brother  Hypertension Brother  Mental Retardation Brother  Cancer Maternal Grandmother  Arthritis-osteo Maternal Grandmother  Alcohol abuse Maternal Grandfather  Cancer Maternal Grandfather  Arthritis-osteo Paternal Grandmother  Cancer Paternal Grandfather Social History Tobacco Use  Smoking status: Never Smoker  Smokeless tobacco: Never Used Substance Use Topics  Alcohol use: No  
   
 
Current Outpatient Medications:  
  ketoconazole (NIZORAL) 2 % topical cream, Apply  to affected area two (2) times a day., Disp: , Rfl:  
  phenytoin (DILANTIN-125) suspension, Take 4 mL by mouth two (2) times a day., Disp: 250 mL, Rfl: 11 
  lacosamide (VIMPAT) 150 mg tab tablet, Take 150 mg by mouth two (2) times a day. Max Daily Amount: 300 mg., Disp: 90 Tab, Rfl: 11 
  levETIRAcetam (KEPPRA) 100 mg/mL solution, 15ml by mouth twice a day., Disp: 900 mL, Rfl: 11 
  levothyroxine (SYNTHROID) 50 mcg tablet, Take 1 Tab by mouth Daily (before breakfast). , Disp: 90 Tab, Rfl: 1 
  pantoprazole (PROTONIX) 40 mg tablet, Take 1 Tab by mouth Daily (before breakfast). , Disp: 30 Tab, Rfl: 2   cholecalciferol (VITAMIN D3) 1,000 unit tablet, Take 1 Tab by mouth daily. , Disp: 90 Tab, Rfl: 0 
  ascorbic acid, vitamin C, (VITAMIN C) 500 mg tablet, Take 1 Tab by mouth daily. , Disp: 30 Tab, Rfl: 6 
  loratadine (CLARITIN) 10 mg tablet, Take 1 Tab by mouth daily. , Disp: 90 Tab, Rfl: 1 
  ferrous sulfate 325 mg (65 mg iron) tablet, Take 1 Tab by mouth Daily (before breakfast). , Disp: 90 Tab, Rfl: 2 
  multivit-folic acid-herbal 542 (WELLESSE PLUS) 400 mcg-200 mg/30 mL liqd oral liquid, Take 30 mL by mouth daily. , Disp: 1 Bottle, Rfl: 0 
  sodium chloride (DEEP SEA NASAL) 0.65 % nasal squeeze bottle, 1 Spray as needed for Congestion. , Disp: , Rfl:  
   fluticasone (FLONASE) 50 mcg/actuation nasal spray, 2 Sprays by Both Nostrils route daily. , Disp: 1 Bottle, Rfl: 2 
  lacosamide (VIMPAT) 100 mg tab tablet, Take 1.5 Tabs by mouth two (2) times a day. Max Daily Amount: 300 mg., Disp: 90 Tab, Rfl: 5 
  W1-T7-B5-B5-B6-iron-met-choln (GERITOL TONIC WITH FERREX 18) 2.5 mg-50 mg-18 iron/15 mL liqd, Take 15 mL by mouth every morning., Disp: , Rfl:  
  acetaminophen (TYLENOL) 325 mg tablet, Take 2 Tabs by mouth every six (6) hours as needed. For pain, Disp: 60 Tab, Rfl: 0 Allergies Allergen Reactions  Morphine Not Reported This Time  Tegretol [Carbamazepine] Other (comments) \"bad reaction\" \"changes attitude\" Review of Systems: A comprehensive review of systems was negative except for: Constitutional: positive for fatigue and malaise Musculoskeletal: positive for myalgias, arthralgias and stiff joints Neurological: positive for dizziness, seizures, memory problems, speech problems, coordination problems, gait problems, tremor and weakness Vitals:  
 08/13/19 1016 BP: 146/82 Weight: 159 lb (72.1 kg) Objective: I 
 
NEUROLOGICAL EXAM: 
  
Appearance: The patient is well developed, well nourished, provides no history and is nonverbal and in no acute distress with severe mental impairment. Mental Status: Nonverbal but does follow simple commands. Mood and affect lethargic. Cranial Nerves:   Intact visual fields. Fundi are benign but poorly seen on the right. Right pupil reacts, left pupil is scarred over, EOM's full, no nystagmus, no ptosis. Facial sensation is normal. Corneal reflexes are intact. Facial movement is symmetric. Hearing is normal bilaterally. Palate is midline with normal sternocleidomastoid and trapezius muscles are normal. Tongue is midline Neck without meningismus or bruits Temporal arteries not tender or enlarged. Motor:  3/5 strength in upper and lower proximal and distal muscles. Normal bulk and tone. No fasciculations. Rapid altering movement is slow bilaterally Reflexes:   Deep tendon reflexes 1+/4 and symmetrical. Patient has no clonus or Babinski signs present Sensory:   Normal to touch, pinprick and temperature and vibration unreliable as is DSS Gait:  Not testable gait. Tremor: Moderate bilateral intention tremor noted. Cerebellar: Moderate abnormal Romberg and tandem cerebellar signs present. Mild tremor on finger-nose-finger exam  
Neurovascular:  Normal heart sounds and regular rhythm, peripheral pulses decreased, and no carotid bruits.  
  
 
 
 
Assessment: ICD-10-CM ICD-9-CM 1. Partial epilepsy with impairment of consciousness ([facility]) G40.209 345.40 phenytoin (DILANTIN-125) suspension  
   lacosamide (VIMPAT) 150 mg tab tablet  
   levETIRAcetam (KEPPRA) 100 mg/mL solution PHENYTOIN, TOTAL & FREE  
   LEVETIRACETAM (KEPPRA) LACOSAMIDE AMMONIA 2. Recurrent seizures ([facility]) G40.909 345.80 phenytoin (DILANTIN-125) suspension  
   lacosamide (VIMPAT) 150 mg tab tablet  
   levETIRAcetam (KEPPRA) 100 mg/mL solution PHENYTOIN, TOTAL & FREE  
   LEVETIRACETAM (KEPPRA) LACOSAMIDE AMMONIA Active Problems: * No active hospital problems. * 
 
 
Plan:  
 
Patient better now that his Depakote has been discontinued, probably reflecting the fact that his ammonia levels probably down to normal.  We will recheck lab test, and ammonia level, and continue current therapy She continues to do well. Previous imaging of the brain has shown no new structural lesion, and EEGs showed no seizure just mild generalized slowing. These were all done in February 2018 at [facility]. Patient will continue his current medications until we get his levels back to decide him with the treatment should be Discussed with the staff in detail, reviewed all [facility]'s records, reviewed the CT scan at [facility] reviewed EEG, all on the PACS system, and I agree with reports and findings as described above. Follow-up in 6 month's time as a scheduled visit or earlier if needed. If we can find a cause of his anorexia he is to see his PCP for medical evaluation. We will check my chart for results of this test, or give us a call for further interpretation of his lab results. Signed By: Lillian Borrego MD   
 August 13, 2019 CC: Laura Roldan MD 
FAX: [phone] This note will not be viewable in [address] If you have questions, please do not hesitate to call me. I look forward to following Mr. Lucero March along with you. Sincerely, Lillian Borrego MD

## 2019-08-13 NOTE — PROGRESS NOTES
Consult  REFERRED BY:  Mirza Sharma MD    CHIEF COMPLAINT: Increasing lethargy and decreasing ability to walk      Subjective:     Vika Mcqueen is a 47 y.o. right-handed -American male seen for evaluation at the request of Dr. Anisa Pickens for problem of seizures which seem to be under good control over the last year with the patient with stable on Vimpat 150 mg twice a day, Dilantin 100 mg twice a day, Keppra 1500 mg twice a day taking liquid form levels being therapeutic 1 year ago with Dilantin 10.2, Vimpat being 7.1, Keppra being 39. Patient taken off Depakote because of elevated ammonia level and is doing much better now and his last ammonia level was much improved. We will recheck that today also. His tremors are gotten much better in the interim. His drowsiness, sedation, and leaning over in his chair have all gotten better. He discussed his condition with the patient and the caregiver in detail and they agreed to therapy and plans as above. He has not had any recurrent seizures in the interim and seem to be doing well as far as the problem goes on the new medications. 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 He still has occasional seizure according to the staff, where he will have a staring off spell and then some tonic-clonic activity for about 30 seconds followed by mild postictal confusion for a minute or 2 and he quickly comes around. He has intractable seizures and has a vagus nerve stimulator and use the Dakin swipe stimulator with the magnet and he will stop the seizures. He is been toxic in the past on medications several times. He had an EEG at [facility] that just showed mild generalized slowing in February and imaging of the brain showed no acute changes. He has had no other focal weakness, sensory loss, or other focal signs.   He does not seem to have any major new medical illnesses now. We discussed his condition with the staff in detail, we will check metabolic parameters to rule out any other treatable cause of his deterioration. Past Medical History:   Diagnosis Date    Anemia     Anxiety     Blindness of left eye     Cerebral palsy (HCC)     Dementia     Down syndrome     Endocrine disease     thyroid disorder    GERD (gastroesophageal reflux disease)     Ill-defined condition     blind in left eye    Ill-defined condition     imparied gait - uses walker    Ill-defined condition     dermatitis    Ill-defined condition     cerebral palsy    Ill-defined condition     prone to decubitus ulcers    Ill-defined condition     anemia    Neurological disorder     Other ill-defined conditions(799.89)     Down syndrome    Psychiatric disorder     mental retardation    Psychiatric disorder     anxiety disorder    Seizures ([address]     Sleep apnea     Thyroid disease     Unspecified epilepsy without mention of intractable epilepsy       Past Surgical History:   Procedure Laterality Date    HX OTHER SURGICAL  03/13/2015     [facility]  Dr. Sergio Johnson.  [facility]  @ [address]     Family History   Problem Relation Age of Onset    Hypertension Mother     Cancer Mother     Lupus Mother     Arthritis-osteo Mother     Heart Disease Mother     Heart Disease Father     Diabetes Father     Hypertension Father     Elevated Lipids Father     Diabetes Brother     Elevated Lipids Brother     Hypertension Brother     Mental Retardation Brother     Cancer Maternal Grandmother     Arthritis-osteo Maternal Grandmother     Alcohol abuse Maternal Grandfather     Cancer Maternal Grandfather     Arthritis-osteo Paternal Grandmother     Cancer Paternal Grandfather       Social History     Tobacco Use    Smoking status: Never Smoker    Smokeless tobacco: Never Used   Substance Use Topics    Alcohol use: No         Current Outpatient Medications:    ketoconazole (NIZORAL) 2 % topical cream, Apply  to affected area two (2) times a day., Disp: , Rfl:     phenytoin (DILANTIN-125) suspension, Take 4 mL by mouth two (2) times a day., Disp: 250 mL, Rfl: 11    lacosamide (VIMPAT) 150 mg tab tablet, Take 150 mg by mouth two (2) times a day. Max Daily Amount: 300 mg., Disp: 90 Tab, Rfl: 11    levETIRAcetam (KEPPRA) 100 mg/mL solution, 15ml by mouth twice a day., Disp: 900 mL, Rfl: 11    levothyroxine (SYNTHROID) 50 mcg tablet, Take 1 Tab by mouth Daily (before breakfast). , Disp: 90 Tab, Rfl: 1    pantoprazole (PROTONIX) 40 mg tablet, Take 1 Tab by mouth Daily (before breakfast). , Disp: 30 Tab, Rfl: 2    cholecalciferol (VITAMIN D3) 1,000 unit tablet, Take 1 Tab by mouth daily. , Disp: 90 Tab, Rfl: 0    ascorbic acid, vitamin C, (VITAMIN C) 500 mg tablet, Take 1 Tab by mouth daily. , Disp: 30 Tab, Rfl: 6    loratadine (CLARITIN) 10 mg tablet, Take 1 Tab by mouth daily. , Disp: 90 Tab, Rfl: 1    ferrous sulfate 325 mg (65 mg iron) tablet, Take 1 Tab by mouth Daily (before breakfast). , Disp: 90 Tab, Rfl: 2    multivit-folic acid-herbal 314 (WELLESSE PLUS) 400 mcg-200 mg/30 mL liqd oral liquid, Take 30 mL by mouth daily. , Disp: 1 Bottle, Rfl: 0    sodium chloride (DEEP SEA NASAL) 0.65 % nasal squeeze bottle, 1 Spray as needed for Congestion. , Disp: , Rfl:     fluticasone (FLONASE) 50 mcg/actuation nasal spray, 2 Sprays by Both Nostrils route daily. , Disp: 1 Bottle, Rfl: 2    lacosamide (VIMPAT) 100 mg tab tablet, Take 1.5 Tabs by mouth two (2) times a day. Max Daily Amount: 300 mg., Disp: 90 Tab, Rfl: 5    J4-A2-M4-B5-B6-iron-met-choln (GERITOL TONIC WITH FERREX 18) 2.5 mg-50 mg-18 iron/15 mL liqd, Take 15 mL by mouth every morning., Disp: , Rfl:     acetaminophen (TYLENOL) 325 mg tablet, Take 2 Tabs by mouth every six (6) hours as needed.  For pain, Disp: 60 Tab, Rfl: 0        Allergies   Allergen Reactions    Morphine Not Reported This Time    Tegretol [Carbamazepine] Other (comments)     \"bad reaction\" \"changes attitude\"        Review of Systems:  A comprehensive review of systems was negative except for: Constitutional: positive for fatigue and malaise  Musculoskeletal: positive for myalgias, arthralgias and stiff joints  Neurological: positive for dizziness, seizures, memory problems, speech problems, coordination problems, gait problems, tremor and weakness   Vitals:    08/13/19 1016   BP: 146/82   Weight: 159 lb (72.1 kg)     Objective:     I    NEUROLOGICAL EXAM:     Appearance: The patient is well developed, well nourished, provides no history and is nonverbal and in no acute distress with severe mental impairment. Mental Status: Nonverbal but does follow simple commands. Mood and affect lethargic. Cranial Nerves:   Intact visual fields. Fundi are benign but poorly seen on the right. Right pupil reacts, left pupil is scarred over, EOM's full, no nystagmus, no ptosis. Facial sensation is normal. Corneal reflexes are intact. Facial movement is symmetric. Hearing is normal bilaterally. Palate is midline with normal sternocleidomastoid and trapezius muscles are normal. Tongue is midline  Neck without meningismus or bruits  Temporal arteries not tender or enlarged. Motor:  3/5 strength in upper and lower proximal and distal muscles. Normal bulk and tone. No fasciculations. Rapid altering movement is slow bilaterally   Reflexes:   Deep tendon reflexes 1+/4 and symmetrical. Patient has no clonus or Babinski signs present   Sensory:   Normal to touch, pinprick and temperature and vibration unreliable as is DSS   Gait:  Not testable gait. Tremor: Moderate bilateral intention tremor noted. Cerebellar: Moderate abnormal Romberg and tandem cerebellar signs present.   Mild tremor on finger-nose-finger exam   Neurovascular:  Normal heart sounds and regular rhythm, peripheral pulses decreased, and no carotid bruits.            Assessment:       ICD-10-CM ICD-9-CM    1. Partial epilepsy with impairment of consciousness (HCC) G40.209 345.40 phenytoin (DILANTIN-125) suspension      lacosamide (VIMPAT) 150 mg tab tablet      levETIRAcetam (KEPPRA) 100 mg/mL solution      PHENYTOIN, TOTAL & FREE      LEVETIRACETAM (KEPPRA)      LACOSAMIDE      AMMONIA   2. Recurrent seizures (HCC) G40.909 345.80 phenytoin (DILANTIN-125) suspension      lacosamide (VIMPAT) 150 mg tab tablet      levETIRAcetam (KEPPRA) 100 mg/mL solution      PHENYTOIN, TOTAL & FREE      LEVETIRACETAM (KEPPRA)      LACOSAMIDE      AMMONIA     Active Problems:    * No active hospital problems. *      Plan:     Patient better now that his Depakote has been discontinued, probably reflecting the fact that his ammonia levels probably down to normal.  We will recheck lab test, and ammonia level, and continue current therapy  She continues to do well. Previous imaging of the brain has shown no new structural lesion, and EEGs showed no seizure just mild generalized slowing. These were all done in February 2018 at [facility]. Patient will continue his current medications until we get his levels back to decide him with the treatment should be  Discussed with the staff in detail, reviewed all [facility] records, reviewed the CT scan at [facility] reviewed EEG, all on the PACS system, and I agree with reports and findings as described above. Follow-up in 6 month's time as a scheduled visit or earlier if needed. If we can find a cause of his anorexia he is to see his PCP for medical evaluation. We will check my chart for results of this test, or give us a call for further interpretation of his lab results. Signed By: Ismael Hoffman MD     August 13, 2019       CC: Maci Correa MD  FAX: [phone]    This note will not be viewable in [address]

## 2019-08-13 NOTE — PATIENT INSTRUCTIONS
A Healthy Lifestyle: Care Instructions Your Care Instructions A healthy lifestyle can help you feel good, stay at a healthy weight, and have plenty of energy for both work and play. A healthy lifestyle is something you can share with your whole family. A healthy lifestyle also can lower your risk for serious health problems, such as high blood pressure, heart disease, and diabetes. You can follow a few steps listed below to improve your health and the health of your family. Follow-up care is a key part of your treatment and safety. Be sure to make and go to all appointments, and call your doctor if you are having problems. It's also a good idea to know your test results and keep a list of the medicines you take. How can you care for yourself at home? · Do not eat too much sugar, fat, or fast foods. You can still have dessert and treats now and then. The goal is moderation. · Start small to improve your eating habits. Pay attention to portion sizes, drink less juice and soda pop, and eat more fruits and vegetables. ? Eat a healthy amount of food. A 3-ounce serving of meat, for example, is about the size of a deck of cards. Fill the rest of your plate with vegetables and whole grains. ? Limit the amount of soda and sports drinks you have every day. Drink more water when you are thirsty. ? Eat at least 5 servings of fruits and vegetables every day. It may seem like a lot, but it is not hard to reach this goal. A serving or helping is 1 piece of fruit, 1 cup of vegetables, or 2 cups of leafy, raw vegetables. Have an apple or some carrot sticks as an afternoon snack instead of a candy bar. Try to have fruits and/or vegetables at every meal. 
· Make exercise part of your daily routine. You may want to start with simple activities, such as walking, bicycling, or slow swimming. Try to be active 30 to 60 minutes every day.  You do not need to do all 30 to 60 minutes all at once. For example, you can exercise 3 times a day for 10 or 20 minutes. Moderate exercise is safe for most people, but it is always a good idea to talk to your doctor before starting an exercise program. 
· Keep moving. Makayla Leather the lawn, work in the garden, or [employer]. Take the stairs instead of the elevator at work. · If you smoke, quit. People who smoke have an increased risk for heart attack, stroke, cancer, and other lung illnesses. Quitting is hard, but there are ways to boost your chance of quitting tobacco for good. ? Use nicotine gum, patches, or lozenges. ? Ask your doctor about stop-smoking programs and medicines. ? Keep trying. In addition to reducing your risk of diseases in the future, you will notice some benefits soon after you stop using tobacco. If you have shortness of breath or asthma symptoms, they will likely get better within a few weeks after you quit. · Limit how much alcohol you drink. Moderate amounts of alcohol (up to 2 drinks a day for men, 1 drink a day for women) are okay. But drinking too much can lead to liver problems, high blood pressure, and other health problems. Family health If you have a family, there are many things you can do together to improve your health. · Eat meals together as a family as often as possible. · Eat healthy foods. This includes fruits, vegetables, lean meats and dairy, and whole grains. · Include your family in your fitness plan. Most people think of activities such as jogging or tennis as the way to fitness, but there are many ways you and your family can be more active. Anything that makes you breathe hard and gets your heart pumping is exercise. Here are some tips: 
? Walk to do errands or to take your child to school or the bus. 
? Go for a family bike ride after dinner instead of watching TV. Where can you learn more? Go to http://marcello-cynthia.info/. Enter C415 in the search box to learn more about \"A Healthy Lifestyle: Care Instructions. \" Current as of: September 11, 2018 Content Version: 12.1 © 7789-7628 Healthwise, [employer]. Care instructions adapted under license by [employer] (which disclaims liability or warranty for this information). If you have questions about a medical condition or this instruction, always ask your healthcare professional. [address] any warranty or liability for your use of this information. Office Policies 
 
o Phone calls/patient messages: Please allow up to 24 hours for someone in the office to contact you about your call or message. Be mindful your provider may be out of the office or your message may require further review. We encourage you to use [employer] for your messages as this is a faster, more efficient way to communicate with our office 
 
o Medication Refills: 
Prescription medications require up to 48 business hours to process. We encourage you to use [employer] for your refills. For controlled medications: Please allow up to 72 business hours to process. Certain medications may require you to  a written prescription at our office. NO narcotic/controlled medications will be prescribed after 4pm Monday through Friday or on weekends 
 
o Form/Paperwork Completion: We ask that you allow 7-14 business days. You may also download your forms to [employer] to have your doctor print off.

## 2019-08-29 LAB
AMMONIA PLAS-MCNC: 93 UG/DL (ref 27–102)
LACOSAMIDE SERPL-MCNC: NORMAL UG/ML
LEVETIRACETAM SERPL-MCNC: 30.6 UG/ML (ref 10–40)
PHENYTOIN FREE SERPL-MCNC: NORMAL UG/ML
PHENYTOIN SERPL-MCNC: NORMAL UG/ML

## 2019-09-24 DIAGNOSIS — R09.81 NASAL CONGESTION: ICD-10-CM

## 2019-09-25 RX ORDER — ASCORBIC ACID 500 MG
500 TABLET ORAL DAILY
Qty: 30 TAB | Refills: 6 | Status: SHIPPED | OUTPATIENT
Start: 2019-09-25 | End: 2021-07-06 | Stop reason: SDUPTHER

## 2019-09-25 RX ORDER — LORATADINE 10 MG/1
10 TABLET ORAL DAILY
Qty: 90 TAB | Refills: 1 | Status: SHIPPED | OUTPATIENT
Start: 2019-09-25 | End: 2020-07-16 | Stop reason: SDUPTHER

## 2019-10-08 ENCOUNTER — HOSPITAL ENCOUNTER (OUTPATIENT)
Dept: GENERAL RADIOLOGY | Age: 54
Discharge: HOME | End: 2019-10-08
Payer: MEDICARE

## 2019-10-08 ENCOUNTER — OFFICE VISIT (OUTPATIENT)
Dept: PRIMARY CARE CLINIC | Age: 54
End: 2019-10-08

## 2019-10-08 VITALS
RESPIRATION RATE: 17 BRPM | TEMPERATURE: 96.5 F | DIASTOLIC BLOOD PRESSURE: 83 MMHG | OXYGEN SATURATION: 100 % | SYSTOLIC BLOOD PRESSURE: 134 MMHG | HEIGHT: 63 IN | BODY MASS INDEX: 28.17 KG/M2 | HEART RATE: 81 BPM

## 2019-10-08 DIAGNOSIS — J69.0 ASPIRATION PNEUMONIA OF LEFT LOWER LOBE DUE TO REGURGITATED FOOD (HCC): ICD-10-CM

## 2019-10-08 DIAGNOSIS — R09.89 CHEST CONGESTION: ICD-10-CM

## 2019-10-08 DIAGNOSIS — R09.89 CHEST CONGESTION: Primary | ICD-10-CM

## 2019-10-08 PROCEDURE — 71046 X-RAY EXAM CHEST 2 VIEWS: CPT

## 2019-10-08 NOTE — PROGRESS NOTES
Subjective:     Chief Complaint   Patient presents with    Chest Congestion     makes him gag when eating food and has thrown up a couple times. x1 week, no known fever or diarrhea, gait has started to be off once he started not feeling well. Productive cough. He  is a 47y.o. year old male past medical history of seizure, dementia, Down syndrome, cerebral palsy, hypothyroid, anemia, who presented from a group home with his caregiver with a c/o chest congestion for the past one week. Reports that eating food makes him gag and has thrown up phlegm couple of times. No known fever, diarrhea. Review of systems not obtained due to patient factors. Obtained from caregiver. Objective:     Vitals:    10/08/19 1526   BP: 134/83   Pulse: 81   Resp: 17   Temp: 96.5 °F (35.8 °C)   TempSrc: Temporal   SpO2: 100%   Height: 5' 3\" (1.6 m)       Physical Examination: General appearance - alert, well appearing, and in no distress, oriented to person and overweight. Sitting in wheel chair. Mental status - alert, oriented to person,  normal mood, behavior, .   Chest - limited exam due to patient not following direction due to his mental acuity: clear to auscultation, no wheezes, rales or rhonchi, symmetric air entry  Heart - normal rate, regular rhythm, normal S1, S2, no murmurs, rubs, clicks or gallops    Allergies   Allergen Reactions    Morphine Not Reported This Time    Tegretol [Carbamazepine] Other (comments)     \"bad reaction\" \"changes attitude\"      Social History     Socioeconomic History    Marital status: SINGLE     Spouse name: Not on file    Number of children: Not on file    Years of education: Not on file    Highest education level: Not on file   Tobacco Use    Smoking status: Never Smoker    Smokeless tobacco: Never Used   Substance and Sexual Activity    Alcohol use: No    Drug use: No    Sexual activity: Not Currently      Family History   Problem Relation Age of Onset    Hypertension Mother     Cancer Mother     Lupus Mother     Arthritis-osteo Mother     Heart Disease Mother     Heart Disease Father     Diabetes Father     Hypertension Father     Elevated Lipids Father     Diabetes Brother     Elevated Lipids Brother     Hypertension Brother     Mental Retardation Brother     Cancer Maternal Grandmother     Arthritis-osteo Maternal Grandmother     Alcohol abuse Maternal Grandfather     Cancer Maternal Grandfather     Arthritis-osteo Paternal Grandmother     Cancer Paternal Grandfather       Past Surgical History:   Procedure Laterality Date    HX OTHER SURGICAL  03/13/2015     [facility]  Dr. Ramona Gannon. [place] Choe  @ [address] [facility]      Past Medical History:   Diagnosis Date    Anemia     Anxiety     Blindness of left eye     Cerebral palsy (HCC)     Dementia     Down syndrome     Endocrine disease     thyroid disorder    GERD (gastroesophageal reflux disease)     Ill-defined condition     blind in left eye    Ill-defined condition     imparied gait - uses walker    Ill-defined condition     dermatitis    Ill-defined condition     cerebral palsy    Ill-defined condition     prone to decubitus ulcers    Ill-defined condition     anemia    Neurological disorder     Other ill-defined conditions(799.89)     Down syndrome    Psychiatric disorder     mental retardation    Psychiatric disorder     anxiety disorder    Seizures ([address]     Sleep apnea     Thyroid disease     Unspecified epilepsy without mention of intractable epilepsy       Current Outpatient Medications   Medication Sig Dispense Refill    ascorbic acid, vitamin C, (VITAMIN C) 500 mg tablet Take 1 Tab by mouth daily. 30 Tab 6    loratadine (CLARITIN) 10 mg tablet Take 1 Tab by mouth daily. 90 Tab 1    ketoconazole (NIZORAL) 2 % topical cream Apply  to affected area two (2) times a day.  phenytoin (DILANTIN-125) suspension Take 4 mL by mouth two (2) times a day.  250 mL 11    lacosamide (VIMPAT) 150 mg tab tablet Take 150 mg by mouth two (2) times a day. Max Daily Amount: 300 mg. 90 Tab 11    levETIRAcetam (KEPPRA) 100 mg/mL solution 15ml by mouth twice a day. 900 mL 11    levothyroxine (SYNTHROID) 50 mcg tablet Take 1 Tab by mouth Daily (before breakfast). 90 Tab 1    pantoprazole (PROTONIX) 40 mg tablet Take 1 Tab by mouth Daily (before breakfast). 30 Tab 2    lacosamide (VIMPAT) 100 mg tab tablet Take 1.5 Tabs by mouth two (2) times a day. Max Daily Amount: 300 mg. 90 Tab 5    cholecalciferol (VITAMIN D3) 1,000 unit tablet Take 1 Tab by mouth daily. 90 Tab 0    ferrous sulfate 325 mg (65 mg iron) tablet Take 1 Tab by mouth Daily (before breakfast). 90 Tab 2    acetaminophen (TYLENOL) 325 mg tablet Take 2 Tabs by mouth every six (6) hours as needed. For pain 60 Tab 0    multivit-folic acid-herbal 154 (WELLESSE PLUS) 400 mcg-200 mg/30 mL liqd oral liquid Take 30 mL by mouth daily. 1 Bottle 0    sodium chloride (DEEP SEA NASAL) 0.65 % nasal squeeze bottle 1 Spray as needed for Congestion.  fluticasone (FLONASE) 50 mcg/actuation nasal spray 2 Sprays by Both Nostrils route daily. 1 Bottle 2    R6-U6-J4-B5-B6-iron-met-choln (GERITOL TONIC WITH FERREX 18) 2.5 mg-50 mg-18 iron/15 mL liqd Take 15 mL by mouth every morning. Assessment/ Plan:   Diagnoses and all orders for this visit:    1. Chest congestion  -     XR CHEST PA LAT; Future: Unchanged patchy opacification of the retrocardiac left lower lobe compare to 1/2019 Xray. 2. Aspiration pneumonia of left lower lobe due to regurgitated food ([address]  -     XR CHEST PA LAT; Future              Continue Puree diet. Result discussed with Stephen Epstein at group home. She notified me that [facility] and  is being treated with Doxycycline 100 mg BID for 10 days. Advised to have a follow up Xray in 6 weeks. Medication risks/benefits/costs/interactions/alternatives discussed with patient.   Advised patient to call back or return to office if symptoms worsen/change/persist. If patient cannot reach us or should anything more severe/urgent arise he/she should proceed directly to the nearest emergency department. Discussed expected course/resolution/complications of diagnosis in detail with patient. Patient given a written after visit summary which includes her diagnoses, current medications and vitals. Patient expressed understanding with the diagnosis and plan. Follow-up and Dispositions    · Return if symptoms worsen or fail to improve.

## 2019-10-08 NOTE — PROGRESS NOTES
José Luis Hernandez is a 47 y.o. male    Chief Complaint   Patient presents with    Chest Congestion     makes him gag when eating food and has thrown up a couple times. x1 week, no known fever or diarrhea, gait has started to be off once he started not feeling well. Productive cough. 1. Have you been to the ER, urgent care clinic since your last visit? Hospitalized since your last visit? No    2. Have you seen or consulted any other health care providers outside of the [address] [place] [address] since your last visit? Include any pap smears or colon screening. No    No flowsheet data found.      Health Maintenance Due   Topic Date Due    DTaP/Tdap/Td series (1 - Tdap) 02/16/1986    Shingrix Vaccine Age 50> (1 of 2) 02/16/2015    FOBT Q 1 YEAR AGE 50-75  12/10/2016    MEDICARE YEARLY EXAM  05/10/2018    Influenza Age 9 to Adult  08/01/2019

## 2019-11-13 RX ORDER — MELATONIN
1000 DAILY
Qty: 90 TAB | Refills: 2 | Status: SHIPPED | OUTPATIENT
Start: 2019-11-13 | End: 2020-10-27 | Stop reason: SDUPTHER

## 2020-03-20 ENCOUNTER — OFFICE VISIT (OUTPATIENT)
Dept: PRIMARY CARE CLINIC | Age: 55
End: 2020-03-20

## 2020-03-20 VITALS
RESPIRATION RATE: 17 BRPM | TEMPERATURE: 96.3 F | DIASTOLIC BLOOD PRESSURE: 80 MMHG | HEIGHT: 63 IN | WEIGHT: 159 LBS | OXYGEN SATURATION: 100 % | HEART RATE: 95 BPM | BODY MASS INDEX: 28.17 KG/M2 | SYSTOLIC BLOOD PRESSURE: 120 MMHG

## 2020-03-20 DIAGNOSIS — E03.9 ACQUIRED HYPOTHYROIDISM: ICD-10-CM

## 2020-03-20 DIAGNOSIS — Z11.1 SCREENING-PULMONARY TB: ICD-10-CM

## 2020-03-20 DIAGNOSIS — Z00.00 MEDICARE ANNUAL WELLNESS VISIT, SUBSEQUENT: Primary | ICD-10-CM

## 2020-03-20 DIAGNOSIS — R56.9 SEIZURE (HCC): ICD-10-CM

## 2020-03-20 DIAGNOSIS — E55.9 VITAMIN D DEFICIENCY: ICD-10-CM

## 2020-03-20 DIAGNOSIS — D64.9 LOW HEMOGLOBIN: ICD-10-CM

## 2020-03-20 DIAGNOSIS — Z12.11 SCREEN FOR COLON CANCER: ICD-10-CM

## 2020-03-20 DIAGNOSIS — Z12.5 SPECIAL SCREENING FOR MALIGNANT NEOPLASM OF PROSTATE: ICD-10-CM

## 2020-03-20 NOTE — PATIENT INSTRUCTIONS
Medicare Wellness Visit, Male The best way to live healthy is to have a lifestyle where you eat a well-balanced diet, exercise regularly, limit alcohol use, and quit all forms of tobacco/nicotine, if applicable. Regular preventive services are another way to keep healthy. Preventive services (vaccines, screening tests, monitoring & exams) can help personalize your care plan, which helps you manage your own care. Screening tests can find health problems at the earliest stages, when they are easiest to treat. [facility] follows the current, evidence-based guidelines published by the [facility] ([facility]) when recommending preventive services for our patients. Because we follow these guidelines, sometimes recommendations change over time as research supports it. (For example, a prostate screening blood test is no longer routinely recommended for men with no symptoms). Of course, you and your doctor may decide to screen more often for some diseases, based on your risk and co-morbidities (chronic disease you are already diagnosed with). Preventive services for you include: - Medicare offers their members a free annual wellness visit, which is time for you and your primary care provider to discuss and plan for your preventive service needs. Take advantage of this benefit every year! 
-All adults over age 72 should receive the recommended pneumonia vaccines. Current USPSTF guidelines recommend a series of two vaccines for the best pneumonia protection.  
-All adults should have a flu vaccine yearly and tetanus vaccine every 10 years. 
-All adults age 48 and older should receive the shingles vaccines (series of two vaccines).       
-All adults age 38-68 who are overweight should have a diabetes screening test once every three years.  
-Other screening tests & preventive services for persons with diabetes include: an eye exam to screen for diabetic retinopathy, a kidney function test, a foot exam, and stricter control over your cholesterol.  
-Cardiovascular screening for adults with routine risk involves an electrocardiogram (ECG) at intervals determined by the provider.  
-Colorectal cancer screening should be done for adults age 54-65 with no increased risk factors for colorectal cancer. There are a number of acceptable methods of screening for this type of cancer. Each test has its own benefits and drawbacks. Discuss with your provider what is most appropriate for you during your annual wellness visit. The different tests include: colonoscopy (considered the best screening method), a fecal occult blood test, a fecal DNA test, and sigmoidoscopy. 
-All adults born between [facility] should be screened once for Hepatitis C. 
-An Abdominal Aortic Aneurysm (AAA) Screening is recommended for men age 73-68 who has ever smoked in their lifetime. Here is a list of your current Health Maintenance items (your personalized list of preventive services) with a due date: 
Health Maintenance Due Topic Date Due  
 DTaP/Tdap/Td  (1 - Tdap) 02/16/1986  Shingles Vaccine (1 of 2) 02/16/2015  Colon Cancer Stool Test  12/10/2016 Breezy Menjivar Annual Well Visit  05/10/2018  Flu Vaccine  08/01/2019

## 2020-03-20 NOTE — PROGRESS NOTES
This is the Subsequent Medicare Annual Wellness Exam, performed 12 months or more after the Initial AWV or the last Subsequent AWV    I have reviewed the patient's medical history in detail and updated the computerized patient record. He  is a 47y.o. year old male past medical history of seizure, dementia, Down syndrome, cerebral palsy, hypothyroid, anemia, who presented from a group home with his caregiver . No concerns today. Has been compliant with Synthroid 50 mcg daily. Has been taking Vit D 1,000 iu daily. Has been following up with Neurologist for seizure which has been well controlled with current med. Unable to ROS due to patient's mental acuity. Caregiver does not have any concerns.           History     Patient Active Problem List   Diagnosis Code    Recurrent seizures ([address] G40.909    Partial epilepsy with impairment of consciousness ([address] G40.209    Ataxia R27.0    Memory loss R41.3    Seizure ([address] R56.9    S/P placement of VNS (vagus nerve stimulation) device Z96.89    ACP (advance care planning) Z71.89    Low vitamin D level R79.89    Blind left eye H54.40    Pneumonia J18.9    Down syndrome Q90.9    Acute encephalopathy G93.40    Complex partial seizure evolving to generalized seizure ([address] G40.209    Acquired hypothyroidism E03.9     Past Medical History:   Diagnosis Date    Anemia     Anxiety     Blindness of left eye     Cerebral palsy (HCC)     Dementia     Down syndrome     Endocrine disease     thyroid disorder    GERD (gastroesophageal reflux disease)     Ill-defined condition     blind in left eye    Ill-defined condition     imparied gait - uses walker    Ill-defined condition     dermatitis    Ill-defined condition     cerebral palsy    Ill-defined condition     prone to decubitus ulcers    Ill-defined condition     anemia    Neurological disorder     Other ill-defined conditions(799.89)     Down syndrome    Psychiatric disorder mental retardation    Psychiatric disorder     anxiety disorder    [facility]     Sleep apnea     Thyroid disease     Unspecified epilepsy without mention of intractable epilepsy       Past Surgical History:   Procedure Laterality Date    HX OTHER SURGICAL  03/13/2015     VNS  Dr. Saul Lester. [place]  @ [facility]     Current Outpatient Medications   Medication Sig Dispense Refill    cholecalciferol (VITAMIN D3) (1000 Units /25 mcg) tablet Take 1 Tab by mouth daily. 90 Tab 2    ascorbic acid, vitamin C, (VITAMIN C) 500 mg tablet Take 1 Tab by mouth daily. 30 Tab 6    loratadine (CLARITIN) 10 mg tablet Take 1 Tab by mouth daily. 90 Tab 1    ketoconazole (NIZORAL) 2 % topical cream Apply  to affected area two (2) times a day.  phenytoin (DILANTIN-125) suspension Take 4 mL by mouth two (2) times a day. 250 mL 11    lacosamide (VIMPAT) 150 mg tab tablet Take 150 mg by mouth two (2) times a day. Max Daily Amount: 300 mg. 90 Tab 11    levETIRAcetam (KEPPRA) 100 mg/mL solution 15ml by mouth twice a day. 900 mL 11    levothyroxine (SYNTHROID) 50 mcg tablet Take 1 Tab by mouth Daily (before breakfast). 90 Tab 1    pantoprazole (PROTONIX) 40 mg tablet Take 1 Tab by mouth Daily (before breakfast). 30 Tab 2    ferrous sulfate 325 mg (65 mg iron) tablet Take 1 Tab by mouth Daily (before breakfast). 90 Tab 2    multivit-folic acid-herbal 204 (WELLESSE PLUS) 400 mcg-200 mg/30 mL liqd oral liquid Take 30 mL by mouth daily. 1 Bottle 0    sodium chloride (DEEP SEA NASAL) 0.65 % nasal squeeze bottle 1 Spray as needed for Congestion.  C0-J2-R5-B5-B6-iron-met-choln (GERITOL TONIC WITH FERREX 18) 2.5 mg-50 mg-18 iron/15 mL liqd Take 15 mL by mouth every morning.  acetaminophen (TYLENOL) 325 mg tablet Take 2 Tabs by mouth every six (6) hours as needed. For pain 60 Tab 0    fluticasone (FLONASE) 50 mcg/actuation nasal spray 2 Sprays by Both Nostrils route daily.  1 Bottle 2     Allergies   Allergen Reactions    Morphine Not Reported This Time    Tegretol [Carbamazepine] Other (comments)     \"bad reaction\" \"changes attitude\"       Family History   Problem Relation Age of Onset    Hypertension Mother     Cancer Mother     Lupus Mother     Arthritis-osteo Mother     Heart Disease Mother     Heart Disease Father     Diabetes Father     Hypertension Father     Elevated Lipids Father     Diabetes Brother     Elevated Lipids Brother     Hypertension Brother     Mental Retardation Brother     Cancer Maternal Grandmother     Arthritis-osteo Maternal Grandmother     Alcohol abuse Maternal Grandfather     Cancer Maternal Grandfather     Arthritis-osteo Paternal Grandmother     Cancer Paternal Grandfather      Social History     Tobacco Use    Smoking status: Never Smoker    Smokeless tobacco: Never Used   Substance Use Topics    Alcohol use: No       Depression Risk Factor Screening:     3 most recent PHQ Screens 3/20/2020   Little interest or pleasure in doing things Not at all   Feeling down, depressed, irritable, or hopeless Not at all   Total Score PHQ 2 0       Alcohol Risk Factor Screening (MALE < 65): Do you average more than 2 drinks per night or 14 drinks a week: No    On any one occasion in the past three months have you have had more than 4 drinks containing alcohol:  No      Functional Ability and Level of Safety:   Hearing: Hearing is good. Patient responds to verbal command. Activities of Daily Living: The home contains: grab bars and rugs  Lives in group home. Patient needs help with:  transportation, shopping, preparing meals, laundry, housework, managing medications, dressing, bathing, hygiene, bathroom needs and walking    Ambulation: with difficulty, uses a walker    Fall Risk:  No flowsheet data found.     Abuse Screen:  Patient is not abused      General: stated age, well developed, well nourished and in NAD  Neck: supple, symmetrical, trachea midline, no adenopathy and thyroid: not enlarged, symmetric, no tenderness/mass/nodules  Ear: mild wax bilaterally. Lungs:  clear to auscultation w/o rales, rhonchi, wheezes w/normal effort and no use of accessory muscles of respiration   Heart: regular rate and rhythm, S1, S2 normal, no murmur, click, rub or gallop  Abdomen: soft, nontender, no masses, BS normal  Ext:  No edema noted. Lymph: no cervical adenopathy appreciated  Skin:  Unable to check. Psych: alert , non verbal.      Cognitive Screening   Has your family/caregiver stated any concerns about your memory: NA      Patient Care Team   Patient Care Team:  Hollis Serrato MD as PCP - General (Family Practice)  Hollis Serrato MD as PCP - [facility]  Skylar Anderson NP as Nurse Practitioner (Neurology)  Chu Kay MD as Surgeon (General Surgery)  Urban Sheriff MD (Neurology)  Deborah Ryan MD (Neurology)    Assessment/Plan   Education and counseling provided:  Are appropriate based on today's review and evaluation  Prostate cancer screening tests (PSA, covered annually)    Diagnoses and all orders for this visit:    1. Medicare annual wellness visit, subsequent    2. Special screening for malignant neoplasm of prostate  -     PSA SCREENING (SCREENING)    3. Acquired hypothyroidism  -    Will notify if any change needed otherwise continue current med. THYROID CASCADE PROFILE    4. Seizure ([address]       Stable. Follow up with neurologist.  5. Low hemoglobin  -     CBC WITH AUTOMATED DIFF    6. Vitamin D deficiency  -     VITAMIN D, 25 HYDROXY              Continue current supplement. Will notify if any change noted.   7. Screening-pulmonary TB  -     QUANTIFERON-TB GOLD PLUS    8. Screen for colon cancer  -     OCCULT BLOOD IMMUNOASSAY,DIAGNOSTIC        Health Maintenance Due   Topic Date Due    DTaP/Tdap/Td series (1 - Tdap) 02/16/1986    Shingrix Vaccine Age 50> (1 of 2) 02/16/2015    FOBT Q1Y Age 50-75  12/10/2016    Medicare Yearly Exam  05/10/2018    Influenza Age 5 to Adult  08/01/2019     Follow-up and Dispositions    · Return in about 6 months (around 9/20/2020) for 6 months follow up. Thor Romo

## 2020-03-27 LAB — HEMOCCULT STL QL IA: NEGATIVE

## 2020-03-30 ENCOUNTER — TELEPHONE (OUTPATIENT)
Dept: PRIMARY CARE CLINIC | Age: 55
End: 2020-03-30

## 2020-03-31 NOTE — TELEPHONE ENCOUNTER
LVM informing Sushila from [facility] home that patient never got labs drawn. I called and verified with lab faiza. Advised that patient can come anytime to get labs done and does not need to be fasting, but lab visit does need to be scheduled at this time.

## 2020-06-12 RX ORDER — PANTOPRAZOLE SODIUM 40 MG/1
40 TABLET, DELAYED RELEASE ORAL
Qty: 30 TAB | Refills: 2 | Status: SHIPPED | OUTPATIENT
Start: 2020-06-12 | End: 2020-08-21 | Stop reason: SDUPTHER

## 2020-06-22 RX ORDER — LEVOTHYROXINE SODIUM 50 UG/1
50 TABLET ORAL
Qty: 90 TAB | Refills: 0 | Status: SHIPPED | OUTPATIENT
Start: 2020-06-22 | End: 2020-09-18 | Stop reason: SDUPTHER

## 2020-06-22 NOTE — TELEPHONE ENCOUNTER
Patient is suppose to have his blood work done for thyroid check up which was ordered three months ago. Can you remind to have lab drawn if they have not done yet.

## 2020-06-23 ENCOUNTER — TELEPHONE (OUTPATIENT)
Dept: NEUROLOGY | Age: 55
End: 2020-06-23

## 2020-06-23 NOTE — TELEPHONE ENCOUNTER
----- Message from Sasha Gifford sent at 6/22/2020  4:31 PM EDT -----  Regarding: Dr. Mehdi Ribera  General Message/Vendor Calls    Caller's first and last name:  [facility] Group home    Reason for call:  Requesting to speak with the nurse    Callback required yes/no and why:  Yes. Best contact number(s):  [phone]    Details to clarify the request:  Requesting to speak with provider in regards to pt's VNS machine. Not sure if the machine is working properly. Requesting information on how to work the machine.       Sasha Gifford

## 2020-06-26 ENCOUNTER — OFFICE VISIT (OUTPATIENT)
Dept: NEUROLOGY | Age: 55
End: 2020-06-26

## 2020-06-26 VITALS
BODY MASS INDEX: 28.17 KG/M2 | SYSTOLIC BLOOD PRESSURE: 120 MMHG | RESPIRATION RATE: 16 BRPM | DIASTOLIC BLOOD PRESSURE: 76 MMHG | HEART RATE: 60 BPM | WEIGHT: 159 LBS | TEMPERATURE: 98 F | HEIGHT: 63 IN

## 2020-06-26 DIAGNOSIS — G40.209 PARTIAL EPILEPSY WITH IMPAIRMENT OF CONSCIOUSNESS (HCC): Primary | ICD-10-CM

## 2020-06-26 DIAGNOSIS — G40.909 RECURRENT SEIZURES (HCC): ICD-10-CM

## 2020-06-26 DIAGNOSIS — G40.209 COMPLEX PARTIAL SEIZURE EVOLVING TO GENERALIZED SEIZURE (HCC): ICD-10-CM

## 2020-06-26 DIAGNOSIS — Z96.89 S/P PLACEMENT OF VNS (VAGUS NERVE STIMULATION) DEVICE: ICD-10-CM

## 2020-06-26 LAB
25(OH)D3+25(OH)D2 SERPL-MCNC: 27.3 NG/ML (ref 30–100)
BASOPHILS # BLD AUTO: 0 X10E3/UL (ref 0–0.2)
BASOPHILS NFR BLD AUTO: 1 %
EOSINOPHIL # BLD AUTO: 0.1 X10E3/UL (ref 0–0.4)
EOSINOPHIL NFR BLD AUTO: 2 %
ERYTHROCYTE [DISTWIDTH] IN BLOOD BY AUTOMATED COUNT: 11.9 % (ref 11.6–15.4)
GAMMA INTERFERON BACKGROUND BLD IA-ACNC: 0.02 IU/ML
HCT VFR BLD AUTO: 35.7 % (ref 37.5–51)
HGB BLD-MCNC: 12.6 G/DL (ref 13–17.7)
IMM GRANULOCYTES # BLD AUTO: 0 X10E3/UL (ref 0–0.1)
IMM GRANULOCYTES NFR BLD AUTO: 0 %
LYMPHOCYTES # BLD AUTO: 0.5 X10E3/UL (ref 0.7–3.1)
LYMPHOCYTES NFR BLD AUTO: 18 %
M TB IFN-G BLD-IMP: NEGATIVE
M TB IFN-G CD4+ BCKGRND COR BLD-ACNC: 0.02 IU/ML
MCH RBC QN AUTO: 34.3 PG (ref 26.6–33)
MCHC RBC AUTO-ENTMCNC: 35.3 G/DL (ref 31.5–35.7)
MCV RBC AUTO: 97 FL (ref 79–97)
MITOGEN IGNF BLD-ACNC: 7.88 IU/ML
MONOCYTES # BLD AUTO: 0.5 X10E3/UL (ref 0.1–0.9)
MONOCYTES NFR BLD AUTO: 17 %
NEUTROPHILS # BLD AUTO: 1.7 X10E3/UL (ref 1.4–7)
NEUTROPHILS NFR BLD AUTO: 62 %
PLATELET # BLD AUTO: 251 X10E3/UL (ref 150–450)
PSA SERPL-MCNC: 0.4 NG/ML (ref 0–4)
QUANTIFERON INCUBATION, QF1T: NORMAL
QUANTIFERON TB2 AG: 0.02 IU/ML
RBC # BLD AUTO: 3.67 X10E6/UL (ref 4.14–5.8)
SERVICE CMNT-IMP: NORMAL
TSH SERPL DL<=0.005 MIU/L-ACNC: 1.02 UIU/ML (ref 0.45–4.5)
WBC # BLD AUTO: 2.8 X10E3/UL (ref 3.4–10.8)

## 2020-06-26 RX ORDER — LEVETIRACETAM 100 MG/ML
SOLUTION ORAL
Qty: 900 ML | Refills: 11 | Status: SHIPPED | OUTPATIENT
Start: 2020-06-26 | End: 2021-03-05 | Stop reason: SDUPTHER

## 2020-06-26 RX ORDER — LACOSAMIDE 150 MG/1
150 TABLET ORAL 2 TIMES DAILY
Qty: 60 TAB | Refills: 11 | Status: SHIPPED | OUTPATIENT
Start: 2020-06-26 | End: 2021-03-05 | Stop reason: SDUPTHER

## 2020-06-26 NOTE — PROGRESS NOTES
Please mail letter:    1. Hemoglobin level is slightly low but stable. Continue iron supplement. WBC level is lower than normal range. Not concerning at this point. Plan on making appointment for follow up in 3 months. 2. Prostate enzyme, thyroid level is in normal range. Continue thyroid supplement. Follow up in three months. 3. Vit D level is slightly lower than normal range. Increase vit d rich food and continue daily Vit D supplement. 4. TB test came back negative for TB.

## 2020-06-26 NOTE — LETTER
6/26/20 Patient: Jose Dillon YOB: 1965 Date of Visit: 6/26/2020 Abhinav Orozco MD 
[facility] [address] [place] [id number] VIA In Basket Dear Abhinav Orozco MD, Thank you for referring Mr. Katya Drake to [address] [place] [address] for evaluation. My notes for this consultation are attached. Consult REFERRED BY: 
Mirza Sharma MD 
 
CHIEF COMPLAINT: Increasing lethargy and decreasing ability to walk Subjective:  
 
Jose Dillon is a 54 y.o. right-handed -American male seen for evaluation at the request of Dr. Yaya Macario for new problem of recent recurring seizures that began this week on Monday and Tuesday, with the patient apparently having partial seizures with 1 arm using the left stick out as does his right leg and the patient will be off of it for a few minutes but then the vagus nerve stimulator kicks in and stopped the spell. He never got his levels done last time because he did not get enough blood, and his EEG was not done. His last EEG was January 1, 2019 which just showed generalized slowing. He has had no recent fever, head injury, trauma, or other cause for the seizures to occur and has not missed any of his medications. Patient on on Vimpat 150 mg twice a day, Dilantin 100 mg twice a day, Keppra 1500 mg twice a day taking liquid form with levels being therapeutic 2018 with Dilantin 10.2, Vimpat being 7.1, Keppra being 39. Patient taken off Depakote because of elevated ammonia level and is doing much better now and his last ammonia level was much improved. His tremors are gotten much better in the interim. His drowsiness, sedation, and leaning over in his chair have all gotten better. He discussed his condition with the patient and the caregiver in detail and they agreed to therapy and plans as above.   He has not had any recurrent seizures in the interim and seem to be doing well as far as the problem goes on the new medications. 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 He still has occasional seizure according to the staff, where he will have a staring off spell and then some tonic-clonic activity for about 30 seconds followed by mild postictal confusion for a minute or 2 and he quickly comes around. He has intractable seizures and has a vagus nerve stimulator and use the Dakin swipe stimulator with the magnet and he will stop the seizures. He is been toxic in the past on medications several times. He has had no other focal weakness, sensory loss, or other focal signs. He does not seem to have any major new medical illnesses now. We discussed his condition with the staff in detail, we will check metabolic parameters to rule out any other treatable cause of his deterioration. Past Medical History:  
Diagnosis Date  Anemia  Anxiety  Blindness of left eye  Cerebral palsy ([address]  Dementia  Down syndrome  Endocrine disease   
 thyroid disorder  GERD (gastroesophageal reflux disease)  Ill-defined condition   
 blind in left eye  Ill-defined condition   
 imparied gait - uses walker  Ill-defined condition   
 dermatitis  Ill-defined condition   
 cerebral palsy  Ill-defined condition   
 prone to decubitus ulcers  Ill-defined condition   
 anemia  Neurological disorder  Other ill-defined conditions(799.89) Down syndrome  Psychiatric disorder   
 mental retardation  Psychiatric disorder   
 anxiety disorder  Seizures ([address]  Sleep apnea  Thyroid disease  Unspecified epilepsy without mention of intractable epilepsy Past Surgical History:  
Procedure Laterality Date  HX OTHER SURGICAL  03/13/2015 STEVIE Rawls. [facility]  @ [facility] Family History Problem Relation Age of Onset  Hypertension Mother  Cancer Mother  Lupus Mother Connye Sole Arthritis-osteo Mother  Heart Disease Mother  Heart Disease Father  Diabetes Father  Hypertension Father  Elevated Lipids Father  Diabetes Brother  Elevated Lipids Brother  Hypertension Brother  Mental Retardation Brother  Cancer Maternal Grandmother  Arthritis-osteo Maternal Grandmother  Alcohol abuse Maternal Grandfather  Cancer Maternal Grandfather  Arthritis-osteo Paternal Grandmother  Cancer Paternal Grandfather Social History Tobacco Use  Smoking status: Never Smoker  Smokeless tobacco: Never Used Substance Use Topics  Alcohol use: No  
   
 
Current Outpatient Medications:  
  lacosamide (Vimpat) 150 mg tab tablet, Take 1 Tab by mouth two (2) times a day. Max Daily Amount: 300 mg., Disp: 60 Tab, Rfl: 11 
  levETIRAcetam (KEPPRA) 100 mg/mL solution, 15ml by mouth twice a day., Disp: 900 mL, Rfl: 11 
  levothyroxine (SYNTHROID) 50 mcg tablet, Take 1 Tab by mouth Daily (before breakfast). , Disp: 90 Tab, Rfl: 0 
  pantoprazole (PROTONIX) 40 mg tablet, Take 1 Tab by mouth Daily (before breakfast). , Disp: 30 Tab, Rfl: 2   cholecalciferol (VITAMIN D3) (1000 Units /25 mcg) tablet, Take 1 Tab by mouth daily. , Disp: 90 Tab, Rfl: 2 
  ascorbic acid, vitamin C, (VITAMIN C) 500 mg tablet, Take 1 Tab by mouth daily. , Disp: 30 Tab, Rfl: 6 
  loratadine (CLARITIN) 10 mg tablet, Take 1 Tab by mouth daily. , Disp: 90 Tab, Rfl: 1   ketoconazole (NIZORAL) 2 % topical cream, Apply  to affected area two (2) times a day., Disp: , Rfl:  
  phenytoin (DILANTIN-125) suspension, Take 4 mL by mouth two (2) times a day., Disp: 250 mL, Rfl: 11 
  ferrous sulfate 325 mg (65 mg iron) tablet, Take 1 Tab by mouth Daily (before breakfast). , Disp: 90 Tab, Rfl: 2   B8-N1-L4-B5-B6-iron-met-choln (GERITOL TONIC WITH FERREX 18) 2.5 mg-50 mg-18 iron/15 mL liqd, Take 15 mL by mouth every morning., Disp: , Rfl:  
  acetaminophen (TYLENOL) 325 mg tablet, Take 2 Tabs by mouth every six (6) hours as needed. For pain, Disp: 60 Tab, Rfl: 0 
  multivit-folic acid-herbal 991 (WELLESSE PLUS) 400 mcg-200 mg/30 mL liqd oral liquid, Take 30 mL by mouth daily. , Disp: 1 Bottle, Rfl: 0 
  sodium chloride (DEEP SEA NASAL) 0.65 % nasal squeeze bottle, 1 Spray as needed for Congestion. , Disp: , Rfl:  
  fluticasone (FLONASE) 50 mcg/actuation nasal spray, 2 Sprays by Both Nostrils route daily. , Disp: 1 Bottle, Rfl: 2 Allergies Allergen Reactions  Morphine Not Reported This Time  Tegretol [Carbamazepine] Other (comments) \"bad reaction\" \"changes attitude\" Review of Systems: A comprehensive review of systems was negative except for: Constitutional: positive for fatigue and malaise Musculoskeletal: positive for myalgias, arthralgias and stiff joints Neurological: positive for dizziness, seizures, memory problems, speech problems, coordination problems, gait problems, tremor and weakness Vitals:  
 06/26/20 1018 BP: 120/76 Pulse: (!) 52 Height: 5' 3\" (1.6 m) Objective: I 
 
NEUROLOGICAL EXAM: 
  
Appearance: The patient is well developed, well nourished, provides no history and is nonverbal and in no acute distress with severe mental impairment. Mental Status: Nonverbal but does follow simple commands. Mood and affect lethargic. Cranial Nerves:   Intact visual fields. Fundi are benign but poorly seen on the right. Right pupil reacts, left pupil is scarred over, EOM's full, no nystagmus, no ptosis. Facial sensation is normal. Corneal reflexes are intact. Facial movement is symmetric. Hearing is normal bilaterally. Palate is midline with normal sternocleidomastoid and trapezius muscles are normal. Tongue is midline Neck without meningismus or bruits Temporal arteries not tender or enlarged. Motor:  3/5 strength in upper and lower proximal and distal muscles. Normal bulk and tone. No fasciculations. Rapid altering movement is slow bilaterally Reflexes:   Deep tendon reflexes 1+/4 and symmetrical. Patient has no clonus or Babinski signs present Sensory:   Normal to touch, pinprick and temperature and vibration unreliable as is DSS Gait:  Not testable gait. Tremor: Moderate bilateral intention tremor noted. Cerebellar: Moderate abnormal Romberg and tandem cerebellar signs present. Mild tremor on finger-nose-finger exam  
Neurovascular:  Normal heart sounds and regular rhythm, peripheral pulses decreased, and no carotid bruits.  
  
 
 
 
Assessment: ICD-10-CM ICD-9-CM 1. Partial epilepsy with impairment of consciousness (HCC) G40.209 345.40 lacosamide (Vimpat) 150 mg tab tablet  
   levETIRAcetam (KEPPRA) 100 mg/mL solution LEVETIRACETAM (KEPPRA) LACOSAMIDE  
   EEG 2. Recurrent seizures ([facility]) G40.909 345.80 lacosamide (Vimpat) 150 mg tab tablet  
   levETIRAcetam (KEPPRA) 100 mg/mL solution LEVETIRACETAM (KEPPRA) LACOSAMIDE  
   EEG 3. Complex partial seizure evolving to generalized seizure ([address] G40.209 345.40 lacosamide (Vimpat) 150 mg tab tablet  
   levETIRAcetam (KEPPRA) 100 mg/mL solution LEVETIRACETAM (KEPPRA) LACOSAMIDE  
   EEG 4. S/P placement of VNS (vagus nerve stimulation) device Z96.89 V45.89 lacosamide (Vimpat) 150 mg tab tablet  
   levETIRAcetam (KEPPRA) 100 mg/mL solution LEVETIRACETAM (KEPPRA) LACOSAMIDE  
   EEG Active Problems: * No active hospital problems. * 
 
 
Plan:  
 
Patient with new problem of breakthrough seizures, we will check his levels, and check an EEG and decide if we need to increase his medication They are to call if he has any more spells in the interim. No clear reason is obvious why he will be having breakthrough seizures now. Follow spells he may need to readjust his vagus nerve stimulator, this was all discussed with the staff in detail. Previous imaging of the brain has shown no new structural lesion, and EEGs showed no seizure just mild generalized slowing. Patient will continue his current medications until we get his levels back to decide him with the treatment should be Discussed with the staff in detail, reviewed all [facility] records, reviewed the [facility] at [facility] reviewed EEG, all on the PACS system, and I agree with reports and findings as described above. Follow-up in 6 month's time as a scheduled visit or earlier if needed. If we can find a cause of his anorexia he is to see his PCP for medical evaluation. We will check my chart for results of this test, or give us a call for further interpretation of his lab results. Signed By: Edilberto Damian MD   
 June 26, 2020 CC: Bessy Botello MD 
FAX: [phone] This note will not be viewable in [address] If you have questions, please do not hesitate to call me. I look forward to following your patient along with you. Sincerely, Edilberto Damian MD

## 2020-06-26 NOTE — PROGRESS NOTES
Consult  REFERRED BY:  Sania Goyal MD    CHIEF COMPLAINT: Increasing lethargy and decreasing ability to walk      Subjective:     Mary Saucedo is a 54 y.o. right-handed -American male seen for evaluation at the request of Dr. Gato Howell for new problem of recent recurring seizures that began this week on Monday and Tuesday, with the patient apparently having partial seizures with 1 arm using the left stick out as does his right leg and the patient will be off of it for a few minutes but then the vagus nerve stimulator kicks in and stopped the spell. He never got his levels done last time because he did not get enough blood, and his EEG was not done. His last EEG was January 1, 2019 which just showed generalized slowing. He has had no recent fever, head injury, trauma, or other cause for the seizures to occur and has not missed any of his medications. Patient on on Vimpat 150 mg twice a day, Dilantin 100 mg twice a day, Keppra 1500 mg twice a day taking liquid form with levels being therapeutic 2018 with Dilantin 10.2, Vimpat being 7.1, Keppra being 39. Patient taken off Depakote because of elevated ammonia level and is doing much better now and his last ammonia level was much improved. His tremors are gotten much better in the interim. His drowsiness, sedation, and leaning over in his chair have all gotten better. He discussed his condition with the patient and the caregiver in detail and they agreed to therapy and plans as above. He has not had any recurrent seizures in the interim and seem to be doing well as far as the problem goes on the new medications. 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   He still has occasional seizure according to the staff, where he will have a staring off spell and then some tonic-clonic activity for about 30 seconds followed by mild postictal confusion for a minute or 2 and he quickly comes around. He has intractable seizures and has a vagus nerve stimulator and use the [place]in swipe stimulator with the magnet and he will stop the seizures. He is been toxic in the past on medications several times. He has had no other focal weakness, sensory loss, or other focal signs. He does not seem to have any major new medical illnesses now. We discussed his condition with the staff in detail, we will check metabolic parameters to rule out any other treatable cause of his deterioration. Past Medical History:   Diagnosis Date    Anemia     Anxiety     Blindness of left eye     Cerebral palsy (HCC)     Dementia     Down syndrome     Endocrine disease     thyroid disorder    GERD (gastroesophageal reflux disease)     Ill-defined condition     blind in left eye    Ill-defined condition     imparied gait - uses walker    Ill-defined condition     dermatitis    Ill-defined condition     cerebral palsy    Ill-defined condition     prone to decubitus ulcers    Ill-defined condition     anemia    Neurological disorder     Other ill-defined conditions(799.89)     Down syndrome    Psychiatric disorder     mental retardation    Psychiatric disorder     anxiety disorder    Seizures ([address]     Sleep apnea     Thyroid disease     Unspecified epilepsy without mention of intractable epilepsy       Past Surgical History:   Procedure Laterality Date    HX OTHER SURGICAL  03/13/2015     [facility]  Dr. Karolina De La Fuente.  Lisa Daley  @ [facility]     Family History   Problem Relation Age of Onset    Hypertension Mother     Cancer Mother     Lupus Mother    Floydene Ada Arthritis-osteo Mother     Heart Disease Mother     Heart Disease Father     Diabetes Father     Hypertension Father     Elevated Lipids Father     Diabetes Brother     Elevated Lipids Brother     Hypertension Brother     Mental Retardation Brother     Cancer Maternal Grandmother     Arthritis-osteo Maternal Grandmother     Alcohol abuse Maternal Grandfather     Cancer Maternal Grandfather     Arthritis-osteo Paternal Grandmother     Cancer Paternal Grandfather       Social History     Tobacco Use    Smoking status: Never Smoker    Smokeless tobacco: Never Used   Substance Use Topics    Alcohol use: No         Current Outpatient Medications:     lacosamide (Vimpat) 150 mg tab tablet, Take 1 Tab by mouth two (2) times a day. Max Daily Amount: 300 mg., Disp: 60 Tab, Rfl: 11    levETIRAcetam (KEPPRA) 100 mg/mL solution, 15ml by mouth twice a day., Disp: 900 mL, Rfl: 11    levothyroxine (SYNTHROID) 50 mcg tablet, Take 1 Tab by mouth Daily (before breakfast). , Disp: 90 Tab, Rfl: 0    pantoprazole (PROTONIX) 40 mg tablet, Take 1 Tab by mouth Daily (before breakfast). , Disp: 30 Tab, Rfl: 2    cholecalciferol (VITAMIN D3) (1000 Units /25 mcg) tablet, Take 1 Tab by mouth daily. , Disp: 90 Tab, Rfl: 2    ascorbic acid, vitamin C, (VITAMIN C) 500 mg tablet, Take 1 Tab by mouth daily. , Disp: 30 Tab, Rfl: 6    loratadine (CLARITIN) 10 mg tablet, Take 1 Tab by mouth daily. , Disp: 90 Tab, Rfl: 1    ketoconazole (NIZORAL) 2 % topical cream, Apply  to affected area two (2) times a day., Disp: , Rfl:     phenytoin (DILANTIN-125) suspension, Take 4 mL by mouth two (2) times a day., Disp: 250 mL, Rfl: 11    ferrous sulfate 325 mg (65 mg iron) tablet, Take 1 Tab by mouth Daily (before breakfast). , Disp: 90 Tab, Rfl: 2    B7-M4-L9-B5-B6-iron-met-choln (GERITOL TONIC WITH FERREX 18) 2.5 mg-50 mg-18 iron/15 mL liqd, Take 15 mL by mouth every morning., Disp: , Rfl:     acetaminophen (TYLENOL) 325 mg tablet, Take 2 Tabs by mouth every six (6) hours as needed. For pain, Disp: 60 Tab, Rfl: 0    multivit-folic acid-herbal 574 (WELLESSE PLUS) 400 mcg-200 mg/30 mL liqd oral liquid, Take 30 mL by mouth daily. , Disp: 1 Bottle, Rfl: 0    sodium chloride (DEEP SEA NASAL) 0.65 % nasal squeeze bottle, 1 Spray as needed for Congestion. , Disp: , Rfl:     fluticasone (FLONASE) 50 mcg/actuation nasal spray, 2 Sprays by Both Nostrils route daily. , Disp: 1 Bottle, Rfl: 2        Allergies   Allergen Reactions    Morphine Not Reported This Time    Tegretol [Carbamazepine] Other (comments)     \"bad reaction\" \"changes attitude\"        Review of Systems:  A comprehensive review of systems was negative except for: Constitutional: positive for fatigue and malaise  Musculoskeletal: positive for myalgias, arthralgias and stiff joints  Neurological: positive for dizziness, seizures, memory problems, speech problems, coordination problems, gait problems, tremor and weakness   Vitals:    06/26/20 1018   BP: 120/76   Pulse: (!) 52   Height: 5' 3\" (1.6 m)     Objective:     I    NEUROLOGICAL EXAM:     Appearance: The patient is well developed, well nourished, provides no history and is nonverbal and in no acute distress with severe mental impairment. Mental Status: Nonverbal but does follow simple commands. Mood and affect lethargic. Cranial Nerves:   Intact visual fields. Fundi are benign but poorly seen on the right. Right pupil reacts, left pupil is scarred over, EOM's full, no nystagmus, no ptosis. Facial sensation is normal. Corneal reflexes are intact. Facial movement is symmetric. Hearing is normal bilaterally. Palate is midline with normal sternocleidomastoid and trapezius muscles are normal. Tongue is midline  Neck without meningismus or bruits  Temporal arteries not tender or enlarged. Motor:  3/5 strength in upper and lower proximal and distal muscles. Normal bulk and tone. No fasciculations. Rapid altering movement is slow bilaterally   Reflexes:   Deep tendon reflexes 1+/4 and symmetrical. Patient has no clonus or Babinski signs present   Sensory:   Normal to touch, pinprick and temperature and vibration unreliable as is DSS   Gait:  Not testable gait. Tremor: Moderate bilateral intention tremor noted. Cerebellar: Moderate abnormal Romberg and tandem cerebellar signs present. Mild tremor on finger-nose-finger exam   Neurovascular:  Normal heart sounds and regular rhythm, peripheral pulses decreased, and no carotid bruits.            Assessment:       ICD-10-CM ICD-9-CM    1. Partial epilepsy with impairment of consciousness (HCC) G40.209 345.40 lacosamide (Vimpat) 150 mg tab tablet      levETIRAcetam (KEPPRA) 100 mg/mL solution      LEVETIRACETAM (KEPPRA)      LACOSAMIDE      EEG   2. Recurrent seizures (HCC) G40.909 345.80 lacosamide (Vimpat) 150 mg tab tablet      levETIRAcetam (KEPPRA) 100 mg/mL solution      LEVETIRACETAM (KEPPRA)      LACOSAMIDE      EEG   3. Complex partial seizure evolving to generalized seizure ([facility] [address] G40.209 345.40 lacosamide (Vimpat) 150 mg tab tablet      levETIRAcetam (KEPPRA) 100 mg/mL solution      LEVETIRACETAM (KEPPRA)      LACOSAMIDE      EEG   4. S/P placement of VNS (vagus nerve stimulation) device Z96.89 V45.89 lacosamide (Vimpat) 150 mg tab tablet      levETIRAcetam (KEPPRA) 100 mg/mL solution      LEVETIRACETAM (KEPPRA)      LACOSAMIDE      EEG     Active Problems:    * No active hospital problems. *      Plan:     Patient with new problem of breakthrough seizures, we will check his levels, and check an EEG and decide if we need to increase his medication  They are to call if he has any more spells in the interim. No clear reason is obvious why he will be having breakthrough seizures now. Follow spells he may need to readjust his vagus nerve stimulator, this was all discussed with the staff in detail. Previous imaging of the brain has shown no new structural lesion, and EEGs showed no seizure just mild generalized slowing.     Patient will continue his current medications until we get his levels back to decide him with the treatment should be  Discussed with the staff in detail, reviewed all [facility] records, reviewed the CT scan at [facility] reviewed EEG, all on the PACS system, and I agree with reports and findings as described above. Follow-up in 6 month's time as a scheduled visit or earlier if needed. If we can find a cause of his anorexia he is to see his PCP for medical evaluation. We will check my chart for results of this test, or give us a call for further interpretation of his lab results. Signed By: Mati Gracia MD     June 26, 2020       CC: Jose Miguel Russell MD  FAX: [phone]      This note will not be viewable in [address]

## 2020-07-13 ENCOUNTER — HOSPITAL ENCOUNTER (OUTPATIENT)
Dept: NEUROLOGY | Age: 55
Discharge: HOME | End: 2020-07-13
Attending: PSYCHIATRY & NEUROLOGY
Payer: MEDICARE

## 2020-07-13 DIAGNOSIS — G40.209 PARTIAL EPILEPSY WITH IMPAIRMENT OF CONSCIOUSNESS (HCC): ICD-10-CM

## 2020-07-13 DIAGNOSIS — G40.909 RECURRENT SEIZURES (HCC): ICD-10-CM

## 2020-07-13 DIAGNOSIS — G93.40 ENCEPHALOPATHY: ICD-10-CM

## 2020-07-13 DIAGNOSIS — Z96.89 S/P PLACEMENT OF VNS (VAGUS NERVE STIMULATION) DEVICE: ICD-10-CM

## 2020-07-13 DIAGNOSIS — G40.209 COMPLEX PARTIAL SEIZURE EVOLVING TO GENERALIZED SEIZURE (HCC): ICD-10-CM

## 2020-07-13 PROCEDURE — 95816 EEG AWAKE AND DROWSY: CPT

## 2020-07-13 NOTE — PROCEDURES
EEG REPORT    Patient Name: Harlan Nuñez  : 1965  Age: 54 y.o. Ordering physician: Diallo Cornejo  Date of EE2020   14:06-14:34  Diagnosis: seizures  Interpreting physician: Diogo Kinsey D.O. FAAN    Procedure: EEG    CLINICAL INDICATION: The patient is a 54 y.o. male who is being evaluated for baseline electro cerebral activities and to rule out seizure focus. Current Outpatient Medications   Medication Sig    lacosamide (Vimpat) 150 mg tab tablet Take 1 Tab by mouth two (2) times a day. Max Daily Amount: 300 mg.  levETIRAcetam (KEPPRA) 100 mg/mL solution 15ml by mouth twice a day.  levothyroxine (SYNTHROID) 50 mcg tablet Take 1 Tab by mouth Daily (before breakfast).  pantoprazole (PROTONIX) 40 mg tablet Take 1 Tab by mouth Daily (before breakfast).  cholecalciferol (VITAMIN D3) (1000 Units /25 mcg) tablet Take 1 Tab by mouth daily.  ascorbic acid, vitamin C, (VITAMIN C) 500 mg tablet Take 1 Tab by mouth daily.  loratadine (CLARITIN) 10 mg tablet Take 1 Tab by mouth daily.  ketoconazole (NIZORAL) 2 % topical cream Apply  to affected area two (2) times a day.  phenytoin (DILANTIN-125) suspension Take 4 mL by mouth two (2) times a day.  ferrous sulfate 325 mg (65 mg iron) tablet Take 1 Tab by mouth Daily (before breakfast).  Y5-F6-G4-B5-B6-iron-met-choln (GERITOL TONIC WITH FERREX 18) 2.5 mg-50 mg-18 iron/15 mL liqd Take 15 mL by mouth every morning.  acetaminophen (TYLENOL) 325 mg tablet Take 2 Tabs by mouth every six (6) hours as needed. For pain    multivit-folic acid-herbal 778 (WELLESSE PLUS) 400 mcg-200 mg/30 mL liqd oral liquid Take 30 mL by mouth daily.  sodium chloride (DEEP SEA NASAL) 0.65 % nasal squeeze bottle 1 Spray as needed for Congestion.  fluticasone (FLONASE) 50 mcg/actuation nasal spray 2 Sprays by Both Nostrils route daily. No current facility-administered medications for this encounter.             DESCRIPTION OF PROCEDURE:     This is a digitally recorded electroencephalogram  Electrodes were applied in accordance with the international 10-20 system of electrode placement. 18 channels of scalp EEG are recorded  A channel was used for EoG  Another channel was used for ECG   The data is stored digitally and reviewed in reformatted montages for optimal display  EEG  was reviewed in both bipolar and referential montages    Description of Activity: The background of this recording contains no well-formed alpha activity. There was a diffuse diffuse theta and delta activity seen throughout the study. There was a small amount of beta activity seen. Throughout the recording, there were no clear areas of focal slowing nor spike or spike-and-wave discharges seen. Hyperventilation was not performed. Photic stimulation produced no response in the posterior head regions. During drowsiness, there is attenuation of the underlying  frequency and slower theta and delta activity occurs. During the recording, the patient did not achieve stage II sleep        Clinical Interpretation: This EEG, performed during wakefulness and drowsiness, is abnormal. There is mild generalized slowing as seen in encephalopathies. There is no focal asymmetry, seizures or epileptiform discharges seen. Clinical correlation is recommended        MONI Munroe

## 2020-07-16 DIAGNOSIS — R09.81 NASAL CONGESTION: ICD-10-CM

## 2020-07-16 RX ORDER — LANOLIN ALCOHOL/MO/W.PET/CERES
325 CREAM (GRAM) TOPICAL
Qty: 90 TAB | Refills: 2 | Status: SHIPPED | OUTPATIENT
Start: 2020-07-16 | End: 2021-01-18 | Stop reason: SDUPTHER

## 2020-07-16 RX ORDER — LORATADINE 10 MG/1
10 TABLET ORAL DAILY
Qty: 90 TAB | Refills: 1 | Status: SHIPPED | OUTPATIENT
Start: 2020-07-16 | End: 2021-01-18 | Stop reason: SDUPTHER

## 2020-08-11 ENCOUNTER — PATIENT MESSAGE (OUTPATIENT)
Dept: NEUROLOGY | Age: 55
End: 2020-08-11

## 2020-08-21 RX ORDER — PANTOPRAZOLE SODIUM 40 MG/1
40 TABLET, DELAYED RELEASE ORAL
Qty: 30 TAB | Refills: 2 | Status: SHIPPED | OUTPATIENT
Start: 2020-08-21 | End: 2020-12-16 | Stop reason: SDUPTHER

## 2020-08-23 LAB
LACOSAMIDE SERPL-MCNC: <0.5 UG/ML (ref 5–10)
LEVETIRACETAM SERPL-MCNC: 75.3 UG/ML (ref 10–40)

## 2020-08-25 ENCOUNTER — DOCUMENTATION ONLY (OUTPATIENT)
Dept: NEUROLOGY | Age: 55
End: 2020-08-25

## 2020-08-25 DIAGNOSIS — G40.909 RECURRENT SEIZURES (HCC): Primary | ICD-10-CM

## 2020-08-25 DIAGNOSIS — G40.209 COMPLEX PARTIAL SEIZURE EVOLVING TO GENERALIZED SEIZURE (HCC): ICD-10-CM

## 2020-08-25 NOTE — PROGRESS NOTES
[employer], can you fax this order for [address] level to Mr. Re[facility] for assisted living at [phone]

## 2020-09-03 LAB
LACOSAMIDE SERPL-MCNC: 8.6 UG/ML (ref 5–10)
LEVETIRACETAM SERPL-MCNC: 47.1 UG/ML (ref 10–40)

## 2020-09-18 DIAGNOSIS — E03.9 ACQUIRED HYPOTHYROIDISM: Primary | ICD-10-CM

## 2020-09-21 RX ORDER — LEVOTHYROXINE SODIUM 50 UG/1
50 TABLET ORAL
Qty: 90 TAB | Refills: 0 | Status: SHIPPED | OUTPATIENT
Start: 2020-09-21 | End: 2021-01-18 | Stop reason: SDUPTHER

## 2020-10-27 RX ORDER — MELATONIN
1000 DAILY
Qty: 90 TAB | Refills: 2 | Status: SHIPPED | OUTPATIENT
Start: 2020-10-27 | End: 2021-03-23 | Stop reason: SDUPTHER

## 2020-12-18 RX ORDER — PANTOPRAZOLE SODIUM 40 MG/1
40 TABLET, DELAYED RELEASE ORAL
Qty: 30 TAB | Refills: 2 | Status: SHIPPED | OUTPATIENT
Start: 2020-12-18 | End: 2021-03-24 | Stop reason: SDUPTHER

## 2020-12-31 DIAGNOSIS — Z13.21 ENCOUNTER FOR VITAMIN DEFICIENCY SCREENING: ICD-10-CM

## 2021-01-16 LAB — 25(OH)D3+25(OH)D2 SERPL-MCNC: 35.3 NG/ML (ref 30–100)

## 2021-03-05 ENCOUNTER — VIRTUAL VISIT (OUTPATIENT)
Dept: NEUROLOGY | Age: 56
End: 2021-03-05
Payer: MEDICARE

## 2021-03-05 DIAGNOSIS — Z96.89 S/P PLACEMENT OF VNS (VAGUS NERVE STIMULATION) DEVICE: ICD-10-CM

## 2021-03-05 DIAGNOSIS — G40.209 COMPLEX PARTIAL SEIZURE EVOLVING TO GENERALIZED SEIZURE (HCC): ICD-10-CM

## 2021-03-05 DIAGNOSIS — G93.40 ENCEPHALOPATHY: ICD-10-CM

## 2021-03-05 DIAGNOSIS — G40.909 RECURRENT SEIZURES (HCC): ICD-10-CM

## 2021-03-05 DIAGNOSIS — G40.209 PARTIAL EPILEPSY WITH IMPAIRMENT OF CONSCIOUSNESS (HCC): Primary | ICD-10-CM

## 2021-03-05 PROCEDURE — 99443 PR PHYS/QHP TELEPHONE EVALUATION 21-30 MIN: CPT | Performed by: PSYCHIATRY & NEUROLOGY

## 2021-03-05 RX ORDER — LACOSAMIDE 150 MG/1
150 TABLET ORAL 2 TIMES DAILY
Qty: 60 TAB | Refills: 11 | Status: SHIPPED | OUTPATIENT
Start: 2021-03-05

## 2021-03-05 RX ORDER — LEVETIRACETAM 100 MG/ML
SOLUTION ORAL
Qty: 900 ML | Refills: 11 | Status: SHIPPED | OUTPATIENT
Start: 2021-03-05

## 2021-03-05 NOTE — LETTER
3/5/2021 9:23 PM 
 
Patient:  Alfreda Brody YOB: 1965 Date of Visit: 3/5/2021 Dear No Recipients: Thank you for referring Mr. Kaur Horner to me for evaluation/treatment. Below are the relevant portions of my assessment and plan of care. Alfreda Brody is a 64 y.o. male, evaluated via audio-only technology on 3/5/2021 for Follow-up Wily Fulton Assessment & Plan:  
 
Diagnoses and all orders for this visit: 1. Partial epilepsy with impairment of consciousness ([facility] [address] -     levETIRAcetam (KEPPRA) 100 mg/mL solution; 15ml by mouth twice a day. -     lacosamide (Vimpat) 150 mg tab tablet; Take 1 Tab by mouth two (2) times a day. Max Daily Amount: 300 mg. 
 
2. S/P placement of VNS (vagus nerve stimulation) device -     levETIRAcetam (KEPPRA) 100 mg/mL solution; 15ml by mouth twice a day. -     lacosamide (Vimpat) 150 mg tab tablet; Take 1 Tab by mouth two (2) times a day. Max Daily Amount: 300 mg. 
 
3. Recurrent seizures ([address] -     levETIRAcetam (KEPPRA) 100 mg/mL solution; 15ml by mouth twice a day. -     lacosamide (Vimpat) 150 mg tab tablet; Take 1 Tab by mouth two (2) times a day. Max Daily Amount: 300 mg. 
 
4. Encephalopathy 
-     levETIRAcetam (KEPPRA) 100 mg/mL solution; 15ml by mouth twice a day. -     lacosamide (Vimpat) 150 mg tab tablet; Take 1 Tab by mouth two (2) times a day. Max Daily Amount: 300 mg. 
 
5. Complex partial seizure evolving to generalized seizure ([address] -     levETIRAcetam (KEPPRA) 100 mg/mL solution; 15ml by mouth twice a day. -     lacosamide (Vimpat) 150 mg tab tablet; Take 1 Tab by mouth two (2) times a day. Max Daily Amount: 300 mg. Patient is seen in virtual visit because the patient was unable to sit in the chair for video visit and went to lie down. Patient has occasional spells where he stares off some, but no real seizures according to the staff they do not think these are seizures. We checked his levels last time and they were Keppra equal 47 a little on the high side and Vimpat equal 8.6 in the therapeutic range. I advised the staff I do not think he needs repeat medication levels now in the midst of this pandemic, because overall he seems to be relatively stable. He is currently taking 1500 mg of Keppra twice a day and Vimpat 150 mg twice a day. He may have a little drowsiness but his seizures are well controlled we will continue the current medication. He does not seem to have any new focal neurologic deficit, but they are watching him carefully somewhat concerned about him and he had a recent problem of Covid infection 2 months ago, and is taking a long time to recover we advised him that sometimes it takes a very long time to get over the Covid infection. He did not have to go to the hospital but he seems to be very sick. We will not change his medications yet, because he is a difficult to control patient. We will see him again in 6 months time at which time we can get back into the office and really take a look at him and reevaluate him, he may need repeat drug levels and EEG testing to make sure he is stable neurologically. 23 minutes spent with the patient and the staff discussing his case, going over his medications, but the office levels, looking at his previous work-up, going over his recent history and bad Covid infection, and discussing his generalized weakness and fatigue and failure to thrive, and the plan is to see him again in 6 months time is hopefully safe and is already got 1 vaccine he is need 1 more. They will call immediately if there is any problem or any new medical or neurologic problems. The complexity of medical decision making for this visit is high Follow-up and Dispositions · Return in about 6 months (around 9/5/2021). 12 
Subjective:  
 
 
Prior to Admission medications Medication Sig Start Date End Date Taking? Authorizing Provider  
levETIRAcetam (KEPPRA) 100 mg/mL solution 15ml by mouth twice a day. 3/5/21  Yes Arleen Herman MD  
lacosamide (Vimpat) 150 mg tab tablet Take 1 Tab by mouth two (2) times a day. Max Daily Amount: 300 mg. 3/5/21  Yes Arleen Herman MD  
levothyroxine (SYNTHROID) 50 mcg tablet Take 1 Tab by mouth Daily (before breakfast). Need appointment. 1/18/21  Yes Mirza Sharma MD  
loratadine (CLARITIN) 10 mg tablet Take 1 Tab by mouth daily. 1/18/21  Yes Mirza Sharma MD  
ferrous sulfate 325 mg (65 mg iron) tablet Take 1 Tab by mouth Daily (before breakfast). 1/18/21  Yes Mirza Sharma MD  
pantoprazole (PROTONIX) 40 mg tablet Take 1 Tab by mouth Daily (before breakfast). 12/18/20  Yes Mirza Sharma MD  
cholecalciferol (Vitamin D3) (1000 Units /25 mcg) tablet Take 1 Tab by mouth daily. 10/27/20  Yes Mirza Sharma MD  
ascorbic acid, vitamin C, (VITAMIN C) 500 mg tablet Take 1 Tab by mouth daily. 9/25/19  Yes Mirza Sharma MD  
ketoconazole (NIZORAL) 2 % topical cream Apply  to affected area two (2) times a day. Yes Provider, Historical  
phenytoin (DILANTIN-125) suspension Take 4 mL by mouth two (2) times a day. 8/13/19  Yes Arleen Herman MD  
Q4-X9-J2-P5-S1-mkmt-met-choln (GERITOL TONIC WITH FERREX 18) 2.5 mg-50 mg-18 iron/15 mL liqd Take 15 mL by mouth every morning. Yes Provider, Historical  
acetaminophen (TYLENOL) 325 mg tablet Take 2 Tabs by mouth every six (6) hours as needed. For pain 1/7/19  Yes Angeli Multani MD  
multivit-folic acid-herbal 852 (WELLESSE PLUS) 400 mcg-200 mg/30 mL liqd oral liquid Take 30 mL by mouth daily.  10/4/18  Yes Loi Lamar MD  
 sodium chloride (DEEP SEA NASAL) 0.65 % nasal squeeze bottle 1 Spray as needed for Congestion. Yes Provider, Historical  
fluticasone (FLONASE) 50 mcg/actuation nasal spray 2 Sprays by Both Nostrils route daily. 2/13/18  Yes Rosemarie Harper MD  
 
Patient Active Problem List  
Diagnosis Code  Recurrent seizures ([facility] [address] G40.909  Partial epilepsy with impairment of consciousness ([address] G40.209  Ataxia R27.0  Memory loss R41.3  Seizure ([address] R56.9  S/P placement of VNS (vagus nerve stimulation) device Z96.89  
 ACP (advance care planning) Z71.89  Low vitamin D level R79.89  Blind left eye H54.40  Pneumonia J18.9  Down syndrome Q90.9  Acute encephalopathy G93.40  Complex partial seizure evolving to generalized seizure ([address] G40.209  Acquired hypothyroidism E03.9 Patient Active Problem List  
 Diagnosis Date Noted  Acquired hypothyroidism 04/12/2018  Acute encephalopathy 03/23/2018  Complex partial seizure evolving to generalized seizure ([address] 03/23/2018  Down syndrome 03/20/2018  Pneumonia 02/03/2018  Low vitamin D level 01/11/2018  Blind left eye 01/11/2018  ACP (advance care planning) 11/09/2016  S/P placement of VNS (vagus nerve stimulation) device 04/10/2015  Seizure ([address] 11/22/2013  Partial epilepsy with impairment of consciousness ([facility] [address] 01/21/2013  Ataxia 01/21/2013  Memory loss 01/21/2013  Recurrent seizures ([facility] [address] 08/27/2012 Current Outpatient Medications Medication Sig Dispense Refill  levETIRAcetam (KEPPRA) 100 mg/mL solution 15ml by mouth twice a day. 900 mL 11  
 lacosamide (Vimpat) 150 mg tab tablet Take 1 Tab by mouth two (2) times a day. Max Daily Amount: 300 mg. 60 Tab 11  
 levothyroxine (SYNTHROID) 50 mcg tablet Take 1 Tab by mouth Daily (before breakfast). Need appointment. 90 Tab 0  
 loratadine (CLARITIN) 10 mg tablet Take 1 Tab by mouth daily.  90 Tab 0  
  ferrous sulfate 325 mg (65 mg iron) tablet Take 1 Tab by mouth Daily (before breakfast). 90 Tab 0  
 pantoprazole (PROTONIX) 40 mg tablet Take 1 Tab by mouth Daily (before breakfast). 30 Tab 2  cholecalciferol (Vitamin D3) (1000 Units /25 mcg) tablet Take 1 Tab by mouth daily. 90 Tab 2  
 ascorbic acid, vitamin C, (VITAMIN C) 500 mg tablet Take 1 Tab by mouth daily. 30 Tab 6  
 ketoconazole (NIZORAL) 2 % topical cream Apply  to affected area two (2) times a day.  phenytoin (DILANTIN-125) suspension Take 4 mL by mouth two (2) times a day. 250 mL 11  
 L5-Z4-I6-B5-B6-iron-met-choln (GERITOL TONIC WITH FERREX 18) 2.5 mg-50 mg-18 iron/15 mL liqd Take 15 mL by mouth every morning.  acetaminophen (TYLENOL) 325 mg tablet Take 2 Tabs by mouth every six (6) hours as needed. For pain 60 Tab 0  
 multivit-folic acid-herbal 465 (WELLESSE PLUS) 400 mcg-200 mg/30 mL liqd oral liquid Take 30 mL by mouth daily. 1 Bottle 0  
 sodium chloride (DEEP SEA NASAL) 0.65 % nasal squeeze bottle 1 Spray as needed for Congestion.  fluticasone (FLONASE) 50 mcg/actuation nasal spray 2 Sprays by Both Nostrils route daily. 1 Bottle 2 Allergies Allergen Reactions  Morphine Not Reported This Time  Tegretol [Carbamazepine] Other (comments) \"bad reaction\" \"changes attitude\" Past Medical History:  
Diagnosis Date  Anemia  Anxiety  Blindness of left eye  Cerebral palsy ([facility] [address]  Dementia  Down syndrome  Endocrine disease   
 thyroid disorder  GERD (gastroesophageal reflux disease)  Ill-defined condition   
 blind in left eye  Ill-defined condition   
 imparied gait - uses walker  Ill-defined condition   
 dermatitis  Ill-defined condition   
 cerebral palsy  Ill-defined condition   
 prone to decubitus ulcers  Ill-defined condition   
 anemia  Neurological disorder  Other ill-defined conditions(799.89) Down syndrome  Psychiatric disorder mental retardation  Psychiatric disorder   
 anxiety disorder  Seizures ([facility] [address]  Sleep apnea  Thyroid disease  Unspecified epilepsy without mention of intractable epilepsy Past Surgical History:  
Procedure Laterality Date  HX OTHER SURGICAL  03/13/2015 VNS  Dr. Cindy Sanchez. [facility]  @ [facility] Family History Problem Relation Age of Onset  Hypertension Mother  Cancer Mother  Lupus Mother [employer] Arthritis-osteo Mother  Heart Disease Mother  Heart Disease Father  Diabetes Father  Hypertension Father  Elevated Lipids Father  Diabetes Brother  Elevated Lipids Brother  Hypertension Brother  Mental Retardation Brother  Cancer Maternal Grandmother  Arthritis-osteo Maternal Grandmother  Alcohol abuse Maternal Grandfather  Cancer Maternal Grandfather  Arthritis-osteo Paternal Grandmother  Cancer Paternal Grandfather Social History Tobacco Use  Smoking status: Never Smoker  Smokeless tobacco: Never Used Substance Use Topics  Alcohol use: No  
 
 
ROS No flowsheet data found. Marina Polanco, who was evaluated through a patient-initiated, synchronous (real-time) audio only encounter, and/or her healthcare decision maker, is aware that it is a billable service, with coverage as determined by his insurance carrier. He provided verbal consent to proceed: Yes. He has not had a related appointment within my department in the past 7 days or scheduled within the next 24 hours. Total Time: minutes: 21-30 minutes Rancho Trevino MD  
 
 
 
If you have questions, please do not hesitate to call me. I look forward to following  Darrin Wakefield along with you. Sincerely, Rancho Trevino MD

## 2021-03-06 NOTE — PROGRESS NOTES
Gabrielle Gautam is a 64 y.o. male, evaluated via audio-only technology on 3/5/2021 for Follow-up  . Assessment & Plan:     Diagnoses and all orders for this visit:    1. Partial epilepsy with impairment of consciousness (HCC)  -     levETIRAcetam (KEPPRA) 100 mg/mL solution; 15ml by mouth twice a day. -     lacosamide (Vimpat) 150 mg tab tablet; Take 1 Tab by mouth two (2) times a day. Max Daily Amount: 300 mg.    2. S/P placement of VNS (vagus nerve stimulation) device  -     levETIRAcetam (KEPPRA) 100 mg/mL solution; 15ml by mouth twice a day. -     lacosamide (Vimpat) 150 mg tab tablet; Take 1 Tab by mouth two (2) times a day. Max Daily Amount: 300 mg.    3. Recurrent seizures (HCC)  -     levETIRAcetam (KEPPRA) 100 mg/mL solution; 15ml by mouth twice a day. -     lacosamide (Vimpat) 150 mg tab tablet; Take 1 Tab by mouth two (2) times a day. Max Daily Amount: 300 mg.    4. Encephalopathy  -     levETIRAcetam (KEPPRA) 100 mg/mL solution; 15ml by mouth twice a day. -     lacosamide (Vimpat) 150 mg tab tablet; Take 1 Tab by mouth two (2) times a day. Max Daily Amount: 300 mg.    5. Complex partial seizure evolving to generalized seizure (HCC)  -     levETIRAcetam (KEPPRA) 100 mg/mL solution; 15ml by mouth twice a day. -     lacosamide (Vimpat) 150 mg tab tablet; Take 1 Tab by mouth two (2) times a day. Max Daily Amount: 300 mg. Patient is seen in virtual visit because the patient was unable to sit in the chair for video visit and went to lie down. Patient has occasional spells where he stares off some, but no real seizures according to the staff they do not think these are seizures. We checked his levels last time and they were Keppra equal 47 a little on the high side and Vimpat equal 8.6 in the therapeutic range. I advised the staff I do not think he needs repeat medication levels now in the midst of this pandemic, because overall he seems to be relatively stable.   He is currently taking 1500 mg of Keppra twice a day and Vimpat 150 mg twice a day. He may have a little drowsiness but his seizures are well controlled we will continue the current medication. He does not seem to have any new focal neurologic deficit, but they are watching him carefully somewhat concerned about him and he had a recent problem of Covid infection 2 months ago, and is taking a long time to recover we advised him that sometimes it takes a very long time to get over the Covid infection. He did not have to go to the hospital but he seems to be very sick. We will not change his medications yet, because he is a difficult to control patient. We will see him again in 6 months time at which time we can get back into the office and really take a look at him and reevaluate him, he may need repeat drug levels and EEG testing to make sure he is stable neurologically. 23 minutes spent with the patient and the staff discussing his case, going over his medications, but the office levels, looking at his previous work-up, going over his recent history and bad Covid infection, and discussing his generalized weakness and fatigue and failure to thrive, and the plan is to see him again in 6 months time is hopefully safe and is already got 1 vaccine he is need 1 more. They will call immediately if there is any problem or any new medical or neurologic problems. The complexity of medical decision making for this visit is high   Follow-up and Dispositions    · Return in about 6 months (around 9/5/2021). 12  Subjective:       Prior to Admission medications    Medication Sig Start Date End Date Taking? Authorizing Provider   levETIRAcetam (KEPPRA) 100 mg/mL solution 15ml by mouth twice a day. 3/5/21  Yes Arin Batista MD   lacosamide (Vimpat) 150 mg tab tablet Take 1 Tab by mouth two (2) times a day.  Max Daily Amount: 300 mg. 3/5/21  Yes Arin Batista MD   levothyroxine (SYNTHROID) 50 mcg tablet Take 1 Tab by mouth Daily (before breakfast). Need appointment. 1/18/21  Yes Mirza Sharma MD   loratadine (CLARITIN) 10 mg tablet Take 1 Tab by mouth daily. 1/18/21  Yes Mirza Sharma MD   ferrous sulfate 325 mg (65 mg iron) tablet Take 1 Tab by mouth Daily (before breakfast). 1/18/21  Yes Mirza Sharma MD   pantoprazole (PROTONIX) 40 mg tablet Take 1 Tab by mouth Daily (before breakfast). 12/18/20  Yes Mirza Sharma MD   cholecalciferol (Vitamin D3) (1000 Units /25 mcg) tablet Take 1 Tab by mouth daily. 10/27/20  Yes Mirza Sharma MD   ascorbic acid, vitamin C, (VITAMIN C) 500 mg tablet Take 1 Tab by mouth daily. 9/25/19  Yes Mirza Sharma MD   ketoconazole (NIZORAL) 2 % topical cream Apply  to affected area two (2) times a day. Yes Provider, Historical   phenytoin (DILANTIN-125) suspension Take 4 mL by mouth two (2) times a day. 8/13/19  Yes Heaven Paz MD   E4-Q3-L3-J4-R8-aqhc-met-choln (GERITOL TONIC WITH FERREX 18) 2.5 mg-50 mg-18 iron/15 mL liqd Take 15 mL by mouth every morning. Yes Provider, Historical   acetaminophen (TYLENOL) 325 mg tablet Take 2 Tabs by mouth every six (6) hours as needed. For pain 1/7/19  Yes Efrain Myrick MD   multivit-folic acid-herbal 236 (WELLESSE PLUS) 400 mcg-200 mg/30 mL liqd oral liquid Take 30 mL by mouth daily. 10/4/18  Yes Argelia Kirkpatrick MD   sodium chloride (DEEP SEA NASAL) 0.65 % nasal squeeze bottle 1 Spray as needed for Congestion. Yes Provider, Historical   fluticasone (FLONASE) 50 mcg/actuation nasal spray 2 Sprays by Both Nostrils route daily.  2/13/18  Yes Rosemarie Harper MD     Patient Active Problem List   Diagnosis Code    Recurrent seizures ([facility] [address] G40.909    Partial epilepsy with impairment of consciousness ([address] G40.209    Ataxia R27.0    Memory loss R41.3    Seizure ([address] R56.9    S/P placement of VNS (vagus nerve stimulation) device Z96.89    ACP (advance care planning) Z71.89    Low vitamin D level R79.89    Blind left eye H54.40    Pneumonia J18.9    Down syndrome Q90.9    Acute encephalopathy G93.40    Complex partial seizure evolving to generalized seizure ([address] G40.209    Acquired hypothyroidism E03.9     Patient Active Problem List    Diagnosis Date Noted    Acquired hypothyroidism 04/12/2018    Acute encephalopathy 03/23/2018    Complex partial seizure evolving to generalized seizure ([facility] [address] 03/23/2018    Down syndrome 03/20/2018    Pneumonia 02/03/2018    Low vitamin D level 01/11/2018    Blind left eye 01/11/2018    ACP (advance care planning) 11/09/2016    S/P placement of VNS (vagus nerve stimulation) device 04/10/2015    Seizure ([facility] [address] 11/22/2013    Partial epilepsy with impairment of consciousness ([facility] [address] 01/21/2013    Ataxia 01/21/2013    Memory loss 01/21/2013    Recurrent seizures ([facility] [address] 08/27/2012     Current Outpatient Medications   Medication Sig Dispense Refill    levETIRAcetam (KEPPRA) 100 mg/mL solution 15ml by mouth twice a day. 900 mL 11    lacosamide (Vimpat) 150 mg tab tablet Take 1 Tab by mouth two (2) times a day. Max Daily Amount: 300 mg. 60 Tab 11    levothyroxine (SYNTHROID) 50 mcg tablet Take 1 Tab by mouth Daily (before breakfast). Need appointment. 90 Tab 0    loratadine (CLARITIN) 10 mg tablet Take 1 Tab by mouth daily. 90 Tab 0    ferrous sulfate 325 mg (65 mg iron) tablet Take 1 Tab by mouth Daily (before breakfast). 90 Tab 0    pantoprazole (PROTONIX) 40 mg tablet Take 1 Tab by mouth Daily (before breakfast). 30 Tab 2    cholecalciferol (Vitamin D3) (1000 Units /25 mcg) tablet Take 1 Tab by mouth daily. 90 Tab 2    ascorbic acid, vitamin C, (VITAMIN C) 500 mg tablet Take 1 Tab by mouth daily. 30 Tab 6    ketoconazole (NIZORAL) 2 % topical cream Apply  to affected area two (2) times a day.  phenytoin (DILANTIN-125) suspension Take 4 mL by mouth two (2) times a day. 250 mL 11    P5-Q6-R1-B5-B6-iron-met-choln (GERITOL TONIC WITH FERREX 18) 2.5 mg-50 mg-18 iron/15 mL liqd Take 15 mL by mouth every morning.  acetaminophen (TYLENOL) 325 mg tablet Take 2 Tabs by mouth every six (6) hours as needed. For pain 60 Tab 0    multivit-folic acid-herbal 885 (WELLESSE PLUS) 400 mcg-200 mg/30 mL liqd oral liquid Take 30 mL by mouth daily. 1 Bottle 0    sodium chloride (DEEP SEA NASAL) 0.65 % nasal squeeze bottle 1 Spray as needed for Congestion.  fluticasone (FLONASE) 50 mcg/actuation nasal spray 2 Sprays by Both Nostrils route daily. 1 Bottle 2     Allergies   Allergen Reactions    Morphine Not Reported This Time    Tegretol [Carbamazepine] Other (comments)     \"bad reaction\" \"changes attitude\"     Past Medical History:   Diagnosis Date    Anemia     Anxiety     Blindness of left eye     Cerebral palsy (HCC)     Dementia     Down syndrome     Endocrine disease     thyroid disorder    GERD (gastroesophageal reflux disease)     Ill-defined condition     blind in left eye    Ill-defined condition     imparied gait - uses walker    Ill-defined condition     dermatitis    Ill-defined condition     cerebral palsy    Ill-defined condition     prone to decubitus ulcers    Ill-defined condition     anemia    Neurological disorder     Other ill-defined conditions(799.89)     Down syndrome    Psychiatric disorder     mental retardation    Psychiatric disorder     anxiety disorder    Seizures ([address]     Sleep apnea     Thyroid disease     Unspecified epilepsy without mention of intractable epilepsy      Past Surgical History:   Procedure Laterality Date    HX OTHER SURGICAL  03/13/2015     VNS  Dr. Deborah Tovar.  [facility]  @ [facility]     Family History   Problem Relation Age of Onset    Hypertension Mother     Cancer Mother     Lupus Mother    [facility] Arthritis-osteo Mother     Heart Disease Mother     Heart Disease Father     Diabetes Father     Hypertension Father     Elevated Lipids Father     Diabetes Brother     Elevated Lipids Brother     Hypertension Brother     Mental Retardation Brother     Cancer Maternal Grandmother     Arthritis-osteo Maternal Grandmother     Alcohol abuse Maternal Grandfather     Cancer Maternal Grandfather     Arthritis-osteo Paternal Grandmother     Cancer Paternal Grandfather      Social History     Tobacco Use    Smoking status: Never Smoker    Smokeless tobacco: Never Used   Substance Use Topics    Alcohol use: No       ROS    No flowsheet data found. Robin Loyola, who was evaluated through a patient-initiated, synchronous (real-time) audio only encounter, and/or her healthcare decision maker, is aware that it is a billable service, with coverage as determined by his insurance carrier. He provided verbal consent to proceed: Yes. He has not had a related appointment within my department in the past 7 days or scheduled within the next 24 hours.       Total Time: minutes: 21-30 minutes    Denise Milian MD

## 2021-03-23 RX ORDER — MELATONIN
1000 DAILY
Qty: 90 TAB | Refills: 2 | Status: SHIPPED | OUTPATIENT
Start: 2021-03-23

## 2021-03-24 RX ORDER — PANTOPRAZOLE SODIUM 40 MG/1
40 TABLET, DELAYED RELEASE ORAL
Qty: 30 TAB | Refills: 2 | Status: SHIPPED | OUTPATIENT
Start: 2021-03-24 | End: 2021-07-06 | Stop reason: SDUPTHER

## 2021-05-10 ENCOUNTER — TELEPHONE (OUTPATIENT)
Dept: NEUROLOGY | Age: 56
End: 2021-05-10

## 2021-05-10 DIAGNOSIS — G40.209 PARTIAL EPILEPSY WITH IMPAIRMENT OF CONSCIOUSNESS (HCC): Primary | ICD-10-CM

## 2021-05-12 LAB — VALPROATE SERPL-MCNC: <4 UG/ML (ref 50–100)

## 2021-05-16 ENCOUNTER — HOSPITAL ENCOUNTER (EMERGENCY)
Age: 56
Discharge: HOME | End: 2021-05-16
Attending: STUDENT IN AN ORGANIZED HEALTH CARE EDUCATION/TRAINING PROGRAM | Admitting: STUDENT IN AN ORGANIZED HEALTH CARE EDUCATION/TRAINING PROGRAM
Payer: MEDICARE

## 2021-05-16 ENCOUNTER — APPOINTMENT (OUTPATIENT)
Dept: GENERAL RADIOLOGY | Age: 56
End: 2021-05-16
Attending: STUDENT IN AN ORGANIZED HEALTH CARE EDUCATION/TRAINING PROGRAM
Payer: MEDICARE

## 2021-05-16 ENCOUNTER — APPOINTMENT (OUTPATIENT)
Dept: CT IMAGING | Age: 56
End: 2021-05-16
Attending: STUDENT IN AN ORGANIZED HEALTH CARE EDUCATION/TRAINING PROGRAM
Payer: MEDICARE

## 2021-05-16 VITALS
HEART RATE: 85 BPM | OXYGEN SATURATION: 98 % | SYSTOLIC BLOOD PRESSURE: 121 MMHG | RESPIRATION RATE: 17 BRPM | TEMPERATURE: 98.2 F | DIASTOLIC BLOOD PRESSURE: 88 MMHG

## 2021-05-16 DIAGNOSIS — N13.30 HYDRONEPHROSIS, UNSPECIFIED HYDRONEPHROSIS TYPE: ICD-10-CM

## 2021-05-16 DIAGNOSIS — K56.41 FECAL IMPACTION IN RECTUM (HCC): Primary | ICD-10-CM

## 2021-05-16 DIAGNOSIS — R25.1 EPISODE OF SHAKING: ICD-10-CM

## 2021-05-16 LAB
ALBUMIN SERPL-MCNC: 3.2 G/DL (ref 3.5–5)
ALBUMIN/GLOB SERPL: 0.5 {RATIO} (ref 1.1–2.2)
ALP SERPL-CCNC: 136 U/L (ref 45–117)
ALT SERPL-CCNC: 35 U/L (ref 12–78)
ANION GAP SERPL CALC-SCNC: 5 MMOL/L (ref 5–15)
AST SERPL-CCNC: 28 U/L (ref 15–37)
BASOPHILS # BLD: 0 K/UL (ref 0–0.1)
BASOPHILS NFR BLD: 0 % (ref 0–1)
BILIRUB SERPL-MCNC: 0.2 MG/DL (ref 0.2–1)
BUN SERPL-MCNC: 6 MG/DL (ref 6–20)
BUN/CREAT SERPL: 8 (ref 12–20)
CALCIUM SERPL-MCNC: 9.4 MG/DL (ref 8.5–10.1)
CHLORIDE SERPL-SCNC: 101 MMOL/L (ref 97–108)
CO2 SERPL-SCNC: 31 MMOL/L (ref 21–32)
COMMENT, HOLDF: NORMAL
CREAT SERPL-MCNC: 0.72 MG/DL (ref 0.7–1.3)
DIFFERENTIAL METHOD BLD: ABNORMAL
EOSINOPHIL # BLD: 0 K/UL (ref 0–0.4)
EOSINOPHIL NFR BLD: 0 % (ref 0–7)
ERYTHROCYTE [DISTWIDTH] IN BLOOD BY AUTOMATED COUNT: 12.3 % (ref 11.5–14.5)
GLOBULIN SER CALC-MCNC: 6.4 G/DL (ref 2–4)
GLUCOSE SERPL-MCNC: 104 MG/DL (ref 65–100)
HCT VFR BLD AUTO: 39.2 % (ref 36.6–50.3)
HGB BLD-MCNC: 13 G/DL (ref 12.1–17)
IMM GRANULOCYTES # BLD AUTO: 0.1 K/UL (ref 0–0.04)
IMM GRANULOCYTES NFR BLD AUTO: 1 % (ref 0–0.5)
LACTATE SERPL-SCNC: 1.2 MMOL/L (ref 0.4–2)
LIPASE SERPL-CCNC: 61 U/L (ref 73–393)
LYMPHOCYTES # BLD: 0.8 K/UL (ref 0.8–3.5)
LYMPHOCYTES NFR BLD: 15 % (ref 12–49)
MCH RBC QN AUTO: 34.1 PG (ref 26–34)
MCHC RBC AUTO-ENTMCNC: 33.2 G/DL (ref 30–36.5)
MCV RBC AUTO: 102.9 FL (ref 80–99)
MONOCYTES # BLD: 0.7 K/UL (ref 0–1)
MONOCYTES NFR BLD: 14 % (ref 5–13)
NEUTS SEG # BLD: 3.6 K/UL (ref 1.8–8)
NEUTS SEG NFR BLD: 70 % (ref 32–75)
NRBC # BLD: 0 K/UL (ref 0–0.01)
NRBC BLD-RTO: 0 PER 100 WBC
PHENYTOIN FREE MFR SERPL: ABNORMAL %
PHENYTOIN FREE SERPL-MCNC: <0.5 UG/ML (ref 1–2)
PHENYTOIN SERPL-MCNC: 11.5 UG/ML (ref 10–20)
PLATELET # BLD AUTO: 344 K/UL (ref 150–400)
PMV BLD AUTO: 8.9 FL (ref 8.9–12.9)
POTASSIUM SERPL-SCNC: 3.8 MMOL/L (ref 3.5–5.1)
PROT SERPL-MCNC: 9.6 G/DL (ref 6.4–8.2)
RBC # BLD AUTO: 3.81 M/UL (ref 4.1–5.7)
RBC MORPH BLD: ABNORMAL
SAMPLES BEING HELD,HOLD: NORMAL
SODIUM SERPL-SCNC: 137 MMOL/L (ref 136–145)
TROPONIN I SERPL-MCNC: <0.05 NG/ML
WBC # BLD AUTO: 5.2 K/UL (ref 4.1–11.1)

## 2021-05-16 PROCEDURE — 85025 COMPLETE CBC W/AUTO DIFF WBC: CPT

## 2021-05-16 PROCEDURE — 36415 COLL VENOUS BLD VENIPUNCTURE: CPT

## 2021-05-16 PROCEDURE — 74011250636 HC RX REV CODE- 250/636: Performed by: STUDENT IN AN ORGANIZED HEALTH CARE EDUCATION/TRAINING PROGRAM

## 2021-05-16 PROCEDURE — 83690 ASSAY OF LIPASE: CPT

## 2021-05-16 PROCEDURE — 83605 ASSAY OF LACTIC ACID: CPT

## 2021-05-16 PROCEDURE — 71045 X-RAY EXAM CHEST 1 VIEW: CPT

## 2021-05-16 PROCEDURE — 74176 CT ABD & PELVIS W/O CONTRAST: CPT

## 2021-05-16 PROCEDURE — 80186 ASSAY OF PHENYTOIN FREE: CPT

## 2021-05-16 PROCEDURE — 99284 EMERGENCY DEPT VISIT MOD MDM: CPT

## 2021-05-16 PROCEDURE — 80185 ASSAY OF PHENYTOIN TOTAL: CPT

## 2021-05-16 PROCEDURE — 70450 CT HEAD/BRAIN W/O DYE: CPT

## 2021-05-16 PROCEDURE — 80177 DRUG SCRN QUAN LEVETIRACETAM: CPT

## 2021-05-16 PROCEDURE — 80235 DRUG ASSAY LACOSAMIDE: CPT

## 2021-05-16 PROCEDURE — 84484 ASSAY OF TROPONIN QUANT: CPT

## 2021-05-16 PROCEDURE — 80053 COMPREHEN METABOLIC PANEL: CPT

## 2021-05-16 RX ORDER — ADHESIVE BANDAGE
30 BANDAGE TOPICAL DAILY
Qty: 354 ML | Refills: 0 | Status: SHIPPED | OUTPATIENT
Start: 2021-05-16 | End: 2021-05-23

## 2021-05-16 RX ORDER — DOCUSATE SODIUM 50 MG/5ML
50 LIQUID ORAL 2 TIMES DAILY
Qty: 473 ML | Refills: 0 | Status: SHIPPED | OUTPATIENT
Start: 2021-05-16 | End: 2021-06-01

## 2021-05-16 RX ADMIN — SODIUM CHLORIDE 1000 ML: 9 INJECTION, SOLUTION INTRAVENOUS at 14:15

## 2021-05-16 NOTE — PROGRESS NOTES
Admission Medication Reconciliation: In progress:    Unable to speak with patient face to face at this time due to general isolation precautions in the ED related to COVID-19 pandemic, likewise due to clinical condition. Spoke with NP Indiana Stanley (NP for group home facility)  by telephone @ [phone] who stated that she will retrieve medication list and call back shortly. Will update PTA med list and post progress note once current information is received. She stated that he received his medication today through [facility]. Thank you for allowing me to participate in the care of your patient. Nyla Anderson PharmD, RN # [phone]       [facility] pharmacy benefit data reflects medications filled and processed through the patient's insurance, however   this data does NOT capture whether the medication was picked up or is currently being taken by the patient.     Allergies:  Morphine and Tegretol [carbamazepine]    Significant PMH/Disease States:   Past Medical History:   Diagnosis Date    Anemia     Anxiety     Blindness of left eye     Cerebral palsy (HCC)     Dementia     Down syndrome     Endocrine disease     thyroid disorder    GERD (gastroesophageal reflux disease)     Ill-defined condition     blind in left eye    Ill-defined condition     imparied gait - uses walker    Ill-defined condition     dermatitis    Ill-defined condition     cerebral palsy    Ill-defined condition     prone to decubitus ulcers    Ill-defined condition     anemia    Neurological disorder     Other ill-defined conditions(799.89)     Down syndrome    Psychiatric disorder     mental retardation    Psychiatric disorder     anxiety disorder    Seizures ([facility] [address]     Sleep apnea     Thyroid disease     Unspecified epilepsy without mention of intractable epilepsy      Chief Complaint for this Admission:    Chief Complaint   Patient presents with    Tremors     Prior to Admission Medications:   Prior to Admission Medications Please contact the main inpatient pharmacy with any questions or concerns at [phone] and we will direct you to the clinical pharmacist covering this patient's care while in-house.    SCOTT Marina

## 2021-05-16 NOTE — ED PROVIDER NOTES
Patient is a 68-year-old male with a history of cerebral palsy, seizure disorder, Down syndrome, and blindness in the left eye who was brought in by his caretaker for 2 to 3 days of tremulousness. These tremors are described as different than his typical seizure activity, in all extremities, fine movement, no apparent loss of consciousness. They also think he has been having some abdominal pain as he is been less ambulatory and wanting to lay flat and hold still. Unclear if there is any specific trigger to the symptoms. He has not had any changes in medicines, which include phenytoin, Keppra, and Vimpat. He has not had a fever or had any vomiting, also no bloody stools or diarrhea. He normally ambulates with assistance, however has not been wanting to walk around as much as usual and has had to wear diapers now due to not wanting to go to the bathroom. The history is provided by the patient. Tremors          Past Medical History:   Diagnosis Date    Anemia     Anxiety     Blindness of left eye     Cerebral palsy (HCC)     Dementia     Down syndrome     Endocrine disease     thyroid disorder    GERD (gastroesophageal reflux disease)     Ill-defined condition     blind in left eye    Ill-defined condition     imparied gait - uses walker    Ill-defined condition     dermatitis    Ill-defined condition     cerebral palsy    Ill-defined condition     prone to decubitus ulcers    Ill-defined condition     anemia    Neurological disorder     Other ill-defined conditions(799.89)     Down syndrome    Psychiatric disorder     mental retardation    Psychiatric disorder     anxiety disorder    Seizures ([address]     Sleep apnea     Thyroid disease     Unspecified epilepsy without mention of intractable epilepsy        Past Surgical History:   Procedure Laterality Date    HX OTHER SURGICAL  03/13/2015     VNS  Dr. Fiona Max.  Donovan Cherry  @ [facility]         Family History:   Problem Relation Age of Onset    Hypertension Mother     Cancer Mother     Lupus Mother     Arthritis-osteo Mother     Heart Disease Mother     Heart Disease Father     Diabetes Father     Hypertension Father     Elevated Lipids Father     Diabetes Brother     Elevated Lipids Brother     Hypertension Brother     Mental Retardation Brother     Cancer Maternal Grandmother     Arthritis-osteo Maternal Grandmother     Alcohol abuse Maternal Grandfather     Cancer Maternal Grandfather     Arthritis-osteo Paternal Grandmother     Cancer Paternal Grandfather        Social History     Socioeconomic History    Marital status: SINGLE     Spouse name: Not on file    Number of children: Not on file    Years of education: Not on file    Highest education level: Not on file   Occupational History    Not on file   Social Needs    Financial resource strain: Not on file    Food insecurity     Worry: Not on file     Inability: Not on file   [employer] needs     Medical: Not on file     Non-medical: Not on file   Tobacco Use    Smoking status: Never Smoker    Smokeless tobacco: Never Used   Substance and Sexual Activity    Alcohol use: No    Drug use: No    Sexual activity: Not Currently   Lifestyle    Physical activity     Days per week: Not on file     Minutes per session: Not on file    Stress: Not on file   Relationships    Social connections     Talks on phone: Not on file     Gets together: Not on file     Attends Yazidi service: Not on file     Active member of club or organization: Not on file     Attends meetings of clubs or organizations: Not on file     Relationship status: Not on file    Intimate partner violence     Fear of current or ex partner: Not on file     Emotionally abused: Not on file     Physically abused: Not on file     Forced sexual activity: Not on file   Other Topics Concern    Not on file   Social History Narrative    Not on file         ALLERGIES: Morphine and Tegretol [carbamazepine]    Review of Systems   Unable to perform ROS: Patient nonverbal       Vitals:    05/16/21 1339   BP: (!) 148/85   Pulse: (!) 105   Resp: 16   Temp: 97.3 °F (36.3 °C)   SpO2: 99%            Physical Exam  Vitals signs and nursing note reviewed. Constitutional:       General: He is not in acute distress. Appearance: He is well-developed. HENT:      Head: Normocephalic and atraumatic. Neck:      Musculoskeletal: Normal range of motion and neck supple. Cardiovascular:      Rate and Rhythm: Regular rhythm. Tachycardia present. Heart sounds: Normal heart sounds. Pulmonary:      Effort: Pulmonary effort is normal. No respiratory distress. Breath sounds: Normal breath sounds. Abdominal:      Palpations: Abdomen is soft. Tenderness: There is no abdominal tenderness. There is no guarding. Musculoskeletal:         General: No deformity or signs of injury. Thoracic back: Normal.      Lumbar back: Normal.      Right lower leg: No edema. Left lower leg: No edema. Comments: No obvious sacral decubitus ulcer     Skin:     General: Skin is warm and dry. Neurological:      Mental Status: He is alert. Mental status is at baseline. Motor: No abnormal muscle tone (Elizabeth). MDM       Procedures    Assessment plan: This is a 80-year-old male here with 2 to 3 days of generalized tremulousness and possible abdominal pain, not ambulating as much as usual.  His appetite has been good. Particularly noticed any abdominal tenderness on my exam currently. Wide differential diagnosis this point, including but not limited to medication adverse effect, Dilantin toxicity, UTI, metabolic abnormality, stroke, ACS. Plan obtain EKG, chest x-ray, CT of the head and abdomen, check drug levels. Dispo depends on results and clinical course. EKG interpretation: 14:33  Rhythm: normal sinus rhythm; and regular .  Rate (approx.): 87; Axis: normal; Intervals: normal ; ST/T wave: no STEMI; EKG documented and interpreted by Tera Burrell MD, ED MD.      6:22 PM  Discussed findings with patient and caregiver. CT findings concerning for fecal impaction. Consented patient and caregiver for rectal exam and possible manual disimpaction. They agreed with procedure. Moderate amount of stool was found in the rectum, which was manually disimpacted without complication. Patient tolerated procedure well. Will give enema post-procedure plan to discharge home on bowel regimen. Caregiver also notes he has an appointment with neurology tomorrow. Discussed keeping this appointment for the tremors.

## 2021-05-16 NOTE — ED TRIAGE NOTES
Triage: Pt arrives from his group home accompanied by his caregiver. She reports that patient in minimally verbal at baseline but hasn't been talking at all today. She also reports that he has been tremulous today which is not his baseline. Pt also appeared to be guarding his abdomen as though he was in pain.

## 2021-05-16 NOTE — PROGRESS NOTES
Admission Medication Reconciliation:      Spoke with Sharon Lozano who reviewed patient's medication list with me (to include identifying that patient is actually receiving 50 mcg levothyroxine from his pharmacy rather than the 100 mcg that is indicated on RX Query). Thank you for allowing me to participate in the care of your patient. Nenita Gamble PharmD, RN # [phone]       [facility] pharmacy benefit data reflects medications filled and processed through the patient's insurance, however   this data does NOT capture whether the medication was picked up or is currently being taken by the patient. Allergies:  Morphine and Tegretol [carbamazepine]    Significant PMH/Disease States:   Past Medical History:   Diagnosis Date    Anemia     Anxiety     Blindness of left eye     Cerebral palsy (HCC)     Dementia     Down syndrome     Endocrine disease     thyroid disorder    GERD (gastroesophageal reflux disease)     Ill-defined condition     blind in left eye    Ill-defined condition     imparied gait - uses walker    Ill-defined condition     dermatitis    Ill-defined condition     cerebral palsy    Ill-defined condition     prone to decubitus ulcers    Ill-defined condition     anemia    Neurological disorder     Other ill-defined conditions(799.89)     Down syndrome    Psychiatric disorder     mental retardation    Psychiatric disorder     anxiety disorder    Seizures ([facility] [address]     Sleep apnea     Thyroid disease     Unspecified epilepsy without mention of intractable epilepsy      Chief Complaint for this Admission:    Chief Complaint   Patient presents with    Tremors     Prior to Admission Medications:   Prior to Admission Medications   Prescriptions Last Dose Informant Taking? G0-H1-L6-B5-B6-iron-met-choln (GERITOL TONIC WITH FERREX 18) 2.5 mg-50 mg-18 iron/15 mL liqd 5/16/2021 at Unknown time  Yes   Sig: Take 15 mL by mouth every morning.    acetaminophen (TYLENOL) 325 mg tablet   Yes   Sig: Take 2 Tabs by mouth every six (6) hours as needed. For pain   ascorbic acid, vitamin C, (VITAMIN C) 500 mg tablet 2021 at Unknown time  Yes   Sig: Take 1 Tab by mouth daily. cholecalciferol (Vitamin D3) (1000 Units /25 mcg) tablet 2021 at Unknown time  Yes   Sig: Take 1 Tab by mouth daily. ferrous sulfate 325 mg (65 mg iron) tablet 2021 at Unknown time  Yes   Sig: Take 1 Tab by mouth Daily (before breakfast). fluticasone propionate (FLONASE) 50 mcg/actuation nasal spray 2021 at Unknown time  Yes   Si Sprays by Both Nostrils route daily. ketoconazole (NIZORAL) 2 % topical cream 2021 at Unknown time  Yes   Sig: Apply  to affected area two (2) times a day. Between toes   lacosamide (Vimpat) 150 mg tab tablet 2021 at Unknown time  Yes   Sig: Take 1 Tab by mouth two (2) times a day. Max Daily Amount: 300 mg.   levETIRAcetam (KEPPRA) 100 mg/mL solution 2021 at Unknown time  Yes   Sig: 15ml by mouth twice a day. levothyroxine (SYNTHROID) 50 mcg tablet 2021 at Unknown time  Yes   Sig: Take 1 Tab by mouth Daily (before breakfast). Need appointment.   loratadine (CLARITIN) 10 mg tablet 2021 at Unknown time  Yes   Sig: Take 1 Tab by mouth daily. multivit-folic acid-herbal 232 (WELLESSE PLUS) 400 mcg-200 mg/30 mL liqd oral liquid 2021 at Unknown time  Yes   Sig: Take 30 mL by mouth daily. pantoprazole (PROTONIX) 40 mg tablet 2021 at Unknown time  Yes   Sig: Take 1 Tab by mouth Daily (before breakfast). phenytoin (DILANTIN-125) suspension 2021 at Unknown time  Yes   Sig: Take 4 mL by mouth two (2) times a day. sodium chloride (DEEP SEA NASAL) 0.65 % nasal squeeze bottle 2021 at Unknown time  Yes   Si Spray as needed for Congestion.       Facility-Administered Medications: None     Please contact the main inpatient pharmacy with any questions or concerns at [phone] and we will direct you to the clinical pharmacist covering this patient's care while in-house.    JOSE RAFAEL MichelBANKS

## 2021-05-17 ENCOUNTER — VIRTUAL VISIT (OUTPATIENT)
Dept: PRIMARY CARE CLINIC | Age: 56
End: 2021-05-17
Payer: MEDICARE

## 2021-05-17 DIAGNOSIS — Z09 HOSPITAL DISCHARGE FOLLOW-UP: ICD-10-CM

## 2021-05-17 DIAGNOSIS — Q90.9 DOWN SYNDROME: ICD-10-CM

## 2021-05-17 DIAGNOSIS — G25.0 BENIGN HEAD TREMOR: ICD-10-CM

## 2021-05-17 DIAGNOSIS — R56.9 SEIZURE (HCC): ICD-10-CM

## 2021-05-17 DIAGNOSIS — K59.00 CONSTIPATION, UNSPECIFIED CONSTIPATION TYPE: ICD-10-CM

## 2021-05-17 DIAGNOSIS — R25.1 TREMOR OF RIGHT HAND: Primary | ICD-10-CM

## 2021-05-17 DIAGNOSIS — E03.9 ACQUIRED HYPOTHYROIDISM: ICD-10-CM

## 2021-05-17 PROCEDURE — 3017F COLORECTAL CA SCREEN DOC REV: CPT | Performed by: FAMILY MEDICINE

## 2021-05-17 PROCEDURE — 99214 OFFICE O/P EST MOD 30 MIN: CPT | Performed by: FAMILY MEDICINE

## 2021-05-17 PROCEDURE — G8419 CALC BMI OUT NRM PARAM NOF/U: HCPCS | Performed by: FAMILY MEDICINE

## 2021-05-17 PROCEDURE — G8432 DEP SCR NOT DOC, RNG: HCPCS | Performed by: FAMILY MEDICINE

## 2021-05-17 PROCEDURE — G8427 DOCREV CUR MEDS BY ELIG CLIN: HCPCS | Performed by: FAMILY MEDICINE

## 2021-05-17 NOTE — PROGRESS NOTES
Identified pt with two pt identifiers(name and ). Chief Complaint   Patient presents with   [facility] Follow Up     was seen yesterday at [facility] for tremors    [address] [place] [address]     [phone]    3 most recent [facility] Screens 3/20/2020   Little interest or pleasure in doing things Not at all   Feeling down, depressed, irritable, or hopeless Not at all   Total Score PHQ 2 0        There were no vitals filed for this visit. Health Maintenance Due   Topic    Pneumococcal 0-64 years (1 of 1 - PPSV23)    COVID-19 Vaccine (1)    DTaP/Tdap/Td series (1 - Tdap)    Shingrix Vaccine Age 49> (1 of 2)    Lipid Screen     Medicare Yearly Exam     Colorectal Cancer Screening Combo        1. Have you been to the ER, urgent care clinic since your last visit? Hospitalized since your last visit? yes     2. Have you seen or consulted any other health care providers outside of the [address] [place] [address] since your last visit? Include any pap smears or colon screening.  No

## 2021-05-17 NOTE — PROGRESS NOTES
Bindu Fernandez is a 64 y.o. male who was seen by synchronous (real-time) audio-video technology on 5/17/2021 for Tremors and Hospital Follow Up (was seen yesterday at [facility] for tremors )        Assessment & Plan:     Diagnoses and all orders for this visit:    1. Tremor of right hand     -  Patient has chronic hx of tremor in is head as well as right upper extremity. I did not see any worsening tremor compared to last visit a year ago. CT head showed progressive for volume loss of brain otherwise no acute finding. Parkinson? Advised to follow up with his neurologist.   2. Constipation, unspecified constipation type      Continue current med as prescribed in ER. 3. Benign head tremor      Same as #1  4. Hospital discharge follow-up      Same as above. 5. Acquired hypothyroidism     Last thyroid level was checked last year. Advised to make appointment as soon as possible in office. Currently on synthroid 50 mcg. 6. Seizure ([address]      Followed by neurologist.  7. Down syndrome      Follow-up and Dispositions    · Return in 1 month (on 6/17/2021), or if symptoms worsen or fail to improve, for medicare well [facility], thyroid. Subjective: This is a 65 y/o with a history of cerebral palsy, hypothyroid, seizure disorder, Down syndrome, and blindness in the left eye who is here with caregiver Aniyah ( NP)  for follow up from his recent ER visit yesterday. He was taken to ER with a concern about worsening tremors in his right hand which is not usual for him. There was also concern about having some abdominal pain. In the ER CT head did not show any acute findings but showed  volume loss in the cerebral hemispheres and posterior fossa  with progression since 2018. CT abdomen showed stool impaction and bladder distension. He was sent home with stool softener. According to [facility] he has been having good urine out put. Started giving stool softener. He is having BM but small amount. ER records reviewed in chart. Hx obtained from [facility]. Prior to Admission medications    Medication Sig Start Date End Date Taking? Authorizing Provider   magnesium hydroxide (Milk of Magnesia) 400 mg/5 mL suspension Take 30 mL by mouth daily for 7 days. 5/16/21 5/23/21 Yes Min Donnelly MD   docusate (COLACE) 50 mg/5 mL liquid Take 5 mL by mouth two (2) times a day for 30 days. 5/16/21 6/15/21 Yes Min Donnelly MD   fluticasone propionate (FLONASE) 50 mcg/actuation nasal spray 2 Sprays by Both Nostrils route daily. 3/31/21  Yes Mirza Sharma MD   pantoprazole (PROTONIX) 40 mg tablet Take 1 Tab by mouth Daily (before breakfast). 3/24/21  Yes Mirza Sharma MD   cholecalciferol (Vitamin D3) (1000 Units /25 mcg) tablet Take 1 Tab by mouth daily. 3/23/21  Yes Mirza Sharma MD   levETIRAcetam (KEPPRA) 100 mg/mL solution 15ml by mouth twice a day. 3/5/21  Yes Anabella Green MD   lacosamide (Vimpat) 150 mg tab tablet Take 1 Tab by mouth two (2) times a day. Max Daily Amount: 300 mg. 3/5/21  Yes Anabella Green MD   levothyroxine (SYNTHROID) 50 mcg tablet Take 1 Tab by mouth Daily (before breakfast). Need appointment. 1/18/21  Yes Mirza Sharma MD   loratadine (CLARITIN) 10 mg tablet Take 1 Tab by mouth daily. 1/18/21  Yes Mirza Sharma MD   ferrous sulfate 325 mg (65 mg iron) tablet Take 1 Tab by mouth Daily (before breakfast). 1/18/21  Yes Mirza Sharma MD   ascorbic acid, vitamin C, (VITAMIN C) 500 mg tablet Take 1 Tab by mouth daily. 9/25/19  Yes Mirza Sharma MD   ketoconazole (NIZORAL) 2 % topical cream Apply  to affected area two (2) times a day. Between toes   Yes Provider, Historical   phenytoin (DILANTIN-125) suspension Take 4 mL by mouth two (2) times a day. 8/13/19  Yes Anabella Green MD   H6-A9-K3-A6-G0-njss-met-choln (GERITOL TONIC WITH FERREX 18) 2.5 mg-50 mg-18 iron/15 mL liqd Take 15 mL by mouth every morning.    Yes Provider, Historical   acetaminophen (TYLENOL) 325 mg tablet Take 2 Tabs by mouth every six (6) hours as needed. For pain 1/7/19  Yes Shante Baptiste MD   multivit-folic acid-herbal 720 (WELLESSE PLUS) 400 mcg-200 mg/30 mL liqd oral liquid Take 30 mL by mouth daily. 10/4/18  Yes Tricia Castro MD   sodium chloride (DEEP SEA NASAL) 0.65 % nasal squeeze bottle 1 Spray as needed for Congestion. Yes Provider, Historical     Patient Active Problem List   Diagnosis Code    Recurrent seizures ([facility] [address] G40.909    Partial epilepsy with impairment of consciousness ([address] G40.209    Ataxia R27.0    Memory loss R41.3    Seizure ([address] R56.9    S/P placement of VNS (vagus nerve stimulation) device Z96.89    ACP (advance care planning) Z71.89    Low vitamin D level R79.89    Blind left eye H54.40    Pneumonia J18.9    Down syndrome Q90.9    Acute encephalopathy G93.40    Complex partial seizure evolving to generalized seizure ([facility] [address] G40.209    Acquired hypothyroidism E03.9     Current Outpatient Medications   Medication Sig Dispense Refill    magnesium hydroxide (Milk of Magnesia) 400 mg/5 mL suspension Take 30 mL by mouth daily for 7 days. 354 mL 0    docusate (COLACE) 50 mg/5 mL liquid Take 5 mL by mouth two (2) times a day for 30 days. 473 mL 0    fluticasone propionate (FLONASE) 50 mcg/actuation nasal spray 2 Sprays by Both Nostrils route daily. 1 Bottle 2    pantoprazole (PROTONIX) 40 mg tablet Take 1 Tab by mouth Daily (before breakfast). 30 Tab 2    cholecalciferol (Vitamin D3) (1000 Units /25 mcg) tablet Take 1 Tab by mouth daily. 90 Tab 2    levETIRAcetam (KEPPRA) 100 mg/mL solution 15ml by mouth twice a day. 900 mL 11    lacosamide (Vimpat) 150 mg tab tablet Take 1 Tab by mouth two (2) times a day. Max Daily Amount: 300 mg. 60 Tab 11    levothyroxine (SYNTHROID) 50 mcg tablet Take 1 Tab by mouth Daily (before breakfast). Need appointment. 90 Tab 0    loratadine (CLARITIN) 10 mg tablet Take 1 Tab by mouth daily.  90 Tab 0    ferrous sulfate 325 mg (65 mg iron) tablet Take 1 Tab by mouth Daily (before breakfast). 90 Tab 0    ascorbic acid, vitamin C, (VITAMIN C) 500 mg tablet Take 1 Tab by mouth daily. 30 Tab 6    ketoconazole (NIZORAL) 2 % topical cream Apply  to affected area two (2) times a day. Between toes      phenytoin (DILANTIN-125) suspension Take 4 mL by mouth two (2) times a day. 250 mL 11    D3-D7-W3-B5-B6-iron-met-choln (GERITOL TONIC WITH FERREX 18) 2.5 mg-50 mg-18 iron/15 mL liqd Take 15 mL by mouth every morning.  acetaminophen (TYLENOL) 325 mg tablet Take 2 Tabs by mouth every six (6) hours as needed. For pain 60 Tab 0    multivit-folic acid-herbal 548 (WELLESSE PLUS) 400 mcg-200 mg/30 mL liqd oral liquid Take 30 mL by mouth daily. 1 Bottle 0    sodium chloride (DEEP SEA NASAL) 0.65 % nasal squeeze bottle 1 Spray as needed for Congestion. Allergies   Allergen Reactions    Morphine Not Reported This Time    Tegretol [Carbamazepine] Other (comments)     \"bad reaction\" \"changes attitude\"       ROS    Objective:     Patient-Reported Vitals 5/17/2021   Patient-Reported Temperature 98        [INSTRUCTIONS:  \"[x]\" Indicates a positive item  \"[]\" Indicates a negative item  -- DELETE ALL ITEMS NOT EXAMINED]    Constitutional: [x] Appears well-developed and well-nourished [x] No apparent distress  .  Patient was smiling during exam.    [] Abnormal -     Mental status: [x] Alert and awake  [x] Oriented to person [] Able to follow commands    [] Abnormal -     Eyes:   EOM    [x]  Normal    [] Abnormal -   Sclera  [x]  Normal    [] Abnormal -          Discharge [x]  None visible   [] Abnormal -     HENT: [x] Normocephalic, atraumatic  [] Abnormal -   [] Mouth/Throat: Mucous membranes are moist    External Ears [x] Normal  [] Abnormal -    Neck: [x] No visualized mass [] Abnormal -     Pulmonary/Chest: [x] Respiratory effort normal   [x] No visualized signs of difficulty breathing or respiratory distress        [] Abnormal -      Musculoskeletal: [] Normal gait with no signs of ataxia         [x] Normal range of motion of neck        [] Abnormal -     Neurological:        [x] No Facial Asymmetry (Cranial nerve 7 motor function) (limited exam due to video visit)          [x] No gaze palsy        [] Abnormal -  Resting tremor noted in his right upper extremity and head. Skin:        [x] No significant exanthematous lesions or discoloration noted on facial skin         [] Abnormal -            Psychiatric:       [x] Normal Affect [] Abnormal -        [] No Hallucinations    Other pertinent observable physical exam findings:-  Abdomen feels soft when Aniyah ( NP) checked his abdomen      We discussed the expected course, resolution and complications of the diagnosis(es) in detail. Medication risks, benefits, costs, interactions, and alternatives were discussed as indicated. I advised him to contact the office if his condition worsens, changes or fails to improve as anticipated. He expressed understanding with the diagnosis(es) and plan. Toy Mace, was evaluated through a synchronous (real-time) audio-video encounter. The patient (or guardian if applicable) is aware that this is a billable service. Verbal consent to proceed has been obtained within the past 12 months. The visit was conducted pursuant to the emergency declaration under the [facility], [address] [place] [address] authority and the [email or web address] and [employer] General Act. Patient identification was verified, and a caregiver was present when appropriate. The patient was located in a state where the provider was credentialed to provide care.       Gaby Cote MD

## 2021-05-18 LAB — LEVETIRACETAM SERPL-MCNC: 66.8 UG/ML (ref 10–40)

## 2021-05-19 LAB — LACOSAMIDE SERPL-MCNC: 8.6 UG/ML (ref 5–10)

## 2021-05-27 NOTE — TELEPHONE ENCOUNTER
Pharmacy request multivitamin refill   PCP: Jj Buckner MD    Last appt: 5/17/2021  Future Appointments   Date Time Provider Quinn Carrasco   6/1/2021  3:00 PM Jj Buckner MD [facility] BS AMB   9/7/2021  3:00 PM Orville Meyers MD [facility] BS AMB       Requested Prescriptions      No prescriptions requested or ordered in this encounter         Other Comments:

## 2021-06-01 ENCOUNTER — OFFICE VISIT (OUTPATIENT)
Dept: PRIMARY CARE CLINIC | Age: 56
End: 2021-06-01
Payer: MEDICARE

## 2021-06-01 ENCOUNTER — HOSPITAL ENCOUNTER (OUTPATIENT)
Dept: GENERAL RADIOLOGY | Age: 56
Discharge: HOME | End: 2021-06-01
Attending: FAMILY MEDICINE
Payer: MEDICARE

## 2021-06-01 VITALS
HEART RATE: 65 BPM | SYSTOLIC BLOOD PRESSURE: 99 MMHG | RESPIRATION RATE: 22 BRPM | HEIGHT: 63 IN | BODY MASS INDEX: 28.17 KG/M2 | TEMPERATURE: 97.8 F | DIASTOLIC BLOOD PRESSURE: 68 MMHG

## 2021-06-01 DIAGNOSIS — Z12.5 SPECIAL SCREENING FOR MALIGNANT NEOPLASM OF PROSTATE: ICD-10-CM

## 2021-06-01 DIAGNOSIS — E03.9 ACQUIRED HYPOTHYROIDISM: ICD-10-CM

## 2021-06-01 DIAGNOSIS — R29.898 POPPING SOUND OF KNEE JOINT: ICD-10-CM

## 2021-06-01 DIAGNOSIS — Z00.00 MEDICARE ANNUAL WELLNESS VISIT, SUBSEQUENT: Primary | ICD-10-CM

## 2021-06-01 DIAGNOSIS — Z11.1 SCREENING-PULMONARY TB: ICD-10-CM

## 2021-06-01 DIAGNOSIS — Q90.9 DOWN SYNDROME: ICD-10-CM

## 2021-06-01 DIAGNOSIS — K59.00 CONSTIPATION, UNSPECIFIED CONSTIPATION TYPE: ICD-10-CM

## 2021-06-01 DIAGNOSIS — R56.9 SEIZURE (HCC): ICD-10-CM

## 2021-06-01 DIAGNOSIS — R25.1 TREMOR OF RIGHT HAND: ICD-10-CM

## 2021-06-01 DIAGNOSIS — G25.0 BENIGN HEAD TREMOR: ICD-10-CM

## 2021-06-01 DIAGNOSIS — Z13.6 SCREENING FOR ISCHEMIC HEART DISEASE: ICD-10-CM

## 2021-06-01 PROCEDURE — G8427 DOCREV CUR MEDS BY ELIG CLIN: HCPCS | Performed by: FAMILY MEDICINE

## 2021-06-01 PROCEDURE — 73562 X-RAY EXAM OF KNEE 3: CPT

## 2021-06-01 PROCEDURE — 3017F COLORECTAL CA SCREEN DOC REV: CPT | Performed by: FAMILY MEDICINE

## 2021-06-01 PROCEDURE — G0439 PPPS, SUBSEQ VISIT: HCPCS | Performed by: FAMILY MEDICINE

## 2021-06-01 PROCEDURE — G8419 CALC BMI OUT NRM PARAM NOF/U: HCPCS | Performed by: FAMILY MEDICINE

## 2021-06-01 PROCEDURE — G8432 DEP SCR NOT DOC, RNG: HCPCS | Performed by: FAMILY MEDICINE

## 2021-06-01 PROCEDURE — 99214 OFFICE O/P EST MOD 30 MIN: CPT | Performed by: FAMILY MEDICINE

## 2021-06-01 RX ORDER — DOCUSATE SODIUM 50 MG/5ML
100 LIQUID ORAL 2 TIMES DAILY
Qty: 473 ML | Refills: 1 | Status: SHIPPED | OUTPATIENT
Start: 2021-06-01 | End: 2021-11-17 | Stop reason: SDUPTHER

## 2021-06-01 RX ORDER — ZOSTER VACCINE RECOMBINANT, ADJUVANTED 50 MCG/0.5
KIT INTRAMUSCULAR
Qty: 0.5 ML | Refills: 1 | Status: SHIPPED | OUTPATIENT
Start: 2021-06-01

## 2021-06-01 NOTE — PROGRESS NOTES
NN Medicare Wellness Visit      Everton Engle is a 64 y.o. male and presents for Annual Medicare Wellness Visit. There were no vitals filed for this visit. Depression Screen:   3 most recent PHQ Screens 3/20/2020   Little interest or pleasure in doing things Not at all   Feeling down, depressed, irritable, or hopeless Not at all   Total Score PHQ 2 0       Fall Risk Assessment:  No flowsheet data found. Abuse Screen:   Abuse Screening Questionnaire 3/20/2020   Do you ever feel afraid of your partner? N   Are you in a relationship with someone who physically or mentally threatens you? N   Is it safe for you to go home? Y         1. Have you been to the ER, urgent care clinic since your last visit? Hospitalized since your last visit? Yes - urgent care for bowel issues     2. Have you seen or consulted any other health care providers outside of the [address] [place] [address] since your last visit? Include any pap smears or colon screening.  No

## 2021-06-01 NOTE — PATIENT INSTRUCTIONS
Medicare Wellness Visit, Male The best way to live healthy is to have a lifestyle where you eat a well-balanced diet, exercise regularly, limit alcohol use, and quit all forms of tobacco/nicotine, if applicable. Regular preventive services are another way to keep healthy. Preventive services (vaccines, screening tests, monitoring & exams) can help personalize your care plan, which helps you manage your own care. Screening tests can find health problems at the earliest stages, when they are easiest to treat. [facility] follows the current, evidence-based guidelines published by the [facility] ([facility]) when recommending preventive services for our patients. Because we follow these guidelines, sometimes recommendations change over time as research supports it. (For example, a prostate screening blood test is no longer routinely recommended for men with no symptoms). Of course, you and your doctor may decide to screen more often for some diseases, based on your risk and co-morbidities (chronic disease you are already diagnosed with). Preventive services for you include: - Medicare offers their members a free annual wellness visit, which is time for you and your primary care provider to discuss and plan for your preventive service needs. Take advantage of this benefit every year! 
-All adults over age 72 should receive the recommended pneumonia vaccines. Current USPSTF guidelines recommend a series of two vaccines for the best pneumonia protection.  
-All adults should have a flu vaccine yearly and tetanus vaccine every 10 years. 
-All adults age 48 and older should receive the shingles vaccines (series of two vaccines).       
-All adults age 38-68 who are overweight should have a diabetes screening test once every three years.  
-Other screening tests & preventive services for persons with diabetes include: an eye exam to screen for diabetic retinopathy, a kidney function test, a foot exam, and stricter control over your cholesterol.  
-Cardiovascular screening for adults with routine risk involves an electrocardiogram (ECG) at intervals determined by the provider.  
-Colorectal cancer screening should be done for adults age 54-65 with no increased risk factors for colorectal cancer. There are a number of acceptable methods of screening for this type of cancer. Each test has its own benefits and drawbacks. Discuss with your provider what is most appropriate for you during your annual wellness visit. The different tests include: colonoscopy (considered the best screening method), a fecal occult blood test, a fecal DNA test, and sigmoidoscopy. 
-All adults born between [facility] should be screened once for Hepatitis C. 
-An Abdominal Aortic Aneurysm (AAA) Screening is recommended for men age 73-68 who has ever smoked in their lifetime. Here is a list of your current Health Maintenance items (your personalized list of preventive services) with a due date: 
Health Maintenance Due Topic Date Due  Pneumococcal Vaccine (1 of 2 - PPSV23) Never done  COVID-19 Vaccine (1) Never done  DTaP/Tdap/Td  (1 - Tdap) Never done  Shingles Vaccine (1 of 2) Never done  Cholesterol Test   12/10/2020  Colorectal Screening  03/22/2021

## 2021-06-01 NOTE — PROGRESS NOTES
This is the Subsequent Medicare Annual Wellness Exam, performed 12 months or more after the Initial AWV or the last Subsequent AWV    I have reviewed the patient's medical history in detail and updated the computerized patient record. This is a 65 y/o with a history of cerebral palsy, hypothyroid, seizure disorder, Down syndrome, and blindness in the left eye, head and right upper extremity tremor who is here with caregiver for medicare well ness as well as with concerns. According to staff member head and right upper extremity tremor has been getting worse. He was seen in ER last month where he had head CT. Head CT showed Volume loss in the cerebral hemispheres and posterior fossa with progression since 2018. He was started on Colace for constipation in the ER. Reports that he has been having BM every 2 days. Caregiver notice popping noise in his left knee. No known injury, no swelling. Concern if there is anything going on with his knee. He is mostly wheel chair bound. Has been on synthroid 50 mcg. Hx of obtained from caregiver. Need TB test done. Lab Results   Component Value Date/Time    TSH 1.020 06/23/2020 10:00 AM    TSH 1.35 01/01/2019 07:50 AM    T4, Free 1.11 01/11/2018 12:07 PM         Assessment/Plan   Education and counseling provided:  Are appropriate based on today's review and evaluation  Prostate cancer screening tests (PSA, covered annually)  Colorectal cancer screening tests    1. Medicare annual wellness visit, subsequent  2. Acquired hypothyroidism  -     THYROID CASCADE PROFILE; Future  -     THYROID CASCADE PROFILE; Future  3. Seizure ([address]  4. Down syndrome  5. Benign head tremor- worsening. Strongly advised to make earlier appointment with his neurologist.  6. Tremor of right hand- same as #5  7. Special screening for malignant neoplasm of prostate  -     PSA SCREENING (SCREENING); Future  8. Screening for ischemic heart disease  -     LIPID PANEL; Future  9. Constipation, unspecified constipation type  -    Increase  docusate (COLACE) 50 mg/5 mL liquid; Take 10 mL by mouth two (2) times a day., Normal, Disp-473 mL, R-1  10. Screening-pulmonary TB  -     QUANTIFERON-TB PLUS(CLIENT INCUB.); Future  11. Popping sound of knee joint  -     XR KNEE LT 3 V; Future-  Evidence of osteopenic change involving the bones of the left knee otherwise no acute changes. Exam is normal.        F/u if anything worsen. Forms completed and faxed over. Depression Risk Factor Screening     3 most recent PHQ Screens 6/1/2021   PHQ Not Done Medical Reason (indicate in comments)   Little interest or pleasure in doing things -   Feeling down, depressed, irritable, or hopeless -   Total Score PHQ 2 -       Alcohol Risk Screen    Do you average more than 2 drinks per night or 14 drinks a week: No    On any one occasion in the past three months have you have had more than 4 drinks containing alcohol:  No        Functional Ability and Level of Safety    Hearing: unable to verify. Activities of Daily Living: The home contains: grab bars and rugs  Patient needs help with:  transportation, shopping, preparing meals, laundry, housework, managing medications, managing money, eating, dressing, bathing, hygiene, bathroom needs and walking      Ambulation: wheelchair bound     Fall Risk:  No flowsheet data found. Abuse Screen:  Patient is not abused       General: stated age, well developed, well nourished and in NAD. Head tremor noted. Neck: supple, symmetrical, trachea midline, no adenopathy and thyroid: not enlarged, symmetric, no tenderness/mass/nodules  Lungs:  clear to auscultation w/o rales, rhonchi, wheezes w/normal effort and no use of accessory muscles of respiration   Heart: regular rate and rhythm, S1, S2 normal, no murmur, click, rub or gallop  Abdomen: soft, nontender, no masses, BS normal  Ext:  No edema noted.    Lymph: no cervical adenopathy appreciated  Skin: Normal. and no rash or abnormalities   Psych: alert ,and non verbal.  Resting tremor of right upper extremity noted. Wheel chair bound.         Cognitive Screening    Has your family/caregiver stated any concerns about your memory: NA         Health Maintenance Due     Health Maintenance Due   Topic Date Due    Pneumococcal 0-64 years (1 of 2 - PPSV23) Never done    COVID-19 Vaccine (1) Never done    DTaP/Tdap/Td series (1 - Tdap) Never done    Shingrix Vaccine Age 50> (1 of 2) Never done    Lipid Screen  12/10/2020    Colorectal Cancer Screening Combo  03/22/2021       Patient Care Team   Patient Care Team:  Junior Pink MD as PCP - General (Family Medicine)  Junior Pink MD as PCP - [facility] Empaneled Provider  Flaco Hendrickson NP as Nurse Practitioner (Neurology)  Vickey Bowles MD as Surgeon (General Surgery)  Anya Gorman MD (Neurology)  Zia Moctezuma MD (Neurology)    History     Patient Active Problem List   Diagnosis Code    Recurrent seizures ([address] G40.909    Partial epilepsy with impairment of consciousness ([address] G40.209    Ataxia R27.0    Memory loss R41.3    Seizure ([address] R56.9    S/P placement of VNS (vagus nerve stimulation) device Z96.89    ACP (advance care planning) Z71.89    Low vitamin D level R79.89    Blind left eye H54.40    Pneumonia J18.9    Down syndrome Q90.9    Acute encephalopathy G93.40    Complex partial seizure evolving to generalized seizure ([address] G40.209    Acquired hypothyroidism E03.9     Past Medical History:   Diagnosis Date    Anemia     Anxiety     Blindness of left eye     Cerebral palsy ([address]     Dementia     Down syndrome     Endocrine disease     thyroid disorder    GERD (gastroesophageal reflux disease)     Ill-defined condition     blind in left eye    Ill-defined condition     imparied gait - uses walker    Ill-defined condition     dermatitis    Ill-defined condition     cerebral palsy    Ill-defined condition     prone to decubitus ulcers    Ill-defined condition     anemia    Neurological disorder     Other ill-defined conditions(799.89)     Down syndrome    Psychiatric disorder     mental retardation    Psychiatric disorder     anxiety disorder    [address] [address]     Sleep apnea     Thyroid disease     Unspecified epilepsy without mention of intractable epilepsy       Past Surgical History:   Procedure Laterality Date    HX OTHER SURGICAL  03/13/2015     VNS  Dr. Ronaldo Whitfield. David  @ [facility]     Current Outpatient Medications   Medication Sig Dispense Refill    varicella-zoster recombinant, PF, (Shingrix, PF,) 50 mcg/0.5 mL susr injection 0.5mL by IntraMUSCular route once now and then repeat in 2-6 months 0.5 mL 1    docusate (COLACE) 50 mg/5 mL liquid Take 5 mL by mouth two (2) times a day for 30 days. 473 mL 0    fluticasone propionate (FLONASE) 50 mcg/actuation nasal spray 2 Sprays by Both Nostrils route daily. 1 Bottle 2    pantoprazole (PROTONIX) 40 mg tablet Take 1 Tab by mouth Daily (before breakfast). 30 Tab 2    cholecalciferol (Vitamin D3) (1000 Units /25 mcg) tablet Take 1 Tab by mouth daily. 90 Tab 2    levETIRAcetam (KEPPRA) 100 mg/mL solution 15ml by mouth twice a day. 900 mL 11    lacosamide (Vimpat) 150 mg tab tablet Take 1 Tab by mouth two (2) times a day. Max Daily Amount: 300 mg. 60 Tab 11    levothyroxine (SYNTHROID) 50 mcg tablet Take 1 Tab by mouth Daily (before breakfast). Need appointment. 90 Tab 0    loratadine (CLARITIN) 10 mg tablet Take 1 Tab by mouth daily. 90 Tab 0    ferrous sulfate 325 mg (65 mg iron) tablet Take 1 Tab by mouth Daily (before breakfast). 90 Tab 0    ascorbic acid, vitamin C, (VITAMIN C) 500 mg tablet Take 1 Tab by mouth daily. 30 Tab 6    ketoconazole (NIZORAL) 2 % topical cream Apply  to affected area two (2) times a day. Between toes      phenytoin (DILANTIN-125) suspension Take 4 mL by mouth two (2) times a day.  250 mL 11    H9-R4-R3-B5-B6-iron-met-choln (GERITOL TONIC WITH FERREX 18) 2.5 mg-50 mg-18 iron/15 mL liqd Take 15 mL by mouth every morning.  acetaminophen (TYLENOL) 325 mg tablet Take 2 Tabs by mouth every six (6) hours as needed. For pain 60 Tab 0    multivit-folic acid-herbal 337 (WELLESSE PLUS) 400 mcg-200 mg/30 mL liqd oral liquid Take 30 mL by mouth daily. 1 Bottle 0    sodium chloride (DEEP SEA NASAL) 0.65 % nasal squeeze bottle 1 Spray as needed for Congestion. Allergies   Allergen Reactions    Morphine Not Reported This Time    Tegretol [Carbamazepine] Other (comments)     \"bad reaction\" \"changes attitude\"       Family History   Problem Relation Age of Onset    Hypertension Mother     Cancer Mother     Lupus Mother     Arthritis-osteo Mother     Heart Disease Mother     Heart Disease Father     Diabetes Father     Hypertension Father     Elevated Lipids Father     Diabetes Brother     Elevated Lipids Brother     Hypertension Brother     Mental Retardation Brother     Cancer Maternal Grandmother     Arthritis-osteo Maternal Grandmother     Alcohol abuse Maternal Grandfather     Cancer Maternal Grandfather     Arthritis-osteo Paternal Grandmother     Cancer Paternal Grandfather      Social History     Tobacco Use    Smoking status: Never Smoker    Smokeless tobacco: Never Used   Substance Use Topics    Alcohol use: No         Follow-up and Dispositions    · Return in about 3 months (around 9/1/2021), or if symptoms worsen or fail to improve.            Sridhar Perez MD

## 2021-06-02 NOTE — PROGRESS NOTES
Please call patient to notify that there is no fracture or any dislocation noted but showed osteopenia ( bone loss) . No concerning finding.

## 2021-06-03 ENCOUNTER — TELEPHONE (OUTPATIENT)
Dept: PRIMARY CARE CLINIC | Age: 56
End: 2021-06-03

## 2021-06-03 ENCOUNTER — OFFICE VISIT (OUTPATIENT)
Dept: NEUROLOGY | Age: 56
End: 2021-06-03
Payer: MEDICARE

## 2021-06-03 VITALS — HEART RATE: 88 BPM | DIASTOLIC BLOOD PRESSURE: 60 MMHG | SYSTOLIC BLOOD PRESSURE: 133 MMHG | OXYGEN SATURATION: 100 %

## 2021-06-03 DIAGNOSIS — R41.3 MEMORY LOSS: ICD-10-CM

## 2021-06-03 DIAGNOSIS — G40.909 RECURRENT SEIZURES (HCC): ICD-10-CM

## 2021-06-03 DIAGNOSIS — G31.9 CEREBRAL ATROPHY (HCC): ICD-10-CM

## 2021-06-03 DIAGNOSIS — Q90.9 DOWN'S SYNDROME: ICD-10-CM

## 2021-06-03 DIAGNOSIS — G93.40 ACUTE ENCEPHALOPATHY: Primary | ICD-10-CM

## 2021-06-03 DIAGNOSIS — G40.209 PARTIAL EPILEPSY WITH IMPAIRMENT OF CONSCIOUSNESS (HCC): ICD-10-CM

## 2021-06-03 DIAGNOSIS — G40.209 COMPLEX PARTIAL SEIZURE EVOLVING TO GENERALIZED SEIZURE (HCC): ICD-10-CM

## 2021-06-03 DIAGNOSIS — R27.0 ATAXIA: ICD-10-CM

## 2021-06-03 PROCEDURE — 99214 OFFICE O/P EST MOD 30 MIN: CPT | Performed by: PSYCHIATRY & NEUROLOGY

## 2021-06-03 PROCEDURE — 3017F COLORECTAL CA SCREEN DOC REV: CPT | Performed by: PSYCHIATRY & NEUROLOGY

## 2021-06-03 PROCEDURE — G8432 DEP SCR NOT DOC, RNG: HCPCS | Performed by: PSYCHIATRY & NEUROLOGY

## 2021-06-03 PROCEDURE — G8427 DOCREV CUR MEDS BY ELIG CLIN: HCPCS | Performed by: PSYCHIATRY & NEUROLOGY

## 2021-06-03 PROCEDURE — G8419 CALC BMI OUT NRM PARAM NOF/U: HCPCS | Performed by: PSYCHIATRY & NEUROLOGY

## 2021-06-03 NOTE — PROGRESS NOTES
Consult  REFERRED BY:  Mirza Sharma MD    CHIEF COMPLAINT: Increasing lethargy and decreasing ability to walk      Subjective:     Da Oro is a 64 y.o. right-handed -American male seen for evaluation at the request of Dr. Pierce Morgan for new problem of recent recurring seizures that required using his VNS to stop the seizure at his nursing home last week, and having more episodes of trembling which they think might be minor seizures. Patient has not had his vagus nerve stimulator checked or reprogrammed in about 2 years, so we will have him see Dr. Rebecca Belle to evaluate the vagus nerve stimulator and reset it if possible because he still having spells. His last EEG 1 year ago just showed moderate generalized slowing but no seizures. They are also very concerned about his progressive cerebral atrophy on his CT scan as compared to the one he did 3 years ago, and he just had a repeat one in May 2021, and we advised him that he has Down syndrome which is like a premature form of Alzheimer's disease and he will continue to have progressive cerebral degeneration and neuronal loss causing atrophy and increasing disability and loss of function but she is starting to suffer strategies can  as well and seems less able to do things. He is on medication of Dilantin 100 mg twice a day and a liquid suspension, and takes Keppra 150 mg twice a day and Vimpat 100 mg twice a day also. His Vimpat level was therapeutic and his Keppra level was a little on the high side at 60 last visit, and Dilantin level was a little bit on the low side with a serum free level of 0.5. We will recheck his levels and another EEG, and reprogram the vagus nerve stimulator and see what we can do. He however has had refractory seizures. Patient taken off Depakote because of elevated ammonia level and is doing much better now and his last ammonia level was much improved. His tremors are gotten much better in the interim.   His drowsiness, sedation, and leaning over in his chair have all gotten better. He discussed his condition with the patient and the caregiver in detail and they agreed to therapy and plans as above. He has not had any recurrent seizures in the interim and seem to be doing well as far as the problem goes on the new medications. 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 He still has occasional seizure according to the staff, where he will have a staring off spell and then some tonic-clonic activity for about 30 seconds followed by mild postictal confusion for a minute or 2 and he quickly comes around. He has intractable seizures and has a vagus nerve stimulator and use the HexAirbotin swipe stimulator with the magnet and he will stop the seizures. He is been toxic in the past on medications several times. He has had no other focal weakness, sensory loss, or other focal signs. He does not seem to have any major new medical illnesses now. We discussed his condition with the staff in detail, we will check metabolic parameters to rule out any other treatable cause of his deterioration.     Past Medical History:   Diagnosis Date    Anemia     Anxiety     Blindness of left eye     Cerebral palsy (HCC)     Dementia     Down syndrome     Endocrine disease     thyroid disorder    GERD (gastroesophageal reflux disease)     Ill-defined condition     blind in left eye    Ill-defined condition     imparied gait - uses walker    Ill-defined condition     dermatitis    Ill-defined condition     cerebral palsy    Ill-defined condition     prone to decubitus ulcers    Ill-defined condition     anemia    Neurological disorder     Other ill-defined conditions(799.89)     Down syndrome    Psychiatric disorder     mental retardation    Psychiatric disorder     anxiety disorder    Seizures ([address]     Sleep apnea     Thyroid disease     Unspecified epilepsy without mention of intractable epilepsy       Past Surgical History:   Procedure Laterality Date    HX OTHER SURGICAL  03/13/2015     VNS  Dr. Carlos Alcaraz. Blake Larios  @ [facility]     Family History   Problem Relation Age of Onset    Hypertension Mother     Cancer Mother     Lupus Mother    [facility] Arthritis-osteo Mother     Heart Disease Mother     Heart Disease Father     Diabetes Father     Hypertension Father     Elevated Lipids Father     Diabetes Brother     Elevated Lipids Brother     Hypertension Brother     Mental Retardation Brother     Cancer Maternal Grandmother     Arthritis-osteo Maternal Grandmother     Alcohol abuse Maternal Grandfather     Cancer Maternal Grandfather     Arthritis-osteo Paternal Grandmother     Cancer Paternal Grandfather       Social History     Tobacco Use    Smoking status: Never Smoker    Smokeless tobacco: Never Used   Substance Use Topics    Alcohol use: No         Current Outpatient Medications:     docusate (COLACE) 50 mg/5 mL liquid, Take 10 mL by mouth two (2) times a day., Disp: 473 mL, Rfl: 1    fluticasone propionate (FLONASE) 50 mcg/actuation nasal spray, 2 Sprays by Both Nostrils route daily. , Disp: 1 Bottle, Rfl: 2    pantoprazole (PROTONIX) 40 mg tablet, Take 1 Tab by mouth Daily (before breakfast). , Disp: 30 Tab, Rfl: 2    cholecalciferol (Vitamin D3) (1000 Units /25 mcg) tablet, Take 1 Tab by mouth daily. , Disp: 90 Tab, Rfl: 2    levETIRAcetam (KEPPRA) 100 mg/mL solution, 15ml by mouth twice a day., Disp: 900 mL, Rfl: 11    lacosamide (Vimpat) 150 mg tab tablet, Take 1 Tab by mouth two (2) times a day. Max Daily Amount: 300 mg., Disp: 60 Tab, Rfl: 11    levothyroxine (SYNTHROID) 50 mcg tablet, Take 1 Tab by mouth Daily (before breakfast). Need appointment. , Disp: 90 Tab, Rfl: 0    loratadine (CLARITIN) 10 mg tablet, Take 1 Tab by mouth daily. , Disp: 90 Tab, Rfl: 0    ferrous sulfate 325 mg (65 mg iron) tablet, Take 1 Tab by mouth Daily (before breakfast). , Disp: 90 Tab, Rfl: 0    ascorbic acid, vitamin C, (VITAMIN C) 500 mg tablet, Take 1 Tab by mouth daily. , Disp: 30 Tab, Rfl: 6    ketoconazole (NIZORAL) 2 % topical cream, Apply  to affected area two (2) times a day. Between toes, Disp: , Rfl:     phenytoin (DILANTIN-125) suspension, Take 4 mL by mouth two (2) times a day., Disp: 250 mL, Rfl: 11    acetaminophen (TYLENOL) 325 mg tablet, Take 2 Tabs by mouth every six (6) hours as needed. For pain, Disp: 60 Tab, Rfl: 0    multivit-folic acid-herbal 472 (WELLESSE PLUS) 400 mcg-200 mg/30 mL liqd oral liquid, Take 30 mL by mouth daily. , Disp: 1 Bottle, Rfl: 0    sodium chloride (DEEP SEA NASAL) 0.65 % nasal squeeze bottle, 1 Spray as needed for Congestion. , Disp: , Rfl:     varicella-zoster recombinant, PF, (Shingrix, PF,) 50 mcg/0.5 mL susr injection, 0.5mL by IntraMUSCular route once now and then repeat in 2-6 months (Patient not taking: Reported on 6/3/2021), Disp: 0.5 mL, Rfl: 1        Allergies   Allergen Reactions    Morphine Not Reported This Time    Tegretol [Carbamazepine] Other (comments)     \"bad reaction\" \"changes attitude\"        Review of Systems:  A comprehensive review of systems was negative except for: Constitutional: positive for fatigue and malaise  Musculoskeletal: positive for myalgias, arthralgias and stiff joints  Neurological: positive for dizziness, seizures, memory problems, speech problems, coordination problems, gait problems, tremor and weakness   Vitals:    06/03/21 1159 06/03/21 1238   BP: (!) 133/59 133/60   Pulse: (!) 115 88   SpO2: 100%      Objective:     I    NEUROLOGICAL EXAM:     Appearance: The patient is well developed, well nourished, provides no history and is nonverbal and in no acute distress with severe mental impairment. Mental Status: Nonverbal but does follow simple commands. Mood and affect lethargic.    Cranial Nerves:   Intact visual fields. Fundi are benign but poorly seen on the right. Right pupil reacts, left pupil is scarred over, EOM's full, no nystagmus, no ptosis. Facial sensation is normal. Corneal reflexes are intact. Facial movement is symmetric. Hearing is normal bilaterally. Palate is midline with normal sternocleidomastoid and trapezius muscles are normal. Tongue is midline  Neck without meningismus or bruits  Temporal arteries not tender or enlarged. Motor:  3/5 strength in upper and lower proximal and distal muscles. Normal bulk and tone. No fasciculations. Rapid altering movement is slow bilaterally   Reflexes:   Deep tendon reflexes 1+/4 and symmetrical. Patient has no clonus or Babinski signs present   Sensory:   Normal to touch, pinprick and temperature and vibration unreliable as is DSS   Gait:  Not testable gait. Tremor: Moderate bilateral intention tremor noted. Cerebellar: Moderate abnormal Romberg and tandem cerebellar signs present. Mild tremor on finger-nose-finger exam   Neurovascular:  Normal heart sounds and regular rhythm, peripheral pulses decreased, and no carotid bruits.            Assessment:       ICD-10-CM ICD-9-CM    1. Acute encephalopathy  G93.40 348.30 LEVETIRACETAM (KEPPRA)      LAMOTRIGINE (LAMICTAL)      PHENYTOIN, TOTAL & FREE      EEG      REFERRAL TO NEUROLOGY   2. Recurrent seizures (HCC)  G40.909 345.80 LEVETIRACETAM (KEPPRA)      LAMOTRIGINE (LAMICTAL)      PHENYTOIN, TOTAL & FREE      EEG      REFERRAL TO NEUROLOGY   3. Memory loss  R41.3 780.93 LEVETIRACETAM (KEPPRA)      LAMOTRIGINE (LAMICTAL)      PHENYTOIN, TOTAL & FREE      EEG      REFERRAL TO NEUROLOGY   4.  Complex partial seizure evolving to generalized seizure ([address]  G40.209 345.40 LEVETIRACETAM (KEPPRA)      LAMOTRIGINE (LAMICTAL)      PHENYTOIN, TOTAL & FREE      EEG      REFERRAL TO NEUROLOGY   5. Partial epilepsy with impairment of consciousness (HCC)  G40.209 345.40 LEVETIRACETAM (KEPPRA)      LAMOTRIGINE (LAMICTAL)      PHENYTOIN, TOTAL & FREE      EEG      REFERRAL TO NEUROLOGY   6. Ataxia  R27.0 781.3 LEVETIRACETAM (KEPPRA)      LAMOTRIGINE (LAMICTAL)      PHENYTOIN, TOTAL & FREE      EEG      REFERRAL TO NEUROLOGY   7. Down's syndrome  Q90.9 758.0 LEVETIRACETAM (KEPPRA)      LAMOTRIGINE (LAMICTAL)      PHENYTOIN, TOTAL & FREE      EEG      REFERRAL TO NEUROLOGY   8. Cerebral atrophy (HCC)  G31.9 331.9 LEVETIRACETAM (KEPPRA)      LAMOTRIGINE (LAMICTAL)      PHENYTOIN, TOTAL & FREE      EEG      REFERRAL TO NEUROLOGY     Active Problems:    * No active hospital problems. *      Plan:     Patient with new problem of breakthrough seizures, we will check his levels, and check an EEG and reprogram his vagus nerve stimulator and may adjust his seizure medications depending on his levels, and therapeutic options may be to increase his Vimpat to 200 mg twice daily and decide if we need to increase his other medication  They are to call if he has any more spells in the interim. Patient with progressive cerebral atrophy, and we explained to the caregiver that this would be normal with his Down syndrome which will be a progressive degenerative brain condition leading to severe disability and end-stage being a  baby bedridden, incontinent unable to feed himself and completely dependent  Follow spells he may need to readjust his vagus nerve stimulator, this was all discussed with the staff in detail. Previous imaging of the brain has shown no new structural lesion, and EEGs showed no seizure just mild generalized slowing. Patient will continue his current medications until we get his levels back to decide him with the treatment should be  Discussed with the staff in detail, reviewed all Nunda's records, reviewed the CT scan at [facility] reviewed EEG, all on the PACS system, and I agree with reports and findings as described above during his last admission.   Follow-up in 3 month's time as a scheduled visit or earlier if needed. If we can find a cause of his anorexia he is to see his PCP for medical evaluation. 38 minutes spent with the patient and his caregiver going over his case, discussing his therapeutic options, and discussing plans. We will check my chart for results of this test, or give us a call for further interpretation of his lab results.     Signed By: Kellie Rossi MD     Faith 3, 2021       CC: Michael Mcpherson MD  FAX: [phone]

## 2021-06-03 NOTE — LETTER
6/3/2021 Patient: Nanci Taylor YOB: 1965 Date of Visit: 6/3/2021 Patricia Rolle MD 
[facility] [address] [place] [id number] [address] [employer] Dear Patricia Rolle MD, Thank you for referring Mr. Thomas Logan to [address] [place] [address] for evaluation. My notes for this consultation are attached. Consult REFERRED BY: 
Mirza Sharma MD 
 
CHIEF COMPLAINT: Increasing lethargy and decreasing ability to walk Subjective:  
 
Nanci Taylor is a 64 y.o. right-handed -American male seen for evaluation at the request of Dr. Eugenio Anders for new problem of recent recurring seizures that required using his VNS to stop the seizure at his nursing home last week, and having more episodes of trembling which they think might be minor seizures. Patient has not had his vagus nerve stimulator checked or reprogrammed in about 2 years, so we will have him see Dr. Joleen Mortimer to evaluate the vagus nerve stimulator and reset it if possible because he still having spells. His last EEG 1 year ago just showed moderate generalized slowing but no seizures. They are also very concerned about his progressive cerebral atrophy on his CT scan as compared to the one he did 3 years ago, and he just had a repeat one in May 2021, and we advised him that he has Down syndrome which is like a premature form of Alzheimer's disease and he will continue to have progressive cerebral degeneration and neuronal loss causing atrophy and increasing disability and loss of function but she is starting to suffer strategies can  as well and seems less able to do things. He is on medication of Dilantin 100 mg twice a day and a liquid suspension, and takes Keppra 150 mg twice a day and Vimpat 100 mg twice a day also.   His Vimpat level was therapeutic and his Keppra level was a little on the high side at 60 last visit, and Dilantin level was a little bit on the low side with a serum free level of 0.5. We will recheck his levels and another EEG, and reprogram the vagus nerve stimulator and see what we can do. He however has had refractory seizures. Patient taken off Depakote because of elevated ammonia level and is doing much better now and his last ammonia level was much improved. His tremors are gotten much better in the interim. His drowsiness, sedation, and leaning over in his chair have all gotten better. He discussed his condition with the patient and the caregiver in detail and they agreed to therapy and plans as above. He has not had any recurrent seizures in the interim and seem to be doing well as far as the problem goes on the new medications. 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 He still has occasional seizure according to the staff, where he will have a staring off spell and then some tonic-clonic activity for about 30 seconds followed by mild postictal confusion for a minute or 2 and he quickly comes around. He has intractable seizures and has a vagus nerve stimulator and use the [place]in swipe stimulator with the magnet and he will stop the seizures. He is been toxic in the past on medications several times. He has had no other focal weakness, sensory loss, or other focal signs. He does not seem to have any major new medical illnesses now. We discussed his condition with the staff in detail, we will check metabolic parameters to rule out any other treatable cause of his deterioration. Past Medical History:  
Diagnosis Date  Anemia  Anxiety  Blindness of left eye  Cerebral palsy ([address]  Dementia  Down syndrome  Endocrine disease   
 thyroid disorder  GERD (gastroesophageal reflux disease)  Ill-defined condition   
 blind in left eye  Ill-defined condition   
 imparied gait - uses walker  Ill-defined condition   
 dermatitis  Ill-defined condition   
 cerebral palsy  Ill-defined condition   
 prone to decubitus ulcers  Ill-defined condition   
 anemia  Neurological disorder  Other ill-defined conditions(799.89) Down syndrome  Psychiatric disorder   
 mental retardation  Psychiatric disorder   
 anxiety disorder  Seizures [address] [address]  Sleep apnea  Thyroid disease  Unspecified epilepsy without mention of intractable epilepsy Past Surgical History:  
Procedure Laterality Date  HX OTHER SURGICAL  03/13/2015 VNS  Dr. Deshaun Garcia. Akiko Placido  @ [id number] Family History Problem Relation Age of Onset  Hypertension Mother  Cancer Mother  Lupus Mother [place] Arthritis-osteo Mother  Heart Disease Mother  Heart Disease Father  Diabetes Father  Hypertension Father  Elevated Lipids Father  Diabetes Brother  Elevated Lipids Brother  Hypertension Brother  Mental Retardation Brother  Cancer Maternal Grandmother  Arthritis-osteo Maternal Grandmother  Alcohol abuse Maternal Grandfather  Cancer Maternal Grandfather  Arthritis-osteo Paternal Grandmother  Cancer Paternal Grandfather Social History Tobacco Use  Smoking status: Never Smoker  Smokeless tobacco: Never Used Substance Use Topics  Alcohol use: No  
   
 
Current Outpatient Medications:  
  docusate (COLACE) 50 mg/5 mL liquid, Take 10 mL by mouth two (2) times a day., Disp: 473 mL, Rfl: 1 
  fluticasone propionate (FLONASE) 50 mcg/actuation nasal spray, 2 Sprays by Both Nostrils route daily. , Disp: 1 Bottle, Rfl: 2 
  pantoprazole (PROTONIX) 40 mg tablet, Take 1 Tab by mouth Daily (before breakfast). , Disp: 30 Tab, Rfl: 2   cholecalciferol (Vitamin D3) (1000 Units /25 mcg) tablet, Take 1 Tab by mouth daily. , Disp: 90 Tab, Rfl: 2 
  levETIRAcetam (KEPPRA) 100 mg/mL solution, 15ml by mouth twice a day., Disp: 900 mL, Rfl: 11 
 lacosamide (Vimpat) 150 mg tab tablet, Take 1 Tab by mouth two (2) times a day. Max Daily Amount: 300 mg., Disp: 60 Tab, Rfl: 11 
  levothyroxine (SYNTHROID) 50 mcg tablet, Take 1 Tab by mouth Daily (before breakfast). Need appointment. , Disp: 90 Tab, Rfl: 0 
  loratadine (CLARITIN) 10 mg tablet, Take 1 Tab by mouth daily. , Disp: 90 Tab, Rfl: 0 
  ferrous sulfate 325 mg (65 mg iron) tablet, Take 1 Tab by mouth Daily (before breakfast). , Disp: 90 Tab, Rfl: 0 
  ascorbic acid, vitamin C, (VITAMIN C) 500 mg tablet, Take 1 Tab by mouth daily. , Disp: 30 Tab, Rfl: 6 
  ketoconazole (NIZORAL) 2 % topical cream, Apply  to affected area two (2) times a day. Between toes, Disp: , Rfl:  
  phenytoin (DILANTIN-125) suspension, Take 4 mL by mouth two (2) times a day., Disp: 250 mL, Rfl: 11 
  acetaminophen (TYLENOL) 325 mg tablet, Take 2 Tabs by mouth every six (6) hours as needed. For pain, Disp: 60 Tab, Rfl: 0 
  multivit-folic acid-herbal 460 (WELLESSE PLUS) 400 mcg-200 mg/30 mL liqd oral liquid, Take 30 mL by mouth daily. , Disp: 1 Bottle, Rfl: 0 
  sodium chloride (DEEP SEA NASAL) 0.65 % nasal squeeze bottle, 1 Spray as needed for Congestion. , Disp: , Rfl:  
  varicella-zoster recombinant, PF, (Shingrix, PF,) 50 mcg/0.5 mL susr injection, 0.5mL by IntraMUSCular route once now and then repeat in 2-6 months (Patient not taking: Reported on 6/3/2021), Disp: 0.5 mL, Rfl: 1 Allergies Allergen Reactions  Morphine Not Reported This Time  Tegretol [Carbamazepine] Other (comments) \"bad reaction\" \"changes attitude\" Review of Systems: A comprehensive review of systems was negative except for: Constitutional: positive for fatigue and malaise Musculoskeletal: positive for myalgias, arthralgias and stiff joints Neurological: positive for dizziness, seizures, memory problems, speech problems, coordination problems, gait problems, tremor and weakness Vitals:  
 06/03/21 1159 06/03/21 1238 BP: (!) 133/59 133/60 Pulse: (!) 115 88 SpO2: 100% Objective: I 
 
NEUROLOGICAL EXAM: 
  
Appearance: The patient is well developed, well nourished, provides no history and is nonverbal and in no acute distress with severe mental impairment. Mental Status: Nonverbal but does follow simple commands. Mood and affect lethargic. Cranial Nerves:   Intact visual fields. Fundi are benign but poorly seen on the right. Right pupil reacts, left pupil is scarred over, EOM's full, no nystagmus, no ptosis. Facial sensation is normal. Corneal reflexes are intact. Facial movement is symmetric. Hearing is normal bilaterally. Palate is midline with normal sternocleidomastoid and trapezius muscles are normal. Tongue is midline Neck without meningismus or bruits Temporal arteries not tender or enlarged. Motor:  3/5 strength in upper and lower proximal and distal muscles. Normal bulk and tone. No fasciculations. Rapid altering movement is slow bilaterally Reflexes:   Deep tendon reflexes 1+/4 and symmetrical. Patient has no clonus or Babinski signs present Sensory:   Normal to touch, pinprick and temperature and vibration unreliable as is DSS Gait:  Not testable gait. Tremor: Moderate bilateral intention tremor noted. Cerebellar: Moderate abnormal Romberg and tandem cerebellar signs present. Mild tremor on finger-nose-finger exam  
Neurovascular:  Normal heart sounds and regular rhythm, peripheral pulses decreased, and no carotid bruits.  
  
 
 
 
Assessment: ICD-10-CM ICD-9-CM 1. Acute encephalopathy  G93.40 348.30 LEVETIRACETAM (KEPPRA) LAMOTRIGINE (LAMICTAL) PHENYTOIN, TOTAL & FREE  
   EEG REFERRAL TO NEUROLOGY 2. Recurrent seizures (HCC)  G40.909 345.80 LEVETIRACETAM (KEPPRA) LAMOTRIGINE (LAMICTAL) PHENYTOIN, TOTAL & FREE  
   EEG REFERRAL TO NEUROLOGY 3. Memory loss  R41.3 780.93 LEVETIRACETAM (KEPPRA) LAMOTRIGINE (LAMICTAL)    PHENYTOIN, TOTAL & FREE  
   EEG REFERRAL TO NEUROLOGY 4. Complex partial seizure evolving to generalized seizure ([address]  G40.209 345.40 LEVETIRACETAM (KEPPRA) LAMOTRIGINE (LAMICTAL) PHENYTOIN, TOTAL & FREE  
   EEG REFERRAL TO NEUROLOGY 5. Partial epilepsy with impairment of consciousness (HCC)  G40.209 345.40 LEVETIRACETAM (KEPPRA) LAMOTRIGINE (LAMICTAL) PHENYTOIN, TOTAL & FREE  
   EEG REFERRAL TO NEUROLOGY 6. Ataxia  R27.0 781.3 LEVETIRACETAM (KEPPRA) LAMOTRIGINE (LAMICTAL) PHENYTOIN, TOTAL & FREE  
   EEG REFERRAL TO NEUROLOGY 7. Down's syndrome  Q90.9 758.0 LEVETIRACETAM (KEPPRA) LAMOTRIGINE (LAMICTAL) PHENYTOIN, TOTAL & FREE  
   EEG REFERRAL TO NEUROLOGY 8. Cerebral atrophy (HCC)  G31.9 331.9 LEVETIRACETAM (KEPPRA) LAMOTRIGINE (LAMICTAL) PHENYTOIN, TOTAL & FREE  
   EEG REFERRAL TO NEUROLOGY Active Problems: * No active hospital problems. * 
 
 
Plan:  
 
Patient with new problem of breakthrough seizures, we will check his levels, and check an EEG and reprogram his vagus nerve stimulator and may adjust his seizure medications depending on his levels, and therapeutic options may be to increase his Vimpat to 200 mg twice daily and decide if we need to increase his other medication They are to call if he has any more spells in the interim. Patient with progressive cerebral atrophy, and we explained to the caregiver that this would be normal with his Down syndrome which will be a progressive degenerative brain condition leading to severe disability and end-stage being a  baby bedridden, incontinent unable to feed himself and completely dependent Follow spells he may need to readjust his vagus nerve stimulator, this was all discussed with the staff in detail. Previous imaging of the brain has shown no new structural lesion, and EEGs showed no seizure just mild generalized slowing.    
Patient will continue his current medications until we get his levels back to decide him with the treatment should be Discussed with the staff in detail, reviewed all [facility] records, reviewed the CT scan at [facility] reviewed EEG, all on the PACS system, and I agree with reports and findings as described above during his last admission. Follow-up in 3 month's time as a scheduled visit or earlier if needed. If we can find a cause of his anorexia he is to see his PCP for medical evaluation. 38 minutes spent with the patient and his caregiver going over his case, discussing his therapeutic options, and discussing plans. We will check my chart for results of this test, or give us a call for further interpretation of his lab results. Signed By: Carlitos Mcgrath MD   
 Faith 3, 2021 CC: Vivian Osei MD 
FAX: [phone] If you have questions, please do not hesitate to call me. I look forward to following your patient along with you. Sincerely, Carlitos Mcgrath MD

## 2021-06-04 LAB
GAMMA INTERFERON BACKGROUND BLD IA-ACNC: 0 IU/ML
M TB IFN-G BLD-IMP: ABNORMAL
M TB IFN-G CD4+ BCKGRND COR BLD-ACNC: 0 IU/ML
MITOGEN IGNF BLD-ACNC: 0.06 IU/ML
PSA SERPL-MCNC: 0.4 NG/ML (ref 0–4)
QUANTIFERON TB2 AG: 0 IU/ML
SERVICE CMNT-IMP: ABNORMAL
TSH SERPL DL<=0.005 MIU/L-ACNC: 1.6 UIU/ML (ref 0.45–4.5)

## 2021-06-10 DIAGNOSIS — R76.12 POSITIVE QUANTIFERON-TB GOLD TEST: Primary | ICD-10-CM

## 2021-06-10 DIAGNOSIS — E03.9 ACQUIRED HYPOTHYROIDISM: ICD-10-CM

## 2021-06-10 DIAGNOSIS — R09.81 NASAL CONGESTION: ICD-10-CM

## 2021-06-10 NOTE — TELEPHONE ENCOUNTER
----- Message from Rosina Odell MD sent at 6/10/2021  1:56 PM EDT -----  Please call patient that TB test came back indeterminate. Xray chest ordered to r/o TB.      Thyroid and prostate test result came back normal.

## 2021-06-10 NOTE — PROGRESS NOTES
Please call patient that TB test came back indeterminate. Xray chest ordered to r/o TB.      Thyroid and prostate test result came back normal.

## 2021-06-11 RX ORDER — LORATADINE 10 MG/1
10 TABLET ORAL DAILY
Qty: 90 TABLET | Refills: 0 | Status: SHIPPED | OUTPATIENT
Start: 2021-06-11

## 2021-06-11 RX ORDER — LEVOTHYROXINE SODIUM 50 UG/1
50 TABLET ORAL
Qty: 90 TABLET | Refills: 0 | Status: SHIPPED | OUTPATIENT
Start: 2021-06-11 | End: 2022-02-10 | Stop reason: SDUPTHER

## 2021-06-11 RX ORDER — LANOLIN ALCOHOL/MO/W.PET/CERES
325 CREAM (GRAM) TOPICAL
Qty: 90 TABLET | Refills: 0 | Status: SHIPPED | OUTPATIENT
Start: 2021-06-11

## 2021-06-18 ENCOUNTER — TELEPHONE (OUTPATIENT)
Dept: NEUROLOGY | Age: 56
End: 2021-06-18

## 2021-07-07 RX ORDER — PANTOPRAZOLE SODIUM 40 MG/1
40 TABLET, DELAYED RELEASE ORAL
Qty: 30 TABLET | Refills: 2 | Status: SHIPPED | OUTPATIENT
Start: 2021-07-07

## 2021-07-07 RX ORDER — ASCORBIC ACID 500 MG
500 TABLET ORAL DAILY
Qty: 30 TABLET | Refills: 6 | Status: SHIPPED | OUTPATIENT
Start: 2021-07-07

## 2021-07-14 ENCOUNTER — TELEPHONE (OUTPATIENT)
Dept: PRIMARY CARE CLINIC | Age: 56
End: 2021-07-14

## 2021-07-14 NOTE — TELEPHONE ENCOUNTER
Spoke to Barbara and informed Dr. Luan Tse does not follow hospice.    Patient was found unresponsive at group home and is in respiratory failure

## 2021-07-14 NOTE — TELEPHONE ENCOUNTER
Pt was admitted into hospice today (7/14) wants to use Dr. Jania Palomares as his attending physician. Please call Florinda Cid back at [phone] she is with [facility].

## 2021-07-16 ENCOUNTER — TELEPHONE (OUTPATIENT)
Dept: NEUROLOGY | Age: 56
End: 2021-07-16

## 2021-07-16 DIAGNOSIS — R56.9 SEIZURE (HCC): Primary | ICD-10-CM

## 2021-07-16 RX ORDER — LACOSAMIDE 10 MG/ML
150 SOLUTION ORAL 2 TIMES DAILY
Qty: 1 BOTTLE | Refills: 5 | OUTPATIENT
Start: 2021-07-16 | End: 2021-11-29 | Stop reason: SDUPTHER

## 2021-07-16 NOTE — TELEPHONE ENCOUNTER
----- Message from Roderick Acuña sent at 7/16/2021  9:46 AM EDT -----  Regarding: /telephone  General Message/Vendor Calls    Caller's first and last name: Tru Choe      Reason for call: Medication form      Callback required yes/no and why: Yes      Best contact number(s): [phone]      Details to clarify the request: Pt had a feeding tube inserted and needs his medication in liquid form, \" lacosamide (Vimpat) 150 mg\". Pt has gone  3 days without his medication. Tru Choe has requested this be sent to the pharmacy ASAP. Please advise.       Roderick Acuña

## 2021-07-16 NOTE — TELEPHONE ENCOUNTER
Medication called in per verbal order from Dr. Mary Roth. This is to switch from pill to liquid.   Please sign off on

## 2021-07-16 NOTE — TELEPHONE ENCOUNTER
----- Message from Km martins sent at 7/16/2021 12:31 PM EDT -----  Regarding: /telephone     Caller's first and last name:[facility]  Reason for call:New Rx needed  Callback required yes/no and why:Yes  Best contact number(s):[phone]  Details to clarify the request:Pt just got out of the hospital and got prescribed Vimpat and he is now on a feeding tube so he his Rx needs to be for liquid not the tablets.

## 2021-08-11 ENCOUNTER — TELEPHONE (OUTPATIENT)
Dept: NEUROLOGY | Age: 56
End: 2021-08-11

## 2021-08-11 NOTE — TELEPHONE ENCOUNTER
----- Message from Brandon Urban sent at 8/11/2021 12:29 PM EDT -----  Regarding: Dr. Liam Ruiz: [phone]  General Message/Vendor Calls    Caller's first and last name:Yamini Manzano, sister in law       Reason for call:Pt's guardians would like a call back to discuss the pt with Dr. Frannie Izaguirre. His cerebral palsy has gotten worse and pt needs a patient lift. Callback required yes/no and why:Yes, discuss.        Best contact number(s):[phone]      Details to clarify the request:n/a      Brandon Urban

## 2021-08-13 NOTE — TELEPHONE ENCOUNTER
verónica rodgers called in regards to a lift for the patient, patient has declined since getting released from the hospital, verónica would like a phone call from dr Jovanni Jamil to discussm, verónica can be reached at [phone]

## 2021-08-13 NOTE — TELEPHONE ENCOUNTER
I called the number on the chart, I got the aide who takes care of him and they know nothing about needing a left, he is in hospice care, and they will discuss it with Chanel Mendoza because they do not think he needs anything

## 2021-11-05 DIAGNOSIS — R56.9 SEIZURE (HCC): ICD-10-CM

## 2021-11-05 RX ORDER — LACOSAMIDE 10 MG/ML
150 SOLUTION ORAL 2 TIMES DAILY
Qty: 600 ML | Refills: 1 | OUTPATIENT
Start: 2021-11-05

## 2021-11-17 ENCOUNTER — TELEPHONE (OUTPATIENT)
Dept: PRIMARY CARE CLINIC | Age: 56
End: 2021-11-17

## 2021-11-17 DIAGNOSIS — K59.00 CONSTIPATION, UNSPECIFIED CONSTIPATION TYPE: ICD-10-CM

## 2021-11-17 NOTE — TELEPHONE ENCOUNTER
Received fax from [address] [place] [address] for refill request on Ferrous Sulf 220 MG/5ML    ML    Directions give 7.5 ml VIA-G-TUBE Once a Day      Ph [phone]        I didn't see this exact description in med list

## 2021-11-18 RX ORDER — DOCUSATE SODIUM 50 MG/5ML
100 LIQUID ORAL 2 TIMES DAILY
Qty: 473 ML | Refills: 1 | Status: SHIPPED | OUTPATIENT
Start: 2021-11-18

## 2021-11-29 DIAGNOSIS — R56.9 SEIZURE (HCC): ICD-10-CM

## 2021-11-29 RX ORDER — LACOSAMIDE 10 MG/ML
150 SOLUTION ORAL 2 TIMES DAILY
Qty: 600 ML | Refills: 5 | Status: SHIPPED | OUTPATIENT
Start: 2021-11-29

## 2021-12-30 RX ORDER — PANTOPRAZOLE SODIUM 40 MG/1
40 TABLET, DELAYED RELEASE ORAL
Qty: 30 TABLET | Refills: 2 | OUTPATIENT
Start: 2021-12-30

## 2022-02-10 DIAGNOSIS — E03.9 ACQUIRED HYPOTHYROIDISM: ICD-10-CM

## 2022-02-11 RX ORDER — LEVOTHYROXINE SODIUM 50 UG/1
50 TABLET ORAL
Qty: 90 TABLET | Refills: 0 | Status: SHIPPED | OUTPATIENT
Start: 2022-02-11

## 2022-03-18 PROBLEM — E03.9 ACQUIRED HYPOTHYROIDISM: Status: ACTIVE | Noted: 2018-04-12

## 2022-03-18 PROBLEM — G93.40 ACUTE ENCEPHALOPATHY: Status: ACTIVE | Noted: 2018-03-23

## 2022-03-19 PROBLEM — H54.40 BLIND LEFT EYE: Status: ACTIVE | Noted: 2018-01-11

## 2022-03-19 PROBLEM — R79.89 LOW VITAMIN D LEVEL: Status: ACTIVE | Noted: 2018-01-11

## 2022-03-19 PROBLEM — Q90.9 DOWN SYNDROME: Status: ACTIVE | Noted: 2018-03-20

## 2022-03-19 PROBLEM — G40.209 COMPLEX PARTIAL SEIZURE EVOLVING TO GENERALIZED SEIZURE (HCC): Status: ACTIVE | Noted: 2018-03-23

## 2022-03-20 PROBLEM — J18.9 PNEUMONIA: Status: ACTIVE | Noted: 2018-02-03

## 2022-03-20 PROBLEM — Q90.9 DOWN'S SYNDROME: Status: ACTIVE | Noted: 2021-06-03

## 2022-03-20 PROBLEM — G31.9 CEREBRAL ATROPHY (HCC): Status: ACTIVE | Noted: 2021-06-03

## 2022-03-24 ENCOUNTER — TELEPHONE (OUTPATIENT)
Dept: PRIMARY CARE CLINIC | Age: 57
End: 2022-03-24

## 2022-03-24 NOTE — TELEPHONE ENCOUNTER
Polo/ calling with [facility], states that the state need physical/labs from June today in ASAP.     Fax [phone]    Questions call [phone]

## 2022-04-04 NOTE — PROGRESS NOTES
Problem: Falls - Risk of  Goal: *Absence of Falls  Document Mae Fall Risk and appropriate interventions in the flowsheet.    Outcome: Progressing Towards Goal  Fall Risk Interventions:  Mobility Interventions: Communicate number of staff needed for ambulation/transfer, Patient to call before getting OOB, Strengthening exercises (ROM-active/passive), Utilize walker, cane, or other assitive device    Mentation Interventions: Adequate sleep, hydration, pain control, Evaluate medications/consider consulting pharmacy, Door open when patient unattended, Bed/chair exit alarm, Family/sitter at bedside, Gait belt with transfers/ambulation, More frequent rounding, Reorient patient, Update white board    Medication Interventions: Assess postural VS orthostatic hypotension, Evaluate medications/consider consulting pharmacy, Patient to call before getting OOB, Teach patient to arise slowly    Elimination Interventions: Call light in reach, Bed/chair exit alarm, Patient to call for help with toileting needs, Toilet paper/wipes in reach Home

## 2022-06-14 NOTE — TELEPHONE ENCOUNTER
PCP: Danielle Hill MD    Last appt: 6/1/2021  No future appointments. Requested Prescriptions     Pending Prescriptions Disp Refills    docusate (COLACE) 50 mg/5 mL liquid 473 mL 1     Sig: Take 10 mL by mouth two (2) times a day.          Other Comments:
Awake